# Patient Record
Sex: FEMALE | Race: WHITE | NOT HISPANIC OR LATINO | Employment: OTHER | URBAN - METROPOLITAN AREA
[De-identification: names, ages, dates, MRNs, and addresses within clinical notes are randomized per-mention and may not be internally consistent; named-entity substitution may affect disease eponyms.]

---

## 2017-03-28 ENCOUNTER — OFFICE VISIT (OUTPATIENT)
Dept: CARDIOLOGY | Facility: CLINIC | Age: 82
End: 2017-03-28
Payer: MEDICARE

## 2017-03-28 VITALS
HEART RATE: 64 BPM | DIASTOLIC BLOOD PRESSURE: 70 MMHG | SYSTOLIC BLOOD PRESSURE: 100 MMHG | BODY MASS INDEX: 23.43 KG/M2 | HEIGHT: 63 IN | WEIGHT: 132.25 LBS

## 2017-03-28 DIAGNOSIS — I10 ESSENTIAL HYPERTENSION: Primary | Chronic | ICD-10-CM

## 2017-03-28 DIAGNOSIS — I73.9 PAD (PERIPHERAL ARTERY DISEASE): ICD-10-CM

## 2017-03-28 DIAGNOSIS — I77.1 SUBCLAVIAN ARTERY STENOSIS, LEFT: ICD-10-CM

## 2017-03-28 DIAGNOSIS — I70.1 RIGHT RENAL ARTERY STENOSIS: ICD-10-CM

## 2017-03-28 DIAGNOSIS — E78.5 DYSLIPIDEMIA: Chronic | ICD-10-CM

## 2017-03-28 PROCEDURE — 99213 OFFICE O/P EST LOW 20 MIN: CPT | Mod: PBBFAC | Performed by: INTERNAL MEDICINE

## 2017-03-28 PROCEDURE — 99214 OFFICE O/P EST MOD 30 MIN: CPT | Mod: S$PBB,,, | Performed by: INTERNAL MEDICINE

## 2017-03-28 PROCEDURE — 99999 PR PBB SHADOW E&M-EST. PATIENT-LVL III: CPT | Mod: PBBFAC,,, | Performed by: INTERNAL MEDICINE

## 2017-03-28 RX ORDER — ROSUVASTATIN CALCIUM 5 MG/1
TABLET, COATED ORAL
Qty: 45 TABLET | Refills: 3 | Status: SHIPPED | OUTPATIENT
Start: 2017-03-28 | End: 2018-01-02 | Stop reason: SDUPTHER

## 2017-03-28 RX ORDER — LOSARTAN POTASSIUM 100 MG/1
100 TABLET ORAL DAILY
Qty: 90 TABLET | Refills: 3 | Status: SHIPPED | OUTPATIENT
Start: 2017-03-28 | End: 2018-01-02 | Stop reason: SDUPTHER

## 2017-03-28 NOTE — MR AVS SNAPSHOT
[facility]  [address]  [facility] [address]  Phone: [phone]                  Naheed Cottrell   3/28/2017 1:30 PM   Office Visit    Description:  Female : 1933   Provider:  Ronald Braswell MD   Department:  [facility]           Reason for Visit     Renovascular hypertension           Diagnoses this Visit        Comments    Subclavian artery stenosis, left    -  Primary     Essential hypertension         Right renal artery stenosis         Dyslipidemia                To Do List           Goals (5 Years of Data)     None      Follow-Up and Disposition     Return in about 3 months (around 2017).       These Medications        Disp Refills Start End    losartan (COZAAR) 100 MG tablet 90 tablet 3 3/28/2017     Take 1 tablet (100 mg total) by mouth once daily. - Oral    Pharmacy: [address] Ph #: [phone]       rosuvastatin (CRESTOR) 5 MG tablet 45 tablet 3 3/28/2017     Take one tablet every other day along with CoQ 10    Pharmacy: [facility] [address] [place] [address] Ph #: [phone]         [facility] On Call     [facility] On Call Nurse Care Line -  Assistance  Registered nurses in the [facility] On Call Center provide clinical advisement, health education, appointment booking, and other advisory services.  Call for this free service at [phone].             Medications           Message regarding Medications     Verify the changes and/or additions to your medication regime listed below are the same as discussed with your clinician today.  If any of these changes or additions are incorrect, please notify your healthcare provider.        START taking these NEW medications        Refills    rosuvastatin (CRESTOR) 5 MG tablet 3    Sig: Take one tablet every other day along with CoQ 10    Class: Normal      CHANGE how you are taking these medications     Start Taking  "Instead of    losartan (COZAAR) 100 MG tablet losartan (COZAAR) 50 MG tablet    Dosage:  Take 1 tablet (100 mg total) by mouth once daily. Dosage:  Take 1 tablet (50 mg total) by mouth once daily.    Reason for Change:  Reorder            Verify that the below list of medications is an accurate representation of the medications you are currently taking.  If none reported, the list may be blank. If incorrect, please contact your healthcare provider. Carry this list with you in case of emergency.           Current Medications     carvedilol (COREG) 25 MG tablet Take 1 tablet (25 mg total) by mouth 2 (two) times daily with meals.    chlorthalidone (HYGROTEN) 25 MG Tab Take 1 tablet (25 mg total) by mouth once daily.    cilostazol (PLETAL) 50 MG Tab Take 1 tablet (50 mg total) by mouth 2 (two) times daily.    citalopram (CELEXA) 20 MG tablet Take 1 tablet (20 mg total) by mouth once daily.    losartan (COZAAR) 100 MG tablet Take 1 tablet (100 mg total) by mouth once daily.    rosuvastatin (CRESTOR) 5 MG tablet Take one tablet every other day along with CoQ 10           Clinical Reference Information           Your Vitals Were     BP Pulse Height Weight Last Period BMI    100/70 (BP Location: Left arm, Patient Position: Sitting, BP Method: Manual) 64 5' 3" (1.6 m) 60 kg (132 lb 4.4 oz) 10/01/1980 (Approximate) 23.43 kg/m2      Blood Pressure          Most Recent Value    Right Arm BP - Sitting  168/70    Left Arm BP - Sitting  100/70    BP  100/70      Allergies as of 3/28/2017     No Known Allergies      Immunizations Administered on Date of Encounter - 3/28/2017     None      Orders Placed During Today's Visit     Future Labs/Procedures Expected by Expires    Comprehensive metabolic panel  5/27/2017 5/27/2018    Lipid panel  5/27/2017 (Approximate) 3/28/2018      [facility] Sign-Up     Activating your [facility] account is as easy as 1-2-3!     1) Visit [email or web address], select Sign Up Now, enter this activation code and " your date of birth, then select Next.  [email or web address]  Expires: 5/12/2017  1:09 PM      2) Create a username and password to use when you visit [facility] in the future and select a security question in case you lose your password and select Next.    3) Enter your e-mail address and click Sign Up!    Additional Information  If you have questions, please e-mail [email or web address] or call [phone] to talk to our [facility] staff. Remember, [email or web address] is NOT to be used for urgent needs. For medical emergencies, dial 911.         Instructions    Increase Losartan to 100 mg daily ( 2 of your current tablets until gone then the new prescription )  Add Crestor ( rosuvastatin ) 5 mg every other day along with CoQ 10  200 mg         Language Assistance Services     ATTENTION: Language assistance services are available, free of charge. Please call [phone].      ATENCIÓN: Si habla onofre, tiene a verma disposición servicios gratuitos de asistencia lingüística. Llame al [phone].     PETRA Ý: N?u b?n nói Ti?ng Vi?t, có các d?ch v? h? tr? ngôn ng? mi?n phí dành cho b?n. G?i s? [phone].         [facility] complies with applicable Federal civil rights laws and does not discriminate on the basis of race, color, national origin, age, disability, or sex.

## 2017-03-28 NOTE — PROGRESS NOTES
Subjective:   Patient ID:  Naheed Cottrell is a 84 y.o. female who presents for follow-up of Renovascular hypertension      HPI:   Naheed Cottrell presents for follow up of hypertension with a possible renovascular component. Elevated in the Clinic today ( right arm ). Naheed Cottrell has peripheral vascular disease with asymptomatic left subclavian stenosis. 2-3 block claudication , moderate LICA stenosis, and the mentioned KEVIN. Remains very active. Naheed Cottrell denies chest pain, shortness of breath, palpitations, presyncope , or syncope. Naheed Cottrell has dyslipidemia and has been intolerant of atorvastatin and simvastatin due to myalgias.. .  Review of Systems   Constitution: Negative for weakness, malaise/fatigue, weight gain and weight loss.   HENT: Negative for headaches.    Eyes: Negative for blurred vision.   Cardiovascular: Positive for claudication. Negative for chest pain, cyanosis, dyspnea on exertion, irregular heartbeat, leg swelling, near-syncope, orthopnea, palpitations, paroxysmal nocturnal dyspnea and syncope.   Respiratory: Negative for cough, shortness of breath and wheezing.    Musculoskeletal: Positive for joint pain. Negative for falls and myalgias.   Gastrointestinal: Negative for abdominal pain, heartburn, nausea and vomiting.   Genitourinary: Negative for nocturia.   Neurological: Negative for brief paralysis, dizziness, focal weakness, numbness and paresthesias.   Psychiatric/Behavioral: Negative for altered mental status.       Current Outpatient Prescriptions   Medication Sig    carvedilol (COREG) 25 MG tablet Take 1 tablet (25 mg total) by mouth 2 (two) times daily with meals.    chlorthalidone (HYGROTEN) 25 MG Tab Take 1 tablet (25 mg total) by mouth once daily.    cilostazol (PLETAL) 50 MG Tab Take 1 tablet (50 mg total) by mouth 2 (two) times daily.    citalopram (CELEXA) 20 MG tablet Take 1 tablet (20 mg total) by mouth once daily.    losartan (COZAAR) 100 MG tablet Take  "1 tablet (100 mg total) by mouth once daily.    rosuvastatin (CRESTOR) 5 MG tablet Take one tablet every other day along with CoQ 10     No current facility-administered medications for this visit.      Objective:   Physical Exam   Constitutional: She is oriented to person, place, and time. She appears well-developed. No distress.   /70 (BP Location: Left arm, Patient Position: Sitting, BP Method: Manual)  Pulse 64  Ht 5' 3" (1.6 m)  Wt 60 kg (132 lb 4.4 oz)  LMP 10/01/1980 (Approximate)  BMI 23.43 kg/m2   HENT:   Head: Normocephalic.   Eyes: Conjunctivae are normal. Pupils are equal, round, and reactive to light. No scleral icterus.   Neck: Neck supple. No JVD present. No thyromegaly present.   Cardiovascular: Normal rate, regular rhythm and intact distal pulses.  PMI is not displaced.  Exam reveals no gallop and no friction rub.    Murmur heard.   Medium-pitched midsystolic murmur is present with a grade of 1/6  at the upper left sternal border  Pulses:       Carotid pulses are on the right side with bruit       Radial pulses are 2+ on the right side, and 1+ on the left side.        Dorsalis pedis pulses are 0 on the right side, and 0 on the left side.        Posterior tibial pulses are 0 on the right side, and 0 on the left side.   Pulmonary/Chest: Effort normal and breath sounds normal. No respiratory distress. She has no wheezes. She has no rales.   Abdominal: Soft. She exhibits no distension. There is no splenomegaly or hepatomegaly. There is no tenderness.   Musculoskeletal: She exhibits no edema or tenderness.   Gait normal   Neurological: She is alert and oriented to person, place, and time.   Skin: Skin is warm and dry. She is not diaphoretic.   Psychiatric: She has a normal mood and affect. Her behavior is normal.       Lab Results   Component Value Date     (L) 10/16/2016    K 3.6 10/16/2016     10/16/2016    CO2 23 10/16/2016    BUN 24 (H) 10/16/2016    CREATININE 0.9 10/16/2016 "     (H) 10/16/2016    HGBA1C 5.5 10/01/2015    MG 2.1 10/16/2016    AST 17 10/16/2016    ALT 10 10/16/2016    ALBUMIN 3.5 10/16/2016    PROT 6.8 10/16/2016    BILITOT 0.3 10/16/2016    WBC 10.00 10/16/2016    HGB 12.5 10/16/2016    HCT 38.1 10/16/2016    MCV 93 10/16/2016     10/16/2016    TSH 1.743 10/16/2016    CHOL 144 08/04/2016    HDL 55 08/04/2016    LDLCALC 78.0 08/04/2016    TRIG 55 08/04/2016       Assessment:     1. Essential hypertension : Moderate elevation   2. Right renal artery stenosis : Possible contribution to # 1   3. Subclavian artery stenosis, left : Asymptomatic   4. Dyslipidemia : Untreated   5       Peripheral arterial Disease: 2-3 block claudication - stable    Plan:     Naheed was seen today for renovascular hypertension.    Diagnoses and all orders for this visit:    Essential hypertension  -     Comprehensive metabolic panel; Future; Expected date: 5/27/17  -     losartan (COZAAR) 100 MG tablet; Take 1 tablet (100 mg total) by mouth once daily ( an increase ).    Right renal artery stenosis    Subclavian artery stenosis, left    Dyslipidemia  -     Lipid panel; Future; Expected date: 5/27/17  -     rosuvastatin (CRESTOR) 5 MG tablet; Take one tablet every other day along with CoQ 10  Peripheral Arterial Disease

## 2017-05-29 ENCOUNTER — LAB VISIT (OUTPATIENT)
Dept: LAB | Facility: HOSPITAL | Age: 82
End: 2017-05-29
Attending: INTERNAL MEDICINE
Payer: MEDICARE

## 2017-05-29 ENCOUNTER — TELEPHONE (OUTPATIENT)
Dept: CARDIOLOGY | Facility: CLINIC | Age: 82
End: 2017-05-29

## 2017-05-29 DIAGNOSIS — I10 ESSENTIAL HYPERTENSION: Chronic | ICD-10-CM

## 2017-05-29 DIAGNOSIS — E78.5 DYSLIPIDEMIA: Chronic | ICD-10-CM

## 2017-05-29 LAB
ALBUMIN SERPL BCP-MCNC: 3.4 G/DL
ALP SERPL-CCNC: 64 U/L
ALT SERPL W/O P-5'-P-CCNC: 7 U/L
ANION GAP SERPL CALC-SCNC: 10 MMOL/L
AST SERPL-CCNC: 15 U/L
BILIRUB SERPL-MCNC: 0.4 MG/DL
BUN SERPL-MCNC: 15 MG/DL
CALCIUM SERPL-MCNC: 9.1 MG/DL
CHLORIDE SERPL-SCNC: 103 MMOL/L
CHOLEST/HDLC SERPL: 3.6 {RATIO}
CO2 SERPL-SCNC: 27 MMOL/L
CREAT SERPL-MCNC: 0.8 MG/DL
EST. GFR  (AFRICAN AMERICAN): >60 ML/MIN/1.73 M^2
EST. GFR  (NON AFRICAN AMERICAN): >60 ML/MIN/1.73 M^2
GLUCOSE SERPL-MCNC: 93 MG/DL
HDL/CHOLESTEROL RATIO: 28.1 %
HDLC SERPL-MCNC: 167 MG/DL
HDLC SERPL-MCNC: 47 MG/DL
LDLC SERPL CALC-MCNC: 106.2 MG/DL
NONHDLC SERPL-MCNC: 120 MG/DL
POTASSIUM SERPL-SCNC: 3.8 MMOL/L
PROT SERPL-MCNC: 6.7 G/DL
SODIUM SERPL-SCNC: 140 MMOL/L
TRIGL SERPL-MCNC: 69 MG/DL

## 2017-05-29 PROCEDURE — 36415 COLL VENOUS BLD VENIPUNCTURE: CPT | Mod: PO

## 2017-05-29 PROCEDURE — 80061 LIPID PANEL: CPT

## 2017-05-29 PROCEDURE — 80053 COMPREHEN METABOLIC PANEL: CPT

## 2017-05-30 NOTE — TELEPHONE ENCOUNTER
Results given to patient and she verbalized understanding.    Notes Recorded by Ronald Braswell MD on 5/29/2017 at 5:52 PM CDT  Please inform the patient that in addition to the comments on her cholesterol,  the other lab OK.  ------    Notes Recorded by Ronald Braswell MD on 5/29/2017 at 12:45 PM CDT  Please inform the patient that, although her cholesterol is not perfect, it is much better than the untreated values. Continue every other day rosuvastatin if tolerated

## 2017-06-04 ENCOUNTER — HOSPITAL ENCOUNTER (INPATIENT)
Facility: HOSPITAL | Age: 82
LOS: 3 days | Discharge: HOME HEALTH | DRG: 683 | End: 2017-06-07
Attending: EMERGENCY MEDICINE | Admitting: HOSPITALIST
Payer: MEDICARE

## 2017-06-04 DIAGNOSIS — S42.202A CLOSED FRACTURE OF PROXIMAL END OF LEFT HUMERUS, UNSPECIFIED FRACTURE MORPHOLOGY, INITIAL ENCOUNTER: ICD-10-CM
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
## 2017-06-04 DIAGNOSIS — N17.9 AKI (ACUTE KIDNEY INJURY): ICD-10-CM

## 2017-06-04 DIAGNOSIS — S42.302A CLOSED LEFT HUMERAL FRACTURE: ICD-10-CM

## 2017-06-04 DIAGNOSIS — R79.89 ELEVATED TROPONIN: Primary | ICD-10-CM

## 2017-06-04 DIAGNOSIS — E87.6 HYPOKALEMIA: ICD-10-CM

## 2017-06-04 DIAGNOSIS — I95.9 HYPOTENSION: ICD-10-CM

## 2017-06-04 DIAGNOSIS — I73.9 PAD (PERIPHERAL ARTERY DISEASE): ICD-10-CM

## 2017-06-04 DIAGNOSIS — E78.5 DYSLIPIDEMIA: Chronic | ICD-10-CM

## 2017-06-04 DIAGNOSIS — I10 ESSENTIAL HYPERTENSION: Chronic | ICD-10-CM

## 2017-06-04 DIAGNOSIS — I21.4 NSTEMI (NON-ST ELEVATED MYOCARDIAL INFARCTION): ICD-10-CM

## 2017-06-04 DIAGNOSIS — R11.10 NON-INTRACTABLE VOMITING, PRESENCE OF NAUSEA NOT SPECIFIED, UNSPECIFIED VOMITING TYPE: ICD-10-CM

## 2017-06-04 DIAGNOSIS — T14.90XA TRAUMA: ICD-10-CM

## 2017-06-04 DIAGNOSIS — S42.202A CLOSED FRACTURE OF LEFT PROXIMAL HUMERUS: ICD-10-CM

## 2017-06-04 DIAGNOSIS — N30.00 ACUTE CYSTITIS WITHOUT HEMATURIA: ICD-10-CM
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
## 2017-06-04 DIAGNOSIS — I67.841 REVERSIBLE CEREBROVASCULAR VASOCONSTRICTION SYNDROME: ICD-10-CM

## 2017-06-04 PROBLEM — S42.209A CLOSED FRACTURE OF PROXIMAL END OF HUMERUS: Status: ACTIVE | Noted: 2017-06-04

## 2017-06-04 LAB
ABO + RH BLD: NORMAL
ALBUMIN SERPL BCP-MCNC: 3.1 G/DL
ALP SERPL-CCNC: 67 U/L
ALT SERPL W/O P-5'-P-CCNC: 16 U/L
ANION GAP SERPL CALC-SCNC: 12 MMOL/L
ANION GAP SERPL CALC-SCNC: 15 MMOL/L
APTT BLDCRRT: 21.7 SEC
AST SERPL-CCNC: 29 U/L
BASOPHILS # BLD AUTO: 0.01 K/UL
BASOPHILS NFR BLD: 0.1 %
BILIRUB SERPL-MCNC: 0.6 MG/DL
BLD GP AB SCN CELLS X3 SERPL QL: NORMAL
BNP SERPL-MCNC: 478 PG/ML
BUN SERPL-MCNC: 40 MG/DL
BUN SERPL-MCNC: 43 MG/DL
CALCIUM SERPL-MCNC: 8.3 MG/DL
CALCIUM SERPL-MCNC: 8.7 MG/DL
CHLORIDE SERPL-SCNC: 101 MMOL/L
CHLORIDE SERPL-SCNC: 108 MMOL/L
CK MB SERPL-MCNC: 6.1 NG/ML
CK MB SERPL-RTO: 1.5 %
CK SERPL-CCNC: 418 U/L
CO2 SERPL-SCNC: 18 MMOL/L
CO2 SERPL-SCNC: 21 MMOL/L
CREAT SERPL-MCNC: 2 MG/DL
CREAT SERPL-MCNC: 2.2 MG/DL
DIFFERENTIAL METHOD: ABNORMAL
EOSINOPHIL # BLD AUTO: 0 K/UL
EOSINOPHIL NFR BLD: 0.1 %
ERYTHROCYTE [DISTWIDTH] IN BLOOD BY AUTOMATED COUNT: 14.2 %
EST. GFR  (AFRICAN AMERICAN): 23 ML/MIN/1.73 M^2
EST. GFR  (AFRICAN AMERICAN): 25.9 ML/MIN/1.73 M^2
EST. GFR  (NON AFRICAN AMERICAN): 20 ML/MIN/1.73 M^2
EST. GFR  (NON AFRICAN AMERICAN): 22.4 ML/MIN/1.73 M^2
GLUCOSE SERPL-MCNC: 125 MG/DL
GLUCOSE SERPL-MCNC: 131 MG/DL
HCT VFR BLD AUTO: 31 %
HGB BLD-MCNC: 10.4 G/DL
INR PPP: 1.2
LIPASE SERPL-CCNC: 6 U/L
LYMPHOCYTES # BLD AUTO: 0.5 K/UL
LYMPHOCYTES NFR BLD: 4.6 %
MAGNESIUM SERPL-MCNC: 1.9 MG/DL
MAGNESIUM SERPL-MCNC: 2 MG/DL
MCH RBC QN AUTO: 30.7 PG
MCHC RBC AUTO-ENTMCNC: 33.5 %
MCV RBC AUTO: 91 FL
MONOCYTES # BLD AUTO: 0.8 K/UL
MONOCYTES NFR BLD: 6.5 %
NEUTROPHILS # BLD AUTO: 10.3 K/UL
NEUTROPHILS NFR BLD: 88.7 %
PLATELET # BLD AUTO: 112 K/UL
PMV BLD AUTO: 9.9 FL
POTASSIUM SERPL-SCNC: 2.8 MMOL/L
POTASSIUM SERPL-SCNC: 4 MMOL/L
PROT SERPL-MCNC: 6.3 G/DL
PROTHROMBIN TIME: 12.8 SEC
RBC # BLD AUTO: 3.39 M/UL
SODIUM SERPL-SCNC: 137 MMOL/L
SODIUM SERPL-SCNC: 138 MMOL/L
TROPONIN I SERPL DL<=0.01 NG/ML-MCNC: 2.61 NG/ML
WBC # BLD AUTO: 11.6 K/UL

## 2017-06-04 PROCEDURE — 25000003 PHARM REV CODE 250: Performed by: EMERGENCY MEDICINE

## 2017-06-04 PROCEDURE — 80053 COMPREHEN METABOLIC PANEL: CPT

## 2017-06-04 PROCEDURE — 83880 ASSAY OF NATRIURETIC PEPTIDE: CPT

## 2017-06-04 PROCEDURE — 85025 COMPLETE CBC W/AUTO DIFF WBC: CPT

## 2017-06-04 PROCEDURE — 25000003 PHARM REV CODE 250: Performed by: HOSPITALIST

## 2017-06-04 PROCEDURE — 83690 ASSAY OF LIPASE: CPT

## 2017-06-04 PROCEDURE — 96361 HYDRATE IV INFUSION ADD-ON: CPT

## 2017-06-04 PROCEDURE — 63600175 PHARM REV CODE 636 W HCPCS: Performed by: HOSPITALIST

## 2017-06-04 PROCEDURE — 80048 BASIC METABOLIC PNL TOTAL CA: CPT

## 2017-06-04 PROCEDURE — 87040 BLOOD CULTURE FOR BACTERIA: CPT

## 2017-06-04 PROCEDURE — 96366 THER/PROPH/DIAG IV INF ADDON: CPT

## 2017-06-04 PROCEDURE — 86900 BLOOD TYPING SEROLOGIC ABO: CPT

## 2017-06-04 PROCEDURE — 96365 THER/PROPH/DIAG IV INF INIT: CPT

## 2017-06-04 PROCEDURE — 63600175 PHARM REV CODE 636 W HCPCS: Performed by: EMERGENCY MEDICINE

## 2017-06-04 PROCEDURE — 99283 EMERGENCY DEPT VISIT LOW MDM: CPT | Mod: ,,, | Performed by: INTERNAL MEDICINE

## 2017-06-04 PROCEDURE — 25000003 PHARM REV CODE 250: Performed by: NURSE PRACTITIONER

## 2017-06-04 PROCEDURE — 85730 THROMBOPLASTIN TIME PARTIAL: CPT

## 2017-06-04 PROCEDURE — 99285 EMERGENCY DEPT VISIT HI MDM: CPT | Mod: GC,,, | Performed by: EMERGENCY MEDICINE

## 2017-06-04 PROCEDURE — 93010 ELECTROCARDIOGRAM REPORT: CPT | Mod: ,,, | Performed by: INTERNAL MEDICINE

## 2017-06-04 PROCEDURE — 99285 EMERGENCY DEPT VISIT HI MDM: CPT | Mod: 25

## 2017-06-04 PROCEDURE — 84484 ASSAY OF TROPONIN QUANT: CPT

## 2017-06-04 PROCEDURE — 87186 SC STD MICRODIL/AGAR DIL: CPT

## 2017-06-04 PROCEDURE — 96375 TX/PRO/DX INJ NEW DRUG ADDON: CPT

## 2017-06-04 PROCEDURE — 86901 BLOOD TYPING SEROLOGIC RH(D): CPT

## 2017-06-04 PROCEDURE — 83735 ASSAY OF MAGNESIUM: CPT

## 2017-06-04 PROCEDURE — 85610 PROTHROMBIN TIME: CPT

## 2017-06-04 PROCEDURE — 83735 ASSAY OF MAGNESIUM: CPT | Mod: 91

## 2017-06-04 PROCEDURE — 20600001 HC STEP DOWN PRIVATE ROOM

## 2017-06-04 PROCEDURE — 82553 CREATINE MB FRACTION: CPT

## 2017-06-04 RX ORDER — CARVEDILOL 25 MG/1
25 TABLET ORAL 2 TIMES DAILY WITH MEALS
Status: DISCONTINUED | OUTPATIENT
Start: 2017-06-04 | End: 2017-06-07 | Stop reason: HOSPADM

## 2017-06-04 RX ORDER — ONDANSETRON 2 MG/ML
4 INJECTION INTRAMUSCULAR; INTRAVENOUS EVERY 6 HOURS PRN
Status: DISCONTINUED | OUTPATIENT
Start: 2017-06-04 | End: 2017-06-07 | Stop reason: HOSPADM

## 2017-06-04 RX ORDER — POTASSIUM CHLORIDE 20 MEQ/15ML
40 SOLUTION ORAL
Status: COMPLETED | OUTPATIENT
Start: 2017-06-04 | End: 2017-06-04

## 2017-06-04 RX ORDER — ACETAMINOPHEN 325 MG/1
650 TABLET ORAL EVERY 6 HOURS PRN
Status: DISCONTINUED | OUTPATIENT
Start: 2017-06-04 | End: 2017-06-07 | Stop reason: HOSPADM

## 2017-06-04 RX ORDER — ONDANSETRON 2 MG/ML
4 INJECTION INTRAMUSCULAR; INTRAVENOUS
Status: DISCONTINUED | OUTPATIENT
Start: 2017-06-04 | End: 2017-06-04

## 2017-06-04 RX ORDER — CITALOPRAM 10 MG/1
20 TABLET ORAL DAILY
Status: DISCONTINUED | OUTPATIENT
Start: 2017-06-04 | End: 2017-06-07 | Stop reason: HOSPADM

## 2017-06-04 RX ORDER — POTASSIUM CHLORIDE 7.45 MG/ML
10 INJECTION INTRAVENOUS
Status: COMPLETED | OUTPATIENT
Start: 2017-06-04 | End: 2017-06-04

## 2017-06-04 RX ORDER — ASPIRIN 325 MG
325 TABLET ORAL
Status: DISCONTINUED | OUTPATIENT
Start: 2017-06-04 | End: 2017-06-04

## 2017-06-04 RX ORDER — HYDROCODONE BITARTRATE AND ACETAMINOPHEN 5; 325 MG/1; MG/1
1 TABLET ORAL EVERY 6 HOURS PRN
Status: DISCONTINUED | OUTPATIENT
Start: 2017-06-04 | End: 2017-06-07 | Stop reason: HOSPADM

## 2017-06-04 RX ORDER — SODIUM CHLORIDE 9 MG/ML
INJECTION, SOLUTION INTRAVENOUS CONTINUOUS
Status: DISCONTINUED | OUTPATIENT
Start: 2017-06-04 | End: 2017-06-06

## 2017-06-04 RX ORDER — ROSUVASTATIN CALCIUM 5 MG/1
5 TABLET, COATED ORAL NIGHTLY
Status: DISCONTINUED | OUTPATIENT
Start: 2017-06-04 | End: 2017-06-07 | Stop reason: HOSPADM

## 2017-06-04 RX ADMIN — ROSUVASTATIN CALCIUM 5 MG: 5 TABLET, FILM COATED ORAL at 09:06

## 2017-06-04 RX ADMIN — SODIUM CHLORIDE 500 ML: 0.9 INJECTION, SOLUTION INTRAVENOUS at 08:06

## 2017-06-04 RX ADMIN — POTASSIUM CHLORIDE 10 MEQ: 10 INJECTION, SOLUTION INTRAVENOUS at 08:06

## 2017-06-04 RX ADMIN — ONDANSETRON 4 MG: 2 INJECTION INTRAMUSCULAR; INTRAVENOUS at 11:06

## 2017-06-04 RX ADMIN — ACETAMINOPHEN 650 MG: 325 TABLET ORAL at 09:06

## 2017-06-04 RX ADMIN — POTASSIUM CHLORIDE 40 MEQ: 20 SOLUTION ORAL at 08:06

## 2017-06-04 RX ADMIN — CITALOPRAM HYDROBROMIDE 20 MG: 10 TABLET ORAL at 02:06

## 2017-06-04 RX ADMIN — SODIUM CHLORIDE: 0.9 INJECTION, SOLUTION INTRAVENOUS at 12:06

## 2017-06-04 RX ADMIN — SODIUM CHLORIDE 1000 ML: 0.9 INJECTION, SOLUTION INTRAVENOUS at 06:06

## 2017-06-04 NOTE — HPI
Patient is a 84 y.o. female right hand dominant with PMH of HTN and HLD presents with left shoulder pain after a fall.  She fell and hit her shoulder earlier today. She noticed immediate pain and presented to [facility] [place] ED.  Denies numbness/paresthesias, head injury, or pain/trauma to other joints/extremities.  In ED she was found to have an elevated troponin and was admitted for observation.

## 2017-06-04 NOTE — ED NOTES
I acknowledge care of this pt. Pt AAO x 4 lying in stretcher with family at bedside. Left arm propped up with blankets. Pt and family aware awaiting ortho to come and speak with pt. Pt denies pain at this time. Pt on continuous cardiac monitor and pulse ox with blood pressure cycling every thirty minutes. Respirations are unlabored and equal. Side rails up x 2 bed locked and in low position with call light in reach. VSS. NAD noted. Will continue to monitor

## 2017-06-04 NOTE — CONSULTS
[facility]  Cardiology Service ER Consult Note    Attending Physician: Scarlett Oliver MD  Reason for Consult: Elevated Troponin    HPI:     Naheed Cottrell is an 84 year old pleasant female patient who presents to the ED after a mechanical fall at home. Cardiology service is consulted for elevated troponin. The patient has a PMH of HTN, extensive vasculopathy (carotid artery stenosis, intermittent claudication, subclavian stenosis).    The patient has been experiencing nausea and non-bloody, non-bilious vomiting for the past 2 days. Her oral intake has been poor as well. Earlier this morning, she was not feeling well and wanted to take a tylenol. When she was walking from the kitchen, she tripped and fell down. She had called her son prior to that so her son came to her home and found her on the floor and confused. The patient remembers the whole event and she denies any chest pain, SOB, palpitations, loss of consciousness. She fell towards a wall and hit her left arm. No head trauma. She was not able to get up after the fall. EMS was called and when they arrived, her SBP was in the 80s (right arm BP usually runs around 160 as per her son, left arm BP is 60 mm Hg lower because of subclavian stenosis.). She was diaphoretic as well. After arrival to the ED, her XRay showed non-displaced fracture of her left humerus. Her mental status is now back to normal and she continued to deny any abdominal pain, chest pain, focal weakness, bleeding, headache, dysuria. She passes gas and had bowel movement this morning. She has left shoulder pain when she moves it.    The patient has no personal history of CAD / HF. She follows up with Dr. AMADEO Braswell from cardiology. Her Losartan dose was increased to 100 mg in March 2017.    ROS: Negative except mentioned in the HPI.      PMH:     Past Medical History:   Diagnosis Date    Arthritis     Cataract     Encounter for blood transfusion     Fall, accidental      Hypertension     Right renal artery stenosis 3/28/2017    Stenosis of left subclavian artery      Past Surgical History:   Procedure Laterality Date    EYE SURGERY      FRACTURE SURGERY          Medications:     No current facility-administered medications on file prior to encounter.      Current Outpatient Prescriptions on File Prior to Encounter   Medication Sig Dispense Refill    carvedilol (COREG) 25 MG tablet Take 1 tablet (25 mg total) by mouth 2 (two) times daily with meals. 180 tablet 11    chlorthalidone (HYGROTEN) 25 MG Tab Take 1 tablet (25 mg total) by mouth once daily. 90 tablet 11    cilostazol (PLETAL) 50 MG Tab Take 1 tablet (50 mg total) by mouth 2 (two) times daily. 180 tablet 3    citalopram (CELEXA) 20 MG tablet Take 1 tablet (20 mg total) by mouth once daily. 90 tablet 2    losartan (COZAAR) 100 MG tablet Take 1 tablet (100 mg total) by mouth once daily. 90 tablet 3    rosuvastatin (CRESTOR) 5 MG tablet Take one tablet every other day along with CoQ 10 45 tablet 3        Physical Exam:     Vitals:  Temp:  [97.7 °F (36.5 °C)-99.1 °F (37.3 °C)]   Pulse:  [73-86]   Resp:  [16-18]   BP: ()/(50-62)   SpO2:  [96 %-97 %]        Physical Exam   Constitutional: She is oriented to person, place, and time. No distress.   HENT:   Head: Normocephalic and atraumatic.   Dry mouth.   Eyes: No scleral icterus.   Neck: No JVD present.   Bilateral carotid bruit (R>L)   Cardiovascular:   Murmur heard.  2/6 TAMY right upper sternal border   Pulmonary/Chest: She has no wheezes. She has rales.   Right basilar rales   Abdominal: Soft. There is no tenderness. There is no rebound.   Bowel sounds present   Musculoskeletal: She exhibits no edema.   Neurological: She is alert and oriented to person, place, and time.   Skin: Skin is warm and dry. She is not diaphoretic.   Decreased turgor.         Labs:     Recent Results (from the past 336 hour(s))   CBC auto differential    Collection Time: 06/04/17  6:16 AM    Result Value Ref Range    WBC 11.60 3.90 - 12.70 K/uL    Hemoglobin 10.4 (L) 12.0 - 16.0 g/dL    Hematocrit 31.0 (L) 37.0 - 48.5 %    Platelets 112 (L) 150 - 350 K/uL       No results found for this or any previous visit (from the past 336 hour(s)).      Recent Labs  Lab 06/04/17  0616   TROPONINI 2.610*   CPKMB 6.1   *   MB 1.5       Imaging:  CXR: Chronic bibasilar opacities, no change compared to before    Echo: 2015   1 - Normal left ventricular systolic function (EF 65-70%).     2 - Normal right ventricular systolic function .     3 - Grade I left ventricular diastolic dysfunction.     4 - Low central venous pressure.     5 - Normal ascending aorta.     EKG:   Normal sinus rhythm, normal ecg    Assessment & Recommendations:     84 year old female patient with a PMH of HTN, extensive vasculopathy (carotid artery stenosis, intermittent claudication, subclavian stenosis). The patient was seen with the following diagnosis:    1) Dehydration  2) Hypotension  3) ANGELICA  4) Hypokalemia   - All of the above likely triggered by poor oral intake, gastroenteritis and contributed by diuretics and BP medications.  - Dry on exam. BP was significantly lower compared to her baseline.    5) Elevated Troponin  - Most likely due to hypotension and ANGELICA  - No chest pain or ischemic ECG changes.  - Not consistent with an ACS.     6) Humerus Fracture  - Non-displaced. Ortho has been consulted.    Recommendations:  - Should admit to medicine.  - IV hydration and replace electrolytes.  - Hold BP medications for now until her BP returns back to her baseline.  - Should repeat one more set of troponin to see the trend. Even if it goes up, this is unlikely to be due to an ACS.  - Continue with ASA and statin.  - Hold pletal until ANGELICA resolves.  - Should obtain 2D echo with CFD to evaluate heart function.    Cardiology service will follow the patient. I discussed with Dr. Sharma, ED team and Dr. Cottrell who is the son of the patient.  Dr. Cottrell and the patient are not interested in any extensive invasive work-up.    Thank you for this consult. Further recommendations are pending the attending addendum. Please do not hesitate to page with questions.     Signed:  Jose Carlos Daly MD  Cardiology Fellow, PGY-6  Pager: [phone]  6/4/2017 9:22 AM    Attending Addendum:

## 2017-06-04 NOTE — ASSESSMENT & PLAN NOTE
- will pursue non-operative treatment  - sling LUE  - NWB LUE  - will f/u in ortho clinic in 1-2 weeks, clinic will call to schedule

## 2017-06-04 NOTE — CONSULTS
[facility]  Orthopedics  Consult Note    Attg Note: Patient seen and examined.   I agree with the above assessment and plan.  Left proximal humerus fracture in good alignment.  NVI distally.  Sling not providing good support.  Will change from sling to shoulder immobilzer.  Will schedule clinic f/u.      Jose J Wilder MD      Patient Name: Naheed Cottrell  MRN: [id number]  Admission Date: 6/4/2017  Hospital Length of Stay: 0 days  Attending Provider: Scarlett Billings MD  Primary Care Provider: Mack Cottrell MD    Patient information was obtained from patient.     Inpatient consult to Orthopedic Surgery  Consult performed by: AIDEN BECKETT  Consult ordered by: SCARLETT BILLINGS        Subjective:     Principal Problem:ANGELICA (acute kidney injury)    Chief Complaint:   Chief Complaint   Patient presents with    Fall     trip and fall, family reports confusion. denies LOC. left shoulder pain.         HPI: Patient is a 84 y.o. female right hand dominant with PMH of HTN and HLD presents with left shoulder pain after a fall.  She fell and hit her shoulder earlier today. She noticed immediate pain and presented to [facility] ED.  Denies numbness/paresthesias, head injury, or pain/trauma to other joints/extremities.  In ED she was found to have an elevated troponin and was admitted for observation.    Past Medical History:   Diagnosis Date    Arthritis     Cataract     Encounter for blood transfusion     Fall, accidental     Hypertension     Right renal artery stenosis 3/28/2017    Stenosis of left subclavian artery        Past Surgical History:   Procedure Laterality Date    EYE SURGERY      FRACTURE SURGERY         Review of patient's allergies indicates:  No Known Allergies    Current Facility-Administered Medications   Medication    0.9%  NaCl infusion    carvedilol tablet 25 mg    citalopram tablet 20 mg    ondansetron injection 4 mg    rosuvastatin tablet 5 mg     Family History      "Problem Relation (Age of Onset)    Hyperlipidemia Mother    Hypertension Mother        Social History Main Topics    Smoking status: Never Smoker    Smokeless tobacco: Never Used    Alcohol use No    Drug use: No    Sexual activity: Not on file     ROS     Negative except as noted in HPI    Objective:     Vital Signs (Most Recent):  Temp: 98.5 °F (36.9 °C) (06/04/17 1241)  Pulse: 91 (06/04/17 1241)  Resp: 16 (06/04/17 1241)  BP: 136/60 (06/04/17 1241)  SpO2: (!) 93 % (06/04/17 1241) Vital Signs (24h Range):  Temp:  [97.7 °F (36.5 °C)-99.1 °F (37.3 °C)] 98.5 °F (36.9 °C)  Pulse:  [73-91] 91  Resp:  [16-18] 16  SpO2:  [93 %-97 %] 93 %  BP: ()/(50-62) 136/60     Weight: 59 kg (130 lb)  Height: 5' 3" (160 cm)  Body mass index is 23.03 kg/m².    No intake or output data in the 24 hours ending 06/04/17 1353    Ortho/SPM Exam    Gen:  No acute distress  CV:  Peripherally well-perfused.  Pulses 2+ bilaterally.  Lungs:  Normal respiratory effort.  Neuro:  CN intact without deficit, SILT throughout B/L Upper & Lower Extremities  Pelvis: No TTP, Stable to direct anterior pressure over ASIS.    MSK:  LUE:  - no gross deformity  - skin intact  - ttp to shoulder/proximal humerus  - no ttp to elbow or distally  - pain with ROM shoulder  - AIN/PIN/Radial/Median/Ulnar Nerves assessed in isolation without deficit  - SILT throughout  - Radial & Ulnar arteries palpated 2+  - Capillary Refill <3s         Significant Labs:   BMP:   Recent Labs  Lab 06/04/17  1109   *      K 4.0      CO2 18*   BUN 40*   CREATININE 2.0*   CALCIUM 8.3*   MG 2.0     CBC:   Recent Labs  Lab 06/04/17  0616   WBC 11.60   HGB 10.4*   HCT 31.0*   *     All pertinent labs within the past 24 hours have been reviewed.    Significant Imaging:   Comminuted left proximal humerus fracture    Assessment/Plan:     Closed fracture of left proximal humerus    - will pursue non-operative treatment  - carlos DE JESUS  - CASSIE DE JESUS  - will f/u in " ortho clinic in 1-2 weeks, clinic will call to schedule              Willy Longoria MD  Orthopedics  [facility]

## 2017-06-04 NOTE — ED PROVIDER NOTES
Encounter Date: 6/4/2017       History     Chief Complaint   Patient presents with    Fall     trip and fall, family reports confusion. denies LOC. left shoulder pain.      Review of patient's allergies indicates:  No Known Allergies  HPI   Presents after fall.  Patient complains of left shoulder pain.  Went to a play tonight, was walking to car and felt a little clammy.  Has been vomiting since Friday night.  She took her BP meds this evening as well.  This evening was walking to the bathroom, went to step over a mirror that she left on the ground and she tripped and hit her left shoulder on the edge of bathroom counter.  She didn't hit her head or have LOC.  She remembers the entire event.  Right now she complains of left shoulder pain.    Past Medical History:   Diagnosis Date    Arthritis     Cataract     Encounter for blood transfusion     Fall, accidental     Hypertension     Right renal artery stenosis 3/28/2017    Stenosis of left subclavian artery      Past Surgical History:   Procedure Laterality Date    EYE SURGERY      FRACTURE SURGERY       Family History   Problem Relation Age of Onset    Hyperlipidemia Mother     Hypertension Mother      Social History   Substance Use Topics    Smoking status: Never Smoker    Smokeless tobacco: Never Used    Alcohol use No     Review of Systems   Gastrointestinal: Positive for nausea and vomiting.   Musculoskeletal:        Left shoulder pain   All other systems reviewed and are negative.      Physical Exam     Initial Vitals [06/04/17 0406]   BP Pulse Resp Temp SpO2   109/62 86 18 99.1 °F (37.3 °C) 97 %     Physical Exam  Physical Exam  Gen/Constitutional: Interactive. No acute distress  Head: Normocephalic, Atraumatic  Neck: supple, no masses or LAD  Eyes: PERRLA, conjunctiva clear  Ears, Nose and Throat: No rhinorrhea or stridor.  Cardiac: Reg Rhythm, No murmur  Pulmonary: CTA Bilat, no wheezes, rhonchi, rales.  GI: Abdomen soft, non-tender,  non-distended; no rebound or guarding  : No CVA tenderness.  Musculoskeletal: Extremities warm, well perfused, no erythema, no edema, left radial pulse intact.  Left shoulder ttp overlying humeral head.  Skin: No rashes  Neuro: Alert and Oriented x 3; No focal motor or sensory deficits.    Psych: Normal affect    ED Course   Procedures  Labs Reviewed   CBC W/ AUTO DIFFERENTIAL - Abnormal; Notable for the following:        Result Value    RBC 3.39 (*)     Hemoglobin 10.4 (*)     Hematocrit 31.0 (*)     Platelets 112 (*)     Gran # 10.3 (*)     Lymph # 0.5 (*)     Gran% 88.7 (*)     Lymph% 4.6 (*)     All other components within normal limits   COMPREHENSIVE METABOLIC PANEL - Abnormal; Notable for the following:     Potassium 2.8 (*)     CO2 21 (*)     Glucose 131 (*)     BUN, Bld 43 (*)     Creatinine 2.2 (*)     Albumin 3.1 (*)     eGFR if  23.0 (*)     eGFR if non  20.0 (*)     All other components within normal limits   B-TYPE NATRIURETIC PEPTIDE - Abnormal; Notable for the following:      (*)     All other components within normal limits   TROPONIN I - Abnormal; Notable for the following:     Troponin I 2.610 (*)     All other components within normal limits   PROTIME-INR - Abnormal; Notable for the following:     Prothrombin Time 12.8 (*)     All other components within normal limits   CK-MB - Abnormal; Notable for the following:      (*)     All other components within normal limits    Narrative:     ADD ON PER DR LEVIN, [facility], ORDER #[id number]   08:20  06/04/2017    BASIC METABOLIC PANEL - Abnormal; Notable for the following:     CO2 18 (*)     Glucose 125 (*)     BUN, Bld 40 (*)     Creatinine 2.0 (*)     Calcium 8.3 (*)     eGFR if  25.9 (*)     eGFR if non  22.4 (*)     All other components within normal limits   CULTURE, URINE   LIPASE   MAGNESIUM   APTT   CK   CK-MB   MAGNESIUM   TYPE & SCREEN       CT Upper Extremity Wo  Contrast Left   Final Result         Comminuted fracture of the proximal left humerus with mild impaction and posterior angulation as discussed above.      No intra-articular fragments.      Consolidative process within the lingula demonstrate mild bronchiectasis suggesting chronic nontuberculous mycobacterial infection.   ______________________________________       Electronically signed by resident: ANNETTE GALLAGHER MD   Date:     06/04/17   Time:    12:03                  As the supervising and teaching physician, I personally reviewed the images and resident's interpretation and I agree with the findings.         ______________________________________       Electronically signed by resident: ANNETTE GALLAGHER MD   Date:     06/04/17   Time:    12:10                  As the supervising and teaching physician, I personally reviewed the images and resident's interpretation and I agree with the findings.                  Electronically signed by: ROWAN CERDA   Date:     06/04/17   Time:    16:16       X-Ray Humerus 2 View Left   Final Result      Redemonstration of comminuted, minimally impacted fracture of the left humeral head.         Electronically signed by: HARSHA GOLDBERG   Date:     06/04/17   Time:    06:36       X-Ray Chest 1 View   Final Result      As above.         Electronically signed by: HARSHA GOLDBERG   Date:     06/04/17   Time:    06:15       X-Ray Shoulder Trauma Left   Final Result      Comminuted, minimally impacted fracture left humeral head with involvement of the greater tuberosity.            Electronically signed by: HARSHA GOLDBERG   Date:     06/04/17   Time:    06:13       CT Head Without Contrast    (Results Pending)                   APC / Resident Notes:   HO3 MDM:  84F w/ n/v, malaise, and mech trip and fall. No cardiac history known. Exam - poor skin turgor, dry membranes, L shoulder ttp and decr ROM d/t pain but no deformity.    Wx - cardiac labs, EKG - NSR 75 rate, normal axes and intervals with no  acute ischemic changes appreciated.    Tx - fluids for suspected dehydration     Sunil Sullivan  [facility] EM PGY3  6/4/2017 5:27 AM    Update -     Labs reviewed - NSTEMI w/ trop 2.2, asa ordered. K repletion initiated. Dr De Oliveira updated pt and family on condition and diagnosis. Cardiology consulted, at bedside.     Ortho reviewed films - L humeral head fx, minimal displacement, no dislocation - nonoperative, sling for comfort and immobilization.     Sunil Sullivan  [facility] EM PGY3  6/4/2017 8:06 AM               Attending Attestation:   Physician Attestation Statement for Resident:  As the supervising MD   Physician Attestation Statement: I have personally seen and examined this patient.   I agree with the above history. -:   As the supervising MD I agree with the above PE.    As the supervising MD I agree with the above treatment, course, plan, and disposition.   -: Patient presents after a mechanical fall, ultimately diagnosed with left humeral head fracture.  Orthopedics consult it and recommended sling and likely nonoperative management of this.  The patient also had incidentally noted acute renal insufficiency on labs and I felt she was significantly dehydrated.  She also had a troponin that was elevated.  Her EKG was nonischemic appearing.  Cardiology was consulted and recommends admission to medicine for trending of troponin and IV fluid rehydration.  Cardiology did not feel her elevated troponin was due to MI/ACS or an STEMI.  They felt it was most likely due to AKA I, dehydration and hypotension.  The patient was given fluid bolus.  Medicine accepted the patient for admission  I have reviewed and agree with the residents interpretation of the following: lab data, x-rays and EKG.  I have reviewed the following: old records at this facility.                    ED Course     Clinical Impression:   The primary encounter diagnosis was Elevated troponin. Diagnoses of Trauma, Fall, Closed left humeral fracture,  Hypokalemia, ANGELICA (acute kidney injury), Non-intractable vomiting, presence of nausea not specified, unspecified vomiting type, Hypotension, NSTEMI (non-ST elevated myocardial infarction), ANGELICA (acute kidney injury), Acute cystitis without hematuria, Closed fracture of proximal end of left humerus, unspecified fracture morphology, initial encounter, Dyslipidemia, and PAD (peripheral artery disease) were also pertinent to this visit.          Sunil Sullivna MD  Resident  06/04/17 0811       Robin De Oliveira MD  06/05/17 8511

## 2017-06-04 NOTE — ED PROVIDER NOTES
Encounter Date: 6/4/2017    SCRIBE #1 NOTE: I, Sunil Mckay, am scribing for, and in the presence of, Dr. De Oliveira . Other sections scribed: X-rays.       History     Chief Complaint   Patient presents with    Fall     trip and fall, family reports confusion. denies LOC. left shoulder pain.      Review of patient's allergies indicates:  No Known Allergies  HPI   Presents after fall.  Patient complains of left shoulder pain.  Went to a [employer], was walking to car and felt a little clammy.  Has been vomiting since Friday night.  She took her BP meds this evening as well.  This evening was walking to the bathroom, went to step over a mirror that she left on the ground and she tripped and hit her left shoulder on the edge of bathroom counter.  She didn't hit her head or have LOC.  She remembers the entire event.  Right now she complains of left shoulder pain.    Past Medical History:   Diagnosis Date    Arthritis     Cataract     Encounter for blood transfusion     Hypertension     Right renal artery stenosis 3/28/2017     Past Surgical History:   Procedure Laterality Date    EYE SURGERY      FRACTURE SURGERY       Family History   Problem Relation Age of Onset    Hyperlipidemia Mother     Hypertension Mother      Social History   Substance Use Topics    Smoking status: Never Smoker    Smokeless tobacco: Never Used    Alcohol use No     Review of Systems   Gastrointestinal: Positive for nausea and vomiting.   Musculoskeletal:        Left shoulder pain.   All other systems reviewed and are negative.      Physical Exam     Initial Vitals [06/04/17 0406]   BP Pulse Resp Temp SpO2   109/62 86 18 99.1 °F (37.3 °C) 97 %     Physical Exam  Gen/Constitutional: Interactive. No acute distress  Head: Normocephalic, Atraumatic  Neck: supple, no masses or LAD  Eyes: PERRLA, conjunctiva clear  Ears, Nose and Throat: No rhinorrhea or stridor.  Cardiac: Reg Rhythm, No murmur  Pulmonary: CTA Bilat, no wheezes, rhonchi,  rales.  GI: Abdomen soft, non-tender, non-distended; no rebound or guarding  : No CVA tenderness.  Musculoskeletal: Extremities warm, well perfused, no erythema, no edema, left radial pulse intact.  Left shoulder ttp overlying humeral head.  Skin: No rashes  Neuro: Alert and Oriented x 3; No focal motor or sensory deficits.    Psych: Normal affect    ED Course   Procedures  Labs Reviewed - No data to display     X-Ray Shoulder Trauma Left    (Results Pending)   X-Ray Chest 1 View    (Results Pending)          Medical Decision Making:   History:   Old Medical Records: I decided to obtain old medical records.  Independently Interpreted Test(s):   I have ordered and independently interpreted X-rays - see prior notes.       APC / Resident Notes:   HO3 MDM:      Sunil Sullivan  [employer] EM PGY3  6/4/2017 5:27 AM             Scribe Attestation:   Scribe #1: I performed the above scribed service and the documentation accurately describes the services I performed. I attest to the accuracy of the note.    Attending Attestation:           Physician Attestation for Scribe:  Physician Attestation Statement for Scribe #1: I, Dr. De Oliveira , reviewed documentation, as scribed by Sunil Mckay in my presence, and it is both accurate and complete.                 ED Course     Clinical Impression:   The encounter diagnosis was Trauma.

## 2017-06-04 NOTE — ED NOTES
Pt sitting up in stretcher with family at bedside. Pt offers no complaints at this time. Will continue to monitor

## 2017-06-04 NOTE — SUBJECTIVE & OBJECTIVE
"Past Medical History:   Diagnosis Date    Arthritis     Cataract     Encounter for blood transfusion     Fall, accidental     Hypertension     Right renal artery stenosis 3/28/2017    Stenosis of left subclavian artery        Past Surgical History:   Procedure Laterality Date    EYE SURGERY      FRACTURE SURGERY         Review of patient's allergies indicates:  No Known Allergies    Current Facility-Administered Medications   Medication    0.9%  NaCl infusion    carvedilol tablet 25 mg    citalopram tablet 20 mg    ondansetron injection 4 mg    rosuvastatin tablet 5 mg     Family History     Problem Relation (Age of Onset)    Hyperlipidemia Mother    Hypertension Mother        Social History Main Topics    Smoking status: Never Smoker    Smokeless tobacco: Never Used    Alcohol use No    Drug use: No    Sexual activity: Not on file     ROS     Negative except as noted in HPI    Objective:     Vital Signs (Most Recent):  Temp: 98.5 °F (36.9 °C) (06/04/17 1241)  Pulse: 91 (06/04/17 1241)  Resp: 16 (06/04/17 1241)  BP: 136/60 (06/04/17 1241)  SpO2: (!) 93 % (06/04/17 1241) Vital Signs (24h Range):  Temp:  [97.7 °F (36.5 °C)-99.1 °F (37.3 °C)] 98.5 °F (36.9 °C)  Pulse:  [73-91] 91  Resp:  [16-18] 16  SpO2:  [93 %-97 %] 93 %  BP: ()/(50-62) 136/60     Weight: 59 kg (130 lb)  Height: 5' 3" (160 cm)  Body mass index is 23.03 kg/m².    No intake or output data in the 24 hours ending 06/04/17 1353    Ortho/SPM Exam    Gen:  No acute distress  CV:  Peripherally well-perfused.  Pulses 2+ bilaterally.  Lungs:  Normal respiratory effort.  Neuro:  CN intact without deficit, SILT throughout B/L Upper & Lower Extremities  Pelvis: No TTP, Stable to direct anterior pressure over ASIS.    MSK:  LUE:  - no gross deformity  - skin intact  - ttp to shoulder/proximal humerus  - no ttp to elbow or distally  - pain with ROM shoulder  - AIN/PIN/Radial/Median/Ulnar Nerves assessed in isolation without deficit  - SILT " throughout  - Radial & Ulnar arteries palpated 2+  - Capillary Refill <3s         Significant Labs:   BMP:   Recent Labs  Lab 06/04/17  1109   *      K 4.0      CO2 18*   BUN 40*   CREATININE 2.0*   CALCIUM 8.3*   MG 2.0     CBC:   Recent Labs  Lab 06/04/17  0616   WBC 11.60   HGB 10.4*   HCT 31.0*   *     All pertinent labs within the past 24 hours have been reviewed.    Significant Imaging:   Comminuted left proximal humerus fracture

## 2017-06-04 NOTE — ED TRIAGE NOTES
Patient reports to ED via EMS for c/c of fall. FMs at bedside reports that patient had been nauseous with emesis, had minimal to eat, and took her all of her BP medications prior to her fall.   Patient was found on the floor by family members.     Patient reports that she tripped and fell and hit her left shoulder. Denies LOC or further injury.

## 2017-06-05 PROBLEM — N30.00 ACUTE CYSTITIS WITHOUT HEMATURIA: Status: ACTIVE | Noted: 2017-06-05

## 2017-06-05 PROBLEM — D69.6 THROMBOCYTOPENIA, UNSPECIFIED: Status: ACTIVE | Noted: 2017-06-05

## 2017-06-05 PROBLEM — E55.9 VITAMIN D DEFICIENCY: Status: ACTIVE | Noted: 2017-06-05

## 2017-06-05 PROBLEM — E87.6 HYPOKALEMIA: Status: ACTIVE | Noted: 2017-06-05

## 2017-06-05 PROBLEM — R78.81 GRAM-NEGATIVE BACTEREMIA: Status: ACTIVE | Noted: 2017-06-05

## 2017-06-05 PROBLEM — D64.9 ANEMIA: Status: ACTIVE | Noted: 2017-06-05

## 2017-06-05 LAB
ANION GAP SERPL CALC-SCNC: 9 MMOL/L
AORTIC VALVE REGURGITATION: ABNORMAL
AORTIC VALVE STENOSIS: ABNORMAL
BACTERIA #/AREA URNS AUTO: ABNORMAL /HPF
BASOPHILS # BLD AUTO: 0 K/UL
BASOPHILS # BLD AUTO: 0 K/UL
BASOPHILS NFR BLD: 0 %
BASOPHILS NFR BLD: 0 %
BILIRUB UR QL STRIP: NEGATIVE
BUN SERPL-MCNC: 42 MG/DL
CALCIUM SERPL-MCNC: 7.3 MG/DL
CHLORIDE SERPL-SCNC: 107 MMOL/L
CLARITY UR REFRACT.AUTO: ABNORMAL
CO2 SERPL-SCNC: 18 MMOL/L
COLOR UR AUTO: YELLOW
CREAT SERPL-MCNC: 1.4 MG/DL
DIASTOLIC DYSFUNCTION: YES
DIFFERENTIAL METHOD: ABNORMAL
DIFFERENTIAL METHOD: ABNORMAL
EOSINOPHIL # BLD AUTO: 0 K/UL
EOSINOPHIL # BLD AUTO: 0 K/UL
EOSINOPHIL NFR BLD: 0 %
EOSINOPHIL NFR BLD: 0 %
ERYTHROCYTE [DISTWIDTH] IN BLOOD BY AUTOMATED COUNT: 14.7 %
ERYTHROCYTE [DISTWIDTH] IN BLOOD BY AUTOMATED COUNT: 15 %
EST. GFR  (AFRICAN AMERICAN): 39.8 ML/MIN/1.73 M^2
EST. GFR  (NON AFRICAN AMERICAN): 34.5 ML/MIN/1.73 M^2
ESTIMATED PA SYSTOLIC PRESSURE: 47.33
GLUCOSE SERPL-MCNC: 110 MG/DL
GLUCOSE UR QL STRIP: NEGATIVE
HCT VFR BLD AUTO: 26.5 %
HCT VFR BLD AUTO: 28.1 %
HGB BLD-MCNC: 8.5 G/DL
HGB BLD-MCNC: 9.2 G/DL
HGB UR QL STRIP: ABNORMAL
HYALINE CASTS UR QL AUTO: 0 /LPF
KETONES UR QL STRIP: NEGATIVE
LEUKOCYTE ESTERASE UR QL STRIP: ABNORMAL
LYMPHOCYTES # BLD AUTO: 0.4 K/UL
LYMPHOCYTES # BLD AUTO: 0.4 K/UL
LYMPHOCYTES NFR BLD: 4.8 %
LYMPHOCYTES NFR BLD: 5.5 %
MAGNESIUM SERPL-MCNC: 1.6 MG/DL
MCH RBC QN AUTO: 29.9 PG
MCH RBC QN AUTO: 30.1 PG
MCHC RBC AUTO-ENTMCNC: 32.1 %
MCHC RBC AUTO-ENTMCNC: 32.7 %
MCV RBC AUTO: 92 FL
MCV RBC AUTO: 93 FL
MICROSCOPIC COMMENT: ABNORMAL
MITRAL VALVE REGURGITATION: ABNORMAL
MONOCYTES # BLD AUTO: 0.1 K/UL
MONOCYTES # BLD AUTO: 0.4 K/UL
MONOCYTES NFR BLD: 1.8 %
MONOCYTES NFR BLD: 5.5 %
NEUTROPHILS # BLD AUTO: 6.6 K/UL
NEUTROPHILS # BLD AUTO: 6.8 K/UL
NEUTROPHILS NFR BLD: 89 %
NEUTROPHILS NFR BLD: 93.3 %
NITRITE UR QL STRIP: NEGATIVE
PH UR STRIP: 5 [PH] (ref 5–8)
PHOSPHATE SERPL-MCNC: 2.7 MG/DL
PLATELET # BLD AUTO: 91 K/UL
PLATELET # BLD AUTO: 95 K/UL
PMV BLD AUTO: 10.6 FL
PMV BLD AUTO: 10.7 FL
POTASSIUM SERPL-SCNC: 3.4 MMOL/L
PROT UR QL STRIP: ABNORMAL
RBC # BLD AUTO: 2.84 M/UL
RBC # BLD AUTO: 3.06 M/UL
RBC #/AREA URNS AUTO: 2 /HPF (ref 0–4)
RETIRED EF AND QEF - SEE NOTES: 65 (ref 55–65)
SODIUM SERPL-SCNC: 134 MMOL/L
SP GR UR STRIP: 1.01 (ref 1–1.03)
SQUAMOUS #/AREA URNS AUTO: 1 /HPF
TRICUSPID VALVE REGURGITATION: ABNORMAL
URN SPEC COLLECT METH UR: ABNORMAL
UROBILINOGEN UR STRIP-ACNC: NEGATIVE EU/DL
WBC # BLD AUTO: 7.28 K/UL
WBC # BLD AUTO: 7.43 K/UL
WBC #/AREA URNS AUTO: >100 /HPF (ref 0–5)
WBC CLUMPS UR QL AUTO: ABNORMAL

## 2017-06-05 PROCEDURE — 99232 SBSQ HOSP IP/OBS MODERATE 35: CPT | Mod: ,,, | Performed by: HOSPITALIST

## 2017-06-05 PROCEDURE — 25000003 PHARM REV CODE 250: Performed by: HOSPITALIST

## 2017-06-05 PROCEDURE — 82272 OCCULT BLD FECES 1-3 TESTS: CPT

## 2017-06-05 PROCEDURE — 93306 TTE W/DOPPLER COMPLETE: CPT | Mod: PBBFAC | Performed by: INTERNAL MEDICINE

## 2017-06-05 PROCEDURE — 93306 TTE W/DOPPLER COMPLETE: CPT

## 2017-06-05 PROCEDURE — 99222 1ST HOSP IP/OBS MODERATE 55: CPT | Mod: GC,,, | Performed by: INTERNAL MEDICINE

## 2017-06-05 PROCEDURE — 80048 BASIC METABOLIC PNL TOTAL CA: CPT

## 2017-06-05 PROCEDURE — 83735 ASSAY OF MAGNESIUM: CPT

## 2017-06-05 PROCEDURE — 20600001 HC STEP DOWN PRIVATE ROOM

## 2017-06-05 PROCEDURE — 84100 ASSAY OF PHOSPHORUS: CPT

## 2017-06-05 PROCEDURE — 63600175 PHARM REV CODE 636 W HCPCS: Performed by: NURSE PRACTITIONER

## 2017-06-05 PROCEDURE — 81001 URINALYSIS AUTO W/SCOPE: CPT

## 2017-06-05 PROCEDURE — 99222 1ST HOSP IP/OBS MODERATE 55: CPT | Mod: GC,,, | Performed by: ORTHOPAEDIC SURGERY

## 2017-06-05 PROCEDURE — 36415 COLL VENOUS BLD VENIPUNCTURE: CPT

## 2017-06-05 PROCEDURE — 85025 COMPLETE CBC W/AUTO DIFF WBC: CPT | Mod: 91

## 2017-06-05 PROCEDURE — 25000003 PHARM REV CODE 250: Performed by: NURSE PRACTITIONER

## 2017-06-05 RX ORDER — POTASSIUM CHLORIDE 20 MEQ/1
40 TABLET, EXTENDED RELEASE ORAL ONCE
Status: COMPLETED | OUTPATIENT
Start: 2017-06-05 | End: 2017-06-05

## 2017-06-05 RX ORDER — FERROUS SULFATE, DRIED 160(50) MG
1 TABLET, EXTENDED RELEASE ORAL 2 TIMES DAILY
Status: DISCONTINUED | OUTPATIENT
Start: 2017-06-06 | End: 2017-06-06

## 2017-06-05 RX ADMIN — SODIUM CHLORIDE: 0.9 INJECTION, SOLUTION INTRAVENOUS at 12:06

## 2017-06-05 RX ADMIN — POTASSIUM CHLORIDE 40 MEQ: 1500 TABLET, EXTENDED RELEASE ORAL at 11:06

## 2017-06-05 RX ADMIN — ACETAMINOPHEN 650 MG: 325 TABLET ORAL at 02:06

## 2017-06-05 RX ADMIN — CARVEDILOL 25 MG: 25 TABLET, FILM COATED ORAL at 08:06

## 2017-06-05 RX ADMIN — CITALOPRAM HYDROBROMIDE 20 MG: 10 TABLET ORAL at 08:06

## 2017-06-05 RX ADMIN — ACETAMINOPHEN 650 MG: 325 TABLET ORAL at 08:06

## 2017-06-05 RX ADMIN — ROSUVASTATIN CALCIUM 5 MG: 5 TABLET, FILM COATED ORAL at 08:06

## 2017-06-05 RX ADMIN — CARVEDILOL 25 MG: 25 TABLET, FILM COATED ORAL at 06:06

## 2017-06-05 RX ADMIN — CEFTRIAXONE 2 G: 2 INJECTION, SOLUTION INTRAVENOUS at 02:06

## 2017-06-05 NOTE — PROGRESS NOTES
[facility] Hospital Medicine  Progress Note    Patient Name: Naheed Cottrell  MRN: [id number]  Patient Class: IP- Inpatient   Admission Date: 6/4/2017  Length of Stay: 1 days  Attending Physician: Kenny Rashid MD  Primary Care Provider: Mack Cottrell MD    [facility] Medicine Team: [facility] Kenny Rashid MD    Subjective:     Principal Problem:Gram-negative bacteremia    HPI:  Presents after fall.  Patient complains of left shoulder pain.  Went to a [employer], was walking to car and felt a little clammy.  Has been vomiting since Friday night.  She took her BP meds this evening as well.  This evening was walking to the bathroom, went to step over a mirror that she left on the ground and she tripped and hit her left shoulder on the edge of bathroom counter.  She didn't hit her head or have LOC.  She remembers the entire event.  Right now she complains of left shoulder pain.    Hospital Course:  See below    Interval History:   Symptoms:  Improved.    Diet:  Adequate intake.    Activity level:  Impaired due to weakness.   Pain:  Chronic left knee pain and acute left arm fracture pain controlled, requiring pain medication.      Review of Systems   Constitutional: Negative for fever.   Respiratory: Negative for shortness of breath.    Cardiovascular: Negative for chest pain.   Gastrointestinal: Negative for abdominal pain and blood in stool.     Objective:     Vital Signs (Most Recent):  Temp: 98.5 °F (36.9 °C) (06/05/17 1500)  Pulse: 80 (06/05/17 1500)  Resp: 20 (06/05/17 1500)  BP: (!) 129/58 (06/05/17 1500)  SpO2: 96 % (06/05/17 1500) Vital Signs (24h Range):  Temp:  [97.5 °F (36.4 °C)-98.5 °F (36.9 °C)] 98.5 °F (36.9 °C)  Pulse:  [70-86] 80  Resp:  [18-20] 20  SpO2:  [94 %-96 %] 96 %  BP: (108-133)/(54-60) 129/58     Weight: 59 kg (130 lb)  Body mass index is 23.03 kg/m².    Intake/Output Summary (Last 24 hours) at 06/05/17 1011  Last data filed at 06/05/17 0600   Gross per 24  hour   Intake             1550 ml   Output                1 ml   Net             1549 ml      Physical Exam   Constitutional: No distress.   Neck: No JVD present.   Cardiovascular: Normal rate and regular rhythm.    Pulmonary/Chest: Effort normal and breath sounds normal.   Abdominal: Soft. Bowel sounds are normal.   Musculoskeletal: She exhibits no edema.   Neurological: She is alert.       Significant Labs: All pertinent labs within the past 24 hours have been reviewed.    Significant Imaging: I have reviewed and interpreted all pertinent imaging results/findings within the past 24 hours.    Assessment/Plan:      * Gram-negative bacteremia    Acute cystitis without hematuria  Urine dirty and blood culture have GNR.  - -- Better; Continue with antibiotics and follow up culture results           Essential hypertension    PAD (peripheral artery disease)   Elevated troponin   Appreciate cards consult.  Tn elevation from infection and hypotension.  TTE done and was unchanged.  -- baby aspirin to start for as long as there is no GI bleed or head bleed from fall; will stop plavix          ANGELICA (acute kidney injury)    From volume depletion from N/V  - improved with IVF          Anemia    Drop could be from IVF as no signs of bleeding  -- check FOBT          Thrombocytopenia, unspecified    Likely from infection  - monitor          Hypokalemia    Replace as needed          Closed fracture of left proximal humerus    Appreciate ortho consult  - c/w sling  -- pt/ot ordered            VTE Risk Mitigation         Ordered     Medium Risk of VTE  Once      06/04/17 0910          Kenny Rashid MD  Department of Hospital Medicine   [facility]

## 2017-06-05 NOTE — PLAN OF CARE
Problem: Patient Care Overview  Goal: Plan of Care Review  Outcome: Ongoing (interventions implemented as appropriate)  Patient AAOx4. VSS. Patient calm and cooperative. Echo done today. CT scheduled. Patient plan of care reviewed with patient and family at the bedside. Occult blood sample sent to lab. Family remains at the bedside. Patient left arm in sling. PT and OT ordered to help with care and placement. Family requested home bed for patient upon discharge. SW and MD aware of family request. Patient up with one assist to bathroom. NS infusing at 50 ml/hr. No complaints of pain at this time.

## 2017-06-05 NOTE — HPI
Presents after fall.  Patient complains of left shoulder pain.  Went to a [employer], was walking to car and felt a little clammy.  Has been vomiting since Friday night.  She took her BP meds this evening as well.  This evening was walking to the bathroom, went to step over a mirror that she left on the ground and she tripped and hit her left shoulder on the edge of bathroom counter.  She didn't hit her head or have LOC.  She remembers the entire event.  Right now she complains of left shoulder pain.

## 2017-06-05 NOTE — PLAN OF CARE
Problem: Patient Care Overview  Goal: Individualization & Mutuality  Outcome: Ongoing (interventions implemented as appropriate)  Pt remained

## 2017-06-05 NOTE — ASSESSMENT & PLAN NOTE
-recommend restarting aspirin 81 mg daily  -patient should ideally also be started on plavix given PAD  -recommend obtaining CT head w/o contrast to r/o bleed given transient episode of coordination difficulties though low suspicion for stroke

## 2017-06-05 NOTE — ASSESSMENT & PLAN NOTE
Acute cystitis without hematuria  Urine dirty and blood culture have GNR.  - -- Better; Continue with antibiotics and follow up culture results

## 2017-06-05 NOTE — PLAN OF CARE
Blue admit folder given  Spoke w pt and daughter Terrence  PT will be dc to daughter's house  Pt takes a baby asa  Pending therapy recs  Home w family vs hh  Daughter terrence will ask dr king to order semi electric bed.      DC PLan   SNF vs hh      06/05/17 1157   Discharge Assessment   Assessment Type Discharge Planning Assessment   Confirmed/corrected address and phone number on facesheet? Yes   Assessment information obtained from? Patient;Caregiver;Medical Record   Expected Length of Stay (days) 3   Communicated expected length of stay with patient/caregiver yes   Prior to hospitilization cognitive status: Alert/Oriented;No Deficits   Prior to hospitalization functional status: Independent   Current cognitive status: Alert/Oriented;No Deficits   Current Functional Status: Assistive Equipment;Needs Assistance   Arrived From home or self-care   Lives With alone   Able to Return to Prior Arrangements yes   Is patient able to care for self after discharge? No   Who are your caregiver(s) and their phone number(s)? going to stay by daughterTerrence's house    [phone]  son is md  [phone]   Patient's perception of discharge disposition home or selfcare   Readmission Within The Last 30 Days no previous admission in last 30 days   Patient currently being followed by outpatient case management? No   Patient currently receives home health services? No   Does the patient currently use HME? No   Patient currently receives private duty nursing? N/A   Patient currently receives any other outside agency services? No   Equipment Currently Used at Home none   Do you have any problems affording any of your prescribed medications? No   Is the patient taking medications as prescribed? yes   Do you have any financial concerns preventing you from receiving the healthcare you need? No   Does the patient have transportation to healthcare appointments? Yes   Transportation Available family or friend will provide   On Dialysis? No   Does the  patient receive services at the Coumadin Clinic? No   Are there any open cases? No   Discharge Plan A Home with family   Discharge Plan B Home with family;Home Health   Patient/Family In Agreement With Plan yes

## 2017-06-05 NOTE — PROGRESS NOTES
[facility]  Cardiology  Progress Note    Patient Name: Naheed Cottrell  MRN: [id number]  Admission Date: 6/4/2017  Hospital Length of Stay: 1 days  Code Status: Full Code   Attending Physician: Kenny Rashid MD   Primary Care Physician: Mack Cottrell MD  Expected Discharge Date: 6/6/2017  Principal Problem:ANGELICA (acute kidney injury)    Subjective:     Hospital Course:   No notes on file    Interval History: NAEON. Patient states she feels better than when she first came in. Denies any chest pain, SOB. Has been ambulating to bathroom. Having some chills and fatigue from lack of sleep. Family states patient had an episode last night where she would bring spoon to chin instead of mouth while she was eating soup last night. This has resolved this morning, no focal deficits.    Review of Systems   Constitution: Positive for chills. Negative for fever.   HENT: Negative.    Eyes: Negative.    Cardiovascular: Negative for chest pain, dyspnea on exertion and palpitations.   Respiratory: Negative for cough, shortness of breath and wheezing.    Musculoskeletal: Negative.    Gastrointestinal: Negative for abdominal pain, diarrhea and vomiting.   Genitourinary: Negative for dysuria and hematuria.   Neurological: Negative for focal weakness and light-headedness.   Psychiatric/Behavioral: Negative.      Objective:     Vital Signs (Most Recent):  Temp: 98.5 °F (36.9 °C) (06/05/17 0745)  Pulse: 73 (06/05/17 1100)  Resp: 20 (06/05/17 0745)  BP: (!) 122/57 (06/05/17 0745)  SpO2: 95 % (06/05/17 0745) Vital Signs (24h Range):  Temp:  [97.5 °F (36.4 °C)-98.5 °F (36.9 °C)] 98.5 °F (36.9 °C)  Pulse:  [70-91] 73  Resp:  [16-20] 20  SpO2:  [93 %-95 %] 95 %  BP: (104-136)/(49-60) 122/57     Weight: 59 kg (130 lb)  Body mass index is 23.03 kg/m².     SpO2: 95 %  O2 Device (Oxygen Therapy): nasal cannula      Intake/Output Summary (Last 24 hours) at 06/05/17 1202  Last data filed at 06/05/17 0600   Gross per 24 hour    Intake             2160 ml   Output                1 ml   Net             2159 ml       Lines/Drains/Airways     Peripheral Intravenous Line                 Peripheral IV - Single Lumen 06/04/17 0605 Right Forearm 1 day         Peripheral IV - Single Lumen 06/04/17 0639 Left Forearm 1 day                Physical Exam   Constitutional: She is oriented to person, place, and time. She appears well-developed and well-nourished. No distress.   HENT:   Head: Normocephalic.   Eyes: Conjunctivae are normal.   Cardiovascular: Normal rate, regular rhythm and normal heart sounds.  Exam reveals no friction rub.    No murmur heard.  Pulmonary/Chest: Effort normal. She has no wheezes. She has no rales.   Abdominal: She exhibits no distension. There is no tenderness.   Musculoskeletal: She exhibits no edema.   Neurological: She is alert and oriented to person, place, and time.   Skin: Skin is warm.   Psychiatric: She has a normal mood and affect.       Significant Labs:   CMP   Recent Labs  Lab 06/04/17  0616 06/04/17  1109 06/05/17  0603    138 134*   K 2.8* 4.0 3.4*    108 107   CO2 21* 18* 18*   * 125* 110   BUN 43* 40* 42*   CREATININE 2.2* 2.0* 1.4   CALCIUM 8.7 8.3* 7.3*   PROT 6.3  --   --    ALBUMIN 3.1*  --   --    BILITOT 0.6  --   --    ALKPHOS 67  --   --    AST 29  --   --    ALT 16  --   --    ANIONGAP 15 12 9   ESTGFRAFRICA 23.0* 25.9* 39.8*   EGFRNONAA 20.0* 22.4* 34.5*    and CBC   Recent Labs  Lab 06/04/17  0616 06/05/17  0603   WBC 11.60 7.43   HGB 10.4* 8.5*   HCT 31.0* 26.5*   * 91*       Significant Imaging:   Imaging Results          CT Upper Extremity Wo Contrast Left (Final result)  Result time 06/04/17 16:16:30    Final result by Nabeel Randall III, MD (06/04/17 16:16:30)                 Impression:        Comminuted fracture of the proximal left humerus with mild impaction and posterior angulation as discussed above.    No intra-articular fragments.    Consolidative process  within the lingula demonstrate mild bronchiectasis suggesting chronic nontuberculous mycobacterial infection.  ______________________________________     Electronically signed by resident: ANNETTE GALLAGHER MD  Date:     06/04/17  Time:    12:03            As the supervising and teaching physician, I personally reviewed the images and resident's interpretation and I agree with the findings.      ______________________________________     Electronically signed by resident: ANNETTE GALLAGHER MD  Date:     06/04/17  Time:    12:10            As the supervising and teaching physician, I personally reviewed the images and resident's interpretation and I agree with the findings.            Electronically signed by: ROWAN CERDA  Date:     06/04/17  Time:    16:16              Narrative:    Technique: Axial 2 mm noncontrast CT of the left upper extremity was obtained without contrast.  Coronal and sagittal reformatted images as well as 3-D reconstruction images are provided for review.    Comparison: Ready graft 6/4/2000 7925    Findings:    There is a comminuted fracture of the proximal left humerus centered at the level of the surgical neck with fracture lines involving the greater and lesser tuberosities.  No intra-articular fractures or fragments are identified.  There is proximal impaction of the distal fragment of less than 1 cm with mild posterior angulation of the proximal fragment.    The clavicle, acromion, and scapula are intact.  Visualized thoracic cage demonstrates no acute rib fractures.  No large effusions are evidence of radiopaque foreign body.    Visualized portions of the left chest demonstrate a chronic apical scarring and pleural thickening as well as consolidation of the lingula.  Calcified pleural thickening again suggest prior granulomatous disease.  No pneumothorax.                             X-Ray Humerus 2 View Left (Final result)  Result time 06/04/17 06:36:44    Final result by Bridget Bess MD  (06/04/17 06:36:44)                 Impression:      Redemonstration of comminuted, minimally impacted fracture of the left humeral head.      Electronically signed by: BRIDGET BESS  Date:     06/04/17  Time:    06:36              Narrative:    Comparison: Shoulder radiographs same date.    Technique: AP and lateral radiographs of the left humerus.    Findings: There is redemonstration of a comminuted, minimally impacted fracture of the left humeral head with involvement of the greater tuberosity.  There is no evidence of humeral head dislocation.  Remaining osseous structures are intact.                             X-Ray Chest 1 View (Final result)  Result time 06/04/17 06:15:54    Final result by Bridget Bess MD (06/04/17 06:15:54)                 Impression:      As above.      Electronically signed by: BRIDGET BESS  Date:     06/04/17  Time:    06:15              Narrative:    Comparison: Chest radiograph 10/16/2016    Technique: Single AP chest radiograph.    Findings: The cardiomediastinal silhouette is within normal limits.  There is atherosclerosis of the aorta.  Trachea is midline.  There is no definite evidence of pneumothorax, focal consolidation or significant pleural fluid.  There is a comminuted fracture of the left humeral head, demonstrated to better extent on prior shoulder radiograph of same date.  Remaining osseous structures are intact.                             X-Ray Shoulder Trauma Left (Final result)  Result time 06/04/17 06:13:29    Final result by Bridget Bess MD (06/04/17 06:13:29)                 Impression:      Comminuted, minimally impacted fracture left humeral head with involvement of the greater tuberosity.        Electronically signed by: BRIDGET BESS  Date:     06/04/17  Time:    06:13              Narrative:    Exam: [id number]  06/04/17  05:56:00 [id number] ([facility]) : XR SHOULDER TRAUMA 3 VIEW LEFT    Technique:    3 views of the left shoulder.    Comparison:     None     Findings:       There is a comminuted, minimally impacted fracture of the left humeral head with involvement of the greater tuberosity.  The humeral head articulates appropriately with the glenoid fossa.  The acromioclavicular interval is maintained.  Remaining osseous structures and visualized intrathoracic structures demonstrate no acute abnormality.                                Assessment and Plan:     Brief HPI: 83 yo F with hx of HTN and PAD, admitted for humeral fracture and ANGELICA. Cardiology consulted for elevated troponin.    PAD (peripheral artery disease)    -recommend restarting aspirin 81 mg daily  -patient should ideally also be started on plavix given PAD  -recommend obtaining CT head w/o contrast to r/o bleed given transient episode of coordination difficulties though low suspicion for stroke           Elevated troponin    -no repeat troponin available. Asymptomatic, unlikely to be ACS.  -2D echo ordered             VTE Risk Mitigation         Ordered     Medium Risk of VTE  Once      06/04/17 0910        Cardiology will sign off pending results of 2D echo.    Alberta Ritter MD  Cardiology  [facility]    I have personally taken the history and examined the patient and agree with the resident's note as stated above.  Need to CT head with spells yesterday where she was missing mouth with spoon and after fall, especially if we will add clopidogrel and ASA. This is not a plaque rupture MI as due to hypotension , UTI etc, although suspect CAD with so much PAD. I know family very well.

## 2017-06-05 NOTE — PLAN OF CARE
Problem: Patient Care Overview  Goal: Plan of Care Review  Outcome: Ongoing (interventions implemented as appropriate)  POC reviewed; pt verbalized understanding. Telemetry monitored. In and out cath attempted 3 x to obtain urine sample for UA and culture; unable to collect at this time. Bladder scan showed 450 ml urine in bladder. IVF infusing. Pt tolerating CL diet. Family at bedside. No falls or injury; pt requires assistance. VSS. [facility] pt.

## 2017-06-05 NOTE — SUBJECTIVE & OBJECTIVE
Interval History: NAEON. Patient states she feels better than when she first came in. Denies any chest pain, SOB. Has been ambulating to bathroom. Having some chills and fatigue from lack of sleep. Family states patient had an episode last night where she would bring spoon to chin instead of mouth while she was eating soup last night. This has resolved this morning, no focal deficits.    Review of Systems   Constitution: Positive for chills. Negative for fever.   HENT: Negative.    Eyes: Negative.    Cardiovascular: Negative for chest pain, dyspnea on exertion and palpitations.   Respiratory: Negative for cough, shortness of breath and wheezing.    Musculoskeletal: Negative.    Gastrointestinal: Negative for abdominal pain, diarrhea and vomiting.   Genitourinary: Negative for dysuria and hematuria.   Neurological: Negative for focal weakness and light-headedness.   Psychiatric/Behavioral: Negative.      Objective:     Vital Signs (Most Recent):  Temp: 98.5 °F (36.9 °C) (06/05/17 0745)  Pulse: 73 (06/05/17 1100)  Resp: 20 (06/05/17 0745)  BP: (!) 122/57 (06/05/17 0745)  SpO2: 95 % (06/05/17 0745) Vital Signs (24h Range):  Temp:  [97.5 °F (36.4 °C)-98.5 °F (36.9 °C)] 98.5 °F (36.9 °C)  Pulse:  [70-91] 73  Resp:  [16-20] 20  SpO2:  [93 %-95 %] 95 %  BP: (104-136)/(49-60) 122/57     Weight: 59 kg (130 lb)  Body mass index is 23.03 kg/m².     SpO2: 95 %  O2 Device (Oxygen Therapy): nasal cannula      Intake/Output Summary (Last 24 hours) at 06/05/17 1202  Last data filed at 06/05/17 0600   Gross per 24 hour   Intake             2160 ml   Output                1 ml   Net             2159 ml       Lines/Drains/Airways     Peripheral Intravenous Line                 Peripheral IV - Single Lumen 06/04/17 0605 Right Forearm 1 day         Peripheral IV - Single Lumen 06/04/17 0639 Left Forearm 1 day                Physical Exam   Constitutional: She is oriented to person, place, and time. She appears well-developed and  well-nourished. No distress.   HENT:   Head: Normocephalic.   Eyes: Conjunctivae are normal.   Cardiovascular: Normal rate, regular rhythm and normal heart sounds.  Exam reveals no friction rub.    No murmur heard.  Pulmonary/Chest: Effort normal. She has no wheezes. She has no rales.   Abdominal: She exhibits no distension. There is no tenderness.   Musculoskeletal: She exhibits no edema.   Neurological: She is alert and oriented to person, place, and time.   Skin: Skin is warm.   Psychiatric: She has a normal mood and affect.       Significant Labs:   CMP   Recent Labs  Lab 06/04/17  0616 06/04/17  1109 06/05/17  0603    138 134*   K 2.8* 4.0 3.4*    108 107   CO2 21* 18* 18*   * 125* 110   BUN 43* 40* 42*   CREATININE 2.2* 2.0* 1.4   CALCIUM 8.7 8.3* 7.3*   PROT 6.3  --   --    ALBUMIN 3.1*  --   --    BILITOT 0.6  --   --    ALKPHOS 67  --   --    AST 29  --   --    ALT 16  --   --    ANIONGAP 15 12 9   ESTGFRAFRICA 23.0* 25.9* 39.8*   EGFRNONAA 20.0* 22.4* 34.5*    and CBC   Recent Labs  Lab 06/04/17  0616 06/05/17  0603   WBC 11.60 7.43   HGB 10.4* 8.5*   HCT 31.0* 26.5*   * 91*       Significant Imaging:   Imaging Results          CT Upper Extremity Wo Contrast Left (Final result)  Result time 06/04/17 16:16:30    Final result by Nabeel Randall III, MD (06/04/17 16:16:30)                 Impression:        Comminuted fracture of the proximal left humerus with mild impaction and posterior angulation as discussed above.    No intra-articular fragments.    Consolidative process within the lingula demonstrate mild bronchiectasis suggesting chronic nontuberculous mycobacterial infection.  ______________________________________     Electronically signed by resident: ANNETTE GALLAGHER MD  Date:     06/04/17  Time:    12:03            As the supervising and teaching physician, I personally reviewed the images and resident's interpretation and I agree with the  findings.      ______________________________________     Electronically signed by resident: ANNETTE GALLAGHER MD  Date:     06/04/17  Time:    12:10            As the supervising and teaching physician, I personally reviewed the images and resident's interpretation and I agree with the findings.            Electronically signed by: ROWAN CERDA  Date:     06/04/17  Time:    16:16              Narrative:    Technique: Axial 2 mm noncontrast CT of the left upper extremity was obtained without contrast.  Coronal and sagittal reformatted images as well as 3-D reconstruction images are provided for review.    Comparison: Ready graft 6/4/2000 6760    Findings:    There is a comminuted fracture of the proximal left humerus centered at the level of the surgical neck with fracture lines involving the greater and lesser tuberosities.  No intra-articular fractures or fragments are identified.  There is proximal impaction of the distal fragment of less than 1 cm with mild posterior angulation of the proximal fragment.    The clavicle, acromion, and scapula are intact.  Visualized thoracic cage demonstrates no acute rib fractures.  No large effusions are evidence of radiopaque foreign body.    Visualized portions of the left chest demonstrate a chronic apical scarring and pleural thickening as well as consolidation of the lingula.  Calcified pleural thickening again suggest prior granulomatous disease.  No pneumothorax.                             X-Ray Humerus 2 View Left (Final result)  Result time 06/04/17 06:36:44    Final result by Bridget Bess MD (06/04/17 06:36:44)                 Impression:      Redemonstration of comminuted, minimally impacted fracture of the left humeral head.      Electronically signed by: BRIDGET BESS  Date:     06/04/17  Time:    06:36              Narrative:    Comparison: Shoulder radiographs same date.    Technique: AP and lateral radiographs of the left humerus.    Findings: There is  redemonstration of a comminuted, minimally impacted fracture of the left humeral head with involvement of the greater tuberosity.  There is no evidence of humeral head dislocation.  Remaining osseous structures are intact.                             X-Ray Chest 1 View (Final result)  Result time 06/04/17 06:15:54    Final result by Bridget Bess MD (06/04/17 06:15:54)                 Impression:      As above.      Electronically signed by: BRIDGET BESS  Date:     06/04/17  Time:    06:15              Narrative:    Comparison: Chest radiograph 10/16/2016    Technique: Single AP chest radiograph.    Findings: The cardiomediastinal silhouette is within normal limits.  There is atherosclerosis of the aorta.  Trachea is midline.  There is no definite evidence of pneumothorax, focal consolidation or significant pleural fluid.  There is a comminuted fracture of the left humeral head, demonstrated to better extent on prior shoulder radiograph of same date.  Remaining osseous structures are intact.                             X-Ray Shoulder Trauma Left (Final result)  Result time 06/04/17 06:13:29    Final result by Bridget Bess MD (06/04/17 06:13:29)                 Impression:      Comminuted, minimally impacted fracture left humeral head with involvement of the greater tuberosity.        Electronically signed by: BRIDGET BESS  Date:     06/04/17  Time:    06:13              Narrative:    Exam: [id number]  06/04/17  05:56:00 [id number] ([facility]) : XR SHOULDER TRAUMA 3 VIEW LEFT    Technique:    3 views of the left shoulder.    Comparison:     None     Findings:      There is a comminuted, minimally impacted fracture of the left humeral head with involvement of the greater tuberosity.  The humeral head articulates appropriately with the glenoid fossa.  The acromioclavicular interval is maintained.  Remaining osseous structures and visualized intrathoracic structures demonstrate no acute abnormality.

## 2017-06-05 NOTE — PROGRESS NOTES
2D echo reviewed and unchanged from previous. Dr. Cottrell (patient's son) would like patient to only be on aspirin, not plavix. Recommend restarting aspirin 81 mg once CT head results negative. Cardiology will sign off.     Alberta Ritter MD  PGY-1  I have personally taken the history and examined the patient and agree with the resident's note as stated above.  Spoke with Dr Cottrell who would rather mom not be on Clopidogrel especially since Troponin was in setting of hypotension, infection, low H/H etc and echo is normal-just continue her baby aspirin for as long as there is no GI bleed or head bleed from fall.Dr Cottrell also asked us to order PT etc , siince this has not been accomplished.

## 2017-06-05 NOTE — ASSESSMENT & PLAN NOTE
Appreciate ortho consult. They will pursue non-operative treatment  - sling LUE  - NWB LUE  - will f/u in ortho clinic in 1-2 weeks, clinic will call to schedule  -- pt/ot ordered

## 2017-06-05 NOTE — SUBJECTIVE & OBJECTIVE
Interval History:   Symptoms:  Improved.    Diet:  Adequate intake.    Activity level:  Impaired due to weakness.   Pain:  Chronic left knee pain and acute left arm fracture pain controlled, requiring pain medication.      Review of Systems   Constitutional: Negative for fever.   Respiratory: Negative for shortness of breath.    Cardiovascular: Negative for chest pain.   Gastrointestinal: Negative for abdominal pain and blood in stool.     Objective:     Vital Signs (Most Recent):  Temp: 98.5 °F (36.9 °C) (06/05/17 1500)  Pulse: 80 (06/05/17 1500)  Resp: 20 (06/05/17 1500)  BP: (!) 129/58 (06/05/17 1500)  SpO2: 96 % (06/05/17 1500) Vital Signs (24h Range):  Temp:  [97.5 °F (36.4 °C)-98.5 °F (36.9 °C)] 98.5 °F (36.9 °C)  Pulse:  [70-86] 80  Resp:  [18-20] 20  SpO2:  [94 %-96 %] 96 %  BP: (108-133)/(54-60) 129/58     Weight: 59 kg (130 lb)  Body mass index is 23.03 kg/m².    Intake/Output Summary (Last 24 hours) at 06/05/17 1701  Last data filed at 06/05/17 0600   Gross per 24 hour   Intake             1550 ml   Output                1 ml   Net             1549 ml      Physical Exam   Constitutional: No distress.   Neck: No JVD present.   Cardiovascular: Normal rate and regular rhythm.    Pulmonary/Chest: Effort normal and breath sounds normal.   Abdominal: Soft. Bowel sounds are normal.   Musculoskeletal: She exhibits no edema.   Neurological: She is alert.       Significant Labs: All pertinent labs within the past 24 hours have been reviewed.    Significant Imaging: I have reviewed and interpreted all pertinent imaging results/findings within the past 24 hours.

## 2017-06-05 NOTE — ASSESSMENT & PLAN NOTE
PAD (peripheral artery disease)   Elevated troponin   Appreciate cards consult.  Tn elevation from infection and hypotension.  TTE done and was unchanged.  -- resume asa;  will stop plavix  - hold cozaar and chlorthalidone in setting of ANGELICA

## 2017-06-05 NOTE — ASSESSMENT & PLAN NOTE
PAD (peripheral artery disease)   Elevated troponin   Appreciate cards consult.  Tn elevation from infection and hypotension.  TTE done and was unchanged.  -- baby aspirin to start for as long as there is no GI bleed or head bleed from fall; will stop plavix

## 2017-06-06 PROBLEM — I51.7 ENLARGED LA (LEFT ATRIUM): Status: ACTIVE | Noted: 2017-06-06

## 2017-06-06 PROBLEM — D50.9 IRON DEFICIENCY ANEMIA: Status: ACTIVE | Noted: 2017-06-05

## 2017-06-06 PROBLEM — I63.411 EMBOLIC STROKE INVOLVING RIGHT MIDDLE CEREBRAL ARTERY: Status: ACTIVE | Noted: 2017-06-06

## 2017-06-06 LAB
25(OH)D3+25(OH)D2 SERPL-MCNC: 16 NG/ML
ANION GAP SERPL CALC-SCNC: 9 MMOL/L
BASOPHILS # BLD AUTO: 0 K/UL
BASOPHILS NFR BLD: 0 %
BUN SERPL-MCNC: 45 MG/DL
CALCIUM SERPL-MCNC: 7.6 MG/DL
CHLORIDE SERPL-SCNC: 104 MMOL/L
CO2 SERPL-SCNC: 21 MMOL/L
CREAT SERPL-MCNC: 1.4 MG/DL
DIFFERENTIAL METHOD: ABNORMAL
EOSINOPHIL # BLD AUTO: 0 K/UL
EOSINOPHIL NFR BLD: 0 %
ERYTHROCYTE [DISTWIDTH] IN BLOOD BY AUTOMATED COUNT: 15.1 %
EST. GFR  (AFRICAN AMERICAN): 39.8 ML/MIN/1.73 M^2
EST. GFR  (NON AFRICAN AMERICAN): 34.5 ML/MIN/1.73 M^2
FOLATE SERPL-MCNC: 10.1 NG/ML
GLUCOSE SERPL-MCNC: 120 MG/DL
HCT VFR BLD AUTO: 27.8 %
HGB BLD-MCNC: 9 G/DL
IRON SERPL-MCNC: <10 UG/DL
LYMPHOCYTES # BLD AUTO: 0.5 K/UL
LYMPHOCYTES NFR BLD: 8.6 %
MCH RBC QN AUTO: 29.6 PG
MCHC RBC AUTO-ENTMCNC: 32.4 %
MCV RBC AUTO: 91 FL
MONOCYTES # BLD AUTO: 0.2 K/UL
MONOCYTES NFR BLD: 3.4 %
NEUTROPHILS # BLD AUTO: 4.9 K/UL
NEUTROPHILS NFR BLD: 88 %
OB PNL STL: NEGATIVE
PLATELET # BLD AUTO: 87 K/UL
PLATELET BLD QL SMEAR: ABNORMAL
PMV BLD AUTO: 10.6 FL
POTASSIUM SERPL-SCNC: 3.5 MMOL/L
RBC # BLD AUTO: 3.04 M/UL
RETICS/RBC NFR AUTO: 0.5 %
SATURATED IRON: ABNORMAL %
SODIUM SERPL-SCNC: 134 MMOL/L
TOTAL IRON BINDING CAPACITY: 186 UG/DL
TRANSFERRIN SERPL-MCNC: 126 MG/DL
VIT B12 SERPL-MCNC: 1012 PG/ML
WBC # BLD AUTO: 5.6 K/UL

## 2017-06-06 PROCEDURE — 97165 OT EVAL LOW COMPLEX 30 MIN: CPT

## 2017-06-06 PROCEDURE — 36415 COLL VENOUS BLD VENIPUNCTURE: CPT

## 2017-06-06 PROCEDURE — 25000003 PHARM REV CODE 250: Performed by: HOSPITALIST

## 2017-06-06 PROCEDURE — 97162 PT EVAL MOD COMPLEX 30 MIN: CPT

## 2017-06-06 PROCEDURE — 99223 1ST HOSP IP/OBS HIGH 75: CPT | Mod: GC,,, | Performed by: PSYCHIATRY & NEUROLOGY

## 2017-06-06 PROCEDURE — 97535 SELF CARE MNGMENT TRAINING: CPT

## 2017-06-06 PROCEDURE — 99232 SBSQ HOSP IP/OBS MODERATE 35: CPT | Mod: ,,, | Performed by: HOSPITALIST

## 2017-06-06 PROCEDURE — 97530 THERAPEUTIC ACTIVITIES: CPT

## 2017-06-06 PROCEDURE — 82746 ASSAY OF FOLIC ACID SERUM: CPT

## 2017-06-06 PROCEDURE — 20600001 HC STEP DOWN PRIVATE ROOM

## 2017-06-06 PROCEDURE — 83540 ASSAY OF IRON: CPT

## 2017-06-06 PROCEDURE — 80048 BASIC METABOLIC PNL TOTAL CA: CPT

## 2017-06-06 PROCEDURE — 63600175 PHARM REV CODE 636 W HCPCS: Performed by: NURSE PRACTITIONER

## 2017-06-06 PROCEDURE — 82306 VITAMIN D 25 HYDROXY: CPT

## 2017-06-06 PROCEDURE — 97116 GAIT TRAINING THERAPY: CPT

## 2017-06-06 PROCEDURE — 82607 VITAMIN B-12: CPT

## 2017-06-06 PROCEDURE — 85025 COMPLETE CBC W/AUTO DIFF WBC: CPT

## 2017-06-06 PROCEDURE — 25000003 PHARM REV CODE 250: Performed by: NURSE PRACTITIONER

## 2017-06-06 PROCEDURE — 85045 AUTOMATED RETICULOCYTE COUNT: CPT

## 2017-06-06 RX ORDER — FERROUS SULFATE, DRIED 160(50) MG
2 TABLET, EXTENDED RELEASE ORAL 2 TIMES DAILY
Status: DISCONTINUED | OUTPATIENT
Start: 2017-06-06 | End: 2017-06-07 | Stop reason: HOSPADM

## 2017-06-06 RX ORDER — ASPIRIN 81 MG/1
81 TABLET ORAL DAILY
Status: DISCONTINUED | OUTPATIENT
Start: 2017-06-06 | End: 2017-06-07 | Stop reason: HOSPADM

## 2017-06-06 RX ORDER — ASPIRIN 325 MG
325 TABLET, DELAYED RELEASE (ENTERIC COATED) ORAL DAILY
Status: DISCONTINUED | OUTPATIENT
Start: 2017-06-06 | End: 2017-06-06

## 2017-06-06 RX ADMIN — CEFTRIAXONE 2 G: 2 INJECTION, SOLUTION INTRAVENOUS at 01:06

## 2017-06-06 RX ADMIN — ACETAMINOPHEN 650 MG: 325 TABLET ORAL at 02:06

## 2017-06-06 RX ADMIN — CARVEDILOL 25 MG: 25 TABLET, FILM COATED ORAL at 06:06

## 2017-06-06 RX ADMIN — OYSTER SHELL CALCIUM WITH VITAMIN D 1 TABLET: 500; 200 TABLET, FILM COATED ORAL at 08:06

## 2017-06-06 RX ADMIN — ROSUVASTATIN CALCIUM 5 MG: 5 TABLET, FILM COATED ORAL at 09:06

## 2017-06-06 RX ADMIN — OYSTER SHELL CALCIUM WITH VITAMIN D 2 TABLET: 500; 200 TABLET, FILM COATED ORAL at 09:06

## 2017-06-06 RX ADMIN — CITALOPRAM HYDROBROMIDE 20 MG: 10 TABLET ORAL at 08:06

## 2017-06-06 RX ADMIN — CARVEDILOL 25 MG: 25 TABLET, FILM COATED ORAL at 08:06

## 2017-06-06 RX ADMIN — ASPIRIN 81 MG: 81 TABLET, COATED ORAL at 01:06

## 2017-06-06 NOTE — ASSESSMENT & PLAN NOTE
84 yr old female with PMHx of HTN, PAD who admitted post mechanical fall; found to have left humerus head fracture. 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  Recommend MRI brain WO contrast to assess age of infarct. Also recommend completing stroke workup with cerebro-vasculature imaging - MRA head/neck.     Antithrombotics for secondary stroke prevention: Antiplatelets:  Aspirin: 81 mg oral now and daily,   Statins for secondary stroke prevention and hyperlipidemia, if present: Atorvastatin- 40 mg oral daily, Aggressive risk factor modification: Hypertension and Carotid Artery disease, Rehab Efforts: Physical Therapy, Occupational Therapy and Speech and Language Pathology, Diagnostics: Ordered/Pending MRA head to assess vasculature, MRA neck/arch to assess vasculature, MRI head without contrast to assess brain parenchyma, VTE Prophylaxis: Heparin 5000 units SQ every 8 hours

## 2017-06-06 NOTE — ASSESSMENT & PLAN NOTE
Drop could be from IVF as no signs of bleeding, but admit was lower than normal.  MCV wnl but RDW elevated.  B12, folate wnl.  Retic low.  Iron <10.  FOBT negative.  -- will start iron po on discharge

## 2017-06-06 NOTE — SUBJECTIVE & OBJECTIVE
Interval History:   Symptoms:  Improved.    Diet:  Adequate intake.    Activity level:  Impaired due to weakness.   Pain:  Chronic left knee pain and acute left arm fracture pain controlled, requiring pain medication.      Review of Systems   Constitutional: Negative for fever.   Respiratory: Negative for shortness of breath.    Cardiovascular: Negative for chest pain.   Gastrointestinal: Negative for abdominal pain and blood in stool.     Objective:     Vital Signs (Most Recent):  Temp: 98.9 °F (37.2 °C) (06/06/17 1541)  Pulse: 77 (06/06/17 1541)  Resp: 18 (06/06/17 1541)  BP: 136/63 (06/06/17 1541)  SpO2: (!) 93 % (06/06/17 1541) Vital Signs (24h Range):  Temp:  [97.7 °F (36.5 °C)-99.6 °F (37.6 °C)] 98.9 °F (37.2 °C)  Pulse:  [65-80] 77  Resp:  [16-20] 18  SpO2:  [92 %-96 %] 93 %  BP: (134-168)/(63-70) 136/63     Weight: 59 kg (130 lb)  Body mass index is 23.03 kg/m².    Intake/Output Summary (Last 24 hours) at 06/06/17 1605  Last data filed at 06/06/17 0704   Gross per 24 hour   Intake          2008.34 ml   Output              150 ml   Net          1858.34 ml      Physical Exam   Constitutional: No distress.   Neck: No JVD present.   Cardiovascular: Normal rate and regular rhythm.    Pulmonary/Chest: Effort normal and breath sounds normal.   Abdominal: Soft. Bowel sounds are normal.   Musculoskeletal: She exhibits no edema.   Neurological: She is alert.       Significant Labs: All pertinent labs within the past 24 hours have been reviewed.    Significant Imaging: I have reviewed and interpreted all pertinent imaging results/findings within the past 24 hours.

## 2017-06-06 NOTE — PLAN OF CARE
Problem: Physical Therapy Goal  Goal: Physical Therapy Goal  Goals to be met by: 17     Patient will increase functional independence with mobility by performin. Supine to sit with Contact Guard Assistance  2. Sit to supine with Contact Guard Assistance  3. Sit to stand transfer with Stand-by Assistance  4. Bed to chair transfer with Stand-by Assistance using LRAD  5. Gait  x 200 feet with Stand-by Assistance using LRAD  6. Ascend/descend 1 step with no Handrails Contact Guard Assistance using SPC  Outcome: Ongoing (interventions implemented as appropriate)  Evaluation complete and goals established.    Dior Bajwa DPT, PT  2017

## 2017-06-06 NOTE — PLAN OF CARE
Problem: Patient Care Overview  Goal: Plan of Care Review  Outcome: Ongoing (interventions implemented as appropriate)  Patient AAOx4. VSS. Patient calm and cooperative. Up with one assist. New sling placed today to left shoulder. continuous fluids discontinued. Seen by PT today. US of carotid done today.

## 2017-06-06 NOTE — PT/OT/SLP EVAL
Occupational Therapy  Evaluation / Treatment    Naheed Cottrell   MRN: [id number]   Admitting Diagnosis: Embolic stroke involving right middle cerebral artery    OT Date of Treatment: 06/06/17   OT Start Time: 1312  OT Stop Time: 1401  OT Total Time (min): 49 min    Billable Minutes:  Evaluation 25  Self Care/Home Management 12  Therapeutic Activity 12    Diagnosis: Embolic stroke involving right middle cerebral artery       Past Medical History:   Diagnosis Date    Arthritis     Cataract     Embolic stroke involving right middle cerebral artery 6/6/2017    Encounter for blood transfusion     Fall, accidental     Hypertension     Right renal artery stenosis 3/28/2017    Stenosis of left subclavian artery       Past Surgical History:   Procedure Laterality Date    EYE SURGERY      FRACTURE SURGERY         Referring physician: Michell  Date referred to OT: 6/5/17    General Precautions: Standard, fall  Orthopedic Precautions: LUE weight bearing as tolerated  Braces:  ((L) UE Immobilizer)    Do you have any cultural, spiritual, Baptist conflicts, given your current situation?: None stated     Patient History:  Living Environment  Lives With: alone  Living Arrangements: house (2 story home with Elevator)  Home Accessibility: stairs to enter home  Home Layout: Able to live on 1st floor  Number of Stairs to Enter Home:  (Threshold to a landinf then another threshold.)  Stair Railings at Home: inside, present at both sides  Transportation Available: family or friend will provide  Living Environment Comment: Pt lives alone in 2 story home with elevator access to 2nd floor where Pt's bed and bathroom are. Pt plans to go to daughter's home post D/C. Daughter's home is single stroy with 1 MIKO and has walk in shower with std height toilet. Daughter will be able to assist Pt 24/7.  Pt was reportedly (I) in all aspects PTA and was very active in the community.  Equipment Currently Used at Home: none    Prior level of  function:   Bed Mobility/Transfers: independent  Grooming: independent  Bathing: independent  Upper Body Dressing: independent  Lower Body Dressing: independent  Toileting: independent  Home Management Skills: independent  Mode of Transportation: Car, Family     Dominant hand: right    Subjective:  Communicated with nurse prior to session.    Chief Complaint: Embolic stroke involving right middle cerebral artery  Patient/Family stated goals: Pt wants to return to PLOF.    Pain/Comfort  Pain Rating 1:  (Pt unable to rate pain but grimacing whenever (L) UE is even minimally moved)  Location - Side 1: Left  Location - Orientation 1: upper  Location 1: arm  Pain Addressed 1: Pre-medicate for activity, Reposition, Distraction  Pain Rating Post-Intervention 1:  (same)    Objective:  Patient found with: peripheral IV, oxygen    Cognitive Exam:  Oriented to: Person, Place, Time and Situation  Follows Commands/attention: Follows two-step commands  Communication: clear/fluent  Memory:  No Deficits noted  Safety awareness/insight to disability: intact  Coping skills/emotional control: Appropriate to situation    Visual/perceptual:  Intact    Physical Exam:  Postural examination/scapula alignment: No postural abnormalities identified  Skin integrity: Visible skin intact  Edema: None noted (B) UEs    Sensation:   Intact    Upper Extremity Range of Motion:  Right Upper Extremity: WFL  Left Upper Extremity: Not tested secondary to (R) UE immobilizer    Upper Extremity Strength:  Right Upper Extremity: WFL  Left Upper Extremity: Not tested secondary to (R) UE being immobilized   Strength: WFLS    Fine motor coordination:   Intact    Gross motor coordination: WFL    Functional Mobility:  Bed Mobility:  Rolling/Turning to Left: Moderate assistance  Sit to Supine: Moderate Assistance    Transfers:  Sit <> Stand Assistance: Contact Guard Assistance (from EOB and Min (A) from toilet.)  Sit <> Stand Assistive Device: Straight  Cane  Bed <> Chair Technique: Stand Pivot  Bed <> Chair Transfer Assistance: Contact Guard Assistance  Toilet Transfer Technique: Stand Pivot  Toilet Transfer Assistance: Minimum Assistance  Toilet Transfer Assistive Device: Straight Cane      Activities of Daily Living:  Feeding Level of Assistance: Set-up Assistance    UE Dressing Level of Assistance: Moderate assistance ([facility]. Total (A) required to don/ doff  UE immobilizer)    LE Dressing Level of Assistance: Maximum assistance (with (A) to don/doff socks and to thread feet into brief. Pt able to pull brief to thigh level and over (R) hip but (A) to pull up over (L) hip.)    Grooming Position: Standing at sink  Grooming Level of Assistance: Stand by assistance (with set up required for Pt to brush teeth, wash face and hands.)     Toileting Where Assessed: Toilet  Toileting Level of Assistance: Minimum assistance (with (A) to pull brief up on (L) side. No other assist required.)       Balance:   Static Sit: NORMAL: No deviations seen in posture held statically  Dynamic Sit: NORMAL: No deviations seen in posture held dynamically  Static Stand: FAIR+: Takes MINIMAL challenges from all directions S/C to steady when standing  Dynamic stand: FAIR: Needs CONTACT GUARD during gait. SC to ambulate    Therapeutic Activities :  OT evaluation performed.  White board updated.  Pt educated on role of OT, safety with functional mobility and ADLs    Pt and daughter educated on doning/doffing of UE immobilizer and safe performance of UBD in light of (L) UE fracture.      Functional Ambulation: Pt ambulated from BSC to restroom 10 ft with SC and CGA , stood to perform grooming and then ambulated back to Bedside chair 10 ft with CGA.      AM-PAC 6 CLICK ADL  How much help from another person does this patient currently need?  1 = Unable, Total/Dependent Assistance  2 = A lot, Maximum/Moderate Assistance  3 = A little, Minimum/Contact Guard/Supervision  4 = None,  "Modified [place]/Independent    Putting on and taking off regular lower body clothing? : 2  Bathing (including washing, rinsing, drying)?: 2  Toileting, which includes using toilet, bedpan, or urinal? : 3  Putting on and taking off regular upper body clothing?: 2  Taking care of personal grooming such as brushing teeth?: 3  Eating meals?: 3  Total Score: 15    AM-PAC Raw Score CMS "G-Code Modifier Level of Impairment Assistance   6 % Total / Unable   7 - 9 CM 80 - 100% Maximal Assist   10-14 CL 60 - 80% Moderate Assist   15 - 19 CK 40 - 60% Moderate Assist   20 - 22 CJ 20 - 40% Minimal Assist   23 CI 1-20% SBA / CGA   24 CH 0% Independent/ Mod I       Patient left up in chair with call button in reach, nurse notified and daughter present    Assessment:  Naheed Cottrell is a 84 y.o. female with a medical diagnosis of Embolic stroke involving right middle cerebral artery and presents with decreased functional mobility, functional endurance, transfers, functional standing balance and sefl care task performance. Pt presents with increased pain (L) UE and is anxious about her inability to   Independently perform functional activities.  Pt is motivated and would benefit from OT intervention to further her functional (I)ce.    Rehab identified problem list/impairments: Rehab identified problem list/impairments: weakness, impaired self care skills, impaired functional mobilty, gait instability, decreased upper extremity function, decreased ROM, pain, decreased safety awareness, impaired coordination, impaired cardiopulmonary response to activity, orthopedic precautions    Rehab potential is good.    Activity tolerance: Good    Discharge recommendations: Discharge Facility/Level Of Care Needs: home health OT     Barriers to discharge: Barriers to Discharge: None    Equipment recommendations: cane, straight     GOALS:    Occupational Therapy Goals        Problem: Occupational Therapy Goal    Goal Priority " Disciplines Outcome Interventions   Occupational Therapy Goal     OT, PT/OT Ongoing (interventions implemented as appropriate)    Description:  Goals to be met by: 6/10/17     Patient will increase functional independence with ADLs by performing:    Feeding with Modified [place].  UE Dressing with Minimal Assistance.  LE Dressing with Minimal Assistance.  Grooming while standing with Set-up Assistance.  Toileting from toilet with Stand-by Assistance for hygiene and clothing management.   Supine to sit with Stand-by Assistance.  Stand pivot transfers with Supervision with SC.                      PLAN:  Patient to be seen 4 x/week to address the above listed problems via self-care/home management, therapeutic activities, therapeutic exercises  Plan of Care expires: 07/06/17  Plan of Care reviewed with: patient, daughter (Simultaneous filing. User may not have seen previous data.)         ROSEANNE Ortega/BANDAR  06/06/2017

## 2017-06-06 NOTE — PROGRESS NOTES
[facility] Hospital Medicine  Progress Note    Patient Name: Naheed Cottrell  MRN: [id number]  Patient Class: IP- Inpatient   Admission Date: 6/4/2017  Length of Stay: 2 days  Attending Physician: Kenny Rashid MD  Primary Care Provider: Mack Cottrell MD    [facility] Medicine Team: [facility]OSP MED O Kenny Rashid MD    Subjective:     Principal Problem:Embolic stroke involving right middle cerebral artery    HPI:  Presents after fall.  Patient complains of left shoulder pain.  Went to a [employer], was walking to car and felt a little clammy.  Has been vomiting since Friday night.  She took her BP meds this evening as well.  This evening was walking to the bathroom, went to step over a mirror that she left on the ground and she tripped and hit her left shoulder on the edge of bathroom counter.  She didn't hit her head or have LOC.  She remembers the entire event.  Right now she complains of left shoulder pain.    Hospital Course:  See below    Interval History:   Symptoms:  Improved.    Diet:  Adequate intake.    Activity level:  Impaired due to weakness.   Pain:  Chronic left knee pain and acute left arm fracture pain controlled, requiring pain medication.      Review of Systems   Constitutional: Negative for fever.   Respiratory: Negative for shortness of breath.    Cardiovascular: Negative for chest pain.   Gastrointestinal: Negative for abdominal pain and blood in stool.     Objective:     Vital Signs (Most Recent):  Temp: 98.9 °F (37.2 °C) (06/06/17 1541)  Pulse: 77 (06/06/17 1541)  Resp: 18 (06/06/17 1541)  BP: 136/63 (06/06/17 1541)  SpO2: (!) 93 % (06/06/17 1541) Vital Signs (24h Range):  Temp:  [97.7 °F (36.5 °C)-99.6 °F (37.6 °C)] 98.9 °F (37.2 °C)  Pulse:  [65-80] 77  Resp:  [16-20] 18  SpO2:  [92 %-96 %] 93 %  BP: (134-168)/(63-70) 136/63     Weight: 59 kg (130 lb)  Body mass index is 23.03 kg/m².    Intake/Output Summary (Last 24 hours) at 06/06/17 1605  Last data filed at  06/06/17 0704   Gross per 24 hour   Intake          2008.34 ml   Output              150 ml   Net          1858.34 ml      Physical Exam   Constitutional: No distress.   Neck: No JVD present.   Cardiovascular: Normal rate and regular rhythm.    Pulmonary/Chest: Effort normal and breath sounds normal.   Abdominal: Soft. Bowel sounds are normal.   Musculoskeletal: She exhibits no edema.   Neurological: She is alert.       Significant Labs: All pertinent labs within the past 24 hours have been reviewed.    Significant Imaging: I have reviewed and interpreted all pertinent imaging results/findings within the past 24 hours.    Assessment/Plan:      Gram-negative bacteremia    Acute cystitis without hematuria  Urine dirty and blood culture have GNR.  - -- Better; Continue with antibiotics and follow up culture results           Essential hypertension    PAD (peripheral artery disease)   Elevated troponin   Appreciate cards consult.  Tn elevation from infection and hypotension.  TTE done and was unchanged.  -- resume asa;  will stop plavix  - hold cozaar and chlorthalidone in setting of ANGELICA          ANGELICA (acute kidney injury)    From volume depletion from N/V.  Baseline Cr 0.8 and at admission was 2.2.  - improved with IVF but not at baseline; stopped IVF as pt is rehydrated now and using oxygen          Vitamin D deficiency    VIt D is low at 16.  -  start PO vit D           Closed fracture of left proximal humerus    Appreciate ortho consult. They will pursue non-operative treatment  - carlos DE JESUS  - CASSIE LUCESAR  - will f/u in ortho clinic in 1-2 weeks, clinic will call to schedule  -- pt/ot ordered          Iron deficiency anemia    Drop could be from IVF as no signs of bleeding, but admit was lower than normal.  MCV wnl but RDW elevated.  B12, folate wnl.  Retic low.  Iron <10.  FOBT negative.  -- will start iron po on discharge        Thrombocytopenia, unspecified    Likely from infection  - monitor          Hypokalemia     Replace as needed          * Embolic stroke involving right middle cerebral artery    Found on CTH.  Appreciate neuro consult.  Felt to be old.  Carotid US has only some mild blockages.            VTE Risk Mitigation         Ordered     Medium Risk of VTE  Once      06/04/17 0910          Kenny Rashid MD  Department of Hospital Medicine   [facility]

## 2017-06-06 NOTE — HPI
Patient is an 84 yr old female with PMHx of HTN, PAD, HFpEF who presented to the ED after a mechanical fall. Patient reported not seeing where she was going and tripping on a mirror that she had left on the floor of her bedroom. Patient reported symptoms of nausea, vomiting since Friday night however was intermittently feeling well enough and had attended a play the evening of the fall. Patient reported left shoulder pain post the fall. Denies LOC associated with the fall and patient reports being able to recollect events during the fall. Patient brought in to the ED for workup of left shoulder pain which was remarkable for comminuted, minimally displaced fracture of the left humeral head. Orthopedics was consulted and the fracture is being managed conservatively. Hospital course remarkable for gram negative anny bacteremia with UTI as possible source and currently on abx. CT head was done to rule out any hemorrhage prior to resuming anti-platelets. Imaging was remarkable for incidental finding of age indeterminate right parietal lobe wedge shaped area of possible infarct for which vascular neurology was consulted. Patient denies any change in neurologic status in the recent past that could account for this lesion. Patient also denies any known history of stroke.

## 2017-06-06 NOTE — ASSESSMENT & PLAN NOTE
Orthopedics following  - conservative management - in shoulder immobilizer  - NWB on the left.   - outpatient follow up

## 2017-06-06 NOTE — PLAN OF CARE
Dr Rashid notified that daughter Erica wants semi electric bed ordered and prefers hh.  Left msg for MSW Ashly re above.

## 2017-06-06 NOTE — ASSESSMENT & PLAN NOTE
Found on CTH.  Appreciate neuro consult.  Felt to be old.  Carotid US has only some mild blockages.

## 2017-06-06 NOTE — PLAN OF CARE
Problem: Occupational Therapy Goal  Goal: Occupational Therapy Goal  Goals to be met by: 6/10/17     Patient will increase functional independence with ADLs by performing:    Feeding with Modified [place].  UE Dressing with Minimal Assistance.  LE Dressing with Minimal Assistance.  Grooming while standing with Set-up Assistance.  Toileting from toilet with Stand-by Assistance for hygiene and clothing management.   Supine to sit with Stand-by Assistance.  Stand pivot transfers with Supervision with SC.    Outcome: Ongoing (interventions implemented as appropriate)  ROSEANNE Ortega/BANDAR      6/6/2017

## 2017-06-06 NOTE — SUBJECTIVE & OBJECTIVE
Past Medical History:   Diagnosis Date    Arthritis     Cataract     Encounter for blood transfusion     Fall, accidental     Hypertension     Right renal artery stenosis 3/28/2017    Stenosis of left subclavian artery      Past Surgical History:   Procedure Laterality Date    EYE SURGERY      FRACTURE SURGERY       Family History   Problem Relation Age of Onset    Hyperlipidemia Mother     Hypertension Mother      Social History   Substance Use Topics    Smoking status: Never Smoker    Smokeless tobacco: Never Used    Alcohol use No     Review of patient's allergies indicates:  No Known Allergies  Medications: I have reviewed the current medication administration record.    Prescriptions Prior to Admission   Medication Sig Dispense Refill Last Dose    carvedilol (COREG) 25 MG tablet Take 1 tablet (25 mg total) by mouth 2 (two) times daily with meals. 180 tablet 11 6/3/2017    chlorthalidone (HYGROTEN) 25 MG Tab Take 1 tablet (25 mg total) by mouth once daily. 90 tablet 11 6/3/2017    cilostazol (PLETAL) 50 MG Tab Take 1 tablet (50 mg total) by mouth 2 (two) times daily. 180 tablet 3 6/3/2017    citalopram (CELEXA) 20 MG tablet Take 1 tablet (20 mg total) by mouth once daily. 90 tablet 2 6/3/2017    losartan (COZAAR) 100 MG tablet Take 1 tablet (100 mg total) by mouth once daily. 90 tablet 3 6/3/2017    rosuvastatin (CRESTOR) 5 MG tablet Take one tablet every other day along with CoQ 10 45 tablet 3 6/3/2017       Review of Systems   Constitutional: Positive for fatigue. Negative for chills.   HENT: Negative for ear discharge and trouble swallowing.    Eyes: Negative for visual disturbance.   Respiratory: Negative for shortness of breath.    Cardiovascular: Negative for chest pain.   Gastrointestinal: Negative for abdominal pain.   Genitourinary: Negative for hematuria.   Musculoskeletal: Negative for back pain.   Neurological: Negative for dizziness, facial asymmetry, light-headedness and  headaches.   Psychiatric/Behavioral: Negative for agitation.     Objective:     Vital Signs (Most Recent):  Temp: 98.3 °F (36.8 °C) (17)  Pulse: 75 (17)  Resp: 20 (17)  BP: 134/63 (17)  SpO2: (!) 92 % (17)    Vital Signs Range (Last 24H):  Temp:  [97.5 °F (36.4 °C)-99.6 °F (37.6 °C)]   Pulse:  [69-86]   Resp:  [18-20]   BP: (108-149)/(54-64)   SpO2:  [92 %-96 %]     Physical Exam   Constitutional: She appears well-developed and well-nourished.   HENT:   Head: Normocephalic and atraumatic.   Eyes: Pupils are equal, round, and reactive to light.   Musculoskeletal:        Left shoulder: She exhibits decreased range of motion.       Neurological Exam:   LOC: alert and follows requests  Language: No aphasia  Speech: No dysarthria  Orientation: Person, Place, Time  Visual Fields (CN II): Full  EOM (CN III, IV, VI): Full/intact  Pupils (CN III, IV, VI): PERRL  Facial Sensation (CN V): Symmetric  Facial Movement (CN VII): symmetric facial expression  Shoulder/Neck (CN XI): SCM-Left: Normal ; SCM-Right: Normal ; Shoulder Shrug: Normal/Symetric  Tongue (CN XII): to midline  Motor*: Arm Left:  Limited ROM at the shoulder 2/2 humerus fracture however  strength 4+/5, Leg Left:   Normal (5/5), Arm Right:   Normal (5/5), Leg Right:   Normal (5/5)  Cerebellar*: Normal limb  Sensation: intact to light touch, temperature and vibration  Tone: Arm-Left: normal; Leg-Left: normal; Arm-Right: normal; Leg-Right: normal    NIH Stroke Scale:  Interval: baseline (upon arrival/admit)  Level of Consciousness: 0 - alert  LOC Questions: 0 - answers both correctly  LOC Commands: 0 - performs both correctly  Best Gaze: 0 - normal  Visual: 0 - no visual loss  Facial Palsy: 0 - normal  Motor Left Arm: UN - amputation, joint fusion (limited left shoulder ROM post fracture. )  Motor Right Arm: 0 - no drift  Motor Left Le - no drift  Motor Right Le - no drift  Limb Ataxia: 0 -  absent  Sensory: 0 - normal  Best Language: 0 - no aphasia  Dysarthria: 0 - normal articulation  Extinction and Inattention: 0 - no neglect  NIH Stroke Scale Total: 0  Modified [place] Scale:   Timeline: Prior to symptoms onset  Modified Maral Score: 0 - no symptoms        Laboratory:  CMP:   Recent Labs  Lab 06/05/17  0603   CALCIUM 7.3*   *   K 3.4*   CO2 18*      BUN 42*   CREATININE 1.4     BMP:   Recent Labs  Lab 06/05/17  0603   *   K 3.4*      CO2 18*   BUN 42*   CREATININE 1.4   CALCIUM 7.3*     CBC:   Recent Labs  Lab 06/05/17  1738   WBC 7.28   RBC 3.06*   HGB 9.2*   HCT 28.1*   PLT 95*   MCV 92   MCH 30.1   MCHC 32.7     Lipid Panel: No results for input(s): CHOL, LDLCALC, HDL, TRIG in the last 168 hours.  Coagulation:   Recent Labs  Lab 06/04/17  0616   INR 1.2   APTT 21.7     Platelet Aggregation Study: No results for input(s): PLTAGG, PLTAGINTERP, PLTAGREGLACO, ADPPLTAGGREG in the last 168 hours.  Hgb A1C: No results for input(s): HGBA1C in the last 168 hours.  TSH: No results for input(s): TSH in the last 168 hours.    Diagnostic Results:  Brain Imaging:     CT head without contrast (6/6/17)  Right parietal lobe area of hypodensity; possible age indeterminate infarct.

## 2017-06-06 NOTE — CONSULTS
[facility]  Vascular Neurology  Comprehensive Stroke Center  Consult Note      Inpatient consult to Vascular (Stroke) Neurology  Consult performed by: ERASMO SALMERON  Consult ordered by: COREY BELCHER        Subjective:     History of Present Illness:  Patient is an 84 yr old female with PMHx of HTN, PAD, HFpEF who presented to the ED after a mechanical fall. Patient reported not seeing where she was going and tripping on a mirror that she had left on the floor of her bedroom. Patient reported symptoms of nausea, vomiting since Friday night however was intermittently feeling well enough and had attended a play the evening of the fall. Patient reported left shoulder pain post the fall. Denies LOC associated with the fall and patient reports being able to recollect events during the fall. Patient brought in to the ED for workup of left shoulder pain which was remarkable for comminuted, minimally displaced fracture of the left humeral head. Orthopedics was consulted and the fracture is being managed conservatively. Hospital course remarkable for gram negative anny bacteremia with UTI as possible source and currently on abx. CT head was done to rule out any hemorrhage prior to resuming anti-platelets. Imaging was remarkable for incidental finding of age indeterminate right parietal lobe wedge shaped area of possible infarct for which vascular neurology was consulted. Patient denies any change in neurologic status in the recent past that could account for this lesion. Patient also denies any known history of stroke.          Past Medical History:   Diagnosis Date    Arthritis     Cataract     Encounter for blood transfusion     Fall, accidental     Hypertension     Right renal artery stenosis 3/28/2017    Stenosis of left subclavian artery      Past Surgical History:   Procedure Laterality Date    EYE SURGERY      FRACTURE SURGERY       Family History   Problem Relation Age of Onset     Hyperlipidemia Mother     Hypertension Mother      Social History   Substance Use Topics    Smoking status: Never Smoker    Smokeless tobacco: Never Used    Alcohol use No     Review of patient's allergies indicates:  No Known Allergies  Medications: I have reviewed the current medication administration record.    Prescriptions Prior to Admission   Medication Sig Dispense Refill Last Dose    carvedilol (COREG) 25 MG tablet Take 1 tablet (25 mg total) by mouth 2 (two) times daily with meals. 180 tablet 11 6/3/2017    chlorthalidone (HYGROTEN) 25 MG Tab Take 1 tablet (25 mg total) by mouth once daily. 90 tablet 11 6/3/2017    cilostazol (PLETAL) 50 MG Tab Take 1 tablet (50 mg total) by mouth 2 (two) times daily. 180 tablet 3 6/3/2017    citalopram (CELEXA) 20 MG tablet Take 1 tablet (20 mg total) by mouth once daily. 90 tablet 2 6/3/2017    losartan (COZAAR) 100 MG tablet Take 1 tablet (100 mg total) by mouth once daily. 90 tablet 3 6/3/2017    rosuvastatin (CRESTOR) 5 MG tablet Take one tablet every other day along with CoQ 10 45 tablet 3 6/3/2017       Review of Systems   Constitutional: Positive for fatigue. Negative for chills.   HENT: Negative for ear discharge and trouble swallowing.    Eyes: Negative for visual disturbance.   Respiratory: Negative for shortness of breath.    Cardiovascular: Negative for chest pain.   Gastrointestinal: Negative for abdominal pain.   Genitourinary: Negative for hematuria.   Musculoskeletal: Negative for back pain.   Neurological: Negative for dizziness, facial asymmetry, light-headedness and headaches.   Psychiatric/Behavioral: Negative for agitation.     Objective:     Vital Signs (Most Recent):  Temp: 98.3 °F (36.8 °C) (06/05/17 2333)  Pulse: 75 (06/05/17 2333)  Resp: 20 (06/05/17 2333)  BP: 134/63 (06/05/17 2333)  SpO2: (!) 92 % (06/05/17 2333)    Vital Signs Range (Last 24H):  Temp:  [97.5 °F (36.4 °C)-99.6 °F (37.6 °C)]   Pulse:  [69-86]   Resp:  [18-20]   BP:  (108-149)/(54-64)   SpO2:  [92 %-96 %]     Physical Exam   Constitutional: She appears well-developed and well-nourished.   HENT:   Head: Normocephalic and atraumatic.   Eyes: Pupils are equal, round, and reactive to light.   Musculoskeletal:        Left shoulder: She exhibits decreased range of motion.       Neurological Exam:   LOC: alert and follows requests  Language: No aphasia  Speech: No dysarthria  Orientation: Person, Place, Time  Visual Fields (CN II): Full  EOM (CN III, IV, VI): Full/intact  Pupils (CN III, IV, VI): PERRL  Facial Sensation (CN V): Symmetric  Facial Movement (CN VII): symmetric facial expression  Shoulder/Neck (CN XI): SCM-Left: Normal ; SCM-Right: Normal ; Shoulder Shrug: Normal/Symetric  Tongue (CN XII): to midline  Motor*: Arm Left:  Limited ROM at the shoulder 2/2 humerus fracture however  strength 4+/5, Leg Left:   Normal (5/5), Arm Right:   Normal (5/5), Leg Right:   Normal (5/5)  Cerebellar*: Normal limb  Sensation: intact to light touch, temperature and vibration  Tone: Arm-Left: normal; Leg-Left: normal; Arm-Right: normal; Leg-Right: normal    NIH Stroke Scale:  Interval: baseline (upon arrival/admit)  Level of Consciousness: 0 - alert  LOC Questions: 0 - answers both correctly  LOC Commands: 0 - performs both correctly  Best Gaze: 0 - normal  Visual: 0 - no visual loss  Facial Palsy: 0 - normal  Motor Left Arm: UN - amputation, joint fusion (limited left shoulder ROM post fracture. )  Motor Right Arm: 0 - no drift  Motor Left Le - no drift  Motor Right Le - no drift  Limb Ataxia: 0 - absent  Sensory: 0 - normal  Best Language: 0 - no aphasia  Dysarthria: 0 - normal articulation  Extinction and Inattention: 0 - no neglect  NIH Stroke Scale Total: 0  Modified [place] Scale:   Timeline: Prior to symptoms onset  Modified [place] Score: 0 - no symptoms        Laboratory:  CMP:   Recent Labs  Lab 17  0603   CALCIUM 7.3*   *   K 3.4*   CO2 18*      BUN 42*    CREATININE 1.4     BMP:   Recent Labs  Lab 06/05/17  0603   *   K 3.4*      CO2 18*   BUN 42*   CREATININE 1.4   CALCIUM 7.3*     CBC:   Recent Labs  Lab 06/05/17  1738   WBC 7.28   RBC 3.06*   HGB 9.2*   HCT 28.1*   PLT 95*   MCV 92   MCH 30.1   MCHC 32.7     Lipid Panel: No results for input(s): CHOL, LDLCALC, HDL, TRIG in the last 168 hours.  Coagulation:   Recent Labs  Lab 06/04/17  0616   INR 1.2   APTT 21.7     Platelet Aggregation Study: No results for input(s): PLTAGG, PLTAGINTERP, PLTAGREGLACO, ADPPLTAGGREG in the last 168 hours.  Hgb A1C: No results for input(s): HGBA1C in the last 168 hours.  TSH: No results for input(s): TSH in the last 168 hours.    Diagnostic Results:  Brain Imaging:     CT head without contrast (6/6/17)  Right parietal lobe area of hypodensity; possible age indeterminate infarct.        Assessment/Plan:     Patient is a 84 y.o. year old female with:    * Embolic stroke involving right middle cerebral artery    84 yr old female with PMHx of HTN, PAD who admitted post mechanical fall; found to have left humerus head fracture. 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  Recommend MRI brain WO contrast to assess age of infarct. Also recommend completing stroke workup with cerebro-vasculature imaging - MRA head/neck.     Antithrombotics for secondary stroke prevention: Antiplatelets:  Aspirin: 81 mg oral now and daily,   Statins for secondary stroke prevention and hyperlipidemia, if present: Atorvastatin- 40 mg oral daily, Aggressive risk factor modification: Hypertension and Carotid Artery disease, Rehab Efforts: Physical Therapy, Occupational Therapy and Speech and Language Pathology, Diagnostics: Ordered/Pending MRA head to assess vasculature, MRA neck/arch to assess vasculature, MRI head without contrast to assess brain parenchyma, VTE Prophylaxis: Heparin 5000 units SQ every 8 hours        Enlarged LA (left atrium)    Noted on imaging - moderate        Acute  cystitis without hematuria    - urine culture unavailable  - UA however - remarkable for >100WBC concerning for UTI  - rocephin day 2        Gram-negative bacteremia    On rocephin - Day 2  - will need repeat blood cultures        Closed fracture of left proximal humerus    Orthopedics following  - conservative management - in shoulder immobilizer  - NWB on the left.   - outpatient follow up        Essential hypertension    Stroke risk factor            If MRI positive for acute stroke, will admit to vascular neurology service.     Larry Felipe MD  Comprehensive Stroke Center  Department of Vascular Neurology   [facility]

## 2017-06-06 NOTE — PT/OT/SLP EVAL
Physical Therapy  Evaluation    Naheed Cottrell   MRN: [id number]   Admitting Diagnosis: Embolic stroke involving right middle cerebral artery    PT Received On: 06/06/17  PT Start Time: 1312     PT Stop Time: 1400    PT Total Time (min): 48 min       Billable Minutes:  Evaluation 25 minutes, Gait Training 10 minutes and Therapeutic Activity 13 minutes    Diagnosis: Embolic stroke involving right middle cerebral artery  S/p fall-- L proximal humerus fracture     Past Medical History:   Diagnosis Date    Arthritis     Cataract     Embolic stroke involving right middle cerebral artery 6/6/2017    Encounter for blood transfusion     Fall, accidental     Hypertension     Right renal artery stenosis 3/28/2017    Stenosis of left subclavian artery       Past Surgical History:   Procedure Laterality Date    EYE SURGERY      FRACTURE SURGERY         Referring physician: CYNDEE Rashid   Date referred to PT: 6/5/17    General Precautions: Standard, fall  Orthopedic Precautions: LUE non weight bearing   Braces:  (L arm sling )       Do you have any cultural, spiritual, Orthodox conflicts, given your current situation?: Per chart review- [facility]     Patient History:  Lives With: alone  Living Arrangements: house  Home Accessibility: stairs to enter home (elevator access)  Number of Stairs to Enter Home:  (threshold + landing + threshold )  Stair Railings at Home: outside, present at both sides  Transportation Available: family or friend will provide  Living Environment Comment: Pt reports living alone in 2 story house with elevator access to bedroom and bathroom. Upon discharge patient will live with daughter in [facility] with 1 MIKO and no handrail; bathroom has walk-in shower with standard toilet. Pt will have 24/7 assistance upon DC. PTA, pt was (I) with functional mobility, ADLs, and driving. Pt reports 1 recent fall which occured L proximal humerus fracture.   Equipment Currently Used at Home: none    Previous Level of  "Function:  Ambulation Skills: independent  Transfer Skills: independent  ADL Skills: independent  Work/Leisure Activity: independent    Subjective:  Communicated with RN prior to session.  Pt agreeable to PT/OT session. Pt states "I'm just so stiff and tired from being on that stretcher for 2 hours."  Chief Complaint: LUE pain   Patient goals: decrease pain and return home     Pain/Comfort  Pain Rating 1:  (did not provide numerical value )  Location - Side 1: Left  Location - Orientation 1: upper  Location 1: arm  Pain Addressed 1: Reposition, Distraction  Pain Rating Post-Intervention 1:  (See comment above)      Objective:   Patient found with: peripheral IV, oxygen     Cognitive Exam:  Oriented to: Person, Place, Time and Situation    Follows Commands/attention: Follows one-step commands and Follows two-step commands  Communication: clear/fluent  Safety awareness/insight to disability: impaired    Physical Exam:  Postural examination/scapula alignment: No postural abnormalities identified    Skin integrity: Visible skin intact  Edema: None noted in BLE    Sensation:   Intact    Lower Extremity Range of Motion:  Right Lower Extremity: WFL  Left Lower Extremity: WFL    Lower Extremity Strength:  Right Lower Extremity: WFL  Left Lower Extremity: WFL      Functional Mobility:  Bed Mobility:  Sit to Supine: Minimum Assistance (guiding at trunk and min A at BLE)    Transfers:  Sit <> Stand Assistance: Minimum Assistance (x 1 trial from BS chair; x 2 trials from EOB)  Sit <> Stand Assistive Device: No Assistive Device  Bed <> Chair Technique: Stand Pivot  Bed <> Chair Assistance: Minimum Assistance  Bed <> Chair Assistive Device: No Assistive Device  Toilet Transfer Technique: Stand Pivot  Toilet Transfer Assistance: Contact Guard Assistance  Toilet Transfer Assistive Device: No Assistive Device (grab bars)    Gait:   Gait Distance: 80' with CGA using no AD. Pt ambulated additional 30' with SPC and CGA. Verbal cues for " gait pattern while using AD, maintaining eyes open, and upright posture. Pt ambulated additional 10' with SPC and close SBA.   Assistance 1: Contact Guard Assistance, Stand by Assistance  Gait Assistive Device: No device, Single point cane  Gait Pattern: 3-point gait  Gait Deviation(s): decreased mikaela, increased time in double stance, decreased velocity of limb motion, decreased step length, decreased stride length    Stairs:  Pt ascended/descend 1 stair(s) with No Assistive Device with no and handrails with Moderate Assistance.     Balance:   Static Sit: GOOD: Takes MODERATE challenges from all directions  Dynamic Sit: GOOD-: Maintains balance through MODERATE excursions of active trunk movement,     Static Stand: FAIR+: Takes MINIMAL challenges from all directions  Dynamic stand: FAIR: Needs CONTACT GUARD during gait    Therapeutic Activities and Exercises:  Therapeutic activities aimed to increase pt's independence, safety, and efficiency with bed mobility and functional transfers. See above for assistance levels.   · Pt educated on role of PT and POC/goals for therapy as well as safety with mobility. Pt verbalized understanding. Pt expressed no further concerns/questions.   · Stand balance in bathroom to perform self care skills x 5 minutes with CGA/SBA   · Toilet transfer: CGA and use of grab bar  · 2 trials of sit to stand from EOB with min A and no AD  · White board updated: pt safe to ambulate to restroom with staff   · EOB sit balance with CGA to SBA while completing static and dynamic balance activities     Gait training performed to increase pt's endurance, gait stability, safety, and independence. Pt provided with verbal cueing to improve AD management and postural awareness.   · Oxygen stats decreased to 83% and  post gait training. Education on deep breathing to maximize lung capacity. Completed on 2L of oxygen.     AM-PAC 6 CLICK MOBILITY  How much help from another person does this patient  currently need?   1 = Unable, Total/Dependent Assistance  2 = A lot, Maximum/Moderate Assistance  3 = A little, Minimum/Contact Guard/Supervision  4 = None, Modified [place]/Independent    Turning over in bed (including adjusting bedclothes, sheets and blankets)?: 3  Sitting down on and standing up from a chair with arms (e.g., wheelchair, bedside commode, etc.): 3  Moving from lying on back to sitting on the side of the bed?: 3  Moving to and from a bed to a chair (including a wheelchair)?: 3  Need to walk in hospital room?: 3  Climbing 3-5 steps with a railing?: 2  Total Score: 17     AM-PAC Raw Score CMS G-Code Modifier Level of Impairment Assistance   6 % Total / Unable   7 - 9 CM 80 - 100% Maximal Assist   10 - 14 CL 60 - 80% Moderate Assist   15 - 19 CK 40 - 60% Moderate Assist   20 - 22 CJ 20 - 40% Minimal Assist   23 CI 1-20% SBA / CGA   24 CH 0% Independent/ Mod I     Patient left supine with all lines intact, call button in reach and RN present.    Assessment:   Naheed Cottrell is a 84 y.o. female with a medical diagnosis of Embolic stroke involving right middle cerebral artery. Upon initial PT evaluation, pt presents with weakness, impaired endurance, impaired self care skills, impaired functional mobilty, gait instability, impaired balance, decreased coordination, decreased upper extremity function, decreased safety awareness, pain, decreased ROM, impaired fine motor, and impaired cardiopulmonary response to activity. Pt completed bed mobility with min A, transfers with min A, and ambulated  80' with CGA using no AD + 40' with SPC and CGA/SBA. Education on PT role and POC as well as AD management. Pt would benefit from skilled PT services to address these deficits and improve return to PLOF. Anticipate d/c to home. DME    Rehab identified problem list/impairments: Rehab identified problem list/impairments: weakness, impaired endurance, impaired self care skills, impaired functional mobilty,  gait instability, impaired balance, decreased coordination, decreased upper extremity function, decreased safety awareness, pain, decreased ROM, impaired fine motor, impaired cardiopulmonary response to activity    Rehab potential is good.    Activity tolerance: Good    Discharge recommendations: Discharge Facility/Level Of Care Needs: home health PT     Barriers to discharge: Barriers to Discharge: None (when patient discharges to daughter's house)    Equipment recommendations: Equipment Needed After Discharge: cane, straight     GOALS:    Physical Therapy Goals        Problem: Physical Therapy Goal    Goal Priority Disciplines Outcome Goal Variances Interventions   Physical Therapy Goal     PT/OT, PT Ongoing (interventions implemented as appropriate)     Description:  Goals to be met by: 17     Patient will increase functional independence with mobility by performin. Supine to sit with Contact Guard Assistance  2. Sit to supine with Contact Guard Assistance  3. Sit to stand transfer with Stand-by Assistance  4. Bed to chair transfer with Stand-by Assistance using LRAD  5. Gait  x 200 feet with Stand-by Assistance using LRAD  6. Ascend/descend 1 step with no Handrails Contact Guard Assistance using SPC                    PLAN:    Patient to be seen 3 x/week to address the above listed problems via gait training, therapeutic activities, therapeutic exercises, neuromuscular re-education  Plan of Care expires: 17  Plan of Care reviewed with: patient, daughter      Dior Faustreggie, PT  2017

## 2017-06-06 NOTE — PLAN OF CARE
· SPOKE W DAUGHTER [employer] WHO WANTS NURSES' REGISTRY IF THEY EMPLOY  THERAPIST NAMED ELSIE LEWIS TO ORDER HOSPITAL BED    2ND HH CHOICE IS PULSE

## 2017-06-06 NOTE — ASSESSMENT & PLAN NOTE
From volume depletion from N/V.  Baseline Cr 0.8 and at admission was 2.2.  - improved with IVF but not at baseline; stopped IVF as pt is rehydrated now and using oxygen

## 2017-06-06 NOTE — ASSESSMENT & PLAN NOTE
- urine culture unavailable  - UA however - remarkable for >100WBC concerning for UTI  - rocephin day 2

## 2017-06-07 VITALS
WEIGHT: 130 LBS | OXYGEN SATURATION: 97 % | DIASTOLIC BLOOD PRESSURE: 60 MMHG | SYSTOLIC BLOOD PRESSURE: 158 MMHG | HEIGHT: 63 IN | HEART RATE: 75 BPM | BODY MASS INDEX: 23.04 KG/M2 | TEMPERATURE: 98 F | RESPIRATION RATE: 20 BRPM

## 2017-06-07 LAB
ANION GAP SERPL CALC-SCNC: 11 MMOL/L
BACTERIA BLD CULT: NORMAL
BASOPHILS # BLD AUTO: 0 K/UL
BASOPHILS NFR BLD: 0 %
BUN SERPL-MCNC: 34 MG/DL
CALCIUM SERPL-MCNC: 8.3 MG/DL
CHLORIDE SERPL-SCNC: 107 MMOL/L
CO2 SERPL-SCNC: 21 MMOL/L
CREAT SERPL-MCNC: 0.9 MG/DL
DIFFERENTIAL METHOD: ABNORMAL
EOSINOPHIL # BLD AUTO: 0 K/UL
EOSINOPHIL NFR BLD: 0.3 %
ERYTHROCYTE [DISTWIDTH] IN BLOOD BY AUTOMATED COUNT: 15.3 %
EST. GFR  (AFRICAN AMERICAN): >60 ML/MIN/1.73 M^2
EST. GFR  (NON AFRICAN AMERICAN): 58.9 ML/MIN/1.73 M^2
GLUCOSE SERPL-MCNC: 109 MG/DL
HCT VFR BLD AUTO: 27.2 %
HGB BLD-MCNC: 9.1 G/DL
LYMPHOCYTES # BLD AUTO: 0.7 K/UL
LYMPHOCYTES NFR BLD: 10.9 %
MCH RBC QN AUTO: 30.1 PG
MCHC RBC AUTO-ENTMCNC: 33.5 %
MCV RBC AUTO: 90 FL
MONOCYTES # BLD AUTO: 0.5 K/UL
MONOCYTES NFR BLD: 8 %
NEUTROPHILS # BLD AUTO: 4.9 K/UL
NEUTROPHILS NFR BLD: 80.5 %
PLATELET # BLD AUTO: 90 K/UL
PMV BLD AUTO: 10.3 FL
POTASSIUM SERPL-SCNC: 3.3 MMOL/L
RBC # BLD AUTO: 3.02 M/UL
SODIUM SERPL-SCNC: 139 MMOL/L
WBC # BLD AUTO: 6.13 K/UL

## 2017-06-07 PROCEDURE — 87040 BLOOD CULTURE FOR BACTERIA: CPT

## 2017-06-07 PROCEDURE — 25000003 PHARM REV CODE 250: Performed by: HOSPITALIST

## 2017-06-07 PROCEDURE — 99239 HOSP IP/OBS DSCHRG MGMT >30: CPT | Mod: ,,, | Performed by: HOSPITALIST

## 2017-06-07 PROCEDURE — 80048 BASIC METABOLIC PNL TOTAL CA: CPT

## 2017-06-07 PROCEDURE — 25000003 PHARM REV CODE 250: Performed by: NURSE PRACTITIONER

## 2017-06-07 PROCEDURE — 99233 SBSQ HOSP IP/OBS HIGH 50: CPT | Mod: GC,,, | Performed by: PSYCHIATRY & NEUROLOGY

## 2017-06-07 PROCEDURE — 36415 COLL VENOUS BLD VENIPUNCTURE: CPT

## 2017-06-07 PROCEDURE — 85025 COMPLETE CBC W/AUTO DIFF WBC: CPT

## 2017-06-07 PROCEDURE — 63600175 PHARM REV CODE 636 W HCPCS: Performed by: NURSE PRACTITIONER

## 2017-06-07 RX ORDER — FERROUS SULFATE, DRIED 160(50) MG
2 TABLET, EXTENDED RELEASE ORAL 2 TIMES DAILY
COMMUNITY
Start: 2017-06-07 | End: 2022-12-12

## 2017-06-07 RX ORDER — FERROUS SULFATE 325(65) MG
325 TABLET, DELAYED RELEASE (ENTERIC COATED) ORAL 2 TIMES DAILY
Refills: 0 | COMMUNITY
Start: 2017-06-07 | End: 2018-11-12

## 2017-06-07 RX ORDER — AMOXICILLIN 250 MG
1 CAPSULE ORAL DAILY
COMMUNITY
Start: 2017-06-07 | End: 2018-11-12

## 2017-06-07 RX ORDER — POTASSIUM CHLORIDE 20 MEQ/1
40 TABLET, EXTENDED RELEASE ORAL ONCE
Status: COMPLETED | OUTPATIENT
Start: 2017-06-07 | End: 2017-06-07

## 2017-06-07 RX ORDER — ASPIRIN 81 MG/1
81 TABLET ORAL DAILY
Refills: 0 | Status: ON HOLD | COMMUNITY
Start: 2017-06-07 | End: 2020-06-26 | Stop reason: HOSPADM

## 2017-06-07 RX ORDER — CIPROFLOXACIN 500 MG/1
500 TABLET ORAL 2 TIMES DAILY
Qty: 20 TABLET | Refills: 0 | Status: SHIPPED | OUTPATIENT
Start: 2017-06-07 | End: 2017-10-09

## 2017-06-07 RX ORDER — CLOPIDOGREL BISULFATE 75 MG/1
75 TABLET ORAL DAILY
Qty: 30 TABLET | Refills: 11
Start: 2017-06-07 | End: 2018-01-02 | Stop reason: SDUPTHER

## 2017-06-07 RX ADMIN — OYSTER SHELL CALCIUM WITH VITAMIN D 2 TABLET: 500; 200 TABLET, FILM COATED ORAL at 09:06

## 2017-06-07 RX ADMIN — CEFTRIAXONE 2 G: 2 INJECTION, SOLUTION INTRAVENOUS at 02:06

## 2017-06-07 RX ADMIN — CITALOPRAM HYDROBROMIDE 20 MG: 10 TABLET ORAL at 09:06

## 2017-06-07 RX ADMIN — POTASSIUM CHLORIDE 40 MEQ: 1500 TABLET, EXTENDED RELEASE ORAL at 09:06

## 2017-06-07 RX ADMIN — ASPIRIN 81 MG: 81 TABLET, COATED ORAL at 09:06

## 2017-06-07 RX ADMIN — CARVEDILOL 25 MG: 25 TABLET, FILM COATED ORAL at 09:06

## 2017-06-07 NOTE — PLAN OF CARE
Problem: Patient Care Overview  Goal: Plan of Care Review  Outcome: Ongoing (interventions implemented as appropriate)  No acute events overnight. VSS on room air now. No complaints of pain.  S/p fall at home resulting in fractured left shoulder placed in sling by ortho no surgical intervention at this time.  AAOx4 ambulating with 1 person assist.  Fall precautions maintained. No neurological deficits it is believed that stroke found on ct is old.  Per patient and family her mental status since of well being has improved and she is back to baseline.  Receiving iv antibiotics for treatment of uti.  Supportive family at bedside at all times.

## 2017-06-07 NOTE — SUBJECTIVE & OBJECTIVE
Interval History:   Symptoms:  Improved.    Diet:  Adequate intake.    Activity level:  Impaired due to weakness.   Pain:  Chronic left knee pain and acute left arm fracture pain controlled, requiring pain medication.      Review of Systems   Constitutional: Negative for fever.   Respiratory: Negative for shortness of breath.    Cardiovascular: Negative for chest pain.   Gastrointestinal: Negative for abdominal pain and blood in stool.     Objective:     Vital Signs (Most Recent):  Temp: 98.2 °F (36.8 °C) (06/07/17 1114)  Pulse: 75 (06/07/17 1120)  Resp: 20 (06/07/17 1114)  BP: (!) 158/60 (06/07/17 1129)  SpO2: 97 % (06/07/17 1114) Vital Signs (24h Range):  Temp:  [97.8 °F (36.6 °C)-98.9 °F (37.2 °C)] 98.2 °F (36.8 °C)  Pulse:  [71-77] 75  Resp:  [18-20] 20  SpO2:  [93 %-97 %] 97 %  BP: (124-182)/(60-80) 158/60     Weight: 59 kg (130 lb)  Body mass index is 23.03 kg/m².    Intake/Output Summary (Last 24 hours) at 06/07/17 1158  Last data filed at 06/07/17 0800   Gross per 24 hour   Intake              550 ml   Output                0 ml   Net              550 ml      Physical Exam   Constitutional: No distress.   Neck: No JVD present.   Cardiovascular: Normal rate and regular rhythm.    Pulmonary/Chest: Effort normal and breath sounds normal.   Abdominal: Soft. Bowel sounds are normal.   Musculoskeletal: She exhibits no edema.   Neurological: She is alert.       Significant Labs: All pertinent labs within the past 24 hours have been reviewed.    Significant Imaging: I have reviewed and interpreted all pertinent imaging results/findings within the past 24 hours.

## 2017-06-07 NOTE — PLAN OF CARE
Left msg for Angela at [facility] regarding hospital bed delivery today to daughter's house.  Cm provided name/number of daughter (Erica) to coordinate this delivery early this morning.  Cm also spoke with daughter Erica late yesterday evening regarding hh preference. I informed her that we only make one referral to a home health agency and they want Nurse[facility] RE[facility] ONLY if they can have a therapist Soco Ortega assigned to her.  CM confirmed with Sherrie at  that they do employ this person and pt wants this therapist assigned to her.  Per r care: confirmation rec'd that pt accepted and spoke with Sherrie who rec'd orders    Dc plan : hospital bed, home health- NURSE's Registry  With THerapist Soco Ortega.     06/07/17 0719   Right Care Assessment   Can the patient answer the patient profile reliably? Yes, cognitively intact   How often would a person be available to care for the patient? Often   Describe the patient's ability to walk at the present time. Walks with the help of equipment   How does the patient rate their overall health at the present time? Good   Number of comorbid conditions (as recorded on the chart) Five or more   During the past month, has the patient often been bothered by feeling down, depressed or hopeless? No   During the past month, has the patient often been bothered by little interest or pleasure in doing things? No     Future Appointments  Date Time Provider Department Center   6/19/2017 9:00 AM Vidya Lynch PA-C [facility] ORTHO Giovanni Powers     Schedule an appointment as soon as possible for a visit with Mack Cottrell MD.    Specialty:  Cardiology  Contact information:  [address] [place] [address]  [phone]  VALENTINA called DR Cottrell's nurse regarding need for this appt.  Nurse Nova will schedule once she speaks with Dr. Cottrell.

## 2017-06-07 NOTE — ASSESSMENT & PLAN NOTE
PAD (peripheral artery disease)   Elevated troponin   Appreciate cards consult.  Tn elevation from infection and hypotension.  TTE done and was unchanged.  Held cozaar and chlorthalidone in setting of ANGELICA, but may restart at DC.

## 2017-06-07 NOTE — ASSESSMENT & PLAN NOTE
Noted on imaging - moderate  Consider long term cardiac monitoring to look for PAF that could have been etiology for stroke

## 2017-06-07 NOTE — PROGRESS NOTES
[facility] Hospital Medicine  Progress Note    Patient Name: Naheed Cottrell  MRN: [id number]  Patient Class: IP- Inpatient   Admission Date: 6/4/2017  Length of Stay: 3 days  Attending Physician: Kenny Rashid MD  Primary Care Provider: Mack Cottrell MD    [facility] Medicine Team: [facility] MED O Kenny Rashid MD    Subjective:     Principal Problem:Embolic stroke involving right middle cerebral artery    HPI:  Presents after fall.  Patient complains of left shoulder pain.  Went to a [employer], was walking to car and felt a little clammy.  Has been vomiting since Friday night.  She took her BP meds this evening as well.  This evening was walking to the bathroom, went to step over a mirror that she left on the ground and she tripped and hit her left shoulder on the edge of bathroom counter.  She didn't hit her head or have LOC.  She remembers the entire event.  Right now she complains of left shoulder pain.    Hospital Course:  See below    Interval History:   Symptoms:  Improved.    Diet:  Adequate intake.    Activity level:  Impaired due to weakness.   Pain:  Chronic left knee pain and acute left arm fracture pain controlled, requiring pain medication.      Review of Systems   Constitutional: Negative for fever.   Respiratory: Negative for shortness of breath.    Cardiovascular: Negative for chest pain.   Gastrointestinal: Negative for abdominal pain and blood in stool.     Objective:     Vital Signs (Most Recent):  Temp: 98.2 °F (36.8 °C) (06/07/17 1114)  Pulse: 75 (06/07/17 1120)  Resp: 20 (06/07/17 1114)  BP: (!) 158/60 (06/07/17 1129)  SpO2: 97 % (06/07/17 1114) Vital Signs (24h Range):  Temp:  [97.8 °F (36.6 °C)-98.9 °F (37.2 °C)] 98.2 °F (36.8 °C)  Pulse:  [71-77] 75  Resp:  [18-20] 20  SpO2:  [93 %-97 %] 97 %  BP: (124-182)/(60-80) 158/60     Weight: 59 kg (130 lb)  Body mass index is 23.03 kg/m².    Intake/Output Summary (Last 24 hours) at 06/07/17 1158  Last data filed at  06/07/17 0800   Gross per 24 hour   Intake              550 ml   Output                0 ml   Net              550 ml      Physical Exam   Constitutional: No distress.   Neck: No JVD present.   Cardiovascular: Normal rate and regular rhythm.    Pulmonary/Chest: Effort normal and breath sounds normal.   Abdominal: Soft. Bowel sounds are normal.   Musculoskeletal: She exhibits no edema.   Neurological: She is alert.       Significant Labs: All pertinent labs within the past 24 hours have been reviewed.    Significant Imaging: I have reviewed and interpreted all pertinent imaging results/findings within the past 24 hours.    Assessment/Plan:      Gram-negative bacteremia    Acute cystitis without hematuria  Urine dirty and blood culture have e. Coli pan_S..  - -- Better; Continue with antibiotics         Essential hypertension    PAD (peripheral artery disease)   Elevated troponin   Appreciate cards consult.  Tn elevation from infection and hypotension.  TTE done and was unchanged.  Held cozaar and chlorthalidone in setting of ANGELICA, but may restart at DC.          ANGELICA (acute kidney injury)    From volume depletion from N/V.  Baseline Cr 0.8 and at admission was 2.2. Improved with IVF back to  Baseline.          Vitamin D deficiency    VIt D is low at 16.  -  start PO vit D           Closed fracture of left proximal humerus    Appreciate ortho consult. They will pursue non-operative treatment  - sling LUE  - NWOLIVER LUE  - will f/u in ortho clinic in 1-2 weeks, clinic will call to schedule  -- pt/ot ordered          Iron deficiency anemia    Drop could be from IVF as no signs of bleeding, but admit was lower than normal.  MCV wnl but RDW elevated.  B12, folate wnl.  Retic low.  Iron <10.  FOBT negative.  -- will start iron po on discharge        Thrombocytopenia, unspecified    Likely from severe infection; will improve with time.          Hypokalemia    Replace as needed          * Embolic stroke involving right middle  cerebral artery    Found on CTH.  Appreciate neuro consult.  Felt to be old.  Carotid US has only some mild blockages.            VTE Risk Mitigation         Ordered     Medium Risk of VTE  Once      06/04/17 0909          Kenny Rashid MD  Department of Hospital Medicine   [facility]

## 2017-06-07 NOTE — ASSESSMENT & PLAN NOTE
From volume depletion from N/V.  Baseline Cr 0.8 and at admission was 2.2. Improved with IVF back to  Baseline.

## 2017-06-07 NOTE — ASSESSMENT & PLAN NOTE
Found on CTH when evaluating AMS.  Appreciate neuro consult.  Felt to be old.  Carotid US has only some mild blockages.

## 2017-06-07 NOTE — ASSESSMENT & PLAN NOTE
Acute cystitis without hematuria  Urine dirty and blood culture have e. Coli pan_S..  - -- Better; Continue with antibiotics

## 2017-06-07 NOTE — PLAN OF CARE
ROQUE send HH orders to [facility] via [facility].    Ashly Arellano LMSW  [id number]

## 2017-06-07 NOTE — NURSING
Pt discharged to home in care of family. IV and telemetry removed. All orders reviewed. Belongings with pt. Pt left by wheel chair.

## 2017-06-07 NOTE — DISCHARGE SUMMARY
[facility] Hospital Medicine  Discharge Summary      Patient Name: Naheed Cottrell  MRN: [id number]  Admission Date: 6/4/2017  Hospital Length of Stay: 3 days  Discharge Date and Time:  06/07/2017 12:00 PM  Attending Physician: Kenny Rashid MD   Discharging Provider: Kenny Rashid MD  Primary Care Provider: Mack Cottrell MD  [facility]: [facility] MED O Kenny Rashid MD    HPI:   Presents after fall.  Patient complains of left shoulder pain.  Went to a [email or web address], was walking to car and felt a little clammy.  Has been vomiting since Friday night.  She took her BP meds this evening as well.  This evening was walking to the bathroom, went to step over a mirror that she left on the ground and she tripped and hit her left shoulder on the edge of bathroom counter.  She didn't hit her head or have LOC.  She remembers the entire event.  Right now she complains of left shoulder pain.    * No surgery found *      Indwelling Lines/Drains at time of discharge:   Lines/Drains/Airways          No matching active lines, drains, or airways        Hospital Course:   See below     Consults:   Consults         Status Ordering Provider     Inpatient consult to Cardiology  Once     Provider:  (Not yet assigned)    Completed GIOVANY GAMINO     Inpatient consult to Orthopedic Surgery  Once     Provider:  (Not yet assigned)    Completed RASHAAD JENKINS     Inpatient consult to Social Work  Once     Provider:  (Not yet assigned)    ANGELITA Araujo     Inpatient consult to Vascular (Stroke) Neurology  Once     Provider:  (Not yet assigned)    Completed COREY BELCHER              Pending Diagnostic Studies:     None        Final Active Diagnoses:    Diagnosis Date Noted POA    PRINCIPAL PROBLEM:  Embolic stroke involving right middle cerebral artery [I63.411] 06/06/2017 Yes    Gram-negative bacteremia [R78.81] 06/05/2017 Yes    Acute cystitis without hematuria [N30.00]  06/05/2017 Yes    Elevated troponin [R74.8] 06/04/2017 Yes    Essential hypertension [I10] 03/28/2017 Yes     Chronic    PAD (peripheral artery disease) [I73.9] 06/23/2016 Yes    ANGELICA (acute kidney injury) [N17.9] 06/04/2017 Yes    Vitamin D deficiency [E55.9] 06/05/2017 Yes    Closed fracture of left proximal humerus [S42.202A] 06/04/2017 Yes    Enlarged LA (left atrium) [I51.7] 06/06/2017 Yes    Hypokalemia [E87.6] 06/05/2017 Yes    Thrombocytopenia, unspecified [D69.6] 06/05/2017 Yes    Iron deficiency anemia [D50.9] 06/05/2017 Yes      Problems Resolved During this Admission:    Diagnosis Date Noted Date Resolved POA      Gram-negative bacteremia    Acute cystitis without hematuria  Urine dirty and blood culture have e. Coli pan_S..  - -- Better; Continue with antibiotics         Essential hypertension    PAD (peripheral artery disease)   Elevated troponin   Appreciate cards consult.  Tn elevation from infection and hypotension.  TTE done and was unchanged.  Held cozaar and chlorthalidone in setting of ANGELICA, but may restart at DC.          ANGELICA (acute kidney injury)    From volume depletion from N/V.  Baseline Cr 0.8 and at admission was 2.2. Improved with IVF back to  Baseline.          Vitamin D deficiency    VIt D is low at 16.  -  start PO vit D           Closed fracture of left proximal humerus    Appreciate ortho consult. They will pursue non-operative treatment  - sling LUE  - NWOLIVER LUE  - will f/u in ortho clinic in 1-2 weeks, clinic will call to schedule  -- pt/ot ordered          Iron deficiency anemia    Drop could be from IVF as no signs of bleeding, but admit was lower than normal.  MCV wnl but RDW elevated.  B12, folate wnl.  Retic low.  Iron <10.  FOBT negative.  -- will start iron po on discharge        Thrombocytopenia, unspecified    Pedro about 90.  Likely from severe infection; will improve with time.          Hypokalemia    Replace as needed          * Embolic stroke involving right  "middle cerebral artery    Found on CTH when evaluating AMS.  Appreciate neuro consult.  Felt to be old.  Carotid US has only some mild blockages.              Discharged Condition: good    Disposition:     Follow Up:  Follow-up Information     Vidya Lynch PA-C In 1 week.    Specialty:  Orthopedic Surgery  Why:  Orthopedic clinic will call with appointment time and date  Contact information:  [address]  [facility] [address]  [phone]             Schedule an appointment as soon as possible for a visit with Mack Cottrell MD.    Specialty:  Cardiology  Contact information:  [address]  [facility] [address]  [phone]                 Patient Instructions:     HOSPITAL BED FOR HOME USE   Order Specific Question Answer Comments   Type: Semi-electric    Length of need (1-99 months): 99    Does patient have medical equipment at home? none    Height: 5' 3" (1.6 m)    Weight: 59 kg (130 lb)    Please check all that apply: Patient requires positioning of the body in ways not feasible in an ordinary bed due to a medical condition which is expected to last at least one month.      CANE FOR HOME USE   Order Specific Question Answer Comments   Type of Cane: Straight    Height: 5' 3" (1.6 m)    Weight: 59 kg (130 lb)    Does patient have medical equipment at home? none    Length of need (1-99 months): 99    Please check all that apply: Patient's condition impairs ambulation.      Cardiac event monitor   Standing Status: Future  Standing Exp. Date: 06/07/18   Order Specific Question Answer Comments   Cardiac Event Monitor Looping Recorder        Medications:  Reconciled Home Medications:   Current Discharge Medication List      START taking these medications    Details   aspirin (ECOTRIN) 81 MG EC tablet Take 1 tablet (81 mg total) by mouth once daily.  Refills: 0      calcium-vitamin D3 500 mg(1,250mg) -200 unit per tablet Take 2 tablets by mouth 2 (two) times daily.      ciprofloxacin HCl (CIPRO) 500 " MG tablet Take 1 tablet (500 mg total) by mouth 2 (two) times daily.  Qty: 20 tablet, Refills: 0      clopidogrel (PLAVIX) 75 mg tablet Take 1 tablet (75 mg total) by mouth once daily.  Qty: 30 tablet, Refills: 11      ferrous sulfate 325 (65 FE) MG EC tablet Take 1 tablet (325 mg total) by mouth 2 (two) times daily. For iron  Refills: 0      senna-docusate 8.6-50 mg (TIMOTHY-COLACE) 8.6-50 mg per tablet Take 1 tablet by mouth once daily. For consitpation         CONTINUE these medications which have NOT CHANGED    Details   carvedilol (COREG) 25 MG tablet Take 1 tablet (25 mg total) by mouth 2 (two) times daily with meals.  Qty: 180 tablet, Refills: 11      chlorthalidone (HYGROTEN) 25 MG Tab Take 1 tablet (25 mg total) by mouth once daily.  Qty: 90 tablet, Refills: 11      cilostazol (PLETAL) 50 MG Tab Take 1 tablet (50 mg total) by mouth 2 (two) times daily.  Qty: 180 tablet, Refills: 3      citalopram (CELEXA) 20 MG tablet Take 1 tablet (20 mg total) by mouth once daily.  Qty: 90 tablet, Refills: 2      losartan (COZAAR) 100 MG tablet Take 1 tablet (100 mg total) by mouth once daily.  Qty: 90 tablet, Refills: 3    Associated Diagnoses: Essential hypertension      rosuvastatin (CRESTOR) 5 MG tablet Take one tablet every other day along with CoQ 10  Qty: 45 tablet, Refills: 3    Associated Diagnoses: Dyslipidemia           Time spent on the discharge of patient: 33 minutes    Kenny Rashid MD  Department of Hospital Medicine  [facility]

## 2017-06-07 NOTE — ASSESSMENT & PLAN NOTE
- urine culture unavailable  - UA however - remarkable for >100WBC concerning for UTI  - rocephin day 4

## 2017-06-07 NOTE — ASSESSMENT & PLAN NOTE
84 yr old female with PMHx of HTN, PAD who admitted post mechanical fall; found to have left humerus head fracture. CT head remarkable for right parietal lobe infarct - incidental finding and age indeterminate; likely subacute infarct.   Carotid U/S unremarkable for critical area of stenosis that could have contributed to symptoms. ECHO showing moderately enlarged LA. No PAF on tele here. Etiology likely cardio-embolic. Recommend long term cardiac monitoring on discharge.     Antithrombotics for secondary stroke prevention: Antiplatelets:  Aspirin: 81 mg oral now and daily,   Statins for secondary stroke prevention and hyperlipidemia, if present: Atorvastatin- 40 mg oral daily, Aggressive risk factor modification: Hypertension and Carotid Artery disease, Rehab Efforts: Physical Therapy, Occupational Therapy and Speech and Language Pathology, Diagnostics: Ordered/Pending none.   VTE Prophylaxis: Heparin 5000 units SQ every 8 hours

## 2017-06-07 NOTE — SUBJECTIVE & OBJECTIVE
Neurologic Chief Complaint: incidental right parietal lobe infarct    Subjective:     Interval History: Patient is seen for follow-up neurological assessment and treatment recommendations:   No acute events overnight. Patient doing well.     HPI, Past Medical, Family, and Social History remains the same as documented in the initial encounter.     Review of Systems   Constitutional: Negative for fatigue and fever.   HENT: Negative for ear discharge.    Eyes: Negative for photophobia.   Respiratory: Negative for shortness of breath.    Cardiovascular: Negative for chest pain.     Scheduled Meds:   aspirin  81 mg Oral Daily    calcium-vitamin D3  2 tablet Oral BID    carvedilol  25 mg Oral BID WM    cefTRIAXone (ROCEPHIN) IVPB  2 g Intravenous Q24H    citalopram  20 mg Oral Daily    rosuvastatin  5 mg Oral QHS     Continuous Infusions:   PRN Meds:acetaminophen, hydrocodone-acetaminophen 5-325mg, ondansetron    Objective:     Vital Signs (Most Recent):  Temp: 98.3 °F (36.8 °C) (06/07/17 0750)  Pulse: 75 (06/07/17 0750)  Resp: 20 (06/07/17 0750)  BP: (!) 168/80 (06/07/17 0750)  SpO2: 97 % (06/07/17 0750)  BP Location: Right arm    Vital Signs Range (Last 24H):  Temp:  [97.8 °F (36.6 °C)-98.9 °F (37.2 °C)]   Pulse:  [71-77]   Resp:  [18-20]   BP: (124-182)/(62-80)   SpO2:  [93 %-97 %]   BP Location: Right arm    Physical Exam   Constitutional: She appears well-developed and well-nourished.   HENT:   Head: Normocephalic and atraumatic.   Eyes: Pupils are equal, round, and reactive to light.       Neurological Exam:   LOC: alert and follows requests  Language: No aphasia  Speech: No dysarthria  Orientation: Person, Place, Time  Memory: Recent memory intact, Remote memory intact, Age correct, Month correct  EOM (CN III, IV, VI): Full/intact  Pupils (CN III, IV, VI): PERRL  Facial Sensation (CN V): Symmetric  Facial Movement (CN VII): symmetric facial expression  Tongue (CN XII): to midline  Motor*: Arm Left:  Normal  (5/5), Leg Left:   Normal (5/5), Arm Right:   Normal (5/5), Leg Right:   Normal (5/5)  Cerebellar*: Normal limb  Sensation: intact to light touch, temperature and vibration  Tone: Arm-Left: normal; Leg-Left: normal; Arm-Right: normal; Leg-Right: normal    NIH Stroke Scale:    Level of Consciousness: 0 - alert  LOC Questions: 0 - answers both correctly  LOC Commands: 0 - performs both correctly  Best Gaze: 0 - normal  Visual: 0 - no visual loss  Facial Palsy: 0 - normal  Motor Left Arm: 0 - no drift  Motor Right Arm: 0 - no drift  Motor Left Le - no drift  Motor Right Le - no drift  Limb Ataxia: 0 - absent  Sensory: 0 - normal  Best Language: 0 - no aphasia  Dysarthria: 0 - normal articulation  Extinction and Inattention: 0 - no neglect  NIH Stroke Scale Total: 0      Laboratory:  CMP:   Recent Labs  Lab 17  0431   CALCIUM 8.3*      K 3.3*   CO2 21*      BUN 34*   CREATININE 0.9     BMP:   Recent Labs  Lab 17  0431      K 3.3*      CO2 21*   BUN 34*   CREATININE 0.9   CALCIUM 8.3*     CBC:   Recent Labs  Lab 17  0431   WBC 6.13   RBC 3.02*   HGB 9.1*   HCT 27.2*   PLT 90*   MCV 90   MCH 30.1   MCHC 33.5     Lipid Panel: No results for input(s): CHOL, LDLCALC, HDL, TRIG in the last 168 hours.  Coagulation:   Recent Labs  Lab 17  0616   INR 1.2   APTT 21.7     Platelet Aggregation Study: No results for input(s): PLTAGG, PLTAGINTERP, PLTAGREGLACO, ADPPLTAGGREG in the last 168 hours.  Hgb A1C: No results for input(s): HGBA1C in the last 168 hours.  TSH: No results for input(s): TSH in the last 168 hours.    Diagnostic Results:  I have personally reviewed:   Findings:     CT head without contrast (17)  Right parietal lobe infarct; age indeterminate.

## 2017-06-07 NOTE — PROGRESS NOTES
[facility]  Vascular Neurology  Comprehensive Stroke Center  Progress Note    Neurologic Chief Complaint: incidental right parietal lobe infarct    Subjective:     Interval History: Patient is seen for follow-up neurological assessment and treatment recommendations:   No acute events overnight. Patient doing well.     HPI, Past Medical, Family, and Social History remains the same as documented in the initial encounter.     Review of Systems   Constitutional: Negative for fatigue and fever.   HENT: Negative for ear discharge.    Eyes: Negative for photophobia.   Respiratory: Negative for shortness of breath.    Cardiovascular: Negative for chest pain.     Scheduled Meds:   aspirin  81 mg Oral Daily    calcium-vitamin D3  2 tablet Oral BID    carvedilol  25 mg Oral BID WM    cefTRIAXone (ROCEPHIN) IVPB  2 g Intravenous Q24H    citalopram  20 mg Oral Daily    rosuvastatin  5 mg Oral QHS     Continuous Infusions:   PRN Meds:acetaminophen, hydrocodone-acetaminophen 5-325mg, ondansetron    Objective:     Vital Signs (Most Recent):  Temp: 98.3 °F (36.8 °C) (06/07/17 0750)  Pulse: 75 (06/07/17 0750)  Resp: 20 (06/07/17 0750)  BP: (!) 168/80 (06/07/17 0750)  SpO2: 97 % (06/07/17 0750)  BP Location: Right arm    Vital Signs Range (Last 24H):  Temp:  [97.8 °F (36.6 °C)-98.9 °F (37.2 °C)]   Pulse:  [71-77]   Resp:  [18-20]   BP: (124-182)/(62-80)   SpO2:  [93 %-97 %]   BP Location: Right arm    Physical Exam   Constitutional: She appears well-developed and well-nourished.   HENT:   Head: Normocephalic and atraumatic.   Eyes: Pupils are equal, round, and reactive to light.       Neurological Exam:   LOC: alert and follows requests  Language: No aphasia  Speech: No dysarthria  Orientation: Person, Place, Time  Memory: Recent memory intact, Remote memory intact, Age correct, Month correct  EOM (CN III, IV, VI): Full/intact  Pupils (CN III, IV, VI): PERRL  Facial Sensation (CN V): Symmetric  Facial  Movement (CN VII): symmetric facial expression  Tongue (CN XII): to midline  Motor*: Arm Left:  Normal (5/5), Leg Left:   Normal (5/5), Arm Right:   Normal (5/5), Leg Right:   Normal (5/5)  Cerebellar*: Normal limb  Sensation: intact to light touch, temperature and vibration  Tone: Arm-Left: normal; Leg-Left: normal; Arm-Right: normal; Leg-Right: normal    NIH Stroke Scale:    Level of Consciousness: 0 - alert  LOC Questions: 0 - answers both correctly  LOC Commands: 0 - performs both correctly  Best Gaze: 0 - normal  Visual: 0 - no visual loss  Facial Palsy: 0 - normal  Motor Left Arm: 0 - no drift  Motor Right Arm: 0 - no drift  Motor Left Le - no drift  Motor Right Le - no drift  Limb Ataxia: 0 - absent  Sensory: 0 - normal  Best Language: 0 - no aphasia  Dysarthria: 0 - normal articulation  Extinction and Inattention: 0 - no neglect  NIH Stroke Scale Total: 0      Laboratory:  CMP:   Recent Labs  Lab 17  0431   CALCIUM 8.3*      K 3.3*   CO2 21*      BUN 34*   CREATININE 0.9     BMP:   Recent Labs  Lab 17  0431      K 3.3*      CO2 21*   BUN 34*   CREATININE 0.9   CALCIUM 8.3*     CBC:   Recent Labs  Lab 17  0431   WBC 6.13   RBC 3.02*   HGB 9.1*   HCT 27.2*   PLT 90*   MCV 90   MCH 30.1   MCHC 33.5     Lipid Panel: No results for input(s): CHOL, LDLCALC, HDL, TRIG in the last 168 hours.  Coagulation:   Recent Labs  Lab 17  0616   INR 1.2   APTT 21.7     Platelet Aggregation Study: No results for input(s): PLTAGG, PLTAGINTERP, PLTAGREGLACO, ADPPLTAGGREG in the last 168 hours.  Hgb A1C: No results for input(s): HGBA1C in the last 168 hours.  TSH: No results for input(s): TSH in the last 168 hours.    Diagnostic Results:  I have personally reviewed:   Findings:     CT head without contrast (17)  Right parietal lobe infarct; age indeterminate.          Assessment/Plan:      - no acute events overnight. Moderately enlarged LA on ECHO    * Embolic stroke  involving right middle cerebral artery    84 yr old female with PMHx of HTN, PAD who admitted post mechanical fall; found to have left humerus head fracture. CT head remarkable for right parietal lobe infarct - incidental finding and age indeterminate; likely subacute infarct.   Carotid U/S unremarkable for critical area of stenosis that could have contributed to symptoms. ECHO showing moderately enlarged LA. No PAF on tele here. Etiology likely cardio-embolic. Recommend long term cardiac monitoring on discharge.     Antithrombotics for secondary stroke prevention: Antiplatelets:  Aspirin: 81 mg oral now and daily,   Statins for secondary stroke prevention and hyperlipidemia, if present: Atorvastatin- 40 mg oral daily, Aggressive risk factor modification: Hypertension and Carotid Artery disease, Rehab Efforts: Physical Therapy, Occupational Therapy and Speech and Language Pathology, Diagnostics: Ordered/Pending none.   VTE Prophylaxis: Heparin 5000 units SQ every 8 hours        Enlarged LA (left atrium)    Noted on imaging - moderate  Consider long term cardiac monitoring to look for PAF that could have been etiology for stroke        Acute cystitis without hematuria    - urine culture unavailable  - UA however - remarkable for >100WBC concerning for UTI  - rocephin day 4        Gram-negative bacteremia    On rocephin - Day 4  - management per primary team        Closed fracture of left proximal humerus    Orthopedics following  - conservative management - in shoulder immobilizer  - NWB on the left.   - outpatient follow up        Essential hypertension    Stroke risk factor            Larry Felipe MD  Comprehensive Stroke Center  Department of Vascular Neurology   [facility]

## 2017-06-07 NOTE — PLAN OF CARE
[facility]    HOME HEALTH ORDERS  FACE TO FACE ENCOUNTER    Patient Name: Naheed Cottrell  YOB: 1933    PCP: Mack Cottrell MD   [facility] [place] [address]  PCP Phone Number: [phone]  PCP Fax: [phone]    Encounter Date: 06/07/2017    Admit to Home Health    Diagnoses:  Active Hospital Problems    Diagnosis  POA    *Embolic stroke involving right middle cerebral artery [I63.411]  Yes    Gram-negative bacteremia [R78.81]  Yes     Priority: 1 - High    Acute cystitis without hematuria [N30.00]  Yes     Priority: 1 - High    Elevated troponin [R74.8]  Yes     Priority: 2     Essential hypertension [I10]  Yes     Priority: 2      Chronic    PAD (peripheral artery disease) [I73.9]  Yes     Priority: 2     ANGELICA (acute kidney injury) [N17.9]  Yes     Priority: 3     Vitamin D deficiency [E55.9]  Yes     Priority: 4     Closed fracture of left proximal humerus [S42.202A]  Yes     Priority: 4     Enlarged LA (left atrium) [I51.7]  Yes    Hypokalemia [E87.6]  Yes    Thrombocytopenia, unspecified [D69.6]  Yes    Iron deficiency anemia [D50.9]  Yes      Resolved Hospital Problems    Diagnosis Date Resolved POA   No resolved problems to display.       Future Appointments  Date Time Provider Department Center   6/19/2017 9:00 AM Viyda Lynch PA-C [facility]     Follow-up Information     Vidya Lynch PA-C In 1 week.    Specialty:  Orthopedic Surgery  Why:  Orthopedic clinic will call with appointment time and date  Contact information:  [address]  [facility] [address]  [phone]                     I have seen and examined this patient face to face today. My clinical findings that support the need for the home health skilled services and home bound status are the following:  Weakness/numbness causing balance and gait disturbance due to Fracture and Infection making it taxing to leave home.    Allergies:Review of  patient's allergies indicates:  No Known Allergies    Diet: regular diet    Activities: activity as tolerated    Nursing:   SN to complete comprehensive assessment including routine vital signs. Instruct on disease process and s/s of complications to report to MD. Review/verify medication list sent home with the patient at time of discharge  and instruct patient/caregiver as needed. Frequency may be adjusted depending on start of care date.    Notify MD if SBP > 160 or < 90; DBP > 90 or < 50; HR > 120 or < 50; Temp > 101;      CONSULTS:    Physical Therapy to evaluate and treat. Evaluate for home safety and equipment needs; Establish/upgrade home exercise program. Perform / instruct on therapeutic exercises, gait training, transfer training, and Range of Motion.  Occupational Therapy to evaluate and treat. Evaluate home environment for safety and equipment needs. Perform/Instruct on transfers, ADL training, ROM, and therapeutic exercises.     Non-weight bearing LUE; must wear sling on LUE    MISCELLANEOUS CARE:  N/A    WOUND CARE ORDERS  n/a      Medications: Review discharge medications with patient and family and provide education.       Medication List      START taking these medications    aspirin 81 MG EC tablet  Commonly known as:  ECOTRIN  Take 1 tablet (81 mg total) by mouth once daily.     calcium-vitamin D3 500 mg(1,250mg) -200 unit per tablet  Take 2 tablets by mouth 2 (two) times daily.     ciprofloxacin HCl 500 MG tablet  Commonly known as:  CIPRO  Take 1 tablet (500 mg total) by mouth 2 (two) times daily.     clopidogrel 75 mg tablet  Commonly known as:  PLAVIX  Take 1 tablet (75 mg total) by mouth once daily.     ferrous sulfate 325 (65 FE) MG EC tablet  Take 1 tablet (325 mg total) by mouth 2 (two) times daily. For iron     senna-docusate 8.6-50 mg 8.6-50 mg per tablet  Commonly known as:  TIMOTHY-COLACE  Take 1 tablet by mouth once daily. For consitpation        CONTINUE taking these medications     carvedilol 25 MG tablet  Commonly known as:  COREG  Take 1 tablet (25 mg total) by mouth 2 (two) times daily with meals.     chlorthalidone 25 MG Tab  Commonly known as:  HYGROTEN  Take 1 tablet (25 mg total) by mouth once daily.     cilostazol 50 MG Tab  Commonly known as:  PLETAL  Take 1 tablet (50 mg total) by mouth 2 (two) times daily.     citalopram 20 MG tablet  Commonly known as:  CELEXA  Take 1 tablet (20 mg total) by mouth once daily.     losartan 100 MG tablet  Commonly known as:  COZAAR  Take 1 tablet (100 mg total) by mouth once daily.     rosuvastatin 5 MG tablet  Commonly known as:  CRESTOR  Take one tablet every other day along with CoQ 10           Where to Get Your Medications      These medications were sent to [facility]  [address] [place] [address]  [address] [facility] [address]    Phone:  [phone]    ciprofloxacin HCl 500 MG tablet     You can get these medications from any pharmacy    You don't need a prescription for these medications   aspirin 81 MG EC tablet   calcium-vitamin D3 500 mg(1,250mg) -200 unit per tablet   ferrous sulfate 325 (65 FE) MG EC tablet   senna-docusate 8.6-50 mg 8.6-50 mg per tablet     Information about where to get these medications is not yet available    Ask your nurse or doctor about these medications   clopidogrel 75 mg tablet           I certify that this patient is confined to her home and needs intermittent skilled nursing care, physical therapy and occupational therapy.

## 2017-06-08 NOTE — PT/OT/SLP DISCHARGE
Physical Therapy Discharge Summary    Naheed Cottrell  MRN: [id number]   Embolic stroke involving right middle cerebral artery   Patient Discharged from acute Physical Therapy on 17.  Please refer to prior PT noted date on 17 for functional status.     Assessment:   Patient appropriate for care in another setting.  GOALS:    Physical Therapy Goals     Not on file          Multidisciplinary Problems (Resolved)        Problem: Physical Therapy Goal    Goal Priority Disciplines Outcome Goal Variances Interventions   Physical Therapy Goal   (Resolved)     PT/OT, PT Outcome(s) achieved     Description:  Goals to be met by: 17     Patient will increase functional independence with mobility by performin. Supine to sit with Contact Guard Assistance  2. Sit to supine with Contact Guard Assistance  3. Sit to stand transfer with Stand-by Assistance  4. Bed to chair transfer with Stand-by Assistance using LRAD  5. Gait  x 200 feet with Stand-by Assistance using LRAD  6. Ascend/descend 1 step with no Handrails Contact Guard Assistance using SPC                  Reasons for Discontinuation of Therapy Services  Transfer to alternate level of care.      Plan:  Patient Discharged to: Home with Home Health Service.    Dior Bajwa DPT, PT  2017

## 2017-06-09 ENCOUNTER — TELEPHONE (OUTPATIENT)
Dept: ORTHOPEDICS | Facility: CLINIC | Age: 82
End: 2017-06-09

## 2017-06-09 ENCOUNTER — PATIENT OUTREACH (OUTPATIENT)
Dept: ADMINISTRATIVE | Facility: CLINIC | Age: 82
End: 2017-06-09
Payer: MEDICARE

## 2017-06-09 RX ORDER — ACETAMINOPHEN 500 MG
1000 TABLET ORAL EVERY 6 HOURS PRN
Status: ON HOLD | COMMUNITY
End: 2020-06-26 | Stop reason: HOSPADM

## 2017-06-09 NOTE — TELEPHONE ENCOUNTER
Spoke with Soco.  She is asking for ROM instructions for pt's humerus fx.  Pending response from Dr Wilder.  Will contact Soco with instructions when received

## 2017-06-09 NOTE — PATIENT INSTRUCTIONS
Discharge Instructions for Stroke  You have been diagnosed with a stroke, or with a TIA (transient ischemic attack). Or you have been identified as having a high risk for stroke. During a stroke, blood stops flowing to part of your brain. This can damage areas in the brain that control other parts of the body. Symptoms after a stroke depend on which part of the brain has been affected.  Stroke risk factors  Once youve had a stroke, youre at greater risk for another one. Listed below are some other factors that can increase your risk for a stroke:  · High blood pressure  · High cholesterol  · Cigarette or cigar smoking  · Diabetes  · Carotid or other artery disease  · Atrial fibrillation, atrial flutter, or other heart disease  · Not being physically active  · Obesity  · Certain blood disorders (such as sickle cell anemia)  · Excessive alcohol use  · Abuse of street drugs  · Race  · Gender  · Family history of stroke  · Diet high in salty, fried, or greasy foods  Changes in daily living  Doing your regular tasks may be difficult after youve had a stroke, but you can learn new ways to manage your daily activities. In fact, doing daily activities may help you to regain muscle strength and bring back function to affected limbs. Be patient, give yourself time to adjust, and appreciate the progress you make.  Daily activities  You may be at risk of falling. Make changes to your home to help you walk more easily. A therapist will decide if you need an assistive device to walk safely.  You may need to see an occupational therapist or physical therapist to learn new ways of doing things. For example, you may need to make adjustments when bathing or dressing:  Tips for showering or bathing  · Test the water temperature with a hand or foot that was not affected by the stroke.  · Use grab bars, a shower seat, a hand-held showerhead, and a long-handled brush.  Tips for getting dressed  · Dress while sitting, starting with  the affected side or limb.  · Wear shirts that pull easily over your head. Wear pants or skirts with elastic waistbands.  · Use zippers with loops attached to the pull tabs.  Lifestyle changes  · Take your medicines exactly as directed. Dont skip doses.  · Begin an exercise program. Ask your provider how to get started. Also ask how much activity you should try to get on a daily or weekly basis. You can benefit from simple activities such as walking or gardening.  · Limit alcohol intake. Men should have no more than 2 alcoholic drinks a day. Women should limit themselves to 1 alcoholic drink per day.  · Know your cholesterol level. Follow your providers recommendations about how to keep cholesterol under control.  · If you are a smoker, quit now. Join a stop-smoking program to improve your chances of success. Ask your provider about medicines or other methods to help you quit.  · Learn stress management techniques to help you deal with stress in your home and work life.  Diet  Your healthcare provider will give you information on dietary changes that you may need to make, based on your situation. Your provider may recommend that you see a registered dietitian for help with diet changes. Changes may include:  · Reducing fat and cholesterol intake  · Reducing salt (sodium) intake, especially if you have high blood pressure  · Eating more fresh vegetables and fruits  · Eating more lean proteins, such as fish, poultry, and beans and peas (legumes)  · Eating less red meat and processed meats  · Using low-fat dairy products  · Limiting vegetable oils and nut oils  · Limiting sweets and processed foods such as chips, cookies, and baked goods  Follow-up care  · Keep your medical appointments. Close follow-up is important to stroke rehabilitation and recovery.  · Some medicines require blood tests to check for progress or problems. Keep follow-up appointments for any blood tests ordered by your providers.  When to call  911  Call 911 right away if you have any of the following symptoms of stroke:  · Weakness, tingling, or loss of feeling on one side of your face or body  · Sudden double vision or trouble seeing in one or both eyes  · Sudden trouble talking or slurred speech  · Trouble understanding others  · Sudden, severe headache  · Dizziness, loss of balance, or a sense of falling  · Blackouts or seizures      F.A.S.T. is an easy way to remember the signs of stroke. When you see these signs, you know that you need to call 911 fast.  F.A.S.T. stands for:  · F is for face drooping. One side of the face is drooping or numb. When the person smiles, the smile is uneven.  · A is for arm weakness. One arm is weak or numb. When the person lifts both arms at the same time, one arm may drift downward.  · S is for speech difficulty. You may notice slurred speech or trouble speaking. The person can't repeat a simple sentence correctly when asked.  · T is for time to call 911. If someone shows any of these symptoms, even if they go away, call 911 immediately. Make note of the time the symptoms first appeared.  Date Last Reviewed: 8/26/2015 © 2000-2016 [employer]. [address] [place] [address], [place] [address]. All rights reserved. This information is not intended as a substitute for professional medical care. Always follow your healthcare professional's instructions.

## 2017-06-09 NOTE — TELEPHONE ENCOUNTER
----- Message from Andrea Gunderson sent at 6/9/2017  9:28 AM CDT -----  Contact: [email or web address], [phone]  Soco called in asking for someone regarding Pt's shoulder. Pt fractured her shoulder and she is needing to know if Pt is to remain completely immobile, or should they do pendulum exercises and elbow fectiion extension. Please call and advise as soon as possible.    Thank you

## 2017-06-09 NOTE — PROGRESS NOTES
Please forward this important TCC information to your provider in order to maximize the post discharge care delivery of this patient.    C3 nurse spoke with Naheed Cottrell  for a TCC post hospital discharge follow up call. The patient does not have a scheduled HOSFU appointment with Mack Cottrell MD who is cardiologist and her son within 7-14 days post hospital discharge date 6\7\17. C3 nurse was unable to schedule HOSFU appointment in Epic. Her daughter states she will see him for follow up.      Respectfully,  Ashly Kim RN  [facility]    [email or web address]       Please do not reply to this message, as this inbox is not routinely monitored.

## 2017-06-10 NOTE — TREATMENT PLAN
Occupational Therapy Discharge Summary    Naheed Cottrell  MRN: [id number]   Embolic stroke involving right middle cerebral artery   Patient Discharged from acute Occupational Therapy on 6/7/17.  Please refer to prior OT note dated on 6/6/17 for functional status.     Assessment:   Patient was discharge unexpectedly.  Information required to complete and accurate discharge summary is unknown.  Refer to therapy initial evaluation and last progress note for initial and most recent functional status and goal achievement.  Recommendations made may be found in medical record.  GOALS:    Occupational Therapy Goals     Not on file          Multidisciplinary Problems (Resolved)        Problem: Occupational Therapy Goal    Goal Priority Disciplines Outcome Interventions   Occupational Therapy Goal   (Resolved)     OT, PT/OT Outcome(s) achieved    Description:  Goals to be met by: 6/10/17     Patient will increase functional independence with ADLs by performing:    Feeding with Modified [place].  UE Dressing with Minimal Assistance.  LE Dressing with Minimal Assistance.  Grooming while standing with Set-up Assistance.  Toileting from toilet with Stand-by Assistance for hygiene and clothing management.   Supine to sit with Stand-by Assistance.  Stand pivot transfers with Supervision with SC.                    Reasons for Discontinuation of Therapy Services  Transfer to alternate level of care.      Plan:  Patient Discharged to: Home with Home Health Service.

## 2017-06-12 ENCOUNTER — TELEPHONE (OUTPATIENT)
Dept: CARDIOLOGY | Facility: CLINIC | Age: 82
End: 2017-06-12

## 2017-06-12 LAB — BACTERIA BLD CULT: NORMAL

## 2017-06-12 NOTE — TELEPHONE ENCOUNTER
Dr Braswell wanted her to come in for a f/u appoinment, but Ms Cottrell said at this time she cannot come because she is not up to it with her broken arm.

## 2017-06-13 ENCOUNTER — TELEPHONE (OUTPATIENT)
Dept: ORTHOPEDICS | Facility: CLINIC | Age: 82
End: 2017-06-13

## 2017-06-13 NOTE — TELEPHONE ENCOUNTER
----- Message from Swetha Alba sent at 6/13/2017  1:28 PM CDT -----  Contact: [email or web address]  Soco Feliz states she spoke with you last week and hasnt heard from you please contact as soon as possible    Contact number [phone]   Thanks

## 2017-06-13 NOTE — TELEPHONE ENCOUNTER
Spoke with pt.   Scheduled for tomorrow at 1115 am with MILANA Monzon    Will call Dr Wilder once pt is roomed.  He will try to come up and see pt.

## 2017-06-14 ENCOUNTER — OFFICE VISIT (OUTPATIENT)
Dept: ORTHOPEDICS | Facility: CLINIC | Age: 82
End: 2017-06-14
Payer: MEDICARE

## 2017-06-14 ENCOUNTER — HOSPITAL ENCOUNTER (OUTPATIENT)
Dept: RADIOLOGY | Facility: HOSPITAL | Age: 82
Discharge: HOME | End: 2017-06-14
Attending: ORTHOPAEDIC SURGERY
Payer: MEDICARE

## 2017-06-14 DIAGNOSIS — M25.562 LEFT KNEE PAIN, UNSPECIFIED CHRONICITY: ICD-10-CM

## 2017-06-14 DIAGNOSIS — M17.12 OSTEOARTHRITIS OF LEFT KNEE, UNSPECIFIED OSTEOARTHRITIS TYPE: ICD-10-CM

## 2017-06-14 DIAGNOSIS — T14.8XXA FRACTURE: ICD-10-CM

## 2017-06-14 DIAGNOSIS — S42.295A OTHER CLOSED NONDISPLACED FRACTURE OF PROXIMAL END OF LEFT HUMERUS, INITIAL ENCOUNTER: Primary | ICD-10-CM

## 2017-06-14 DIAGNOSIS — S42.202D CLOSED FRACTURE OF PROXIMAL END OF LEFT HUMERUS WITH ROUTINE HEALING, UNSPECIFIED FRACTURE MORPHOLOGY, SUBSEQUENT ENCOUNTER: ICD-10-CM

## 2017-06-14 DIAGNOSIS — S42.202D CLOSED FRACTURE OF PROXIMAL END OF LEFT HUMERUS WITH ROUTINE HEALING, UNSPECIFIED FRACTURE MORPHOLOGY, SUBSEQUENT ENCOUNTER: Primary | ICD-10-CM

## 2017-06-14 PROCEDURE — 73564 X-RAY EXAM KNEE 4 OR MORE: CPT | Mod: 26,LT,, | Performed by: RADIOLOGY

## 2017-06-14 PROCEDURE — 1159F MED LIST DOCD IN RCRD: CPT | Mod: ,,, | Performed by: PHYSICIAN ASSISTANT

## 2017-06-14 PROCEDURE — 73562 X-RAY EXAM OF KNEE 3: CPT | Mod: 26,59,RT, | Performed by: RADIOLOGY

## 2017-06-14 PROCEDURE — 99999 PR PBB SHADOW E&M-EST. PATIENT-LVL I: CPT | Mod: PBBFAC,,, | Performed by: PHYSICIAN ASSISTANT

## 2017-06-14 PROCEDURE — 99204 OFFICE O/P NEW MOD 45 MIN: CPT | Mod: S$PBB,,, | Performed by: PHYSICIAN ASSISTANT

## 2017-06-14 PROCEDURE — 73030 X-RAY EXAM OF SHOULDER: CPT | Mod: 26,LT,, | Performed by: RADIOLOGY

## 2017-06-14 PROCEDURE — 73564 X-RAY EXAM KNEE 4 OR MORE: CPT | Mod: TC,LT

## 2017-06-14 PROCEDURE — 73030 X-RAY EXAM OF SHOULDER: CPT | Mod: TC,LT

## 2017-06-15 NOTE — PROGRESS NOTES
Subjective:      Patient ID: Naheed Cottrell is a 84 y.o. female.    Chief Complaint: No chief complaint on file.    HPI    Patient is a 84 year old female who presents to clinic for follow up from orthopedics hospital consult for left proximal humerus fracture that occurred on 06/04/2017 secondary to tripping and hit her left shoulder on the edge of bathroom counter.  Patient has been treated nonoperatively in a shoulder immobilizer. She reports that she is doing well. Pain is controlled. She denied numbness or tingling.   She does report left lateral knee pain. Pain is increased with weight bearing and range of motion. Patient has taken OTC NSAID which is helping some. Patient has history of previous surgery in the knee. Denied fever chills.     Review of Systems   Constitution: Negative for chills and fever.   Cardiovascular: Negative for chest pain.   Respiratory: Negative for cough and shortness of breath.    Skin: Negative for color change, dry skin, itching, nail changes, poor wound healing and rash.   Musculoskeletal: Positive for arthritis, falls and joint pain.        Left shoulder fracture, left knee pain   Neurological: Negative for dizziness.   Psychiatric/Behavioral: Negative for altered mental status. The patient is not nervous/anxious.    All other systems reviewed and are negative.        Objective:      General    Constitutional: She is oriented to person, place, and time. She appears well-developed and well-nourished. No distress.   HENT:   Head: Atraumatic.   Eyes: Conjunctivae are normal.   Cardiovascular: Normal rate.    Pulmonary/Chest: Effort normal.   Neurological: She is alert and oriented to person, place, and time.   Psychiatric: She has a normal mood and affect. Her behavior is normal.             Left Knee Exam     Inspection   Scars: present  Swelling: absent  Bruising: absent    Tenderness   The patient tender to palpation of the lateral joint line.    Left Shoulder Exam      Inspection/Observation   Swelling: present  Bruising: present    Comments:  Arrived to clinic in sling  Diffuse bruising to upper arm.   full range of motion of elbow and wrist.       RADS: taken in clinic: reviewed by myself and .   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  No fracture dislocation bone destruction or OCD seen.  SHOULDER: comminuted fracture of the surgical neck of the humerus, in similar position to previous exams    Assessment:       Encounter Diagnoses   Name Primary?    Other closed nondisplaced fracture of proximal end of left humerus, initial encounter Yes    Left knee pain, unspecified chronicity     Osteoarthritis of left knee, unspecified osteoarthritis type           Plan:        discussed treatment plan with patient and family. At this time we well continue non-operative treatment.   - continue sling,   - may do pendulums of shoulder, range of motion as tolerated of elbow wrist and fingers.   - NWB  - pain medication: no refill needed at this time. She may continue OTC antiinflammatory of knee pain, though patient informed of risk of antiinflammatories and delayed healing at fracture.   - return to clinic in 2 weeks with  at time 2 view shoulder AP scap Y is needed.

## 2017-06-21 ENCOUNTER — TELEPHONE (OUTPATIENT)
Dept: ORTHOPEDICS | Facility: CLINIC | Age: 82
End: 2017-06-21

## 2017-06-21 DIAGNOSIS — S42.202D CLOSED FRACTURE OF PROXIMAL END OF LEFT HUMERUS WITH ROUTINE HEALING, UNSPECIFIED FRACTURE MORPHOLOGY, SUBSEQUENT ENCOUNTER: Primary | ICD-10-CM

## 2017-06-21 NOTE — TELEPHONE ENCOUNTER
----- Message from Vidya Lynch PA-C sent at 6/21/2017  2:12 PM CDT -----  Contact: Soco/Home Health Nurse      ----- Message -----  From: Trini Angela MA  Sent: 6/21/2017   9:38 AM  To: JER Flores  I need written orders for home health exercises with pt's shoulder.  Fax to [facility]    Fax # [phone]  Thanks    ----- Message -----  From: Leonor Patel  Sent: 6/21/2017   9:09 AM  To: Me[facility]IT[facility] Staff    Soco would like to speak with you regarding putting the pt therapy on hold until she has received further orders for the pts shoulder. Soco can be reached at [phone].

## 2017-06-22 ENCOUNTER — TELEPHONE (OUTPATIENT)
Dept: ORTHOPEDICS | Facility: CLINIC | Age: 82
End: 2017-06-22

## 2017-06-22 NOTE — TELEPHONE ENCOUNTER
----- Message from Leonor Patel sent at 6/22/2017  8:54 AM CDT -----  Contact: Soco Wan called in stating that she needs the pts PT orders to be sent to [facility]. The phone number to [facility] is [phone]. Soco can be reached at [phone].

## 2017-06-26 RX ORDER — CITALOPRAM 20 MG/1
20 TABLET, FILM COATED ORAL DAILY
Qty: 90 TABLET | Refills: 2 | Status: SHIPPED | OUTPATIENT
Start: 2017-06-26 | End: 2018-01-02 | Stop reason: SDUPTHER

## 2017-06-27 ENCOUNTER — HOSPITAL ENCOUNTER (OUTPATIENT)
Dept: RADIOLOGY | Facility: HOSPITAL | Age: 82
Discharge: HOME | End: 2017-06-27
Attending: ORTHOPAEDIC SURGERY
Payer: MEDICARE

## 2017-06-27 ENCOUNTER — OFFICE VISIT (OUTPATIENT)
Dept: ORTHOPEDICS | Facility: CLINIC | Age: 82
End: 2017-06-27
Payer: MEDICARE

## 2017-06-27 VITALS — WEIGHT: 133.06 LBS | BODY MASS INDEX: 23.57 KG/M2 | HEIGHT: 63 IN

## 2017-06-27 DIAGNOSIS — S42.202D CLOSED FRACTURE OF PROXIMAL END OF LEFT HUMERUS WITH ROUTINE HEALING, UNSPECIFIED FRACTURE MORPHOLOGY, SUBSEQUENT ENCOUNTER: ICD-10-CM

## 2017-06-27 DIAGNOSIS — S42.202D CLOSED FRACTURE OF PROXIMAL END OF LEFT HUMERUS WITH ROUTINE HEALING, UNSPECIFIED FRACTURE MORPHOLOGY, SUBSEQUENT ENCOUNTER: Primary | ICD-10-CM

## 2017-06-27 PROCEDURE — 99213 OFFICE O/P EST LOW 20 MIN: CPT | Mod: PBBFAC,25 | Performed by: ORTHOPAEDIC SURGERY

## 2017-06-27 PROCEDURE — 99499 UNLISTED E&M SERVICE: CPT | Mod: S$PBB,,, | Performed by: ORTHOPAEDIC SURGERY

## 2017-06-27 PROCEDURE — 73030 X-RAY EXAM OF SHOULDER: CPT | Mod: 26,LT,, | Performed by: RADIOLOGY

## 2017-06-27 PROCEDURE — 99999 PR PBB SHADOW E&M-EST. PATIENT-LVL III: CPT | Mod: PBBFAC,,, | Performed by: ORTHOPAEDIC SURGERY

## 2017-06-28 ENCOUNTER — TELEPHONE (OUTPATIENT)
Dept: ORTHOPEDICS | Facility: CLINIC | Age: 82
End: 2017-06-28

## 2017-06-28 NOTE — TELEPHONE ENCOUNTER
----- Message from Andrea Gunderson sent at 6/28/2017 11:43 AM CDT -----  Contact: Soco@ nurse with [facility], [phone]  Soco called in asking to please fax updated PT orders to [facility]    Thank you

## 2017-06-29 NOTE — PROGRESS NOTES
HPI: 83 yo female status post trip and fall sustaining left proximal humerus fracture on 6/4/17.  She's been working on pendulum exercises with OT, and has essentially no pain.      ROS:  Patient denies constitutional symptoms, cardiac symptoms, respiratory symptoms, GI symptoms.  The remainder of the musculoskeletal ROS is included in the HPI.    PE:    AA&O x 4.  NAD  HEENT:  NCAT, sclera nonicteric  Lungs:  Respirations are equal and unlabored.  CV:  2+ bilateral upper and lower extremity pulses.  Skin:  Intact throughout.    MS:  Resolving ecchymosis.  Minimal TTP.  Able to move passively with no pain.  NVI distal.      Rads:  Maintaining good position.      A/P:  3+ weeks status post left proximal humerus fracture in good alignment.  I updated her HH PT order to begin PROM, advance through AAROM over the next few weeks and begin AROM at the 6 weeks dax.  She will RTC in 4 weeks with x-rays true AP and scapular Y left shoulder.  Sooner if she has any problems.

## 2017-06-30 ENCOUNTER — TELEPHONE (OUTPATIENT)
Dept: ORTHOPEDICS | Facility: CLINIC | Age: 82
End: 2017-06-30

## 2017-06-30 NOTE — TELEPHONE ENCOUNTER
----- Message from David Yip sent at 6/30/2017  8:08 AM CDT -----  Contact: Soco-[facility]   Soco  received physical therapy orders,have some questions

## 2017-07-17 ENCOUNTER — TELEPHONE (OUTPATIENT)
Dept: ORTHOPEDICS | Facility: CLINIC | Age: 82
End: 2017-07-17

## 2017-07-17 NOTE — TELEPHONE ENCOUNTER
----- Message from David Yip sent at 7/17/2017  9:26 AM CDT -----  Contact: Soco-[facility]   Soco ask if can continue home health PT twice a week for two weeks.

## 2017-07-17 NOTE — TELEPHONE ENCOUNTER
Spoke with Soco.  States pt was out of town on vacation last week and had no therapy visits.   Soco will fax orders for continued PT to my attention for Dr Wilder to sign.

## 2017-07-25 ENCOUNTER — OFFICE VISIT (OUTPATIENT)
Dept: ORTHOPEDICS | Facility: CLINIC | Age: 82
End: 2017-07-25
Payer: MEDICARE

## 2017-07-25 ENCOUNTER — HOSPITAL ENCOUNTER (OUTPATIENT)
Dept: RADIOLOGY | Facility: HOSPITAL | Age: 82
Discharge: HOME | End: 2017-07-25
Attending: ORTHOPAEDIC SURGERY
Payer: MEDICARE

## 2017-07-25 DIAGNOSIS — M25.512 ACUTE PAIN OF LEFT SHOULDER: Primary | ICD-10-CM

## 2017-07-25 DIAGNOSIS — S42.202D CLOSED FRACTURE OF PROXIMAL END OF LEFT HUMERUS WITH ROUTINE HEALING, UNSPECIFIED FRACTURE MORPHOLOGY, SUBSEQUENT ENCOUNTER: ICD-10-CM

## 2017-07-25 PROCEDURE — 99213 OFFICE O/P EST LOW 20 MIN: CPT | Mod: S$PBB,,, | Performed by: PHYSICIAN ASSISTANT

## 2017-07-25 PROCEDURE — 73030 X-RAY EXAM OF SHOULDER: CPT | Mod: 26,LT,, | Performed by: RADIOLOGY

## 2017-07-25 PROCEDURE — 99999 PR PBB SHADOW E&M-EST. PATIENT-LVL I: CPT | Mod: PBBFAC,,, | Performed by: PHYSICIAN ASSISTANT

## 2017-07-25 PROCEDURE — 1159F MED LIST DOCD IN RCRD: CPT | Mod: ,,, | Performed by: PHYSICIAN ASSISTANT

## 2017-07-25 PROCEDURE — 99211 OFF/OP EST MAY X REQ PHY/QHP: CPT | Mod: PBBFAC,25 | Performed by: PHYSICIAN ASSISTANT

## 2017-07-25 NOTE — PROGRESS NOTES
HPI: 83 yo female status post trip and fall sustaining left proximal humerus fracture on 6/4/17.  She's been working on exercises with OT, and has no pain.      ROS:  Patient denies constitutional symptoms, cardiac symptoms, respiratory symptoms, GI symptoms.  The remainder of the musculoskeletal ROS is included in the HPI.    PE:    AA&O x 4.  NAD  HEENT:  NCAT, sclera nonicteric  Lungs:  Respirations are equal and unlabored.  CV:  2+ bilateral upper and lower extremity pulses.  Skin:  Intact throughout.    MS:  Resolving ecchymosis.  no TTP.  Able to move passively with no pain. FROm of wrist and elbow. active flexion is 90  NVI distal.      Rads:  Maintaining good position.      A/P:  9+ weeks status post left proximal humerus fracture in good alignment.   Order placed for out patient PT, [facility], Patient is to make appointment herself.    She will RTC in 6 weeks with x-rays true AP and scapular Y left shoulder.  Sooner if she has any problems.

## 2017-08-01 ENCOUNTER — CLINICAL SUPPORT (OUTPATIENT)
Dept: REHABILITATION | Facility: HOSPITAL | Age: 82
End: 2017-08-01
Attending: ORTHOPAEDIC SURGERY
Payer: MEDICARE

## 2017-08-01 DIAGNOSIS — S42.355A CLOSED NONDISPLACED COMMINUTED FRACTURE OF SHAFT OF LEFT HUMERUS, INITIAL ENCOUNTER: Primary | ICD-10-CM

## 2017-08-01 PROCEDURE — 97162 PT EVAL MOD COMPLEX 30 MIN: CPT | Mod: PO

## 2017-08-01 PROCEDURE — G8979 MOBILITY GOAL STATUS: HCPCS | Mod: CJ,PO

## 2017-08-01 PROCEDURE — G8978 MOBILITY CURRENT STATUS: HCPCS | Mod: CK,PO

## 2017-08-01 PROCEDURE — 97110 THERAPEUTIC EXERCISES: CPT | Mod: PO

## 2017-08-01 NOTE — PROGRESS NOTES
[id number]  Date of evaluation: 08/01/2017     Referring Physician: Jose J Wilder MD  riority Start Date Expiration Date Referral Entered By   Routine 08/01/2017 12/31/2017 Vidya Lynch PA-C   Visits Requested Visits Authorized Visits Completed Visits Scheduled   1 20 1 0   Procedure Information     Diagnosis   M25.512 (ICD-10-CM) - Acute pain of left shoulder   S42.202D (ICD-10-CM) - Closed fracture of proximal end of left humerus with routine healing, unspecified fracture morphology, subsequent encounter     Age: 84 y.o.  Sex: female          Hand dominance: Right    Time In: 1002  Time Out: 1100  58 minute sone to one with therapist    Date of onset: 6/4/17  Involved area  proximal fracture of left humerus  Mechanism of Injury: pt passed out due to UTI and fell fracturing her left shoulder  She also had a second fall when she tripped over an object    Past Medical History:   Diagnosis Date    Arthritis     Cataract     Embolic stroke involving right middle cerebral artery 6/6/2017    Encounter for blood transfusion     Fall, accidental     Hypertension     Right renal artery stenosis 3/28/2017    Stenosis of left subclavian artery    anxiety    Precautions:   Current Outpatient Prescriptions   Medication Sig    acetaminophen (TYLENOL) 500 MG tablet Take 1,000 mg by mouth every 6 (six) hours as needed for Pain.    aspirin (ECOTRIN) 81 MG EC tablet Take 1 tablet (81 mg total) by mouth once daily.    calcium-vitamin D3 500 mg(1,250mg) -200 unit per tablet Take 2 tablets by mouth 2 (two) times daily.    carvedilol (COREG) 25 MG tablet Take 1 tablet (25 mg total) by mouth 2 (two) times daily with meals.    chlorthalidone (HYGROTEN) 25 MG Tab Take 1 tablet (25 mg total) by mouth once daily.    cilostazol (PLETAL) 50 MG Tab Take 1 tablet (50 mg total) by mouth 2 (two) times daily.    ciprofloxacin HCl (CIPRO) 500 MG tablet Take 1 tablet (500 mg total) by  "mouth 2 (two) times daily.    citalopram (CELEXA) 20 MG tablet Take 1 tablet (20 mg total) by mouth once daily.    clopidogrel (PLAVIX) 75 mg tablet Take 1 tablet (75 mg total) by mouth once daily.    ferrous sulfate 325 (65 FE) MG EC tablet Take 1 tablet (325 mg total) by mouth 2 (two) times daily. For iron    losartan (COZAAR) 100 MG tablet Take 1 tablet (100 mg total) by mouth once daily.    rosuvastatin (CRESTOR) 5 MG tablet Take one tablet every other day along with CoQ 10    senna-docusate 8.6-50 mg (TIMOTHY-COLACE) 8.6-50 mg per tablet Take 1 tablet by mouth once daily. For consitpation     No current facility-administered medications for this visit.      Review of patient's allergies indicates:  No Known Allergies  X-Rays/Tests:   There is a comminuted fracture of the proximal left humerus centered at the level of the surgical neck with fracture lines involving the greater and lesser tuberosities.  No intra-articular fractures or fragments are identified.  There is proximal impaction of the distal fragment of less than 1 cm with mild posterior angulation of the proximal fragment.     The clavicle, acromion, and scapula are intact.  Visualized thoracic cage demonstrates no acute rib fractures.  No large effusions are evidence of radiopaque foreign body.     PRECAUTIONS : standard, FALL RISK    Subjective:    Pain:Painful left shoulder with movement.  She was allowed to discontinue sling and swathe last week  During no work: 1/10  While workin/10  Sleeping: 3/10  Location of pain: generalized entire left shoulder    Naheed's goals for therapy are: 'get back to normal UE usage"      Objective: Petite 85 yo WF in no apparent distress  Sensation Test: Patient denies any numbness/tingling  Observation/Inspection:  scapular elevation with attempted shoulder ROM, rounded shoulders  Ecchymotic biceps and triceps regions on left    Range of Motion:   Right: WFL   (L) UE    supine AAROM Norm     0-180   Shoulder " Flexion 105 180   Shoulder Abduction 65 0-180   Shoulder Extension 35 0-50   Shoulder Internal Rotation 30  Can reach LS  0-90   Shoulder External Rotation 5 0-90   Horz Shoulder Adduction 25 0-40     ROM Comments:   Firm end feel with pain at end ranges    All major mm groups of Left shoulder functioning but not formally tested due to limitations in motion  Self Care: requires increase time to don clothes     TU seconds  30 second chair test:  8     CMS Impairment/Limitation/Restriction for FOTO Upper Arm Survey  Status Limitation G-Code CMS Severity Modifier  Intake 50% 50% Current Status CK - At least 40 percent but less than 60 percent  Predicted 67% 33% Goal Status+ CJ - At least 20 percent but less than 40 percent  D/C Status CK **only report if this is a one time visit  +Based on FOTO predicted change score  [facility]  INTAKE FUNCTIONAL STATUS SUMMARY (8/3/2017)      Treatment included: PT evaluation,  !5 MINUTES OF tHEREX OF the following exercises (HEP) were instructed and Naheed was able to demonstrate them prior to the end of the session. HEP are as follows:   Pendulum exercises  Shoulder rolls  Shoulder shrugs  Wand for fflex  Wand assisted abduction   Wand IR  sidelying ER  Pulley fflex I scaption   Romelia abduction    EDUCATION:  A written handout with exercises above was provided to patient and she was able to demonstrate prior to end of session she is to perform exercises twice daily.  She expresses understanding and agreement.  Discussed role of PTA  And PT, and goals pt expresses agreement    Assessment    83 yo  Referred to PT following a comminuted fracture of the left humerus.  She is currently living with her daughter who is assisting with ADLS.    Pt tolerated today's treatment without any adverse effects.   Following session patient reported  No change in symptoms.    Pt presents with  the following impairments and problems:   pain   Weakness,  decreased ROM of  Left UE ,  and impaired muscle length, decreased or painful participation in normal ADLS and usual exercise program.    Co-morbidities and personal factors which may influence therapy include the following:  Embolic stroke, arthritis recent 2 falls, lives alone usually6  Activity Limitations /Participation restrictions: decreased activity tolerance due to immobilization of the left shoulder  Clinical presentation: an evolving clinical presentation with changing clinical characteristics consistent with recent humeral fracture as well as recent falls  Complexity: l medium,   Medical necessity is demonstrated by the above impairments and problems.  Problem List:   Decreased function of Left UE, Decreased ROM, Increased pain, Decreased strength, Hypomobility, Muscular atrophy, Inability to perform work/tasks, Difficulty sleeping, Inability to perform leisure activiites and Inability to perform self care tasks  Goals to be met in 4 weeks:  1) Pt will be independent and report performing HEP to maximize (R) shoulder functional capacity.  2) Pt will increase shoulder PROM in all measured planes of motion by 10 degrees to A with daily task.  3) Pt will report use of home modalities for pain management.  4) Pt will report one degree less of pain with nonuse and with use.  5) Pt will report interrupted sleep no more than twice a night.    Plan  Naheed to be treated by Physical Therapy 1-2 times per week for 8weeks to achieve the established goals.   Treatment to include Ultrasoud @ 3mHz, AAROM Mobilization of GH joint, PROM Home program, Ice, Moist heat, Strengthening Theraband Ex and UBE  as well as any other treatments deemed necessary based on the patient's needs or progress.   Will also work on balance activity since pt is a high fall risk  Additional balance testing will take place next session.          I certify the need for these services furnished under this plan of treatment and while  under my care    ____________________________________  Physician/Referring Practitioner    _______________  Date of Signature

## 2017-08-03 ENCOUNTER — CLINICAL SUPPORT (OUTPATIENT)
Dept: REHABILITATION | Facility: HOSPITAL | Age: 82
End: 2017-08-03
Attending: ORTHOPAEDIC SURGERY
Payer: MEDICARE

## 2017-08-03 DIAGNOSIS — S42.355A CLOSED NONDISPLACED COMMINUTED FRACTURE OF SHAFT OF LEFT HUMERUS, INITIAL ENCOUNTER: Primary | ICD-10-CM

## 2017-08-03 PROCEDURE — 97110 THERAPEUTIC EXERCISES: CPT | Mod: PO

## 2017-08-03 NOTE — PROGRESS NOTES
"      Patient: Naheed Cottrell   [facility] #: [id number]  Date of evaluation: 2017     Referring Physician: Jose J Wilder MD  [place] Start Date Expiration Date Referral Entered By   Routine 2017 Vidya Lynch PA-C   Visits Requested Visits Authorized Visits Completed Visits Scheduled    2 0   Procedure Information     Diagnosis   M25.512 (ICD-10-CM) - Acute pain of left shoulder   S42.202D (ICD-10-CM) - Closed fracture of proximal end of left humerus with routine healing, unspecified fracture morphology, subsequent encounter     Age: 84 y.o.  Sex: female          Hand dominance: Right    Time In: 805  Time Out: 910  60 minute sone to one with therapist    Date of onset: 17  Involved area  proximal fracture of left humerus  Mechanism of Injury: pt passed out due to UTI and fell fracturing her left shoulder  She also had a second fall when she tripped over an object    anxiety  X-Rays/Tests:   There is a comminuted fracture of the proximal left humerus centered at the level of the surgical neck with fracture lines involving the greater and lesser tuberosities.  No intra-articular fractures or fragments are identified.  There is proximal impaction of the distal fragment of less than 1 cm with mild posterior angulation of the proximal fragment.   PRECAUTIONS : standard, FALL RISK    Subjective:  "I have been doing my exercises at home a lot and I think I am doing better."  Pain:Painful left shoulder with movement.   During no work: 1/10  While workin/10  Sleeping: 3/10  Location of pain: generalized entire left shoulder  Objective: Petite 83 yo WF in no apparent distress    scapular elevation with attempted shoulder ROM,    poor scapula rhythm.    Pt holds her shoulders elevated and rigidly  Ecchymotic biceps and triceps regions on left    Range of Motion:   Right: WFL   (L) UE    supine AAROM Norm     0-180   Shoulder Flexion 105 180   Shoulder Abduction 65 0-180   Shoulder Extension " 35 0-50   Shoulder Internal Rotation 30  Can reach LS  0-90   Shoulder External Rotation 5 0-90   Horz Shoulder Adduction 25 0-40     ROM Comments:   Firm end feel with pain at end ranges  Pt resists all motion but will somewhat relax with cues    All major mm groups of Left shoulder functioning but not formally tested due to limitations in motion      Treatment included:   55 MINUTES OF tHEREX OF the following exercises (HEP) were instructed and Naheed was able to demonstrate them prior to the end of the session. HEP are as follows:   All exercises 8-10 repetitions to tolerance  Pendulum exercises  NP  Shoulder rolls  Shoulder shrugs  Wand for fflex  Wand assisted abduction   Wand IR  sidelying ER  Pulley fflex I scaption   Pulley abduction  Wall slides  Wall pushes  Manual glenohumeral distraction provided throughout much of AAROM and manual stroking to assist with cues for relaxation    EDUCATION:  HEP reviewec. She expresses understanding and agreement.      Assessment    85 yo  Referred to PT following a comminuted fracture of the left humerus.   Pt tolerated today's treatment without any adverse effects. She is very tense and guards all movement of the LUE.  This limits her shoulder motion and requires extended time for all exercises due to need for manual cues to relax mm guarding    Goals to be met in 4 weeks:  1) Pt will be independent and report performing HEP to maximize (R) shoulder functional capacity.  2) Pt will increase shoulder PROM in all measured planes of motion by 10 degrees to A with daily task.  3) Pt will report use of home modalities for pain management.  4) Pt will report one degree less of pain with nonuse and with use.  5) Pt will report interrupted sleep no more than twice a night.    Plan  Naheed to be treated by Physical Therapy 1-2 times per week for 8weeks to achieve the established goals.   Treatment to include Ultrasoud @ 3mHz, AAROM Mobilization of GH joint, PROM Home program, Ice,  Moist heat, Strengthening Theraband Ex and UBE  as well as any other treatments deemed necessary based on the patient's needs or progress.   Will also work on balance activity since pt is a high fall risk  Additional balance testing will take place next session.

## 2017-08-08 ENCOUNTER — CLINICAL SUPPORT (OUTPATIENT)
Dept: REHABILITATION | Facility: HOSPITAL | Age: 82
End: 2017-08-08
Attending: ORTHOPAEDIC SURGERY
Payer: MEDICARE

## 2017-08-08 DIAGNOSIS — S42.355A CLOSED NONDISPLACED COMMINUTED FRACTURE OF SHAFT OF LEFT HUMERUS, INITIAL ENCOUNTER: Primary | ICD-10-CM

## 2017-08-08 PROCEDURE — 97110 THERAPEUTIC EXERCISES: CPT | Mod: PO

## 2017-08-08 NOTE — PROGRESS NOTES
"      Patient: Naheed Colinaney   [facility] #: [id number]  Date of evaluation: 2017     Referring Physician: Jose J Wilder MD  [place] Start Date Expiration Date Referral Entered By   Routine 2017 Vidya Lynch PA-C   Visits Requested Visits Authorized Visits Completed Visits Scheduled    3 0   Procedure Information     Diagnosis   M25.512 (ICD-10-CM) - Acute pain of left shoulder   S42.202D (ICD-10-CM) - Closed fracture of proximal end of left humerus with routine healing, unspecified fracture morphology, subsequent encounter     Age: 84 y.o.  Sex: female          Hand dominance: Right    Time In: 300  Time Out: 82726 minute sone to one with therapist    Date of onset: 17  Involved area  proximal fracture of left humerus  Mechanism of Injury: pt passed out due to UTI and fell fracturing her left shoulder  She also had a second fall when she tripped over an object      X-Rays/Tests:   There is a comminuted fracture of the proximal left humerus centered at the level of the surgical neck with fracture lines involving the greater and lesser tuberosities.  No intra-articular fractures or fragments are identified.  There is proximal impaction of the distal fragment of less than 1 cm with mild posterior angulation of the proximal fragment.   PRECAUTIONS : standard, FALL RISK    Subjective:  "I have been doing my exercises at home."."  Pain:Painful left shoulder with movement.   During no work: 1/10  While workin/10  Sleeping: 3/10  Location of pain: generalized entire left shoulder  Objective: Petite 83 yo WF in no apparent distress    scapular elevation with attempted shoulder ROM,    poor scapula rhythm.    Pt holds her shoulders elevated and rigidly  Ecchymotic biceps and triceps regions on left    Range of Motion:   Right: WFL   (L) UE    supine AAROM Norm     0-180   Shoulder Flexion 120 180   Shoulder Abduction 65 0-180   Shoulder Extension 35 0-50   Shoulder Internal Rotation " 30  Can reach LS  0-90   Shoulder External Rotation 5 0-90   Horz Shoulder Adduction 25 0-40         Treatment included:   55 MINUTES OF tHEREX OF the following exercises (HEP) were instructed and Naheed was able to demonstrate them prior to the end of the session. HEP are as follows:   All exercises x10 repetitions     Shoulder rolls  Shoulder shrugs  Wand for fflex  Wand assisted abduction   Wand IR  sidelying ER  Pulley fflex I scaption   Pulley abduction  Wall slides  Wall pushes  Wall shoulder rotation ( closed chained Kiblers)  scap retraction with green theraband  scap retraction with elbows straight/shoulder extension   Manual glenohumeral distraction provided throughout much of AAROM and manual stroking to assist with cues for relaxation    Moist heat at end of session for pain relief  EDUCATION:  HEP updated  Written handout was provided and  Reviewed--pt was able to return demonstration. She expresses understanding and agreement.      Assessment    83 yo  Referred to PT following a comminuted fracture of the left humerus.   Pt tolerated today's treatment without any adverse effects. She is very tense and guards all movement of the LUE when thrapist assisted AAROM attempted but she does quite well with self modulated AAROM.  This limits her shoulder motion and requires extended time for all exercises due to need for manual cues to relax mm guarding    Goals to be met in 4 weeks:  1) Pt will be independent and report performing HEP to maximize (R) shoulder functional capacity.  2) Pt will increase shoulder PROM in all measured planes of motion by 10 degrees to A with daily task.  3) Pt will report use of home modalities for pain management.  4) Pt will report one degree less of pain with nonuse and with use.  5) Pt will report interrupted sleep no more than twice a night.    Plan  Will also work on balance activity since pt is a high fall risk

## 2017-08-10 ENCOUNTER — CLINICAL SUPPORT (OUTPATIENT)
Dept: REHABILITATION | Facility: HOSPITAL | Age: 82
End: 2017-08-10
Attending: ORTHOPAEDIC SURGERY
Payer: MEDICARE

## 2017-08-10 DIAGNOSIS — S42.355A CLOSED NONDISPLACED COMMINUTED FRACTURE OF SHAFT OF LEFT HUMERUS, INITIAL ENCOUNTER: Primary | ICD-10-CM

## 2017-08-10 PROCEDURE — 97110 THERAPEUTIC EXERCISES: CPT | Mod: PO

## 2017-08-10 NOTE — PROGRESS NOTES
Patient: Naheed Colinaney   [facility] #: [id number]  Date of evaluation: 08/10/2017     Referring Physician: Jose J Wilder MD  riority Start Date Expiration Date Referral Entered By   Routine 08/01/2017 12/31/2017 Vidya Lynch PA-C   Visits Requested Visits Authorized Visits Completed Visits Scheduled   1 20 4 0   Procedure Information     Diagnosis   M25.512 (ICD-10-CM) - Acute pain of left shoulder   S42.202D (ICD-10-CM) - Closed fracture of proximal end of left humerus with routine healing, unspecified fracture morphology, subsequent encounter     Age: 84 y.o.  Sex: female          Hand dominance: Right    Time In: 900  Time Out: 100  30 minute sone to one with therapist    Date of onset: 6/4/17  Involved area  proximal fracture of left humerus  Mechanism of Injury: pt passed out due to UTI and fell fracturing her left shoulder  She also had a second fall when she tripped over an object      X-Rays/Tests:   There is a comminuted fracture of the proximal left humerus centered at the level of the surgical neck with fracture lines involving the greater and lesser tuberosities.  No intra-articular fractures or fragments are identified.  There is proximal impaction of the distal fragment of less than 1 cm with mild posterior angulation of the proximal fragment.   PRECAUTIONS : standard, FALL RISK    Subjective:    Pain:Painful left shoulder with movement.   At rest: 1/10  With end range motion: 5/10    Location of pain: generalized entire left shoulder    Objective: Petite 85 yo WF in no apparent distress    scapular elevation with attempted shoulder ROM,    poor scapula rhythm.    Pt holds her shoulders elevated and rigidly  Has difficulty with relaxing biceps during motion.    Range of Motion:   Right: WFL   (L) UE    supine AAROM Norm     0-180   Shoulder Flexion 120 180   Shoulder Abduction 65 0-180   Shoulder Extension 35 0-50   Shoulder Internal Rotation 30  Can reach LS  0-90   Shoulder External Rotation  5 0-90   Horz Shoulder Adduction 25 0-40         Treatment included:   55 MINUTES OF tHEREX OF the following exercises (HEP) were instructed and Naheed was able to demonstrate them prior to the end of the session. HEP are as follows:   All exercises x10 repetitions . 30 minutes billable time  Scifit x 8 min for UE motion  Shoulder rolls  Shoulder shrugs  Wand for fflex  Wand assisted abduction   Wand IR  sidelying ER  Pulley fflex I scaption   Pulley abduction  Wall slides  Wall pushes  Wall shoulder rotation ( closed chained Kiblers)  scap retraction with green theraband  scap retraction with elbows straight/shoulder extension   Manual glenohumeral distraction provided throughout much of AAROM and manual stroking to assist with cues for relaxation    Moist heat at end of session for pain relief  EDUCATION:  HEP updated  Written handout was provided and  Reviewed--pt was able to return demonstration. She expresses understanding and agreement.      Assessment    85 yo  With a comminuted fracture of the left humerus.   Pt tolerated today's treatment without any adverse effects. She is demonstrating progress with AAROM and is improving in strength.  She reports no longer having difficulty with ADLs  Goals to be met in 4 weeks:  1) Pt will be independent and report performing HEP to maximize (R) shoulder functional capacity.  2) Pt will increase shoulder PROM in all measured planes of motion by 10 degrees to A with daily task.  3) Pt will report use of home modalities for pain management.  4) Pt will report one degree less of pain with nonuse and with use.  5) Pt will report interrupted sleep no more than twice a night.    Plan  Will also work on balance activity since pt is a high fall risk

## 2017-08-15 ENCOUNTER — CLINICAL SUPPORT (OUTPATIENT)
Dept: REHABILITATION | Facility: HOSPITAL | Age: 82
End: 2017-08-15
Attending: ORTHOPAEDIC SURGERY
Payer: MEDICARE

## 2017-08-15 DIAGNOSIS — S42.355A CLOSED NONDISPLACED COMMINUTED FRACTURE OF SHAFT OF LEFT HUMERUS, INITIAL ENCOUNTER: Primary | ICD-10-CM

## 2017-08-15 PROCEDURE — 97110 THERAPEUTIC EXERCISES: CPT | Mod: PO

## 2017-08-15 NOTE — PROGRESS NOTES
Patient: Naheed Cottrell   [facility] #: [id number]  Date of evaluation: 08/15/2017     Referring Physician: Jose J Wilder MD  [place] Start Date Expiration Date Referral Entered By   Routine 08/01/2017 12/31/2017 Vidya Lynch PA-C   Visits Requested Visits Authorized Visits Completed Visits Scheduled   1 20 4 0   Procedure Information     Diagnosis   M25.512 (ICD-10-CM) - Acute pain of left shoulder   S42.202D (ICD-10-CM) - Closed fracture of proximal end of left humerus with routine healing, unspecified fracture morphology, subsequent encounter     Age: 84 y.o.  Sex: female          Hand dominance: Right    Time In: 900  Time Out: 1000  30 minutes one to one with therapist    Date of onset: 6/4/17  Involved area  proximal fracture of left humerus  Mechanism of Injury: pt passed out due to UTI and fell fracturing her left shoulder  She also had a second fall when she tripped over an object      X-Rays/Tests:   There is a comminuted fracture of the proximal left humerus centered at the level of the surgical neck with fracture lines involving the greater and lesser tuberosities.  No intra-articular fractures or fragments are identified.  There is proximal impaction of the distal fragment of less than 1 cm with mild posterior angulation of the proximal fragment.   PRECAUTIONS : standard, FALL RISK    Subjective:  I do my exercises everyday twice a day sometimes.  I am now able to use my left arm for everything  Pain:Painful left shoulder with movement.   At rest: 1/10  With end range motion: 5/10    Location of pain: generalized entire left shoulder    Objective: Petite 83 yo WF in no apparent distress    scapular elevation with attempted shoulder ROM,    poor scapula rhythm but is showing slight improvement    Pt holds her shoulders elevated and rigidly  Has difficulty with relaxing her left upper extremity and shoulder girdle during therapy.  .    Range of Motion:   Right: WFL   (L) UE    supine AAROM  8/15  Norm     0-180   Shoulder Flexion 125 180   Shoulder Abduction 68 0-180   Shoulder Extension 35 0-50   Shoulder Internal Rotation 30  Can reach LS  0-90   Shoulder External Rotation 5 0-90   Horz Shoulder Adduction 25 0-40         Treatment included:   55 MINUTES OF tHEREX OF the following exercises (HEP) were instructed and Naheed was able to demonstrate them prior to the end of the session. HEP are as follows:   All exercises x10 repetitions . 30 minutes billable time  Scifit x 8 min for UE motion  Shoulder rolls  Shoulder shrugs  Wand for fflex  Wand assisted abduction   Wand IR  sidelying ER  Pulley fflex I scaption   Pulley abduction  Wall slides  Wall pushes  Wall shoulder rotation ( closed chained Kiblers)  scap retraction with green theraband  scap retraction with elbows straight/shoulder extension   Manual glenohumeral distraction provided throughout much of AAROM and manual stroking to assist with cues for relaxation    icet at end of session for pain relief  EDUCATION:  HEP reviewed  Pt performing without problem or question  Assessment    85 yo  With a comminuted fracture of the left humerus.   Pt tolerated today's treatment without any adverse effects. She is demonstrating progress with AAROM and is improving in strength.  She reports no longer having difficulty with ADLs and is very pleased with her progress.  Mrs. Cottrell has great difficulty relaxing her LUE with therex and does much better with self AAROM    Goals to be met in 4 weeks:  1) Pt will be independent and report performing HEP to maximize (R) shoulder functional capacity.  MET  2) Pt will increase shoulder PROM in all measured planes of motion by 10 degrees to A with daily task.  3) Pt will report use of home modalities for pain management.  4) Pt will report one degree less of pain with nonuse and with use.  5) Pt will report interrupted sleep no more than twice a night.    Plan WE DID NOT HAVE TIME TODAY TO ADDRESS BALANCE ISSUES.

## 2017-08-22 ENCOUNTER — CLINICAL SUPPORT (OUTPATIENT)
Dept: REHABILITATION | Facility: HOSPITAL | Age: 82
End: 2017-08-22
Attending: ORTHOPAEDIC SURGERY
Payer: MEDICARE

## 2017-08-22 DIAGNOSIS — S42.355A CLOSED NONDISPLACED COMMINUTED FRACTURE OF SHAFT OF LEFT HUMERUS, INITIAL ENCOUNTER: Primary | ICD-10-CM
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
## 2017-08-22 PROCEDURE — 97110 THERAPEUTIC EXERCISES: CPT | Mod: PO

## 2017-08-22 PROCEDURE — 97112 NEUROMUSCULAR REEDUCATION: CPT | Mod: PO

## 2017-08-22 NOTE — PROGRESS NOTES
Patient: Naheed Cottrell   [facility] #: [id number]  Date of evaluation: 08/22/2017     Referring Physician: Jose J Wilder MD  riority Start Date Expiration Date Referral Entered By   Routine 08/01/2017 12/31/2017 Vidya Lynch PA-C   Visits Requested Visits Authorized Visits Completed Visits Scheduled   1 20 6 0   Procedure Information     Diagnosis   M25.512 (ICD-10-CM) - Acute pain of left shoulder   S42.202D (ICD-10-CM) - Closed fracture of proximal end of left humerus with routine healing, unspecified fracture morphology, subsequent encounter     Age: 84 y.o.  Sex: female          Hand dominance: Right    Time In: 903  Time Out: 1000  57 minutes one to one with therapist    Date of onset: 6/4/17  proximal fracture of left humerus  Mechanism of Injury: pt passed out due to UTI and fell fracturing her left shoulder  She also had a second fall when she tripped over an object      X-Rays/Tests:   There is a comminuted fracture of the proximal left humerus centered at the level of the surgical neck with fracture lines involving the greater and lesser tuberosities.  No intra-articular fractures or fragments are identified.  There is proximal impaction of the distal fragment of less than 1 cm with mild posterior angulation of the proximal fragment.   PRECAUTIONS : standard, FALL RISK    Subjective:  I dont have any painin the left shoulder unless I over stretch it.  Pain currently  At rest 0/10  With motion 3/10  Location of pain: generalized entire left shoulder    Objective:     scapular elevation with attempted shoulder ROM,    poor scapula rhythm but is showing slight improvement    Pt holds her shoulders elevated and rigidly  Has difficulty with relaxing her left upper extremity and shoulder girdle during therapy.  .    Range of Motion:   Right: WFL   (L) UE    supine AAROM  8/15 Norm     0-180   Shoulder Flexion 129 180   Shoulder Abduction 90 0-180   Shoulder Extension 35 0-50   Shoulder Internal Rotation  30  Can reach LS  0-90   Shoulder External Rotation 5 0-90   Horz Shoulder Adduction 25 0-40     BALANCE ASSESSMENT  30 sec chair rise 13 repetitions  TUG  11 seconds  SLS 4 sec RLE, 6 sec LLE  Cannot step into tandem without UE support  Tandem walk accomplished with min UE support  Standing Eyes closed x 60 seconds without LOB  Heel tap requires UE light support    Ambulation:  Doesn't use AD.. Ambulates with normal mikaela, stride and sequence.  No LOB or wavering from path noted on level surfaces     Treatment included:   35 MINUTES OF tHEREX OF the following exercises  All exercises2 x10 repetitions .   Shoulder rolls  Shoulder shrugs  Wand for fflex with 2# wand  Wand assisted abduction  With 2# wand  Towel IR  sidelying ER with 1#  Pulley fflex I scaption   Pulley abduction  Wall slides  Wall pushes  Wall shoulder rotation ( closed chained Kiblers)  scap retraction with green theraband  scap retraction with elbows straight/shoulder extension   Manual glenohumeral distraction provided throughout much of AAROM and manual stroking to assist with cues for relaxation  NP    Neuromm re-ed x 30 min  SLS  Toe raises in standing  Heel raises in standing  SLS with UE support  Tandem stand with UE support  Tandem walk with UE support  Heel taps on 2nd step  EO/ EC standing balance  Ambulation with head turns    Ice at end of session for pain relief to shoulder    EDUCATION:  HEP reviewed  Pt performing without problem or question/  Am updated HEP was provided to include balance activities.  Pt expressed understanding of new exercises    85 yo  With a comminuted fracture of the left humerus.   Pt tolerated today's treatment without any adverse effects. She is demonstrating progress with AAROM and is improving in strength.  She reports no longer having difficulty with ADLs and is very pleased with her progress.  Mrs. Cottrell has great difficulty relaxing her LUE with therex and does much better with self AAROM.  The cause  of the fracture of her shoulder was due to a fall.  Therefore her safety and balance is of a concern.  Her balance is actually good for her age.  She will benefit from a few sessions to address balance to avoid future potential for falls    Goals to be met in 4 weeks:  1) Pt will be independent and report performing HEP to maximize (R) shoulder functional capacity.  MET  2) Pt will increase shoulder PROM in all measured planes of motion by 10 degrees to A with daily task.  3) Pt will report use of home modalities for pain management.  4) Pt will report one degree less of pain with nonuse and with use.  5) Pt will report interrupted sleep no more than twice a night.      NEW GOALS  6 WEEKS  ( AS OF 8/22/17)  Pt to be able to ambulate on compliant and noncompliant surfaces without LOB and good safety awareness  Pt to be able to report no difficulty with balance at home.  Pt to be able to navigate, unlevel surfaces such as gravel, grass, broken concrete without LOB  Plan continue to address UE strength and ROM concerns.  Devote some of treatment time to balance activities to include neuromm re-education, therex, gait training

## 2017-08-24 ENCOUNTER — CLINICAL SUPPORT (OUTPATIENT)
Dept: REHABILITATION | Facility: HOSPITAL | Age: 82
End: 2017-08-24
Attending: ORTHOPAEDIC SURGERY
Payer: MEDICARE
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
## 2017-08-24 DIAGNOSIS — S42.355A CLOSED NONDISPLACED COMMINUTED FRACTURE OF SHAFT OF LEFT HUMERUS, INITIAL ENCOUNTER: Primary | ICD-10-CM

## 2017-08-24 PROCEDURE — 97110 THERAPEUTIC EXERCISES: CPT | Mod: PO

## 2017-08-24 NOTE — PROGRESS NOTES
[id number]  Date of evaluation: 08/24/2017     Referring Physician: Jose J Wilder MD  [place] Start Date Expiration Date Referral Entered By   Routine 08/01/2017 12/31/2017 Vidya Lynch PA-C   Visits Requested Visits Authorized Visits Completed Visits Scheduled   1 20 7 0   Procedure Information     Diagnosis   M25.512 (ICD-10-CM) - Acute pain of left shoulder   S42.202D (ICD-10-CM) - Closed fracture of proximal end of left humerus with routine healing, unspecified fracture morphology, subsequent encounter     Age: 84 y.o.  Sex: female          Hand dominance: Right    Time In: 900  Time Out: 1000  60 minutes one to one with therapist    Date of onset: 6/4/17  proximal fracture of left humerus  Mechanism of Injury: pt passed out due to UTI and fell fracturing her left shoulder  She also had a second fall when she tripped over an object      X-Rays/Tests:   There is a comminuted fracture of the proximal left humerus centered at the level of the surgical neck with fracture lines involving the greater and lesser tuberosities.  No intra-articular fractures or fragments are identified.  There is proximal impaction of the distal fragment of less than 1 cm with mild posterior angulation of the proximal fragment.   PRECAUTIONS : standard, FALL RISK    Subjective:  I dont have any pain in the left shoulder unless I reach too far.  Pain currently  At rest 0/10  With motion 3/10  Location of pain: generalized entire left shoulder    Objective:   scapular elevation with attempted shoulder ROM,    poor scapula rhythm but is showing slight improvement    Pt holds her shoulders elevated and rigidly  Has difficulty with relaxing her left upper extremity and shoulder girdle during therapy.  .    Range of Motion:   Right: WFL   (L) UE       supine AAROM  8/15 AA 8/24   Norm        0-180   Shoulder Flexion 129 152   180   Shoulder Abduction 90 105   0-180   Shoulder Extension 35 35   0-50    Shoulder Internal Rotation 30  Can reach LS  @90'  40   0-90   Shoulder External Rotation 5 @90'  30   0-90   Horz Shoulder Adduction 25    0-40       Treatment included:   55 MINUTES OF tHEREX OF the following exercises  All exercises2 x10 repetitions .   Shoulder rolls  Shoulder shrugs  Wand for fflex with 2# wand  Wand assisted abduction  With 2# wand  Towel IR  sidelying ER with 1#  Pulley fflex I scaption   Pulley abduction  Wall slides  Wall pushes  NP  Wall shoulder rotation ( closed chained Kiblers)  NP  scap retraction with green theraband  scap retraction with elbows straight/shoulder extension   Manual glenohumeral distraction provided throughout much of AAROM and manual stroking to assist with cues for relaxation    BOSU static stand , AP, Lat with SBA    Neuromm re-ed x 30 min  Not performed  SLS  Toe raises in standing  Heel raises in standing  SLS with UE support  Tandem stand with UE support  Tandem walk with UE support  Heel taps on 2nd step  EO/ EC standing balance  Ambulation with head turns    Ice at end of session for pain relief to shoulder    EDUCATION:  HEP reviewed  Pt performing without problem or question/  .  Pt expressed understanding of new exercises    85 yo  With a comminuted fracture of the left humerus.   Pt tolerated today's treatment without any adverse effects. She is demonstrating progress with AAROM and is improving in strength.  She reports no longer having difficulty with ADLs and is very pleased with her progress.  Relaxing during exercise to achieve full ROM remains problematic as pt is anxious and guards motion strongly.      Goals to be met in 4 weeks:  1) Pt will be independent and report performing HEP to maximize (R) shoulder functional capacity.  MET  2) Pt will increase shoulder PROM in all measured planes of motion by 10 degrees to A with daily task.  MET  3) Pt will report use of home modalities for pain management.  MET  4) Pt will report one degree less of pain  with nonuse and with use.  MET  5) Pt will report interrupted sleep no more than twice a night.  MET      NEW GOALS  6 WEEKS  ( AS OF 8/22/17)  Pt to be able to ambulate on compliant and noncompliant surfaces without LOB and good safety awareness  Pt to be able to report no difficulty with balance at home.  Pt to be able to navigate, unlevel surfaces such as gravel, grass, broken concrete without LOB  Plan continue to address UE strength and ROM concerns.  Devote some of treatment time to balance activities to include neuromm re-education, therex, gait training  ADDITIONAL GOALS AS OF 8/24/17  Pt will be independent and report performing HEP to maximize (R) shoulder functional capacity.   2) Pt will increase shoulder PROM in all measured planes of motion by 5degrees to A with daily task.   3) Pt will report use of home modalities for pain management.    4) Pt will report one degree less of pain with nonuse and with use.    PLAN  Continue with current plan of care

## 2017-08-31 ENCOUNTER — CLINICAL SUPPORT (OUTPATIENT)
Dept: REHABILITATION | Facility: HOSPITAL | Age: 82
End: 2017-08-31
Attending: ORTHOPAEDIC SURGERY
Payer: MEDICARE

## 2017-08-31 DIAGNOSIS — R29.898 LEFT ARM WEAKNESS: ICD-10-CM
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
## 2017-08-31 DIAGNOSIS — S42.355A CLOSED NONDISPLACED COMMINUTED FRACTURE OF SHAFT OF LEFT HUMERUS, INITIAL ENCOUNTER: Primary | ICD-10-CM

## 2017-08-31 PROCEDURE — 97110 THERAPEUTIC EXERCISES: CPT | Mod: PO

## 2017-08-31 NOTE — PROGRESS NOTES
Patient: Naheed Cottrell   [facility] #: [id number]  Date of evaluation: 08/31/2017     Referring Physician: Jose J Wilder MD  [place] Start Date Expiration Date Referral Entered By   Routine 08/01/2017 12/31/2017 Vidya Lynch PA-C   Visits Requested Visits Authorized Visits Completed Visits Scheduled   1 20 7 0   Procedure Information     Diagnosis   M25.512 (ICD-10-CM) - Acute pain of left shoulder   S42.202D (ICD-10-CM) - Closed fracture of proximal end of left humerus with routine healing, unspecified fracture morphology, subsequent encounter     Age: 84 y.o.  Sex: female          Hand dominance: Right    Time In: 900  Time Out: 1000  60 minutes one to one with therapist    Date of onset: 6/4/17  proximal fracture of left humerus  Mechanism of Injury: pt passed out due to UTI and fell fracturing her left shoulder  She also had a second fall when she tripped over an object    X-Rays/Tests:   There is a comminuted fracture of the proximal left humerus centered at the level of the surgical neck with fracture lines involving the greater and lesser tuberosities.  No intra-articular fractures or fragments are identified.  There is proximal impaction of the distal fragment of less than 1 cm with mild posterior angulation of the proximal fragment.   PRECAUTIONS : standard, FALL RISK    Subjective:  I can do everything without problem at home now. Still some problem with seatbelt  I exercise 2x a day on most days  Sometimes 3x  Pain currently  At rest 0/10  With motion 3/10  Location of pain: generalized entire left shoulder    Objective:   scapular elevation with attempted shoulder ROM,    poor scapula rhythm but is showing slight improvement    Pt holds her shoulders elevated and rigidly  Has difficulty with relaxing her left upper extremity and shoulder girdle during therapy.  .  However when moving UE actively she does have functional AROM of the left shoulder but with poor scap/humeral rhythm  Range of Motion:    Right: WFL   (L) UE       supine AAROM  8/15 AA 8/24 AROM 8/31  Norm        0-180   Shoulder Flexion 129 152 152  180   Shoulder Abduction 90 105 100  0-180   Shoulder Extension 35 35 35  0-50   Shoulder Internal Rotation 30  Can reach LS  @90'  40 @90'  40     0-90   Shoulder External Rotation 5 @90'  30 @90'  30     0-90   Horz Shoulder Adduction 25    0-40       Treatment included:   55 MINUTES OFTHEREX OF the following exercises  All exercises2 x10 repetitions .   Shoulder rolls  Shoulder shrugs  Wand for fflex with 2# wand  Wand assisted abduction  With 2# wand  Towel IR  sidelying ER with 1#  Pulley fflex I scaption   Pulley abduction  Wall slides  Wall pushes  NP  Wall shoulder rotation ( closed chained Kiblers)  NP  scap retraction with green theraband  scap retraction with elbows straight/shoulder extension   Manual glenohumeral distraction provided throughout much of AAROM and manual stroking to assist with cues for relaxation    BOSU static stand , AP, Lat with SBA    Neuromm re-ed x 30 min  Not performed  SLS  Toe raises in standing  Heel raises in standing  SLS with UE support  Tandem stand with UE support  Tandem walk with UE support  Heel taps on 2nd step  EO/ EC standing balance  Ambulation with head turns    Ice at end of session for pain relief to shoulder    EDUCATION:  HEP reviewed  Pt performing without problem or question/  ..      ASSESSMENT  83 yo  With a comminuted fracture of the left humerus.   Pt tolerated today's treatment without any adverse effects. She is demonstrating progress with AAROM and is improving in strength.  She reports no longer having difficulty with ADLs and is very pleased with her progress.  Relaxing during exercise to achieve full ROM remains problematic as pt is anxious and guards motion strongly.  She may have reached max potential but we will continue to see for a 2-4 more treatments to see if we can achieve some more IR and ER reaching behind her and putting on  seatbelt and to focus more on balance activity    Goals to be met in 4 weeks:  1) Pt will be independent and report performing HEP to maximize (R) shoulder functional capacity.  MET  2) Pt will increase shoulder PROM in all measured planes of motion by 10 degrees to A with daily task.  MET  3) Pt will report use of home modalities for pain management.  MET  4) Pt will report one degree less of pain with nonuse and with use.  MET  5) Pt will report interrupted sleep no more than twice a night.  MET      NEW GOALS  6 WEEKS  ( AS OF 8/22/17)  Pt to be able to ambulate on compliant and noncompliant surfaces without LOB and good safety awareness  Pt to be able to report no difficulty with balance at home.  Pt to be able to navigate, unlevel surfaces such as gravel, grass, broken concrete without LOB  Plan continue to address UE strength and ROM concerns.  Devote some of treatment time to balance activities to include neuromm re-education, therex, gait training  ADDITIONAL GOALS AS OF 8/24/17  Pt will be independent and report performing HEP to maximize (R) shoulder functional capacity.   2) Pt will increase shoulder PROM in all measured planes of motion by 5degrees to A with daily task.   3) Pt will report use of home modalities for pain management.    4) Pt will report one degree less of pain with nonuse and with use.    PLAN FOCUS ON BALANCE ACTIVITY

## 2017-09-05 ENCOUNTER — HOSPITAL ENCOUNTER (OUTPATIENT)
Dept: RADIOLOGY | Facility: HOSPITAL | Age: 82
Discharge: HOME | End: 2017-09-05
Attending: PHYSICIAN ASSISTANT
Payer: MEDICARE

## 2017-09-05 ENCOUNTER — OFFICE VISIT (OUTPATIENT)
Dept: ORTHOPEDICS | Facility: CLINIC | Age: 82
End: 2017-09-05
Payer: MEDICARE

## 2017-09-05 DIAGNOSIS — M25.519 SHOULDER PAIN, UNSPECIFIED CHRONICITY, UNSPECIFIED LATERALITY: Primary | ICD-10-CM

## 2017-09-05 DIAGNOSIS — S42.202D CLOSED FRACTURE OF PROXIMAL END OF LEFT HUMERUS WITH ROUTINE HEALING, UNSPECIFIED FRACTURE MORPHOLOGY, SUBSEQUENT ENCOUNTER: Primary | ICD-10-CM

## 2017-09-05 DIAGNOSIS — M25.519 SHOULDER PAIN, UNSPECIFIED CHRONICITY, UNSPECIFIED LATERALITY: ICD-10-CM

## 2017-09-05 PROCEDURE — 73030 X-RAY EXAM OF SHOULDER: CPT | Mod: 26,LT,, | Performed by: RADIOLOGY

## 2017-09-05 PROCEDURE — 99213 OFFICE O/P EST LOW 20 MIN: CPT | Mod: PBBFAC,25 | Performed by: PHYSICIAN ASSISTANT

## 2017-09-05 PROCEDURE — 99999 PR PBB SHADOW E&M-EST. PATIENT-LVL III: CPT | Mod: PBBFAC,,, | Performed by: PHYSICIAN ASSISTANT

## 2017-09-05 PROCEDURE — 99213 OFFICE O/P EST LOW 20 MIN: CPT | Mod: S$PBB,,, | Performed by: PHYSICIAN ASSISTANT

## 2017-09-05 PROCEDURE — 73030 X-RAY EXAM OF SHOULDER: CPT | Mod: TC,LT

## 2017-09-05 PROCEDURE — 1159F MED LIST DOCD IN RCRD: CPT | Mod: ,,, | Performed by: PHYSICIAN ASSISTANT

## 2017-09-06 ENCOUNTER — CLINICAL SUPPORT (OUTPATIENT)
Dept: REHABILITATION | Facility: HOSPITAL | Age: 82
End: 2017-09-06
Attending: ORTHOPAEDIC SURGERY
Payer: MEDICARE

## 2017-09-06 DIAGNOSIS — R29.898 LEFT ARM WEAKNESS: Primary | Chronic | ICD-10-CM
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
## 2017-09-06 PROCEDURE — 97110 THERAPEUTIC EXERCISES: CPT | Mod: PO

## 2017-09-06 PROCEDURE — 97112 NEUROMUSCULAR REEDUCATION: CPT | Mod: PO

## 2017-09-06 NOTE — PROGRESS NOTES
Patient: Naheed Cottrell   [facility] #: [id number]  Date of evaluation: 09/06/2017     Referring Physician: Jose J Wilder MD  [place] Start Date Expiration Date Referral Entered By   Routine 08/01/2017 12/31/2017 Vidya Lynch PA-C   Visits Requested Visits Authorized Visits Completed Visits Scheduled   1 20 9 0   Procedure Information     Diagnosis   M25.512 (ICD-10-CM) - Acute pain of left shoulder   S42.202D (ICD-10-CM) - Closed fracture of proximal end of left humerus with routine healing, unspecified fracture morphology, subsequent encounter     Age: 84 y.o.  Sex: female          Hand dominance: Right    Time In: 1000  Time Out: 1100  30minutes one to one with therapist    Date of onset: 6/4/17  proximal fracture of left humerus  Mechanism of Injury: pt passed out due to UTI and fell fracturing her left shoulder  She also had a second fall when she tripped over an object    X-Rays/Tests:   There is a comminuted fracture of the proximal left humerus centered at the level of the surgical neck with fracture lines involving the greater and lesser tuberosities.  No intra-articular fractures or fragments are identified.  There is proximal impaction of the distal fragment of less than 1 cm with mild posterior angulation of the proximal fragment.   PRECAUTIONS : standard, FALL RISK    Subjective:  I can do everything without problem at home now. Still some problem with seatbelt  I exercise 2x a day on most days  Sometimes 3x  Pain currently  At rest 0/10  With motion 3/10  Location of pain: generalized entire left shoulder    Objective:   scapular elevation with attempted shoulder ROM,    poor scapula rhythm but is showing slight improvement    Pt holds her shoulders elevated and rigidly  Has difficulty with relaxing her left upper extremity and shoulder girdle during therapy.  .  However when moving UE actively she does have functional AROM of the left shoulder but with poor scap/humeral rhythm  Range of Motion:    Right: WFL   (L) UE       supine AAROM  8/15 AA 8/24 AROM 8/31  Norm        0-180   Shoulder Flexion 129 152 152  180   Shoulder Abduction 90 105 100  0-180   Shoulder Extension 35 35 35  0-50   Shoulder Internal Rotation 30  Can reach LS  @90'  40 @90'  40     0-90   Shoulder External Rotation 5 @90'  30 @90'  30     0-90   Horz Shoulder Adduction 25    0-40       Treatment included:   55 MINUTES OFTHEREX OF the following exercises  All exercises2 x10 repetitions .   Shoulder rolls  Shoulder shrugs  Wand for fflex with 2# wand  Wand assisted abduction  With 2# wand  Towel IR  sidelying ER with 1#  Pulley fflex I scaption   Pulley abduction  Wall slides  Wall pushes  NP  Wall shoulder rotation ( closed chained Kiblers)  NP  scap retraction with green theraband  scap retraction with elbows straight/shoulder extension   Manual glenohumeral distraction provided throughout much of AAROM and manual stroking to assist with cues for relaxation    BOSU static stand , AP, Lat with SBA    Neuromm re-ed x 30 min  Not performed  SLS  Toe raises in standing  Heel raises in standing  SLS with UE support  Tandem stand with UE support  Tandem walk with UE support  Heel taps on 2nd step  EO/ EC standing balance  Ambulation with head turns    Ice at end of session for pain relief to shoulder    EDUCATION:  HEP reviewed  Pt performing without problem or question/  ..      ASSESSMENT  83 yo  With a comminuted fracture of the left humerus.   Pt tolerated today's treatment without any adverse effects. She is demonstrating progress with AAROM and is improving in strength.  She reports no longer having difficulty with ADLs and is very pleased with her progress.  Relaxing during exercise to achieve full ROM remains problematic as pt is anxious and guards motion strongly.  She may have reached max potential but we will continue to see for a 2-4 more treatments to see if we can achieve some more IR and ER reaching behind her and putting on  seatbelt and to focus more on balance activity    Goals to be met in 4 weeks:  1) Pt will be independent and report performing HEP to maximize (R) shoulder functional capacity.  MET  2) Pt will increase shoulder PROM in all measured planes of motion by 10 degrees to A with daily task.  MET  3) Pt will report use of home modalities for pain management.  MET  4) Pt will report one degree less of pain with nonuse and with use.  MET  5) Pt will report interrupted sleep no more than twice a night.  MET      NEW GOALS  6 WEEKS  ( AS OF 8/22/17)  Pt to be able to ambulate on compliant and noncompliant surfaces without LOB and good safety awareness  Pt to be able to report no difficulty with balance at home.  Pt to be able to navigate, unlevel surfaces such as gravel, grass, broken concrete without LOB  Plan continue to address UE strength and ROM concerns.  Devote some of treatment time to balance activities to include neuromm re-education, therex, gait training  ADDITIONAL GOALS AS OF 8/24/17  Pt will be independent and report performing HEP to maximize (R) shoulder functional capacity.   2) Pt will increase shoulder PROM in all measured planes of motion by 5degrees to A with daily task.   3) Pt will report use of home modalities for pain management.    4) Pt will report one degree less of pain with nonuse and with use.    PLAN FOCUS ON BALANCE ACTIVITY

## 2017-09-06 NOTE — PROGRESS NOTES
Patient: Naheed Cottrell   [facility] #: [id number]  Date of evaluation: 09/06/2017     Referring Physician: Jose J Wilder MD  [place] Start Date Expiration Date Referral Entered By   Routine 08/01/2017 12/31/2017 Vidya Lynch PA-C   Visits Requested Visits Authorized Visits Completed Visits Scheduled   1 20 7 0   Procedure Information     Diagnosis   M25.512 (ICD-10-CM) - Acute pain of left shoulder   S42.202D (ICD-10-CM) - Closed fracture of proximal end of left humerus with routine healing, unspecified fracture morphology, subsequent encounter     Age: 84 y.o.  Sex: female          Hand dominance: Right    Time In: 1000  Time Out: 1100  30 minutes one to one with therapist    Date of onset: 6/4/17  proximal fracture of left humerus  Mechanism of Injury: pt passed out due to UTI and fell fracturing her left shoulder  She also had a second fall when she tripped over an object    X-Rays/Tests:   There is a comminuted fracture of the proximal left humerus centered at the level of the surgical neck with fracture lines involving the greater and lesser tuberosities.  No intra-articular fractures or fragments are identified.  There is proximal impaction of the distal fragment of less than 1 cm with mild posterior angulation of the proximal fragment.   PRECAUTIONS : standard, FALL RISK    Subjective:  I can do everything without problem at home now. Pain currently  At rest 0/10  With motion 3/10  Location of pain: generalized entire left shoulder    Objective:   scapular elevation with attempted shoulder ROM,    poor scapula rhythm but is showing improvement    Pt holds her shoulders elevated and rigidly  Has difficulty with relaxing her left upper extremity and shoulder girdle during therapy.  .  However when moving UE actively she does have functional AROM of the left shoulder but with poor scap/humeral rhythm  Range of Motion:   Right: WFL   (L) UE       supine AAROM  8/15 AA 8/24 AROM 8/31  Norm        0-180  "  Shoulder Flexion 129 152 152  180   Shoulder Abduction 90 105 100  0-180   Shoulder Extension 35 35 35  0-50   Shoulder Internal Rotation 30  Can reach LS  @90'  40 @90'  40     0-90   Shoulder External Rotation 5 @90'  30 @90'  30     0-90   Horz Shoulder Adduction 25    0-40       Treatment included:   15MINUTES OF one to one THEREX OF the following exercises  All exercises2 x10 repetitions .   Shoulder rolls  Shoulder shrugs  Wand for fflex with 2#  abduction  With 2# wand  Towel IR  sidelying ER with 1#  Pulley fflex I scaption   Pulley abduction  Wall slides  Wall pushes  NP  Wall shoulder rotation ( closed chained Kiblers)  NP  scap retraction with green theraband  scap retraction with elbows straight/shoulder extension   Manual glenohumeral distraction provided throughout much of AAROM and manual stroking to assist with cues for relaxation    BOSU static stand , AP, Lat with SBA    Neuromm re-ed x  min  Not performed  SLS  Toe raises in standing  Heel raises in standing  SLS with UE support  Tandem stand with UE support  Tandem walk with UE support  Heel taps on 2nd step            NP  EO/ EC standing balance   NP  Ambulation with head turns  NP  Ambulation x 8 trials across compliant surface "feels like thick grass" with SBA/CGA  only and no LOB  She exhibited fair safety awareness I  ce at end of session for pain relief to shoulder    EDUCATION:  HEP reviewed  Pt performing without problem or question/  ..      ASSESSMENT  85 yo  With a comminuted fracture of the left humerus.   Pt tolerated today's treatment without any adverse effects. She is demonstrating progress with AAROM and is improving in strength.  She reports no longer having difficulty with ADLs and is very pleased with her progress.   She is doing very well and now we are concentrating on balance activity to prevent future falls    Goals to be met in 4 weeks:  1) Pt will be independent and report performing HEP to maximize (R) shoulder " functional capacity.  MET  2) Pt will increase shoulder PROM in all measured planes of motion by 10 degrees to A with daily task.  MET  3) Pt will report use of home modalities for pain management.  MET  4) Pt will report one degree less of pain with nonuse and with use.  MET  5) Pt will report interrupted sleep no more than twice a night.  MET      NEW GOALS  6 WEEKS  ( AS OF 8/22/17)  Pt to be able to ambulate on compliant and noncompliant surfaces without LOB and good safety awareness  Pt to be able to report no difficulty with balance at home.  Pt to be able to navigate, unlevel surfaces such as gravel, grass, broken concrete without LOB  Plan continue to address UE strength and ROM concerns.  Devote some of treatment time to balance activities to include neuromm re-education, therex, gait training  ADDITIONAL GOALS AS OF 8/24/17  Pt will be independent and report performing HEP to maximize (R) shoulder functional capacity.   2) Pt will increase shoulder PROM in all measured planes of motion by 5degrees to A with daily task.   3) Pt will report use of home modalities for pain management.    4) Pt will report one degree less of pain with nonuse and with use.    PLAN FOCUS ON BALANCE ACTIVITY assess TUG and 30 sec chair rise next session

## 2017-09-06 NOTE — PROGRESS NOTES
HPI: 83 yo female status post trip and fall sustaining left proximal humerus fracture on 6/4/17.  She's been working on exercises with OT, and has no pain.  Reports that she is doing very well and is happy with the outcome.     ROS:  Patient denies constitutional symptoms, cardiac symptoms, respiratory symptoms, GI symptoms.  The remainder of the musculoskeletal ROS is included in the HPI.    PE:    AA&O x 4.  NAD  HEENT:  NCAT, sclera nonicteric  Lungs:  Respirations are equal and unlabored.  CV:  2+ bilateral upper and lower extremity pulses.  Skin:  Intact throughout.    MS:  Resolving ecchymosis.  no TTP.   FROm of wrist and elbow. active flexion is 90 abd 85  NVI distal.      Rads:  Maintaining good position.      A/P: status post left proximal humerus fracture in good alignment.   PT, [facility], Patient is to make appointment herself.    She will RTC in 6 weeks if any increase I painwith x-rays true AP and scapular Y left shoulder.  Sooner if she has any problems.

## 2017-09-12 ENCOUNTER — CLINICAL SUPPORT (OUTPATIENT)
Dept: REHABILITATION | Facility: HOSPITAL | Age: 82
End: 2017-09-12
Attending: ORTHOPAEDIC SURGERY
Payer: MEDICARE

## 2017-09-12 ENCOUNTER — DOCUMENTATION ONLY (OUTPATIENT)
Dept: REHABILITATION | Facility: HOSPITAL | Age: 82
End: 2017-09-12

## 2017-09-12 DIAGNOSIS — R29.898 LEFT ARM WEAKNESS: Primary | ICD-10-CM
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
## 2017-09-12 DIAGNOSIS — S42.355A CLOSED NONDISPLACED COMMINUTED FRACTURE OF SHAFT OF LEFT HUMERUS, INITIAL ENCOUNTER: ICD-10-CM

## 2017-09-12 PROCEDURE — G8979 MOBILITY GOAL STATUS: HCPCS | Mod: CJ,PO

## 2017-09-12 PROCEDURE — 97110 THERAPEUTIC EXERCISES: CPT | Mod: PO

## 2017-09-12 PROCEDURE — G8978 MOBILITY CURRENT STATUS: HCPCS | Mod: CK,PO

## 2017-09-12 NOTE — PROGRESS NOTES
.          [id number]  Date of evaluation: 09/12/2017     Referring Physician: Jose J Wilder MD  [place] Start Date Expiration Date Referral Entered By   Routine 08/01/2017 12/31/2017 Vidya Lynch PA-C   Visits Requested Visits Authorized Visits Completed Visits Scheduled   1 20 10 0   Procedure Information     Diagnosis   M25.512 (ICD-10-CM) - Acute pain of left shoulder   S42.202D (ICD-10-CM) - Closed fracture of proximal end of left humerus with routine healing, unspecified fracture morphology, subsequent encounter     Age: 84 y.o.  Sex: female          Hand dominance: Right    Time In: 1000  Time Out: 1100  30 minutes one to one with therapist    Date of onset: 6/4/17  proximal fracture of left humerus  Mechanism of Injury: pt passed out due to UTI and fell fracturing her left shoulder  She also had a second fall when she tripped over an object    X-Rays/Tests:   There is a comminuted fracture of the proximal left humerus centered at the level of the surgical neck with fracture lines involving the greater and lesser tuberosities.  No intra-articular fractures or fragments are identified.  There is proximal impaction of the distal fragment of less than 1 cm with mild posterior angulation of the proximal fragment.   PRECAUTIONS : standard, FALL RISK    Subjective:  I can do everything without problem at home now. Pain currently  At rest 0/10  With motion 3/10  Location of pain: generalized entire left shoulder.  Most pain at end ranges    Objective:   scapular elevation with  shoulder ROM,    poor scapula rhythm but is showing improvement   Pt resists all AAROM.  However when moving UE actively she does have functional AROM of the left shoulder but with poor scap/humeral rhythm  Range of Motion:   Right: WFL   (L) UE       supine AAROM  8/15 AA 8/24 AROM 8/31  Norm        0-180   Shoulder Flexion 129 152 152  180   Shoulder Abduction 90 105 100  0-180   Shoulder Extension  "35 35 35  0-50   Shoulder Internal Rotation 30  Can reach LS  @90'  40 @90'  40     0-90   Shoulder External Rotation 5 @90'  30 @90'  30     0-90   Horz Shoulder Adduction 25    0-40       Treatment included:   30MINUTES OF one to one THEREX OF the following exercises  All exercises2 x10 repetitions .   Shoulder rolls  Shoulder shrugs  Wand for fflex with 3#  abduction  With 3# wand  Towel IR  sidelying ER with 2#  Pulley fflex I scaption   (independent)  Pulley abduction    (independent)  Wall slides  Wall pushes  NP  Wall shoulder rotation ( closed chained Kiblers)  NP  scap retraction with GTB  scap retraction with elbows straight/shoulder extension GTB  Manual glenohumeral distraction provided throughout much of AAROM and manual stroking to assist with cues for relaxation    BOSU static stand , AP, Lat with SBA  NP    Neuromm re-ed x  min  Not performed  SLS  Toe raises in standing  Heel raises in standing  SLS with UE support  Tandem stand with UE support  Tandem walk with UE support  Heel taps on 2nd step            NP  EO/ EC standing balance   NP  Ambulation with head turns  NP  Ambulation x 8 trials across compliant surface "feels like thick grass" with SBA/CGA  only and no LOB  She exhibited fair safety awareness I  ce at end of session for pain relief to shoulder    EDUCATION:  HEP reviewed  Pt performing without problem or question/  ..      ASSESSMENT  83 yo  With a comminuted fracture of the left humerus.   Pt tolerated today's treatment without any adverse effects. She is demonstrating progress with AAROM and is improving in strength.  She reports no longer having difficulty with ADLs and is very pleased with her progress.   She is doing very well and now we are concentrating on balance activity to prevent future falls    Impairment/Limitation/Restriction for FOTO Upper Arm Survey  Status Limitation G-Code CMS Severity Modifier  Intake 50% 50%  Predicted 67% 33% Goal Status+ CJ - At least 20 percent " but less than 40 percent  9/6/2017 56% 44% Current Status CK - At least 40 percent but less than 60 percent  D/C Status CK **only report if this is discharge survey    Goals to be met in 4 weeks:  1) Pt will be independent and report performing HEP to maximize (R) shoulder functional capacity.  MET  2) Pt will increase shoulder PROM in all measured planes of motion by 10 degrees to A with daily task.  MET  3) Pt will report use of home modalities for pain management.  MET  4) Pt will report one degree less of pain with nonuse and with use.  MET  5) Pt will report interrupted sleep no more than twice a night.  MET      NEW GOALS  6 WEEKS  ( AS OF 8/22/17)  Pt to be able to ambulate on compliant and noncompliant surfaces without LOB and good safety awareness  Pt to be able to report no difficulty with balance at home.  Pt to be able to navigate, unlevel surfaces such as gravel, grass, broken concrete without LOB  Plan continue to address UE strength and ROM concerns.  Devote some of treatment time to balance activities to include neuromm re-education, therex, gait training  ADDITIONAL GOALS AS OF 8/24/17  Pt will be independent and report performing HEP to maximize (R) shoulder functional capacity.   2) Pt will increase shoulder PROM in all measured planes of motion by 5degrees to A with daily task.   3) Pt will report use of home modalities for pain management.    4) Pt will report one degree less of pain with nonuse and with use.    PLAN FOCUS ON BALANCE ACTIVITY assess TUG and 30 sec chair rise next session

## 2017-09-14 ENCOUNTER — CLINICAL SUPPORT (OUTPATIENT)
Dept: REHABILITATION | Facility: HOSPITAL | Age: 82
End: 2017-09-14
Attending: ORTHOPAEDIC SURGERY
Payer: MEDICARE

## 2017-09-14 DIAGNOSIS — R29.898 LEFT ARM WEAKNESS: Primary | ICD-10-CM
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
## 2017-09-14 DIAGNOSIS — S42.355A CLOSED NONDISPLACED COMMINUTED FRACTURE OF SHAFT OF LEFT HUMERUS, INITIAL ENCOUNTER: ICD-10-CM

## 2017-09-14 PROCEDURE — G8979 MOBILITY GOAL STATUS: HCPCS | Mod: CJ,PO

## 2017-09-14 PROCEDURE — 97110 THERAPEUTIC EXERCISES: CPT | Mod: PO

## 2017-09-14 PROCEDURE — G8980 MOBILITY D/C STATUS: HCPCS | Mod: CK,PO

## 2017-09-14 NOTE — PROGRESS NOTES
.      PHYSICAL THERAPY TREATMENT AND DISCHARGE SUMMARY    Patient: Naheed Colinaney   [facility] #: [id number]  Date of evaluation: 09/14/2017     Referring Physician: Jose J Wilder MD  [facility] Start Date Expiration Date Referral Entered By   Routine 08/01/2017 12/31/2017 Vidya Lynch PA-C   Visits Requested Visits Authorized Visits Completed Visits Scheduled   1 20 11 0   Procedure Information     Diagnosis   M25.512 (ICD-10-CM) - Acute pain of left shoulder   S42.202D (ICD-10-CM) - Closed fracture of proximal end of left humerus with routine healing, unspecified fracture morphology, subsequent encounter     Age: 84 y.o.  Sex: female          Hand dominance: Right    Time In: 1000  Time Out: 1110  50 minutes one to one with therapist    Date of onset: 6/4/17  proximal fracture of left humerus  Mechanism of Injury: pt passed out due to UTI and fell fracturing her left shoulder  She also had a second fall when she tripped over an object    X-Rays/Tests:   There is a comminuted fracture of the proximal left humerus centered at the level of the surgical neck with fracture lines involving the greater and lesser tuberosities.  No intra-articular fractures or fragments are identified.  There is proximal impaction of the distal fragment of less than 1 cm with mild posterior angulation of the proximal fragment.   PRECAUTIONS : standard, FALL RISK    Subjective:  I can do everything without problem at home and I have no pain as long as I don't move my arm tot he extreme like in therapy.  . Pain currently  At rest 0/10  With motion during ADL  0/10  At end range  6/10  Location of pain: generalized entire left shoulder.  Most pain at end ranges    Objective:   scapular elevation with  shoulder ROM significantly improved with good rhythm until last few degrees of motion     Range of Motion:   Right: WFL   (L) UE       supine AAROM  8/15 AA 8/24 AROM 8/31 AROM 9/14 Norm        0-180   Shoulder Flexion 129 152 152 160 180    Shoulder Abduction 90 105 100 180 0-180   Shoulder Extension 35 35 35 35 0-50   Shoulder Internal Rotation 30  Can reach LS  @90'  40 @90'  40    @90  80 0-90   Shoulder External Rotation 5 @90'  30 @90'  30    @90   30 0-90   Horz Shoulder Adduction 25    0-40   All ROM within a functional range   UE strength overall at ;eaast 4 to 4+/5 all major mm groups    Treatment included:   50 MINUTES OF one to one THEREX OF the following exercises  All exercises2 x10 repetitions .   Wand for fflex with 3#  abduction  With 3# wand  Towel IR  sidelying ER with 2#  Pulley fflex I scaption   (independent)  Pulley abduction    (independent)  Wall slides  Wall shoulder rotation   scap retraction with GTB  scap retraction with elbows straight/shoulder extension GTB  AAROM of shoulder  Ambulation on level surfaces with minor perturbations, on inclines, stepping over parveen g curbs in series, walking on gravel and on unlevel surface, walking on cracked unlevel concrete all performed with good safety awareness, and no loss of balance.    EDUCATION:  HEP reviewed   And need to continue with current exercises at least 3 x week to maintain gains and possibly increase shoulder motion discussed with pt and she agrees and expresses understanding    ASSESSMENT  85 yo  With a comminuted fracture of the left humerus following a fall and then a subsequent fall has done exceptionally well with rehab.   She now has a functional strength and ROM for the left shoulder.  She is able to perform all normal daily activities independently and without pain.  Her balance and gait has also demonstrated improvement with heightened safety awareness and ability to ambulate safely and independent surfaces that previously presented challenges to her balance.  She has met goals below and is ready for discharge.      NEW GOALS  6 WEEKS  ( AS OF 8/22/17)  Pt to be able to ambulate on compliant and noncompliant surfaces without LOB and good safety awareness   MET  Pt to be able to report no difficulty with balance at home. MET  Pt to be able to navigate, unlevel surfaces such as gravel, grass, broken concrete without LOB  MET    ADDITIONAL GOALS AS OF 8/24/17  Pt will be independent and report performing HEP to maximize (R) shoulder functional capacity.      2) Pt will increase shoulder PROM in all measured planes of motion by 5degrees to A with daily task.   MET  3) Pt will report use of home modalities for pain management.   MET  4) Pt will report one degree less of pain with nonuse and with use. MET    CMS Impairment/Limitation/Restriction for FOTO Upper Arm Survey  Status Limitation G-Code CMS Severity Modifier  Intake 50% 50%  Predicted 67% 33% Goal Status+ CJ - At least 20 percent but less than 40 percent  9/6/2017 56% 44%  9/14/2017 59% 41% Current Status CK - At least 40 percent but less than 60 percent  D/C Status CK **only report if this is discharge survey      PLAN dISCHARGED FROM PT WITH GOALS MET

## 2017-10-09 ENCOUNTER — OFFICE VISIT (OUTPATIENT)
Dept: CARDIOLOGY | Facility: CLINIC | Age: 82
End: 2017-10-09
Payer: MEDICARE

## 2017-10-09 VITALS
SYSTOLIC BLOOD PRESSURE: 135 MMHG | BODY MASS INDEX: 23.91 KG/M2 | HEART RATE: 61 BPM | HEIGHT: 63 IN | WEIGHT: 134.94 LBS | DIASTOLIC BLOOD PRESSURE: 62 MMHG

## 2017-10-09 DIAGNOSIS — I73.9 PAD (PERIPHERAL ARTERY DISEASE): ICD-10-CM

## 2017-10-09 DIAGNOSIS — E55.9 VITAMIN D DEFICIENCY: ICD-10-CM

## 2017-10-09 DIAGNOSIS — I10 ESSENTIAL HYPERTENSION: Primary | Chronic | ICD-10-CM

## 2017-10-09 DIAGNOSIS — E78.5 DYSLIPIDEMIA: Chronic | ICD-10-CM

## 2017-10-09 DIAGNOSIS — E87.6 HYPOKALEMIA: ICD-10-CM

## 2017-10-09 DIAGNOSIS — I77.1 SUBCLAVIAN ARTERY STENOSIS, LEFT: ICD-10-CM

## 2017-10-09 DIAGNOSIS — I70.1 RIGHT RENAL ARTERY STENOSIS: ICD-10-CM

## 2017-10-09 PROCEDURE — 99214 OFFICE O/P EST MOD 30 MIN: CPT | Mod: S$PBB,,, | Performed by: INTERNAL MEDICINE

## 2017-10-09 PROCEDURE — 99999 PR PBB SHADOW E&M-EST. PATIENT-LVL III: CPT | Mod: PBBFAC,,, | Performed by: INTERNAL MEDICINE

## 2017-10-09 PROCEDURE — 99213 OFFICE O/P EST LOW 20 MIN: CPT | Mod: PBBFAC | Performed by: INTERNAL MEDICINE

## 2017-10-09 RX ORDER — CILOSTAZOL 50 MG/1
50 TABLET ORAL 2 TIMES DAILY
Qty: 180 TABLET | Refills: 3 | Status: SHIPPED | OUTPATIENT
Start: 2017-10-09 | End: 2018-01-02 | Stop reason: SDUPTHER

## 2017-10-09 RX ORDER — VIT C/E/ZN/COPPR/LUTEIN/ZEAXAN 250MG-90MG
1000 CAPSULE ORAL DAILY
Refills: 0 | COMMUNITY
Start: 2017-10-09 | End: 2022-12-12

## 2017-10-09 NOTE — PROGRESS NOTES
Subjective:   Patient ID:  Naheed Cottrell is a 84 y.o. female who presents for follow-up of Hypertension (6 month f/u )      HPI:   Naheed Cottrell presents for follow up of hypertension.Bp is better controlled with medication adjustment. Naheed Cottrell has peripheral vascular disease including right renal artery stenosis , moderate LICA stenosis, and asymptomatic left subclavian stenosis. States if she walks 3 blocks, her left knee and calf will ache. Naheed Cottrell denies chest pain, shortness of breath, palpitations, presyncope , or syncope. Naheed Cottrell has dyslipidemia tolerating the QOD Crestor with LDL a little over 100..  Review of Systems   Constitution: Negative for weakness, malaise/fatigue, weight gain and weight loss.   Eyes: Negative for blurred vision.   Cardiovascular: Positive for claudication. Negative for chest pain, cyanosis, dyspnea on exertion, irregular heartbeat, leg swelling, near-syncope, orthopnea, palpitations, paroxysmal nocturnal dyspnea and syncope.   Respiratory: Negative for cough, shortness of breath and wheezing.    Musculoskeletal: Positive for joint pain. Negative for falls and myalgias.        Recent fractured shoulder.   Gastrointestinal: Negative for abdominal pain, heartburn, nausea and vomiting.   Genitourinary: Negative for nocturia.   Neurological: Negative for brief paralysis, dizziness, focal weakness, headaches, numbness and paresthesias.   Psychiatric/Behavioral: Negative for altered mental status.       Current Outpatient Prescriptions   Medication Sig    acetaminophen (TYLENOL) 500 MG tablet Take 1,000 mg by mouth every 6 (six) hours as needed for Pain.    aspirin (ECOTRIN) 81 MG EC tablet Take 1 tablet (81 mg total) by mouth once daily.    calcium-vitamin D3 500 mg(1,250mg) -200 unit per tablet Take 2 tablets by mouth 2 (two) times daily.    carvedilol (COREG) 25 MG tablet Take 1 tablet (25 mg total) by mouth 2 (two) times daily with meals.     "chlorthalidone (HYGROTEN) 25 MG Tab Take 1 tablet (25 mg total) by mouth once daily.    cilostazol (PLETAL) 50 MG Tab Take 1 tablet (50 mg total) by mouth 2 (two) times daily.    citalopram (CELEXA) 20 MG tablet Take 1 tablet (20 mg total) by mouth once daily.    clopidogrel (PLAVIX) 75 mg tablet Take 1 tablet (75 mg total) by mouth once daily.    ferrous sulfate 325 (65 FE) MG EC tablet Take 1 tablet (325 mg total) by mouth 2 (two) times daily. For iron    losartan (COZAAR) 100 MG tablet Take 1 tablet (100 mg total) by mouth once daily.    rosuvastatin (CRESTOR) 5 MG tablet Take one tablet every other day along with CoQ 10    senna-docusate 8.6-50 mg (TIMOTHY-COLACE) 8.6-50 mg per tablet Take 1 tablet by mouth once daily. For consitpation    cholecalciferol, vitamin D3, 1,000 unit capsule Take 1 capsule (1,000 Units total) by mouth once daily.     No current facility-administered medications for this visit.      Objective:   Physical Exam   Constitutional: She is oriented to person, place, and time. She appears well-developed. No distress.   /62 (BP Location: Left arm, Patient Position: Sitting, BP Method: Medium (Automatic))   Pulse 61   Ht 5' 3" (1.6 m)   Wt 61.2 kg (134 lb 15.1 oz)   LMP 10/01/1980 (Approximate)   BMI 23.90 kg/m²    HENT:   Head: Normocephalic.   Eyes: Conjunctivae are normal. Pupils are equal, round, and reactive to light. No scleral icterus.   Neck: Neck supple. No JVD present. No thyromegaly present.   Cardiovascular: Normal rate, regular rhythm and intact distal pulses.   Occasional extrasystoles are present. PMI is not displaced.  Exam reveals no gallop and no friction rub.    Murmur heard.   Medium-pitched systolic murmur is present with a grade of 1/6  at the upper left sternal border  Pulses:       Radial pulses are 2+ on the right side, and 1+ on the left side.        Femoral pulses are 2+ on the right side, and 2+ on the left side.       Dorsalis pedis pulses are 0 on " the right side, and 0 on the left side.        Posterior tibial pulses are 0 on the right side, and 0 on the left side.   Pulmonary/Chest: Effort normal and breath sounds normal. No respiratory distress. She has no wheezes. She has no rales.   Abdominal: Soft. She exhibits no distension. There is no splenomegaly or hepatomegaly. There is no tenderness.   Musculoskeletal: She exhibits no edema or tenderness.   Gait normal   Neurological: She is alert and oriented to person, place, and time.   Skin: Skin is warm and dry. She is not diaphoretic.   Psychiatric: She has a normal mood and affect. Her behavior is normal.       Lab Results   Component Value Date     06/07/2017    K 3.3 (L) 06/07/2017     06/07/2017    CO2 21 (L) 06/07/2017    BUN 34 (H) 06/07/2017    CREATININE 0.9 06/07/2017     06/07/2017    HGBA1C 5.5 10/01/2015    MG 1.6 06/05/2017    AST 29 06/04/2017    ALT 16 06/04/2017    ALBUMIN 3.1 (L) 06/04/2017    PROT 6.3 06/04/2017    BILITOT 0.6 06/04/2017    WBC 6.13 06/07/2017    HGB 9.1 (L) 06/07/2017    HCT 27.2 (L) 06/07/2017    MCV 90 06/07/2017    PLT 90 (L) 06/07/2017    TSH 1.743 10/16/2016    CHOL 167 05/29/2017    HDL 47 05/29/2017    LDLCALC 106.2 05/29/2017    TRIG 69 05/29/2017       Assessment:     1. Essential hypertension: adequate control    2. Dyslipidemia ; Improved tolerating current regimen   3. PAD (peripheral artery disease) : Stable claudication   4. Hypokalemia : In the hospital with UTI   5. Vitamin D deficiency ; 16 last check   6. Right renal artery stenosis : Stable   7. Subclavian artery stenosis, left : Stable -asymptomatic       Plan:     Naheed was seen today for hypertension.    Diagnoses and all orders for this visit:    Essential hypertension  -     CBC auto differential; Future; Expected date: 10/09/2017 ( anemic in the hospital 0  Continue current regimen    Dyslipidemia  Continue current regimen    PAD (peripheral artery disease)  -     cilostazol  (PLETAL) 50 MG Tab; Take 1 tablet (50 mg total) by mouth 2 (two) times daily( refilled ).    Hypokalemia  -     Basic metabolic panel; Future; Expected date: 10/09/2017    Vitamin D deficiency  -     VITAMIN D; Future; Expected date: 11/09/2017  -     cholecalciferol, vitamin D3, 1,000 unit capsule; Take 1 capsule (1,000 Units total) by mouth once daily.    Right renal artery stenosis    Subclavian artery stenosis, left    Flue shot recommended but she states she doesn't take them and has never had the flue.

## 2017-10-10 ENCOUNTER — TELEPHONE (OUTPATIENT)
Dept: CARDIOLOGY | Facility: CLINIC | Age: 82
End: 2017-10-10

## 2017-10-10 NOTE — TELEPHONE ENCOUNTER
----- Message from Ronald Braswell MD sent at 10/9/2017  6:21 PM CDT -----  Please inform the patient that her Electrolytes have returned to normal and Vit D level still low therefore take the replacement as we discussed. Blood count has returned to normal

## 2017-11-28 RX ORDER — CHLORTHALIDONE 25 MG/1
TABLET ORAL
Qty: 90 TABLET | Refills: 11 | Status: SHIPPED | OUTPATIENT
Start: 2017-11-28 | End: 2018-01-02 | Stop reason: SDUPTHER

## 2018-01-02 DIAGNOSIS — E78.5 DYSLIPIDEMIA: Chronic | ICD-10-CM

## 2018-01-02 DIAGNOSIS — I73.9 PAD (PERIPHERAL ARTERY DISEASE): ICD-10-CM

## 2018-01-02 DIAGNOSIS — I10 ESSENTIAL HYPERTENSION: Chronic | ICD-10-CM

## 2018-01-02 RX ORDER — CITALOPRAM 20 MG/1
20 TABLET, FILM COATED ORAL DAILY
Qty: 90 TABLET | Refills: 2 | Status: SHIPPED | OUTPATIENT
Start: 2018-01-02 | End: 2018-11-21 | Stop reason: SDUPTHER

## 2018-01-02 RX ORDER — LOSARTAN POTASSIUM 100 MG/1
100 TABLET ORAL DAILY
Qty: 90 TABLET | Refills: 3 | Status: SHIPPED | OUTPATIENT
Start: 2018-01-02 | End: 2018-04-30 | Stop reason: SDUPTHER

## 2018-01-02 RX ORDER — CHLORTHALIDONE 25 MG/1
25 TABLET ORAL DAILY
Qty: 90 TABLET | Refills: 11 | Status: SHIPPED | OUTPATIENT
Start: 2018-01-02 | End: 2018-11-21 | Stop reason: SDUPTHER

## 2018-01-02 RX ORDER — CLOPIDOGREL BISULFATE 75 MG/1
75 TABLET ORAL DAILY
Qty: 90 TABLET | Refills: 3
Start: 2018-01-02 | End: 2018-05-07 | Stop reason: SDUPTHER

## 2018-01-02 RX ORDER — CARVEDILOL 25 MG/1
TABLET ORAL
Qty: 180 TABLET | Refills: 11 | Status: SHIPPED | OUTPATIENT
Start: 2018-01-02 | End: 2018-01-02 | Stop reason: SDUPTHER

## 2018-01-02 RX ORDER — CILOSTAZOL 50 MG/1
50 TABLET ORAL 2 TIMES DAILY
Qty: 180 TABLET | Refills: 3 | Status: SHIPPED | OUTPATIENT
Start: 2018-01-02 | End: 2018-10-31 | Stop reason: SDUPTHER

## 2018-01-02 RX ORDER — ROSUVASTATIN CALCIUM 5 MG/1
TABLET, COATED ORAL
Qty: 45 TABLET | Refills: 3 | Status: SHIPPED | OUTPATIENT
Start: 2018-01-02 | End: 2018-04-30 | Stop reason: SDUPTHER

## 2018-01-02 RX ORDER — CARVEDILOL 25 MG/1
25 TABLET ORAL 2 TIMES DAILY WITH MEALS
Qty: 180 TABLET | Refills: 11 | Status: SHIPPED | OUTPATIENT
Start: 2018-01-02 | End: 2018-11-21 | Stop reason: SDUPTHER

## 2018-04-30 ENCOUNTER — OFFICE VISIT (OUTPATIENT)
Dept: CARDIOLOGY | Facility: CLINIC | Age: 83
End: 2018-04-30
Payer: MEDICARE

## 2018-04-30 VITALS
OXYGEN SATURATION: 95 % | HEART RATE: 76 BPM | SYSTOLIC BLOOD PRESSURE: 110 MMHG | BODY MASS INDEX: 23.36 KG/M2 | DIASTOLIC BLOOD PRESSURE: 80 MMHG | WEIGHT: 131.81 LBS | HEIGHT: 63 IN

## 2018-04-30 DIAGNOSIS — I70.1 RIGHT RENAL ARTERY STENOSIS: ICD-10-CM

## 2018-04-30 DIAGNOSIS — I10 ESSENTIAL HYPERTENSION: Primary | Chronic | ICD-10-CM

## 2018-04-30 DIAGNOSIS — I73.9 PAD (PERIPHERAL ARTERY DISEASE): ICD-10-CM

## 2018-04-30 DIAGNOSIS — E78.5 DYSLIPIDEMIA: Chronic | ICD-10-CM

## 2018-04-30 DIAGNOSIS — I77.1 SUBCLAVIAN ARTERY STENOSIS, LEFT: ICD-10-CM

## 2018-04-30 PROCEDURE — 99213 OFFICE O/P EST LOW 20 MIN: CPT | Mod: S$GLB,,, | Performed by: INTERNAL MEDICINE

## 2018-04-30 PROCEDURE — 3074F SYST BP LT 130 MM HG: CPT | Mod: CPTII,S$GLB,, | Performed by: INTERNAL MEDICINE

## 2018-04-30 PROCEDURE — 3079F DIAST BP 80-89 MM HG: CPT | Mod: CPTII,S$GLB,, | Performed by: INTERNAL MEDICINE

## 2018-04-30 PROCEDURE — 99999 PR PBB SHADOW E&M-EST. PATIENT-LVL IV: CPT | Mod: PBBFAC,,, | Performed by: INTERNAL MEDICINE

## 2018-04-30 RX ORDER — LOSARTAN POTASSIUM 100 MG/1
100 TABLET ORAL DAILY
Qty: 90 TABLET | Refills: 3 | Status: SHIPPED | OUTPATIENT
Start: 2018-04-30 | End: 2018-11-12 | Stop reason: ALTCHOICE

## 2018-04-30 RX ORDER — ROSUVASTATIN CALCIUM 5 MG/1
TABLET, COATED ORAL
Qty: 45 TABLET | Refills: 3
Start: 2018-04-30 | End: 2019-06-04

## 2018-04-30 NOTE — PATIENT INSTRUCTIONS
It is recommended that  blood pressure be checked 3-4 times a week in your right arm and a log  maintained brought with you the next visit.  Take Rosuvastatin 3 times a week.

## 2018-04-30 NOTE — PROGRESS NOTES
"Subjective:   Patient ID:  Naheed Cottrell is a 85 y.o. female who presents for follow-up of Hypertension (6 month f/u )      HPI:   Naheed Cottrell presents for follow up of hypertension and PAD. She has left subclavian stenosis with BP in her right arm elevated but she has not taken her Losartan today ( ran out yesterday). Has claudication after 3 blocks but is limited more by knee pain. Has a moderate KEVIN. Naheed Cottrell denies chest pain, shortness of breath, palpitations, presyncope , or syncope. Naheed Cottrell has dyslipidemia but stopped crestor Q.O.D because of a " funny feeling " in her thighs..  Review of Systems   Constitution: Negative for weakness, malaise/fatigue, weight gain and weight loss.   Eyes: Negative for blurred vision.   Cardiovascular: Positive for claudication. Negative for chest pain, cyanosis, dyspnea on exertion, irregular heartbeat, leg swelling, near-syncope, orthopnea, palpitations, paroxysmal nocturnal dyspnea and syncope.   Respiratory: Negative for cough, shortness of breath and wheezing.    Musculoskeletal: Positive for joint pain. Negative for falls and myalgias.   Gastrointestinal: Negative for abdominal pain, heartburn, nausea and vomiting.   Genitourinary: Negative for nocturia.   Neurological: Negative for brief paralysis, dizziness, focal weakness, headaches, numbness and paresthesias.   Psychiatric/Behavioral: Negative for altered mental status.       Current Outpatient Prescriptions   Medication Sig    acetaminophen (TYLENOL) 500 MG tablet Take 1,000 mg by mouth every 6 (six) hours as needed for Pain.    aspirin (ECOTRIN) 81 MG EC tablet Take 1 tablet (81 mg total) by mouth once daily.    calcium-vitamin D3 500 mg(1,250mg) -200 unit per tablet Take 2 tablets by mouth 2 (two) times daily.    carvedilol (COREG) 25 MG tablet Take 1 tablet (25 mg total) by mouth 2 (two) times daily with meals.    chlorthalidone (HYGROTEN) 25 MG Tab Take 1 tablet (25 mg total) by mouth " "once daily.    chlorthalidone (HYGROTEN) 25 MG Tab Take 1 tablet (25 mg total) by mouth once daily.    cholecalciferol, vitamin D3, 1,000 unit capsule Take 1 capsule (1,000 Units total) by mouth once daily.    cilostazol (PLETAL) 50 MG Tab Take 1 tablet (50 mg total) by mouth 2 (two) times daily.    citalopram (CELEXA) 20 MG tablet Take 1 tablet (20 mg total) by mouth once daily.    clopidogrel (PLAVIX) 75 mg tablet Take 1 tablet (75 mg total) by mouth once daily.    ferrous sulfate 325 (65 FE) MG EC tablet Take 1 tablet (325 mg total) by mouth 2 (two) times daily. For iron    losartan (COZAAR) 100 MG tablet Take 1 tablet (100 mg total) by mouth once daily.    senna-docusate 8.6-50 mg (TIMOTHY-COLACE) 8.6-50 mg per tablet Take 1 tablet by mouth once daily. For consitpation    rosuvastatin (CRESTOR) 5 MG tablet Take one tablet three times a week  along with CoQ 10     No current facility-administered medications for this visit.      Objective:   Physical Exam   Constitutional: She is oriented to person, place, and time. She appears well-developed. No distress.   /80 (BP Location: Right arm, Patient Position: Sitting, BP Method: Medium (Automatic))   Pulse 76   Ht 5' 3" (1.6 m)   Wt 59.8 kg (131 lb 13.4 oz)   LMP 10/01/1980 (Approximate)   SpO2 95%   BMI 23.35 kg/m²    HENT:   Head: Normocephalic.   Eyes: Conjunctivae are normal. Pupils are equal, round, and reactive to light. No scleral icterus.   Neck: Neck supple. No JVD present. No thyromegaly present.   Cardiovascular: Normal rate, regular rhythm and intact distal pulses.  PMI is not displaced.  Exam reveals no gallop and no friction rub.    Murmur heard.   Midsystolic murmur is present with a grade of 1/6  at the upper left sternal border  Pulses:       Radial pulses are 2+ on the right side, and 1+ on the left side.        Dorsalis pedis pulses are 0 on the right side, and 0 on the left side.        Posterior tibial pulses are 0 on the right " side, and 0 on the left side.   Pulmonary/Chest: Effort normal and breath sounds normal. No respiratory distress. She has no wheezes. She has no rales.   Abdominal: Soft. She exhibits no distension. There is no splenomegaly or hepatomegaly. There is no tenderness.   Musculoskeletal: She exhibits no edema or tenderness.   Gait normal   Neurological: She is alert and oriented to person, place, and time.   Skin: Skin is warm and dry. She is not diaphoretic.   Psychiatric: She has a normal mood and affect. Her behavior is normal.       Lab Results   Component Value Date     10/09/2017    K 4.1 10/09/2017     10/09/2017    CO2 27 10/09/2017    BUN 23 10/09/2017    CREATININE 0.8 10/09/2017    GLU 91 10/09/2017    HGBA1C 5.5 10/01/2015    MG 1.6 06/05/2017    AST 29 06/04/2017    ALT 16 06/04/2017    ALBUMIN 3.1 (L) 06/04/2017    PROT 6.3 06/04/2017    BILITOT 0.6 06/04/2017    WBC 5.43 10/09/2017    HGB 13.0 10/09/2017    HCT 40.0 10/09/2017    MCV 92 10/09/2017     10/09/2017    TSH 1.743 10/16/2016    CHOL 167 05/29/2017    HDL 47 05/29/2017    LDLCALC 106.2 05/29/2017    TRIG 69 05/29/2017       Assessment:     1. Essential hypertension: Elevation but did not take all meds today and ate chinese for lunch    2. PAD (peripheral artery disease) : Stable   3. Subclavian artery stenosis, left : Asx   4. Dyslipidemia : Not controlled   5. Right renal artery stenosis        Plan:     Naheed was seen today for hypertension.    Diagnoses and all orders for this visit:    Essential hypertension  -     losartan (COZAAR) 100 MG tablet; Take 1 tablet (100 mg total) by mouth once daily ( refilled).  Home BP checks by family.  PAD (peripheral artery disease)    Subclavian artery stenosis, left    Dyslipidemia  -    Attempt  rosuvastatin (CRESTOR) 5 MG tablet; Take one tablet three times a week  along with CoQ 10    Right renal artery stenosis

## 2018-05-07 RX ORDER — CLOPIDOGREL BISULFATE 75 MG/1
75 TABLET ORAL DAILY
Qty: 90 TABLET | Refills: 3
Start: 2018-05-07 | End: 2018-05-16 | Stop reason: SDUPTHER

## 2018-05-16 RX ORDER — CLOPIDOGREL BISULFATE 75 MG/1
75 TABLET ORAL DAILY
Qty: 90 TABLET | Refills: 3 | Status: SHIPPED | OUTPATIENT
Start: 2018-05-16 | End: 2018-05-16 | Stop reason: SDUPTHER

## 2018-05-16 RX ORDER — CLOPIDOGREL BISULFATE 75 MG/1
75 TABLET ORAL DAILY
Qty: 90 TABLET | Refills: 3
Start: 2018-05-16 | End: 2018-05-16 | Stop reason: SDUPTHER

## 2018-05-16 RX ORDER — CLOPIDOGREL BISULFATE 75 MG/1
75 TABLET ORAL DAILY
Qty: 90 TABLET | Refills: 3 | Status: SHIPPED | OUTPATIENT
Start: 2018-05-16 | End: 2018-11-21 | Stop reason: SDUPTHER

## 2018-10-31 DIAGNOSIS — I73.9 PAD (PERIPHERAL ARTERY DISEASE): ICD-10-CM

## 2018-10-31 RX ORDER — CILOSTAZOL 50 MG/1
50 TABLET ORAL 2 TIMES DAILY
Qty: 180 TABLET | Refills: 3 | Status: SHIPPED | OUTPATIENT
Start: 2018-10-31 | End: 2018-11-21 | Stop reason: SDUPTHER

## 2018-11-07 DIAGNOSIS — E78.5 DYSLIPIDEMIA: ICD-10-CM

## 2018-11-07 DIAGNOSIS — I10 HYPERTENSION, UNSPECIFIED TYPE: Primary | ICD-10-CM

## 2018-11-07 DIAGNOSIS — E88.9 METABOLIC DISEASE: ICD-10-CM

## 2018-11-07 DIAGNOSIS — E56.9 VITAMIN DEFICIENCY: Primary | ICD-10-CM

## 2018-11-07 DIAGNOSIS — E78.5 DYSLIPIDEMIA: Primary | ICD-10-CM

## 2018-11-08 ENCOUNTER — LAB VISIT (OUTPATIENT)
Dept: LAB | Facility: HOSPITAL | Age: 83
End: 2018-11-08
Attending: INTERNAL MEDICINE
Payer: MEDICARE

## 2018-11-08 DIAGNOSIS — E78.5 DYSLIPIDEMIA: ICD-10-CM

## 2018-11-08 DIAGNOSIS — I10 HYPERTENSION, UNSPECIFIED TYPE: ICD-10-CM

## 2018-11-08 LAB
ALBUMIN SERPL BCP-MCNC: 3.6 G/DL
ALP SERPL-CCNC: 70 U/L
ALT SERPL W/O P-5'-P-CCNC: 9 U/L
ANION GAP SERPL CALC-SCNC: 9 MMOL/L
AST SERPL-CCNC: 15 U/L
BASOPHILS # BLD AUTO: 0.03 K/UL
BASOPHILS NFR BLD: 0.6 %
BILIRUB SERPL-MCNC: 0.4 MG/DL
BUN SERPL-MCNC: 25 MG/DL
CALCIUM SERPL-MCNC: 9.1 MG/DL
CHLORIDE SERPL-SCNC: 103 MMOL/L
CHOLEST SERPL-MCNC: 183 MG/DL
CHOLEST/HDLC SERPL: 3.8 {RATIO}
CO2 SERPL-SCNC: 28 MMOL/L
CREAT SERPL-MCNC: 0.9 MG/DL
DIFFERENTIAL METHOD: ABNORMAL
EOSINOPHIL # BLD AUTO: 0.1 K/UL
EOSINOPHIL NFR BLD: 1 %
ERYTHROCYTE [DISTWIDTH] IN BLOOD BY AUTOMATED COUNT: 13.9 %
EST. GFR  (AFRICAN AMERICAN): >60 ML/MIN/1.73 M^2
EST. GFR  (NON AFRICAN AMERICAN): 58.5 ML/MIN/1.73 M^2
GLUCOSE SERPL-MCNC: 94 MG/DL
HCT VFR BLD AUTO: 36.6 %
HDLC SERPL-MCNC: 48 MG/DL
HDLC SERPL: 26.2 %
HGB BLD-MCNC: 11.5 G/DL
IMM GRANULOCYTES # BLD AUTO: 0.01 K/UL
IMM GRANULOCYTES NFR BLD AUTO: 0.2 %
LDLC SERPL CALC-MCNC: 117 MG/DL
LYMPHOCYTES # BLD AUTO: 1.6 K/UL
LYMPHOCYTES NFR BLD: 31.3 %
MCH RBC QN AUTO: 29.9 PG
MCHC RBC AUTO-ENTMCNC: 31.4 G/DL
MCV RBC AUTO: 95 FL
MONOCYTES # BLD AUTO: 0.5 K/UL
MONOCYTES NFR BLD: 9 %
NEUTROPHILS # BLD AUTO: 3 K/UL
NEUTROPHILS NFR BLD: 57.9 %
NONHDLC SERPL-MCNC: 135 MG/DL
NRBC BLD-RTO: 0 /100 WBC
PLATELET # BLD AUTO: 173 K/UL
PMV BLD AUTO: 10.5 FL
POTASSIUM SERPL-SCNC: 4.1 MMOL/L
PROT SERPL-MCNC: 6.9 G/DL
RBC # BLD AUTO: 3.84 M/UL
SODIUM SERPL-SCNC: 140 MMOL/L
TRIGL SERPL-MCNC: 90 MG/DL
WBC # BLD AUTO: 5.24 K/UL

## 2018-11-08 PROCEDURE — 80053 COMPREHEN METABOLIC PANEL: CPT | Mod: HCWC

## 2018-11-08 PROCEDURE — 80061 LIPID PANEL: CPT | Mod: HCWC

## 2018-11-08 PROCEDURE — 85025 COMPLETE CBC W/AUTO DIFF WBC: CPT | Mod: HCWC

## 2018-11-08 PROCEDURE — 36415 COLL VENOUS BLD VENIPUNCTURE: CPT | Mod: HCWC,PO

## 2018-11-12 ENCOUNTER — OFFICE VISIT (OUTPATIENT)
Dept: CARDIOLOGY | Facility: CLINIC | Age: 83
End: 2018-11-12
Payer: MEDICARE

## 2018-11-12 ENCOUNTER — TELEPHONE (OUTPATIENT)
Dept: CARDIOLOGY | Facility: CLINIC | Age: 83
End: 2018-11-12

## 2018-11-12 VITALS
HEART RATE: 84 BPM | BODY MASS INDEX: 24.51 KG/M2 | HEIGHT: 62 IN | DIASTOLIC BLOOD PRESSURE: 56 MMHG | SYSTOLIC BLOOD PRESSURE: 117 MMHG | WEIGHT: 133.19 LBS

## 2018-11-12 DIAGNOSIS — D64.9 ANEMIA, UNSPECIFIED TYPE: ICD-10-CM

## 2018-11-12 DIAGNOSIS — I77.1 SUBCLAVIAN ARTERY STENOSIS, LEFT: ICD-10-CM

## 2018-11-12 DIAGNOSIS — I10 ESSENTIAL HYPERTENSION: Primary | Chronic | ICD-10-CM

## 2018-11-12 DIAGNOSIS — I73.9 PAD (PERIPHERAL ARTERY DISEASE): ICD-10-CM

## 2018-11-12 DIAGNOSIS — E78.5 DYSLIPIDEMIA: Chronic | ICD-10-CM

## 2018-11-12 PROCEDURE — 3078F DIAST BP <80 MM HG: CPT | Mod: CPTII,HCWC,S$GLB, | Performed by: INTERNAL MEDICINE

## 2018-11-12 PROCEDURE — 99214 OFFICE O/P EST MOD 30 MIN: CPT | Mod: HCWC,S$GLB,, | Performed by: INTERNAL MEDICINE

## 2018-11-12 PROCEDURE — 3074F SYST BP LT 130 MM HG: CPT | Mod: CPTII,HCWC,S$GLB, | Performed by: INTERNAL MEDICINE

## 2018-11-12 PROCEDURE — 99999 PR PBB SHADOW E&M-EST. PATIENT-LVL III: CPT | Mod: PBBFAC,HCWC,, | Performed by: INTERNAL MEDICINE

## 2018-11-12 RX ORDER — AA/PROT/LYSINE/METHIO/VIT C/B6 50-12.5 MG
10 TABLET ORAL DAILY
COMMUNITY
End: 2022-12-12

## 2018-11-12 RX ORDER — OLMESARTAN MEDOXOMIL 40 MG/1
40 TABLET ORAL DAILY
Qty: 90 TABLET | Refills: 3 | Status: SHIPPED | OUTPATIENT
Start: 2018-11-12 | End: 2019-02-14 | Stop reason: SDUPTHER

## 2018-11-12 NOTE — TELEPHONE ENCOUNTER
Ronald, I got the note on my mother. The BP is recorded in left arm. That is the side with subclavian stenosis.

## 2018-11-12 NOTE — PROGRESS NOTES
"Subjective:   Patient ID:  Naheed Cottrell is a 85 y.o. female who presents for follow-up of Essential hypertension (6 months fu)      HPI:   Naheed Cottrell presents for follow up of hypertension and PAD. Her BP is elevated in her right arm having left subclavian stenosis. Naheed Cottrell has peripheral vascular disease stating that she has leg pain after a " few blocks" with knee pain also limiting her. Naheed Cottrell denies chest pain, shortness of breath, palpitations, presyncope , or syncope. Naheed Cottrell has dyslipidemia only able to tolerate 3 time a week low dose Rosuvastatin. .  Review of Systems   Constitution: Negative for weakness, malaise/fatigue, weight gain and weight loss.   Eyes: Negative for blurred vision.   Cardiovascular: Positive for claudication. Negative for chest pain, cyanosis, dyspnea on exertion, irregular heartbeat, leg swelling, near-syncope, orthopnea, palpitations, paroxysmal nocturnal dyspnea and syncope.   Respiratory: Negative for cough, shortness of breath and wheezing.    Hematologic/Lymphatic:        Recent H&H decreased when compared to last.   Musculoskeletal: Positive for joint pain. Negative for falls and myalgias.   Gastrointestinal: Negative for abdominal pain, heartburn, nausea and vomiting.   Genitourinary: Negative for nocturia.   Neurological: Negative for brief paralysis, dizziness, focal weakness, headaches, numbness and paresthesias.   Psychiatric/Behavioral: Negative for altered mental status.       Current Outpatient Medications   Medication Sig    acetaminophen (TYLENOL) 500 MG tablet Take 1,000 mg by mouth every 6 (six) hours as needed for Pain.    aspirin (ECOTRIN) 81 MG EC tablet Take 1 tablet (81 mg total) by mouth once daily.    calcium-vitamin D3 500 mg(1,250mg) -200 unit per tablet Take 2 tablets by mouth 2 (two) times daily.    carvedilol (COREG) 25 MG tablet Take 1 tablet (25 mg total) by mouth 2 (two) times daily with meals.    chlorthalidone " "(HYGROTEN) 25 MG Tab Take 1 tablet (25 mg total) by mouth once daily.    cholecalciferol, vitamin D3, 1,000 unit capsule Take 1 capsule (1,000 Units total) by mouth once daily.    cilostazol (PLETAL) 50 MG Tab Take 1 tablet (50 mg total) by mouth 2 (two) times daily.    citalopram (CELEXA) 20 MG tablet Take 1 tablet (20 mg total) by mouth once daily.    clopidogrel (PLAVIX) 75 mg tablet Take 1 tablet (75 mg total) by mouth once daily.    coenzyme Q10 (CO Q-10) 10 mg capsule Take 10 mg by mouth once daily.    rosuvastatin (CRESTOR) 5 MG tablet Take one tablet three times a week  along with CoQ 10    olmesartan (BENICAR) 40 MG tablet Take 1 tablet (40 mg total) by mouth once daily.     No current facility-administered medications for this visit.      Objective:   Physical Exam   Constitutional: She is oriented to person, place, and time. She appears well-developed. No distress.   BP (!) 117/56 (BP Location: Left arm, Patient Position: Sitting, BP Method: Large (Automatic))   Pulse 84   Ht 5' 2" (1.575 m)   Wt 60.4 kg (133 lb 2.5 oz)   LMP 10/01/1980 (Approximate)   BMI 24.35 kg/m²    HENT:   Head: Normocephalic.   Eyes: Conjunctivae are normal. Pupils are equal, round, and reactive to light. No scleral icterus.   Neck: Neck supple. No JVD present. No thyromegaly present.   Cardiovascular: Normal rate, regular rhythm, normal heart sounds and intact distal pulses. PMI is not displaced. Exam reveals no gallop and no friction rub.   No murmur heard.  Pulses:       Radial pulses are 2+ on the right side, and 0 on the left side.        Femoral pulses are 2+ on the right side, and 2+ on the left side.       Dorsalis pedis pulses are 0 on the right side, and 0 on the left side.        Posterior tibial pulses are 0 on the right side, and 0 on the left side.   Pulmonary/Chest: Effort normal and breath sounds normal. No respiratory distress. She has no wheezes. She has no rales.   Abdominal: Soft. She exhibits no " distension. There is no splenomegaly or hepatomegaly. There is no tenderness.   Musculoskeletal: She exhibits no edema or tenderness.   Gait normal   Neurological: She is alert and oriented to person, place, and time.   Skin: Skin is warm and dry. She is not diaphoretic.   Psychiatric: She has a normal mood and affect. Her behavior is normal.       Lab Results   Component Value Date     11/08/2018    K 4.1 11/08/2018     11/08/2018    CO2 28 11/08/2018    BUN 25 (H) 11/08/2018    CREATININE 0.9 11/08/2018    GLU 94 11/08/2018    HGBA1C 5.5 10/01/2015    MG 1.6 06/05/2017    AST 15 11/08/2018    ALT 9 (L) 11/08/2018    ALBUMIN 3.6 11/08/2018    PROT 6.9 11/08/2018    BILITOT 0.4 11/08/2018    WBC 5.24 11/08/2018    HGB 11.5 (L) 11/08/2018    HCT 36.6 (L) 11/08/2018    MCV 95 11/08/2018     11/08/2018    TSH 1.743 10/16/2016    CHOL 183 11/08/2018    HDL 48 11/08/2018    LDLCALC 117.0 11/08/2018    TRIG 90 11/08/2018       Assessment:     1. Essential hypertension : Elevation with known right renal artery stenosis.   2. PAD (peripheral artery disease) : Modest limitation   3. Subclavian artery stenosis, left    4. Dyslipidemia : Not At LDL goal due to relative statin intolerance   5. Anemia, unspecified type : Mild       Plan:     Naheed was seen today for essential hypertension.    Diagnoses and all orders for this visit:    Essential hypertension  -     olmesartan (BENICAR) 40 MG tablet; Take 1 tablet (40 mg total) by mouth once daily in place of Losartan.    PAD (peripheral artery disease)    Subclavian artery stenosis, left    Dyslipidemia  Continue current regimen  Consider the addition of Zetia in the future will discuss with her son ( physician).  Anemia, unspecified type  -     POCT Occult Blood Stool    Other orders  -     coenzyme Q10 (CO Q-10) 10 mg capsule; Take 10 mg by mouth once daily.

## 2018-11-21 DIAGNOSIS — I73.9 PAD (PERIPHERAL ARTERY DISEASE): ICD-10-CM

## 2018-11-21 NOTE — TELEPHONE ENCOUNTER
----- Message from Jose Quiroz sent at 11/21/2018  3:07 PM CST -----  Contact: self   patient need a refill on all [facility] medications [facility], any questions please call back at [phone] (home) patient is waiting     [id number] - [place] - [address] AT [facility] [address] [facility]  [address]  [place]   [place] [address]  Phone: [phone] Fax: [phone]

## 2018-11-25 RX ORDER — CHLORTHALIDONE 25 MG/1
25 TABLET ORAL DAILY
Qty: 90 TABLET | Refills: 11 | Status: SHIPPED | OUTPATIENT
Start: 2018-11-25 | End: 2020-02-10

## 2018-11-25 RX ORDER — CILOSTAZOL 50 MG/1
50 TABLET ORAL 2 TIMES DAILY
Qty: 180 TABLET | Refills: 3 | Status: SHIPPED | OUTPATIENT
Start: 2018-11-25 | End: 2020-02-10

## 2018-11-25 RX ORDER — CARVEDILOL 25 MG/1
25 TABLET ORAL 2 TIMES DAILY WITH MEALS
Qty: 180 TABLET | Refills: 11 | Status: SHIPPED | OUTPATIENT
Start: 2018-11-25 | End: 2020-04-29

## 2018-11-25 RX ORDER — CITALOPRAM 20 MG/1
20 TABLET, FILM COATED ORAL DAILY
Qty: 90 TABLET | Refills: 2 | Status: SHIPPED | OUTPATIENT
Start: 2018-11-25 | End: 2020-01-06

## 2018-11-25 RX ORDER — CLOPIDOGREL BISULFATE 75 MG/1
75 TABLET ORAL DAILY
Qty: 90 TABLET | Refills: 3 | Status: SHIPPED | OUTPATIENT
Start: 2018-11-25 | End: 2019-06-05 | Stop reason: SDUPTHER

## 2019-01-28 ENCOUNTER — PATIENT OUTREACH (OUTPATIENT)
Dept: ADMINISTRATIVE | Facility: HOSPITAL | Age: 84
End: 2019-01-28

## 2019-02-14 DIAGNOSIS — I10 ESSENTIAL HYPERTENSION: Chronic | ICD-10-CM

## 2019-02-14 RX ORDER — OLMESARTAN MEDOXOMIL 40 MG/1
TABLET ORAL
Qty: 90 TABLET | Refills: 0 | Status: ON HOLD | OUTPATIENT
Start: 2019-02-14 | End: 2019-12-30 | Stop reason: HOSPADM

## 2019-05-16 DIAGNOSIS — E78.5 DYSLIPIDEMIA: Primary | ICD-10-CM

## 2019-05-30 ENCOUNTER — LAB VISIT (OUTPATIENT)
Dept: LAB | Facility: HOSPITAL | Age: 84
End: 2019-05-30
Attending: INTERNAL MEDICINE
Payer: MEDICARE

## 2019-05-30 DIAGNOSIS — E78.5 DYSLIPIDEMIA: ICD-10-CM

## 2019-05-30 LAB
ALBUMIN SERPL BCP-MCNC: 3.6 G/DL (ref 3.5–5.2)
ALP SERPL-CCNC: 65 U/L (ref 55–135)
ALT SERPL W/O P-5'-P-CCNC: 9 U/L (ref 10–44)
ANION GAP SERPL CALC-SCNC: 8 MMOL/L (ref 8–16)
AST SERPL-CCNC: 16 U/L (ref 10–40)
BASOPHILS # BLD AUTO: 0.03 K/UL (ref 0–0.2)
BASOPHILS NFR BLD: 0.7 % (ref 0–1.9)
BILIRUB SERPL-MCNC: 0.3 MG/DL (ref 0.1–1)
BUN SERPL-MCNC: 25 MG/DL (ref 8–23)
CALCIUM SERPL-MCNC: 9.6 MG/DL (ref 8.7–10.5)
CHLORIDE SERPL-SCNC: 103 MMOL/L (ref 95–110)
CHOLEST SERPL-MCNC: 262 MG/DL (ref 120–199)
CHOLEST/HDLC SERPL: 5.5 {RATIO} (ref 2–5)
CO2 SERPL-SCNC: 27 MMOL/L (ref 23–29)
CREAT SERPL-MCNC: 1 MG/DL (ref 0.5–1.4)
DIFFERENTIAL METHOD: ABNORMAL
EOSINOPHIL # BLD AUTO: 0.1 K/UL (ref 0–0.5)
EOSINOPHIL NFR BLD: 1.4 % (ref 0–8)
ERYTHROCYTE [DISTWIDTH] IN BLOOD BY AUTOMATED COUNT: 13.9 % (ref 11.5–14.5)
EST. GFR  (AFRICAN AMERICAN): 58.9 ML/MIN/1.73 M^2
EST. GFR  (NON AFRICAN AMERICAN): 51.1 ML/MIN/1.73 M^2
GLUCOSE SERPL-MCNC: 91 MG/DL (ref 70–110)
HCT VFR BLD AUTO: 39.2 % (ref 37–48.5)
HDLC SERPL-MCNC: 48 MG/DL (ref 40–75)
HDLC SERPL: 18.3 % (ref 20–50)
HGB BLD-MCNC: 12.3 G/DL (ref 12–16)
IMM GRANULOCYTES # BLD AUTO: 0.01 K/UL (ref 0–0.04)
IMM GRANULOCYTES NFR BLD AUTO: 0.2 % (ref 0–0.5)
LDLC SERPL CALC-MCNC: 186.2 MG/DL (ref 63–159)
LYMPHOCYTES # BLD AUTO: 1.3 K/UL (ref 1–4.8)
LYMPHOCYTES NFR BLD: 31 % (ref 18–48)
MCH RBC QN AUTO: 30 PG (ref 27–31)
MCHC RBC AUTO-ENTMCNC: 31.4 G/DL (ref 32–36)
MCV RBC AUTO: 96 FL (ref 82–98)
MONOCYTES # BLD AUTO: 0.4 K/UL (ref 0.3–1)
MONOCYTES NFR BLD: 10.3 % (ref 4–15)
NEUTROPHILS # BLD AUTO: 2.4 K/UL (ref 1.8–7.7)
NEUTROPHILS NFR BLD: 56.4 % (ref 38–73)
NONHDLC SERPL-MCNC: 214 MG/DL
NRBC BLD-RTO: 0 /100 WBC
PLATELET # BLD AUTO: 180 K/UL (ref 150–350)
PMV BLD AUTO: 10.5 FL (ref 9.2–12.9)
POTASSIUM SERPL-SCNC: 4.4 MMOL/L (ref 3.5–5.1)
PROT SERPL-MCNC: 6.9 G/DL (ref 6–8.4)
RBC # BLD AUTO: 4.1 M/UL (ref 4–5.4)
SODIUM SERPL-SCNC: 138 MMOL/L (ref 136–145)
TRIGL SERPL-MCNC: 139 MG/DL (ref 30–150)
WBC # BLD AUTO: 4.19 K/UL (ref 3.9–12.7)

## 2019-05-30 PROCEDURE — 80053 COMPREHEN METABOLIC PANEL: CPT | Mod: HCNC

## 2019-05-30 PROCEDURE — 36415 COLL VENOUS BLD VENIPUNCTURE: CPT | Mod: HCNC,PO

## 2019-05-30 PROCEDURE — 80061 LIPID PANEL: CPT | Mod: HCNC

## 2019-05-30 PROCEDURE — 85025 COMPLETE CBC W/AUTO DIFF WBC: CPT | Mod: HCNC

## 2019-06-04 ENCOUNTER — OFFICE VISIT (OUTPATIENT)
Dept: CARDIOLOGY | Facility: CLINIC | Age: 84
End: 2019-06-04
Payer: MEDICARE

## 2019-06-04 ENCOUNTER — TELEPHONE (OUTPATIENT)
Dept: CARDIOLOGY | Facility: CLINIC | Age: 84
End: 2019-06-04

## 2019-06-04 VITALS
WEIGHT: 133.63 LBS | SYSTOLIC BLOOD PRESSURE: 103 MMHG | HEIGHT: 63 IN | DIASTOLIC BLOOD PRESSURE: 54 MMHG | BODY MASS INDEX: 23.68 KG/M2 | HEART RATE: 68 BPM

## 2019-06-04 DIAGNOSIS — I10 ESSENTIAL HYPERTENSION: Primary | Chronic | ICD-10-CM

## 2019-06-04 DIAGNOSIS — R09.89 RIGHT CAROTID BRUIT: Primary | ICD-10-CM

## 2019-06-04 DIAGNOSIS — I70.1 RIGHT RENAL ARTERY STENOSIS: ICD-10-CM

## 2019-06-04 DIAGNOSIS — E78.5 DYSLIPIDEMIA: Chronic | ICD-10-CM

## 2019-06-04 DIAGNOSIS — R09.89 RIGHT CAROTID BRUIT: ICD-10-CM

## 2019-06-04 DIAGNOSIS — I63.411 EMBOLIC STROKE INVOLVING RIGHT MIDDLE CEREBRAL ARTERY: Primary | ICD-10-CM

## 2019-06-04 DIAGNOSIS — Z88.8 ALLERGY TO STATIN MEDICATION: ICD-10-CM

## 2019-06-04 DIAGNOSIS — I73.9 PAD (PERIPHERAL ARTERY DISEASE): ICD-10-CM

## 2019-06-04 DIAGNOSIS — R00.2 PALPITATIONS: Primary | ICD-10-CM

## 2019-06-04 DIAGNOSIS — R00.2 PALPITATIONS: ICD-10-CM

## 2019-06-04 DIAGNOSIS — I77.1 SUBCLAVIAN ARTERY STENOSIS, LEFT: ICD-10-CM

## 2019-06-04 PROCEDURE — 93005 EKG 12-LEAD: ICD-10-PCS | Mod: HCNC,S$GLB,, | Performed by: INTERNAL MEDICINE

## 2019-06-04 PROCEDURE — 99999 PR PBB SHADOW E&M-EST. PATIENT-LVL IV: CPT | Mod: PBBFAC,HCNC,, | Performed by: INTERNAL MEDICINE

## 2019-06-04 PROCEDURE — 99214 PR OFFICE/OUTPT VISIT, EST, LEVL IV, 30-39 MIN: ICD-10-PCS | Mod: HCNC,S$GLB,, | Performed by: INTERNAL MEDICINE

## 2019-06-04 PROCEDURE — 93005 ELECTROCARDIOGRAM TRACING: CPT | Mod: HCNC,S$GLB,, | Performed by: INTERNAL MEDICINE

## 2019-06-04 PROCEDURE — 93010 EKG 12-LEAD: ICD-10-PCS | Mod: HCNC,S$GLB,, | Performed by: INTERNAL MEDICINE

## 2019-06-04 PROCEDURE — 99999 PR PBB SHADOW E&M-EST. PATIENT-LVL IV: ICD-10-PCS | Mod: PBBFAC,HCNC,, | Performed by: INTERNAL MEDICINE

## 2019-06-04 PROCEDURE — 93010 ELECTROCARDIOGRAM REPORT: CPT | Mod: HCNC,S$GLB,, | Performed by: INTERNAL MEDICINE

## 2019-06-04 PROCEDURE — 99214 OFFICE O/P EST MOD 30 MIN: CPT | Mod: HCNC,S$GLB,, | Performed by: INTERNAL MEDICINE

## 2019-06-04 NOTE — PROGRESS NOTES
Subjective:   Patient ID:  Naheed Cottrell is a 86 y.o. female who presents for follow-up of Essential hypertension (6 months fu)      HPI:   Naheed Cottrell presents for follow up of hypertension. Her BP remains elevated. She is taking all medication. Naheed Cottrell has peripheral vascular disease both peripheral ( able to walk 2-3 blocks before calf discomfort) , right renal artery, and moderate carotid stenosis on last carotid doppler. Naheed Cottrell has dyslipidemia and stopped the 3 time a week low dose rosuvastatin due to leg pain.Naheed Cottrell denies chest pain, shortness of breath, palpitations, presyncope , or syncope. .. .  Review of Systems   Constitution: Negative for malaise/fatigue, weight gain and weight loss.   Eyes: Negative for blurred vision.   Cardiovascular: Positive for claudication. Negative for chest pain, cyanosis, dyspnea on exertion, irregular heartbeat, leg swelling, near-syncope, orthopnea, palpitations, paroxysmal nocturnal dyspnea and syncope.   Respiratory: Negative for cough, shortness of breath and wheezing.    Musculoskeletal: Positive for joint pain and muscle cramps. Negative for falls and myalgias.        Left knee pain   Gastrointestinal: Negative for abdominal pain, heartburn, nausea and vomiting.   Genitourinary: Negative for nocturia.   Neurological: Negative for brief paralysis, dizziness, focal weakness, headaches, numbness, paresthesias and weakness.   Psychiatric/Behavioral: Negative for altered mental status.       Current Outpatient Medications   Medication Sig    acetaminophen (TYLENOL) 500 MG tablet Take 1,000 mg by mouth every 6 (six) hours as needed for Pain.    aspirin (ECOTRIN) 81 MG EC tablet Take 1 tablet (81 mg total) by mouth once daily.    calcium-vitamin D3 500 mg(1,250mg) -200 unit per tablet Take 2 tablets by mouth 2 (two) times daily.    carvedilol (COREG) 25 MG tablet Take 1 tablet (25 mg total) by mouth 2 (two) times daily with meals.     "chlorthalidone (HYGROTEN) 25 MG Tab Take 1 tablet (25 mg total) by mouth once daily.    cholecalciferol, vitamin D3, 1,000 unit capsule Take 1 capsule (1,000 Units total) by mouth once daily.    cilostazol (PLETAL) 50 MG Tab Take 1 tablet (50 mg total) by mouth 2 (two) times daily.    citalopram (CELEXA) 20 MG tablet Take 1 tablet (20 mg total) by mouth once daily.    clopidogrel (PLAVIX) 75 mg tablet Take 1 tablet (75 mg total) by mouth once daily.    coenzyme Q10 (CO Q-10) 10 mg capsule Take 10 mg by mouth once daily.    olmesartan (BENICAR) 40 MG tablet TAKE 1 TABLET(40 MG) BY MOUTH EVERY DAY    alirocumab (PRALUENT PEN) 75 mg/mL PnIj Inject 1 mL (75 mg total) into the skin every 14 (fourteen) days.     No current facility-administered medications for this visit.      Objective:   Physical Exam   Constitutional: She is oriented to person, place, and time. She appears well-developed. No distress.   BP (!) 103/54 (BP Location: Left arm, Patient Position: Sitting, BP Method: Large (Automatic))   Pulse 68   Ht 5' 2.5" (1.588 m)   Wt 60.6 kg (133 lb 9.6 oz)   LMP 10/01/1980 (Approximate)   BMI 24.05 kg/m²    HENT:   Head: Normocephalic.   Eyes: Pupils are equal, round, and reactive to light. Conjunctivae are normal. No scleral icterus.   Neck: Neck supple. No JVD present. No thyromegaly present.   Cardiovascular: Normal rate, regular rhythm and intact distal pulses. PMI is not displaced. Exam reveals no gallop and no friction rub.   Murmur heard.   Medium-pitched midsystolic murmur is present with a grade of 1/6 at the upper left sternal border and lower left sternal border.  Pulses:       Carotid pulses are on the right side with bruit.       Radial pulses are 2+ on the right side, and 0 on the left side.        Dorsalis pedis pulses are 0 on the right side, and 0 on the left side.        Posterior tibial pulses are 0 on the right side, and 0 on the left side.   Pulmonary/Chest: Effort normal and breath " "sounds normal. No respiratory distress. She has no wheezes. She has no rales.   Abdominal: Soft. She exhibits no distension. There is no splenomegaly or hepatomegaly. There is no tenderness.   Musculoskeletal: She exhibits no edema or tenderness.   Gait normal   Neurological: She is alert and oriented to person, place, and time.   Skin: Skin is warm and dry. She is not diaphoretic.   Psychiatric: She has a normal mood and affect. Her behavior is normal.       Lab Results   Component Value Date     05/30/2019    K 4.4 05/30/2019     05/30/2019    CO2 27 05/30/2019    BUN 25 (H) 05/30/2019    CREATININE 1.0 05/30/2019    GLU 91 05/30/2019    HGBA1C 5.5 10/01/2015    MG 1.6 06/05/2017    AST 16 05/30/2019    ALT 9 (L) 05/30/2019    ALBUMIN 3.6 05/30/2019    PROT 6.9 05/30/2019    BILITOT 0.3 05/30/2019    WBC 4.19 05/30/2019    HGB 12.3 05/30/2019    HCT 39.2 05/30/2019    MCV 96 05/30/2019     05/30/2019    TSH 1.743 10/16/2016    CHOL 262 (H) 05/30/2019    HDL 48 05/30/2019    LDLCALC 186.2 (H) 05/30/2019    TRIG 139 05/30/2019       Assessment:     1. Essential hypertension : Right arm elevation . No evidence for end organ involvement and may represent " pseudohypertension " due to arterial calcinosis.   2. PAD (peripheral artery disease) : 2-3 block claudication per the patient   3. Subclavian artery stenosis, left : Asx   4. Dyslipidemia ; Untreated due to inability to take a statin   5. Right renal artery stenosis: possibly contributing to #1    6. Right carotid bruit : High pitched   7. Allergy to statin medication        Plan:     Naheed was seen today for essential hypertension.    Diagnoses and all orders for this visit:    Essential hypertension  -     EKG 12-lead; ( NSSTWC)  Continue current regimen for the time being ( tried Norvasc in the past and stopped due to edema)    PAD (peripheral artery disease)  Stable  Subclavian artery stenosis, left    Dyslipidemia  -     alirocumab (PRALUENT " PEN) 75 mg/mL PnIj; Inject 1 mL (75 mg total) into the skin every 14 (fourteen) days.    Right renal artery stenosis      Right carotid bruit  -     US Carotid Bilateral; Future; Expected date: 06/04/2019    Allergy to statin medication  -     alirocumab (PRALUENT PEN) 75 mg/mL PnIj; Inject 1 mL (75 mg total) into the skin every 14 (fourteen) days.

## 2019-06-05 ENCOUNTER — TELEPHONE (OUTPATIENT)
Dept: PHARMACY | Facility: CLINIC | Age: 84
End: 2019-06-05

## 2019-06-05 RX ORDER — CLOPIDOGREL BISULFATE 75 MG/1
75 TABLET ORAL DAILY
Qty: 90 TABLET | Refills: 3 | Status: SHIPPED | OUTPATIENT
Start: 2019-06-05 | End: 2019-12-19

## 2019-06-05 NOTE — TELEPHONE ENCOUNTER
LVM for callback to inform patient that [facility] received prescription for Praluent and prior authorization is required.  [facility] will be back in touch once insurance determination is received.

## 2019-06-11 ENCOUNTER — CLINICAL SUPPORT (OUTPATIENT)
Dept: CARDIOLOGY | Facility: CLINIC | Age: 84
End: 2019-06-11
Attending: INTERNAL MEDICINE
Payer: MEDICARE

## 2019-06-11 DIAGNOSIS — R09.89 RIGHT CAROTID BRUIT: ICD-10-CM

## 2019-06-11 LAB
LEFT CBA DIAS: 14 CM/S
LEFT CBA SYS: 85 CM/S
LEFT CCA DIST DIAS: 16 CM/S
LEFT CCA DIST SYS: 94 CM/S
LEFT CCA MID DIAS: 22 CM/S
LEFT CCA MID SYS: 117 CM/S
LEFT CCA PROX DIAS: 16 CM/S
LEFT CCA PROX SYS: 89 CM/S
LEFT ECA DIAS: 0 CM/S
LEFT ECA SYS: 92 CM/S
LEFT ICA DIST DIAS: 27 CM/S
LEFT ICA DIST SYS: 101 CM/S
LEFT ICA MID DIAS: 21 CM/S
LEFT ICA MID SYS: 91 CM/S
LEFT ICA PROX DIAS: 11 CM/S
LEFT ICA PROX SYS: 67 CM/S
LEFT VERTEBRAL DIAS: 0 CM/S
LEFT VERTEBRAL SYS: 96 CM/S
OHS CV CAROTID RIGHT ICA EDV HIGHEST: 15
OHS CV CAROTID ULTRASOUND LEFT ICA/CCA RATIO: 0.86
OHS CV CAROTID ULTRASOUND RIGHT ICA/CCA RATIO: 0.94
OHS CV PV CAROTID LEFT HIGHEST CCA: 117
OHS CV PV CAROTID LEFT HIGHEST ICA: 101
OHS CV PV CAROTID RIGHT HIGHEST CCA: 63
OHS CV PV CAROTID RIGHT HIGHEST ICA: 59
OHS CV US CAROTID LEFT HIGHEST EDV: 27
RIGHT ARM SYSTOLIC BLOOD PRESSURE: 110 MMHG
RIGHT CBA DIAS: 12 CM/S
RIGHT CBA SYS: 78 CM/S
RIGHT CCA DIST DIAS: 12 CM/S
RIGHT CCA DIST SYS: 58 CM/S
RIGHT CCA MID DIAS: 11 CM/S
RIGHT CCA MID SYS: 57 CM/S
RIGHT CCA PROX DIAS: 10 CM/S
RIGHT CCA PROX SYS: 63 CM/S
RIGHT ECA DIAS: 0 CM/S
RIGHT ECA SYS: 144 CM/S
RIGHT ICA DIST DIAS: 15 CM/S
RIGHT ICA DIST SYS: 59 CM/S
RIGHT ICA MID DIAS: 14 CM/S
RIGHT ICA MID SYS: 54 CM/S
RIGHT ICA PROX DIAS: 13 CM/S
RIGHT ICA PROX SYS: 58 CM/S
RIGHT VERTEBRAL DIAS: 14 CM/S
RIGHT VERTEBRAL SYS: 93 CM/S

## 2019-06-11 PROCEDURE — 93880 CV US DOPPLER CAROTID (CUPID ONLY): ICD-10-PCS | Mod: HCNC,S$GLB,, | Performed by: INTERNAL MEDICINE

## 2019-06-11 PROCEDURE — 93880 EXTRACRANIAL BILAT STUDY: CPT | Mod: HCNC,S$GLB,, | Performed by: INTERNAL MEDICINE

## 2019-06-13 NOTE — TELEPHONE ENCOUNTER
Patient called to pharmacy.  She declined injection training as she states she has several physicians in the family who will be administering the injections for her.  Patient plans to start Praluent on TBD with physician family member.   Counseled patient on administration directions:  - Inject 1ml (75mg) into the skin every 14 days.   - Take out of the refrigerator 30-60 minutes prior to injection.  - Wash hands before and after injection.  - Monthly RX will come with gauze, bandaids, and alcohol swabs.  - Patient may inject in either the tops of the thighs, abdomen- but at least 2 inches away from her belly button, or the outer part of her upper arm. Patient was instructed to rotate injections sites.  - Patient is to wipe down the injection site with the alcohol pad, wait to dry. Gently squeeze the area of the cleaned skin and hold it firmly. Place the pen flat against the raised area of skin that is being squeezed, then push down on the button and release, in 10-15 seconds you will hear a click and the window will go from from clear to yellow, indicating injection is complete.  - Patient should rotate injection sites.   - Patient will use sharps container; once full, per [place] law, she/ he may lock the sharps container and place in her trash. She/ he can then contact the Pharmacy and we will replace the sharps at no additional charge.  Patient was counseled on possible side effects:  - Injection site reaction: redness, soreness, itching, bruising, which should resolve within 3-5 days.  - flu-like symptoms  Patient did not have any further questions. She was advised to keep a calendar to stay compliant. Patient will start Praluent on TBD. I will f/u with patient in a week and [facility] will f/u in 3 weeks for further refills.      Wilbert Crawford, NORBERT.Ph., [facility]  Clinical Pharmacist, HIV/HCV  [facility]  Phone: [phone]

## 2019-07-09 ENCOUNTER — TELEPHONE (OUTPATIENT)
Dept: PHARMACY | Facility: CLINIC | Age: 84
End: 2019-07-09

## 2019-08-14 ENCOUNTER — TELEPHONE (OUTPATIENT)
Dept: PHARMACY | Facility: CLINIC | Age: 84
End: 2019-08-14

## 2019-08-18 RX ORDER — DICLOFENAC SODIUM 10 MG/G
2 GEL TOPICAL 2 TIMES DAILY
Qty: 1 TUBE | Refills: 3 | Status: SHIPPED | OUTPATIENT
Start: 2019-08-18 | End: 2020-10-28 | Stop reason: SDUPTHER

## 2019-09-17 DIAGNOSIS — I10 ESSENTIAL HYPERTENSION: Chronic | ICD-10-CM

## 2019-09-17 RX ORDER — OLMESARTAN MEDOXOMIL 40 MG/1
TABLET ORAL
Qty: 90 TABLET | Refills: 3 | Status: SHIPPED | OUTPATIENT
Start: 2019-09-17 | End: 2020-08-17 | Stop reason: SDUPTHER

## 2019-09-18 ENCOUNTER — TELEPHONE (OUTPATIENT)
Dept: PHARMACY | Facility: CLINIC | Age: 84
End: 2019-09-18

## 2019-09-18 DIAGNOSIS — E78.5 DYSLIPIDEMIA: Primary | ICD-10-CM

## 2019-09-18 NOTE — TELEPHONE ENCOUNTER
Praluent PEN follow up and refill confirmed. We will ship Praluent refill on 19 via fedex to arrive on 19. $155.93 copay- [id number]. Confirmed 2 patient identifiers - name and .      Patient self injects Praluent every other  without any difficulties; son in law is a physician who showed her how to self inject.  She uses a calendar to adhere to administration days.  She has 0 doses on hand, next injection due 19.   Patient reports no side effects since beginning of therapy.  No missed doses, no new medications, no new allergies or health conditions reported at this time. Patient has f/u with cardio provider at end of the month.  All questions answered and addressed to patients satisfaction. Pt verbalized understanding.

## 2019-09-26 ENCOUNTER — TELEPHONE (OUTPATIENT)
Dept: CARDIOLOGY | Facility: CLINIC | Age: 84
End: 2019-09-26

## 2019-09-26 ENCOUNTER — LAB VISIT (OUTPATIENT)
Dept: LAB | Facility: HOSPITAL | Age: 84
End: 2019-09-26
Attending: INTERNAL MEDICINE
Payer: MEDICARE

## 2019-09-26 DIAGNOSIS — E78.5 DYSLIPIDEMIA: ICD-10-CM

## 2019-09-26 LAB
CHOLEST SERPL-MCNC: 146 MG/DL (ref 120–199)
CHOLEST/HDLC SERPL: 2.9 {RATIO} (ref 2–5)
HDLC SERPL-MCNC: 51 MG/DL (ref 40–75)
HDLC SERPL: 34.9 % (ref 20–50)
LDLC SERPL CALC-MCNC: 74.2 MG/DL (ref 63–159)
NONHDLC SERPL-MCNC: 95 MG/DL
TRIGL SERPL-MCNC: 104 MG/DL (ref 30–150)

## 2019-09-26 PROCEDURE — 80061 LIPID PANEL: CPT | Mod: HCNC

## 2019-09-26 PROCEDURE — 36415 COLL VENOUS BLD VENIPUNCTURE: CPT | Mod: HCNC,PO

## 2019-09-26 NOTE — TELEPHONE ENCOUNTER
Naheed Cottrell called and results of lab tests reviewed.Dramatic reduction in lipid levels.  . Continue current regimen

## 2019-09-30 ENCOUNTER — OFFICE VISIT (OUTPATIENT)
Dept: CARDIOLOGY | Facility: CLINIC | Age: 84
End: 2019-09-30
Payer: MEDICARE

## 2019-09-30 VITALS
HEIGHT: 62 IN | HEART RATE: 78 BPM | SYSTOLIC BLOOD PRESSURE: 87 MMHG | WEIGHT: 136.69 LBS | BODY MASS INDEX: 25.15 KG/M2 | DIASTOLIC BLOOD PRESSURE: 41 MMHG

## 2019-09-30 DIAGNOSIS — E78.5 DYSLIPIDEMIA: Chronic | ICD-10-CM

## 2019-09-30 DIAGNOSIS — I70.1 RIGHT RENAL ARTERY STENOSIS: ICD-10-CM

## 2019-09-30 DIAGNOSIS — I10 ESSENTIAL HYPERTENSION: Primary | Chronic | ICD-10-CM

## 2019-09-30 DIAGNOSIS — I77.1 SUBCLAVIAN ARTERY STENOSIS, LEFT: ICD-10-CM

## 2019-09-30 DIAGNOSIS — I73.9 PAD (PERIPHERAL ARTERY DISEASE): ICD-10-CM

## 2019-09-30 PROCEDURE — 99999 PR PBB SHADOW E&M-EST. PATIENT-LVL III: CPT | Mod: PBBFAC,HCNC,, | Performed by: INTERNAL MEDICINE

## 2019-09-30 PROCEDURE — 99499 RISK ADDL DX/OHS AUDIT: ICD-10-PCS | Mod: S$GLB,,, | Performed by: INTERNAL MEDICINE

## 2019-09-30 PROCEDURE — 99214 PR OFFICE/OUTPT VISIT, EST, LEVL IV, 30-39 MIN: ICD-10-PCS | Mod: HCNC,S$GLB,, | Performed by: INTERNAL MEDICINE

## 2019-09-30 PROCEDURE — 99214 OFFICE O/P EST MOD 30 MIN: CPT | Mod: HCNC,S$GLB,, | Performed by: INTERNAL MEDICINE

## 2019-09-30 PROCEDURE — 99999 PR PBB SHADOW E&M-EST. PATIENT-LVL III: ICD-10-PCS | Mod: PBBFAC,HCNC,, | Performed by: INTERNAL MEDICINE

## 2019-09-30 PROCEDURE — 99499 UNLISTED E&M SERVICE: CPT | Mod: S$GLB,,, | Performed by: INTERNAL MEDICINE

## 2019-09-30 NOTE — PROGRESS NOTES
Subjective:   Patient ID:  Naheed Cottrell is a 86 y.o. female who presents for follow-up of Essential hypertension (3 month f/u )      HPI:   moshe Cottrell presents for follow up of hypertension, PAD, and dyslipidemia. BP in her right arm is 140/ ( Left subclavian stenosis). Claudication sxs or stable ( 2-3 blocks ) but she is also limited by left knee pain. She has right KEVIN with carotid stenosis that was  only mild recent check. She has tolerated Praluent with significant lipid reduction. Naheed Cottrell denies chest pain, shortness of breath, palpitations, presyncope , or syncope.   Review of Systems   Constitution: Negative for malaise/fatigue, weight gain and weight loss.   Eyes: Negative for blurred vision.   Cardiovascular: Positive for claudication. Negative for chest pain, cyanosis, dyspnea on exertion, irregular heartbeat, leg swelling, near-syncope, orthopnea, palpitations, paroxysmal nocturnal dyspnea and syncope.   Respiratory: Negative for cough, shortness of breath and wheezing.    Skin:        Alopecia   Musculoskeletal: Positive for joint pain. Negative for falls and myalgias.   Gastrointestinal: Negative for abdominal pain, heartburn, nausea and vomiting.   Genitourinary: Negative for nocturia.   Neurological: Negative for brief paralysis, dizziness, focal weakness, headaches, numbness, paresthesias and weakness.   Psychiatric/Behavioral: Negative for altered mental status.       Current Outpatient Medications   Medication Sig    acetaminophen (TYLENOL) 500 MG tablet Take 1,000 mg by mouth every 6 (six) hours as needed for Pain.    alirocumab (PRALUENT PEN) 75 mg/mL PnIj Inject 1 mL (75 mg total) into the skin every 14 (fourteen) days.    carvedilol (COREG) 25 MG tablet Take 1 tablet (25 mg total) by mouth 2 (two) times daily with meals.    chlorthalidone (HYGROTEN) 25 MG Tab Take 1 tablet (25 mg total) by mouth once daily.    cholecalciferol, vitamin D3, 1,000 unit capsule Take 1 capsule  "(1,000 Units total) by mouth once daily.    cilostazol (PLETAL) 50 MG Tab Take 1 tablet (50 mg total) by mouth 2 (two) times daily.    citalopram (CELEXA) 20 MG tablet Take 1 tablet (20 mg total) by mouth once daily.    clopidogrel (PLAVIX) 75 mg tablet Take 1 tablet (75 mg total) by mouth once daily.    coenzyme Q10 (CO Q-10) 10 mg capsule Take 10 mg by mouth once daily.    diclofenac sodium (VOLTAREN) 1 % Gel Apply 2 g topically 2 (two) times daily.    olmesartan (BENICAR) 40 MG tablet TAKE 1 TABLET(40 MG) BY MOUTH EVERY DAY    olmesartan (BENICAR) 40 MG tablet TAKE 1 TABLET(40 MG) BY MOUTH EVERY DAY    aspirin (ECOTRIN) 81 MG EC tablet Take 1 tablet (81 mg total) by mouth once daily.    calcium-vitamin D3 500 mg(1,250mg) -200 unit per tablet Take 2 tablets by mouth 2 (two) times daily.     No current facility-administered medications for this visit.      Objective:   Physical Exam   Constitutional: She is oriented to person, place, and time. She appears well-developed. No distress.   BP (!) 87/41 (BP Location: Left arm, Patient Position: Sitting, BP Method: Medium (Automatic))   Pulse 78   Ht 5' 2" (1.575 m)   Wt 62 kg (136 lb 11 oz)   LMP 10/01/1980 (Approximate)   BMI 25.00 kg/m²    HENT:   Head: Normocephalic.   Eyes: Pupils are equal, round, and reactive to light. Conjunctivae are normal. No scleral icterus.   Neck: Neck supple. No JVD present. No thyromegaly present.   Cardiovascular: Normal rate, regular rhythm and intact distal pulses.  Occasional extrasystoles are present. PMI is not displaced. Exam reveals no gallop and no friction rub.   Murmur heard.   Medium-pitched midsystolic murmur is present with a grade of 1/6 at the upper left sternal border and lower left sternal border.  Pulses:       Radial pulses are 2+ on the right side, and 0 on the left side.        Dorsalis pedis pulses are 0 on the right side, and 0 on the left side.        Posterior tibial pulses are 0 on the right side, " and 0 on the left side.   Pulmonary/Chest: Effort normal and breath sounds normal. No respiratory distress. She has no wheezes. She has no rales.   Abdominal: Soft. She exhibits no distension. There is no splenomegaly or hepatomegaly. There is no tenderness.   Musculoskeletal: She exhibits no edema or tenderness.   Gait normal   Neurological: She is alert and oriented to person, place, and time.   Skin: Skin is warm and dry. She is not diaphoretic.   Psychiatric: She has a normal mood and affect. Her behavior is normal.       Lab Results   Component Value Date     05/30/2019    K 4.4 05/30/2019     05/30/2019    CO2 27 05/30/2019    BUN 25 (H) 05/30/2019    CREATININE 1.0 05/30/2019    GLU 91 05/30/2019    HGBA1C 5.5 10/01/2015    MG 1.6 06/05/2017    AST 16 05/30/2019    ALT 9 (L) 05/30/2019    ALBUMIN 3.6 05/30/2019    PROT 6.9 05/30/2019    BILITOT 0.3 05/30/2019    WBC 4.19 05/30/2019    HGB 12.3 05/30/2019    HCT 39.2 05/30/2019    MCV 96 05/30/2019     05/30/2019    TSH 1.743 10/16/2016    CHOL 146 09/26/2019    HDL 51 09/26/2019    LDLCALC 74.2 09/26/2019    TRIG 104 09/26/2019       Assessment:     1. Essential hypertension : Improved and near goal   2. Dyslipidemia: Marked reduction with PCSK9 inhibitor    3. PAD (peripheral artery disease) : Stable   4. Subclavian artery stenosis, left : Stable asx   5. Right renal artery stenosis : Stable       Plan:     Naheed was seen today for essential hypertension.    Diagnoses and all orders for this visit:    Essential hypertension  Continue current regimen    Dyslipidemia  Continue current regimen    PAD (peripheral artery disease)    Subclavian artery stenosis, left    Right renal artery stenosis

## 2019-10-10 ENCOUNTER — TELEPHONE (OUTPATIENT)
Dept: PHARMACY | Facility: CLINIC | Age: 84
End: 2019-10-10

## 2019-11-06 ENCOUNTER — TELEPHONE (OUTPATIENT)
Dept: PHARMACY | Facility: CLINIC | Age: 84
End: 2019-11-06

## 2019-12-02 ENCOUNTER — TELEPHONE (OUTPATIENT)
Dept: CARDIOLOGY | Facility: CLINIC | Age: 84
End: 2019-12-02

## 2019-12-02 NOTE — TELEPHONE ENCOUNTER
----- Message from Shelby Mir RN sent at 12/2/2019 11:36 AM CST -----  Contact: pt [phone]  Please see message below, [facility] prefers Repatha but if would like can also send prescription to pharmacy for Praluent and will try to get authorized again.    Thank you    ----- Message -----  From: Nichole Kirk  Sent: 12/2/2019  11:29 AM CST  To: Shelby Mir RN    Repatha is the preferred drug for [employer]. However if you send in the prescription for Praluent we can try to get it authorized by [facility].  Thank you  ----- Message -----  From: Shelby Mir RN  Sent: 12/2/2019  11:10 AM CST  To: Nichole Blisslo,  Can you look into this for me? Do we need to send a new rx in?  Thank you    ----- Message -----  From: Laury Mortensen  Sent: 12/2/2019   9:23 AM CST  To: Shelby Mir RN    Pt received a letter from [facility] that they will not be covering her Praluent pen 75 mg/ml anymore. She would like Dr. Braswell to try and renew this Rx for January.   LOV 9/30/19 Dr. Braswell    Thanks

## 2019-12-04 DIAGNOSIS — Z88.8 ALLERGY TO STATIN MEDICATION: ICD-10-CM

## 2019-12-04 DIAGNOSIS — E78.5 DYSLIPIDEMIA: Primary | Chronic | ICD-10-CM

## 2019-12-04 DIAGNOSIS — E78.5 DYSLIPIDEMIA: Chronic | ICD-10-CM

## 2019-12-05 NOTE — TELEPHONE ENCOUNTER
----- Message from Mike Echevarria sent at 12/4/2019 12:53 PM CST -----  Regarding: Refill request - Praluent   Good afternoon Dr. Braswell,    Look like the prescription Praluent was discontinue with [facility] & sent over to [facility], if appropriate can you please send over a new script to [facility] for us to work on the prior authorization for 2020 & patient will be in need for refill after 12/6.    Thank you,  Mike  [phone]

## 2019-12-09 ENCOUNTER — TELEPHONE (OUTPATIENT)
Dept: PHARMACY | Facility: CLINIC | Age: 84
End: 2019-12-09

## 2019-12-19 RX ORDER — CLOPIDOGREL BISULFATE 75 MG/1
TABLET ORAL
Qty: 90 TABLET | Refills: 3 | Status: SHIPPED | OUTPATIENT
Start: 2019-12-19 | End: 2020-06-18 | Stop reason: SDUPTHER

## 2019-12-29 ENCOUNTER — HOSPITAL ENCOUNTER (OUTPATIENT)
Facility: HOSPITAL | Age: 84
Discharge: HOME | DRG: 312 | End: 2019-12-30
Attending: EMERGENCY MEDICINE | Admitting: HOSPITALIST
Payer: MEDICARE

## 2019-12-29 DIAGNOSIS — N28.9 ACUTE RENAL INSUFFICIENCY: ICD-10-CM

## 2019-12-29 DIAGNOSIS — I10 ESSENTIAL HYPERTENSION: Chronic | ICD-10-CM

## 2019-12-29 DIAGNOSIS — F41.9 ANXIETY: ICD-10-CM

## 2019-12-29 DIAGNOSIS — R55 SYNCOPE: Primary | ICD-10-CM

## 2019-12-29 DIAGNOSIS — I73.9 PAD (PERIPHERAL ARTERY DISEASE): ICD-10-CM

## 2019-12-29 DIAGNOSIS — I50.32 CHRONIC HEART FAILURE WITH PRESERVED EJECTION FRACTION: ICD-10-CM

## 2019-12-29 DIAGNOSIS — I95.1 ORTHOSTATIC HYPOTENSION: ICD-10-CM

## 2019-12-29 DIAGNOSIS — R55 SYNCOPE, UNSPECIFIED SYNCOPE TYPE: ICD-10-CM

## 2019-12-29 DIAGNOSIS — Z86.73 HISTORY OF CVA (CEREBROVASCULAR ACCIDENT): ICD-10-CM

## 2019-12-29 DIAGNOSIS — D50.9 IRON DEFICIENCY ANEMIA, UNSPECIFIED IRON DEFICIENCY ANEMIA TYPE: ICD-10-CM

## 2019-12-29 PROBLEM — N30.00 ACUTE CYSTITIS WITHOUT HEMATURIA: Status: RESOLVED | Noted: 2017-06-05 | Resolved: 2019-12-29

## 2019-12-29 PROBLEM — R79.89 ELEVATED TROPONIN: Status: RESOLVED | Noted: 2017-06-04 | Resolved: 2019-12-29

## 2019-12-29 PROBLEM — R78.81 GRAM-NEGATIVE BACTEREMIA: Status: RESOLVED | Noted: 2017-06-05 | Resolved: 2019-12-29

## 2019-12-29 PROBLEM — E87.6 HYPOKALEMIA: Status: RESOLVED | Noted: 2017-06-05 | Resolved: 2019-12-29

## 2019-12-29 PROBLEM — N17.9 AKI (ACUTE KIDNEY INJURY): Status: RESOLVED | Noted: 2017-06-04 | Resolved: 2019-12-29

## 2019-12-29 LAB
ALBUMIN SERPL BCP-MCNC: 3.4 G/DL (ref 3.5–5.2)
ALP SERPL-CCNC: 60 U/L (ref 55–135)
ALT SERPL W/O P-5'-P-CCNC: 9 U/L (ref 10–44)
ANION GAP SERPL CALC-SCNC: 12 MMOL/L (ref 8–16)
AST SERPL-CCNC: 21 U/L (ref 10–40)
BACTERIA #/AREA URNS AUTO: ABNORMAL /HPF
BASOPHILS # BLD AUTO: 0.03 K/UL (ref 0–0.2)
BASOPHILS NFR BLD: 0.7 % (ref 0–1.9)
BILIRUB SERPL-MCNC: 0.3 MG/DL (ref 0.1–1)
BILIRUB UR QL STRIP: NEGATIVE
BUN SERPL-MCNC: 25 MG/DL (ref 8–23)
CALCIUM SERPL-MCNC: 8.9 MG/DL (ref 8.7–10.5)
CHLORIDE SERPL-SCNC: 103 MMOL/L (ref 95–110)
CLARITY UR REFRACT.AUTO: ABNORMAL
CO2 SERPL-SCNC: 22 MMOL/L (ref 23–29)
COLOR UR AUTO: YELLOW
CREAT SERPL-MCNC: 1.3 MG/DL (ref 0.5–1.4)
DIFFERENTIAL METHOD: ABNORMAL
EOSINOPHIL # BLD AUTO: 0 K/UL (ref 0–0.5)
EOSINOPHIL NFR BLD: 0.7 % (ref 0–8)
ERYTHROCYTE [DISTWIDTH] IN BLOOD BY AUTOMATED COUNT: 14 % (ref 11.5–14.5)
EST. GFR  (AFRICAN AMERICAN): 42.9 ML/MIN/1.73 M^2
EST. GFR  (NON AFRICAN AMERICAN): 37.2 ML/MIN/1.73 M^2
GLUCOSE SERPL-MCNC: 118 MG/DL (ref 70–110)
GLUCOSE UR QL STRIP: NEGATIVE
HCT VFR BLD AUTO: 36.3 % (ref 37–48.5)
HGB BLD-MCNC: 11.1 G/DL (ref 12–16)
HGB UR QL STRIP: NEGATIVE
HYALINE CASTS UR QL AUTO: 6 /LPF
IMM GRANULOCYTES # BLD AUTO: 0.01 K/UL (ref 0–0.04)
IMM GRANULOCYTES NFR BLD AUTO: 0.2 % (ref 0–0.5)
KETONES UR QL STRIP: NEGATIVE
LACTATE SERPL-SCNC: 1.6 MMOL/L (ref 0.5–2.2)
LEUKOCYTE ESTERASE UR QL STRIP: ABNORMAL
LYMPHOCYTES # BLD AUTO: 0.9 K/UL (ref 1–4.8)
LYMPHOCYTES NFR BLD: 20.2 % (ref 18–48)
MCH RBC QN AUTO: 29.9 PG (ref 27–31)
MCHC RBC AUTO-ENTMCNC: 30.6 G/DL (ref 32–36)
MCV RBC AUTO: 98 FL (ref 82–98)
MICROSCOPIC COMMENT: ABNORMAL
MONOCYTES # BLD AUTO: 0.5 K/UL (ref 0.3–1)
MONOCYTES NFR BLD: 10.8 % (ref 4–15)
NEUTROPHILS # BLD AUTO: 3 K/UL (ref 1.8–7.7)
NEUTROPHILS NFR BLD: 67.4 % (ref 38–73)
NITRITE UR QL STRIP: NEGATIVE
NRBC BLD-RTO: 0 /100 WBC
PH UR STRIP: 5 [PH] (ref 5–8)
PLATELET # BLD AUTO: 148 K/UL (ref 150–350)
PMV BLD AUTO: 10.1 FL (ref 9.2–12.9)
POTASSIUM SERPL-SCNC: 3.9 MMOL/L (ref 3.5–5.1)
PROT SERPL-MCNC: 6.5 G/DL (ref 6–8.4)
PROT UR QL STRIP: NEGATIVE
RBC # BLD AUTO: 3.71 M/UL (ref 4–5.4)
RBC #/AREA URNS AUTO: 2 /HPF (ref 0–4)
SODIUM SERPL-SCNC: 137 MMOL/L (ref 136–145)
SP GR UR STRIP: 1.01 (ref 1–1.03)
SQUAMOUS #/AREA URNS AUTO: 9 /HPF
TROPONIN I SERPL DL<=0.01 NG/ML-MCNC: 0.01 NG/ML (ref 0–0.03)
TROPONIN I SERPL DL<=0.01 NG/ML-MCNC: <0.006 NG/ML (ref 0–0.03)
URN SPEC COLLECT METH UR: ABNORMAL
WBC # BLD AUTO: 4.46 K/UL (ref 3.9–12.7)
WBC #/AREA URNS AUTO: 51 /HPF (ref 0–5)

## 2019-12-29 PROCEDURE — G0378 HOSPITAL OBSERVATION PER HR: HCPCS | Mod: HCNC

## 2019-12-29 PROCEDURE — 82272 OCCULT BLD FECES 1-3 TESTS: CPT | Mod: HCNC

## 2019-12-29 PROCEDURE — 83605 ASSAY OF LACTIC ACID: CPT | Mod: HCNC

## 2019-12-29 PROCEDURE — 99285 PR EMERGENCY DEPT VISIT,LEVEL V: ICD-10-PCS | Mod: ,,, | Performed by: EMERGENCY MEDICINE

## 2019-12-29 PROCEDURE — 85025 COMPLETE CBC W/AUTO DIFF WBC: CPT | Mod: HCNC

## 2019-12-29 PROCEDURE — 63600175 PHARM REV CODE 636 W HCPCS: Mod: HCNC | Performed by: PHYSICIAN ASSISTANT

## 2019-12-29 PROCEDURE — 93010 ELECTROCARDIOGRAM REPORT: CPT | Mod: HCNC,,, | Performed by: INTERNAL MEDICINE

## 2019-12-29 PROCEDURE — 81001 URINALYSIS AUTO W/SCOPE: CPT | Mod: HCNC

## 2019-12-29 PROCEDURE — 87086 URINE CULTURE/COLONY COUNT: CPT | Mod: HCNC

## 2019-12-29 PROCEDURE — 84484 ASSAY OF TROPONIN QUANT: CPT | Mod: 91,HCNC

## 2019-12-29 PROCEDURE — 25000003 PHARM REV CODE 250: Mod: HCNC | Performed by: PHYSICIAN ASSISTANT

## 2019-12-29 PROCEDURE — 80053 COMPREHEN METABOLIC PANEL: CPT | Mod: HCNC

## 2019-12-29 PROCEDURE — 84484 ASSAY OF TROPONIN QUANT: CPT | Mod: HCNC

## 2019-12-29 PROCEDURE — 11000001 HC ACUTE MED/SURG PRIVATE ROOM: Mod: HCNC

## 2019-12-29 PROCEDURE — 99285 EMERGENCY DEPT VISIT HI MDM: CPT | Mod: 25,HCNC

## 2019-12-29 PROCEDURE — 96360 HYDRATION IV INFUSION INIT: CPT | Mod: HCNC

## 2019-12-29 PROCEDURE — 93010 EKG 12-LEAD: ICD-10-PCS | Mod: HCNC,,, | Performed by: INTERNAL MEDICINE

## 2019-12-29 PROCEDURE — 99223 PR INITIAL HOSPITAL CARE,LEVL III: ICD-10-PCS | Mod: HCNC,AI,, | Performed by: PHYSICIAN ASSISTANT

## 2019-12-29 PROCEDURE — 93005 ELECTROCARDIOGRAM TRACING: CPT

## 2019-12-29 PROCEDURE — 36415 COLL VENOUS BLD VENIPUNCTURE: CPT | Mod: HCNC

## 2019-12-29 PROCEDURE — 99223 1ST HOSP IP/OBS HIGH 75: CPT | Mod: HCNC,AI,, | Performed by: PHYSICIAN ASSISTANT

## 2019-12-29 PROCEDURE — 99285 EMERGENCY DEPT VISIT HI MDM: CPT | Mod: ,,, | Performed by: EMERGENCY MEDICINE

## 2019-12-29 RX ORDER — IPRATROPIUM BROMIDE AND ALBUTEROL SULFATE 2.5; .5 MG/3ML; MG/3ML
3 SOLUTION RESPIRATORY (INHALATION) EVERY 4 HOURS PRN
Status: DISCONTINUED | OUTPATIENT
Start: 2019-12-29 | End: 2019-12-30 | Stop reason: HOSPADM

## 2019-12-29 RX ORDER — ACETAMINOPHEN 325 MG/1
650 TABLET ORAL EVERY 4 HOURS PRN
Status: DISCONTINUED | OUTPATIENT
Start: 2019-12-29 | End: 2019-12-30 | Stop reason: HOSPADM

## 2019-12-29 RX ORDER — IBUPROFEN 200 MG
24 TABLET ORAL
Status: DISCONTINUED | OUTPATIENT
Start: 2019-12-29 | End: 2019-12-30 | Stop reason: HOSPADM

## 2019-12-29 RX ORDER — CILOSTAZOL 50 MG/1
50 TABLET ORAL 2 TIMES DAILY
Status: DISCONTINUED | OUTPATIENT
Start: 2019-12-29 | End: 2019-12-30 | Stop reason: HOSPADM

## 2019-12-29 RX ORDER — CHLORTHALIDONE 25 MG/1
25 TABLET ORAL DAILY
Status: DISCONTINUED | OUTPATIENT
Start: 2019-12-30 | End: 2019-12-29

## 2019-12-29 RX ORDER — ONDANSETRON 8 MG/1
8 TABLET, ORALLY DISINTEGRATING ORAL EVERY 8 HOURS PRN
Status: DISCONTINUED | OUTPATIENT
Start: 2019-12-29 | End: 2019-12-30 | Stop reason: HOSPADM

## 2019-12-29 RX ORDER — CITALOPRAM 20 MG/1
20 TABLET, FILM COATED ORAL DAILY
Status: DISCONTINUED | OUTPATIENT
Start: 2019-12-30 | End: 2019-12-30 | Stop reason: HOSPADM

## 2019-12-29 RX ORDER — OLMESARTAN MEDOXOMIL 40 MG/1
40 TABLET ORAL DAILY
Status: DISCONTINUED | OUTPATIENT
Start: 2019-12-30 | End: 2019-12-29

## 2019-12-29 RX ORDER — TALC
6 POWDER (GRAM) TOPICAL NIGHTLY PRN
Status: DISCONTINUED | OUTPATIENT
Start: 2019-12-29 | End: 2019-12-30 | Stop reason: HOSPADM

## 2019-12-29 RX ORDER — SODIUM CHLORIDE 0.9 % (FLUSH) 0.9 %
5 SYRINGE (ML) INJECTION
Status: DISCONTINUED | OUTPATIENT
Start: 2019-12-29 | End: 2019-12-30 | Stop reason: HOSPADM

## 2019-12-29 RX ORDER — GLUCAGON 1 MG
1 KIT INJECTION
Status: DISCONTINUED | OUTPATIENT
Start: 2019-12-29 | End: 2019-12-30 | Stop reason: HOSPADM

## 2019-12-29 RX ORDER — HYDRALAZINE HYDROCHLORIDE 25 MG/1
25 TABLET, FILM COATED ORAL EVERY 8 HOURS PRN
Status: DISCONTINUED | OUTPATIENT
Start: 2019-12-29 | End: 2019-12-30 | Stop reason: HOSPADM

## 2019-12-29 RX ORDER — CARVEDILOL 25 MG/1
25 TABLET ORAL 2 TIMES DAILY WITH MEALS
Status: DISCONTINUED | OUTPATIENT
Start: 2019-12-29 | End: 2019-12-30 | Stop reason: HOSPADM

## 2019-12-29 RX ORDER — ASPIRIN 81 MG/1
81 TABLET ORAL DAILY
Status: DISCONTINUED | OUTPATIENT
Start: 2019-12-30 | End: 2019-12-30 | Stop reason: HOSPADM

## 2019-12-29 RX ORDER — IBUPROFEN 200 MG
16 TABLET ORAL
Status: DISCONTINUED | OUTPATIENT
Start: 2019-12-29 | End: 2019-12-30 | Stop reason: HOSPADM

## 2019-12-29 RX ORDER — POLYETHYLENE GLYCOL 3350 17 G/17G
17 POWDER, FOR SOLUTION ORAL DAILY
Status: DISCONTINUED | OUTPATIENT
Start: 2019-12-30 | End: 2019-12-30 | Stop reason: HOSPADM

## 2019-12-29 RX ORDER — ACETAMINOPHEN 500 MG
1000 TABLET ORAL EVERY 8 HOURS PRN
Status: DISCONTINUED | OUTPATIENT
Start: 2019-12-29 | End: 2019-12-30 | Stop reason: HOSPADM

## 2019-12-29 RX ORDER — BISACODYL 10 MG
10 SUPPOSITORY, RECTAL RECTAL DAILY PRN
Status: DISCONTINUED | OUTPATIENT
Start: 2019-12-29 | End: 2019-12-30 | Stop reason: HOSPADM

## 2019-12-29 RX ORDER — CLOPIDOGREL BISULFATE 75 MG/1
75 TABLET ORAL ONCE
Status: COMPLETED | OUTPATIENT
Start: 2019-12-29 | End: 2019-12-29

## 2019-12-29 RX ADMIN — SODIUM CHLORIDE 1000 ML: 0.9 INJECTION, SOLUTION INTRAVENOUS at 04:12

## 2019-12-29 RX ADMIN — CILOSTAZOL 50 MG: 50 TABLET ORAL at 09:12

## 2019-12-29 RX ADMIN — CARVEDILOL 25 MG: 25 TABLET, FILM COATED ORAL at 09:12

## 2019-12-29 RX ADMIN — SODIUM CHLORIDE 1000 ML: 0.9 INJECTION, SOLUTION INTRAVENOUS at 09:12

## 2019-12-29 RX ADMIN — CLOPIDOGREL BISULFATE 75 MG: 75 TABLET ORAL at 09:12

## 2019-12-29 RX ADMIN — HYDRALAZINE HYDROCHLORIDE 25 MG: 25 TABLET, FILM COATED ORAL at 11:12

## 2019-12-29 NOTE — EKG INTERPRETATIONS - EMERGENCY DEPT.
Independently interpreted by MD:  Rate 72, NSR, no stemi, PVC x 2, no hypertrophy, nonspecific ST changes

## 2019-12-29 NOTE — ED PROVIDER NOTES
Encounter Date: 12/29/2019       History     Chief Complaint   Patient presents with    Loss of Consciousness     arrived to ED via EMS with c/o syncope x2, each lasting approx 10 seconds each, witness states patient passed out when standing up, returned to baseline when she was lying down then passed out again when she tried to get up, denies injury     87 yo F with medical comorbidities significant for HTN, stroke, NSTEMI in 2017, renal artery stenosis, dyslipidemia, PAD presents to the ED for evaluation of syncope.  Patient's daughter witnessed the episode.  Patient denies any prodromal symptoms.  Patient's daughter witnessed the episode.  Daughter states that the patient reached for her and appeared like she was going to faint.  Pt's daughter caught her and guided her to the ground. She was unconscious for about 10 sec.  Daughter and other family members moved patient to a chair. She states that patient passed out again when they sat her up.  Pt denies any chest pain, SOB, palpitations, n/v, headache, dark or bloody stool, dysuria or other urinary symptoms, fever.          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   HENT: Negative for sore throat.    Respiratory: Negative for  shortness of breath.    Cardiovascular: Negative for chest pain and palpitations.   Gastrointestinal: Negative for abdominal pain, blood in stool, diarrhea, nausea and vomiting.   Genitourinary: Negative for dysuria, frequency and urgency.   Musculoskeletal: Negative for back pain.   Skin: Negative for rash.   Neurological: Positive for syncope. Negative for weakness.   Hematological: Does not bruise/bleed easily.       Physical Exam     Initial Vitals [12/29/19 1546]   BP Pulse Resp Temp SpO2   (!) 90/40 70 16 97.4 °F (36.3 °C) 95 %      MAP       --         Physical Exam    Nursing note and vitals reviewed.  Constitutional: She appears well-developed and well-nourished. She is not diaphoretic.  Non-toxic appearance. She does not appear ill. No distress.   HENT:   Head: Normocephalic and atraumatic.   Eyes: Conjunctivae and EOM are normal. Pupils are equal, round, and reactive to light.   Neck: Neck supple.   Cardiovascular: Normal rate and regular rhythm. Exam reveals no gallop and no friction rub.    No murmur heard.  Pulmonary/Chest: Effort normal. No accessory muscle usage. No tachypnea. No respiratory distress. She has no decreased breath sounds. She has no wheezes. She has no rhonchi. She has no rales.   Abdominal: Soft. She exhibits no distension and no mass. There is no tenderness. There is no guarding.   Genitourinary:   Genitourinary Comments: Med brown stool on GAIL, guaiac negative    Neurological: She is alert.   Skin: No rash noted.   Psychiatric: She has a normal mood and affect. Her behavior is normal.         ED Course   Procedures  Labs Reviewed   CBC W/ AUTO DIFFERENTIAL - Abnormal; Notable for the following components:       Result Value    RBC 3.71 (*)     Hemoglobin 11.1 (*)     Hematocrit 36.3 (*)     Mean Corpuscular Hemoglobin Conc 30.6 (*)     Platelets 148 (*)     Lymph # 0.9 (*)     All other components within normal limits   COMPREHENSIVE METABOLIC PANEL - Abnormal; Notable for the  following components:    CO2 22 (*)     Glucose 118 (*)     BUN, Bld 25 (*)     Albumin 3.4 (*)     ALT 9 (*)     eGFR if  42.9 (*)     eGFR if non  37.2 (*)     All other components within normal limits   LACTIC ACID, PLASMA   TROPONIN I   URINALYSIS, REFLEX TO URINE CULTURE   TROPONIN I     EKG Readings: (Independently Interpreted)   Initial Reading: No STEMI. Rhythm: Normal Sinus Rhythm. Heart Rate: 72. Ectopy: PVCs.     ECG Results          EKG 12-lead (In process)  Result time 12/29/19 16:11:05    In process by Interface, Lab In [facility] (12/29/19 16:11:05)                 Narrative:    Test Reason : R55,    Vent. Rate : 072 BPM     Atrial Rate : 072 BPM     P-R Int : 180 ms          QRS Dur : 078 ms      QT Int : 384 ms       P-R-T Axes : 065 069 061 degrees     QTc Int : 420 ms    Sinus rhythm with occasional Premature ventricular complexes  Nonspecific ST abnormality  Abnormal ECG  When compared with ECG of 04-JUN-2019 15:22,  Premature ventricular complexes are now Present  Nonspecific T wave abnormality no longer evident in Inferior leads    Referred By: System System           Confirmed By:                             Imaging Results          X-Ray Chest PA And Lateral (Final result)  Result time 12/29/19 16:38:53    Final result by Kamryn Dumont MD (12/29/19 16:38:53)                 Impression:      No radiographic evidence for acute cardiopulmonary disease.      Electronically signed by: Kamryn Dumont MD  Date:    12/29/2019  Time:    16:38             Narrative:    EXAMINATION:  XR CHEST PA AND LATERAL    CLINICAL HISTORY:  Syncope and collapse    TECHNIQUE:  PA and lateral views of the chest were performed.    COMPARISON:  06/04/2017    FINDINGS:  The lungs are symmetrically inflated with no mass, nodule, pneumothorax, airspace consolidation or pleural effusion.    The cardiomediastinal silhouette is normal.    The osseous and soft tissue structures are stable  for this patient.                                 Medical Decision Making:   History:   Old Medical Records: I decided to obtain old medical records.  Differential Diagnosis:   My differential diagnosis includes but is not limited to:  Cardiac dysrhythmia, anemia, electrolyte abnormality, ACS   Independently Interpreted Test(s):   I have ordered and independently interpreted EKG Reading(s) - see prior notes  Clinical Tests:   Lab Tests: Ordered  Radiological Study: Ordered  Medical Tests: Ordered  Other:   I have discussed this case with another health care provider.       <> Summary of the Discussion:   Hospital Medicine       APC / Resident Notes:   86-year-old female presents to the ED with 2 syncopal episodes today, both witnessed  By family. Initial BP is 90/40.  Patient is well-appearing and in no acute distress.  Neuro intact.    EKG reveals PVCs without evidence of ischemia or other life-threatening arrhythmia.  Initial troponin is negative.  Chest x-ray is unremarkable.  Other blood work unremarkable.      Patient will be placed in observation for cardiac monitoring and further management.     I have reviewed the patient's records and discussed this case with my supervising physician.  Please be advised that this text was dictated with [employer] software and may contain dictation errors.                               Clinical Impression:       ICD-10-CM ICD-9-CM   1. Syncope R55 780.2         Disposition:   Disposition: Placed in Observation  Condition: Fair                       Kamryn Mares PA-C  12/29/19 [id number]

## 2019-12-29 NOTE — ED TRIAGE NOTES
Naheed Cottrell, a 86 y.o. female presents to the ED w/ complaint of multiple episodes of syncopy.  Pt states that she may have not been drinking enough water.  Pt denies CP, SOB, dysuria, constipation, fever or chills. Family member assisted pt to the ground and no trauma.  Pt denies being dizziness and palpitations during the episodes.        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    LOC: The patient is awake, alert and aware of environment with an appropriate affect, the patient is oriented x 3 and speaking appropriately.  APPEARANCE: Patient resting comfortably and in no acute distress, patient is clean and well groomed, patient's clothing is properly fastened.  SKIN: The skin is warm and dry, color consistent with ethnicity, patient has normal skin turgor and moist mucus membranes, skin intact, no breakdown or bruising noted.  MUSCULOSKELETAL: Patient moving all extremities spontaneously, no obvious swelling or deformities noted.  RESPIRATORY: Airway is open and patent, respirations are spontaneous, patient has a normal effort and rate, no accessory muscle use noted, bilateral breath sounds clear.  CARDIAC: Patient NS with PVCs, no periphreal edema noted, capillary refill < 3 seconds.  ABDOMEN: Soft and non tender  to palpation, no distention noted, normoactive bowel sounds present in all four quadrants.  NEUROLOGIC: PERRL, eyes open spontaneously, behavior appropriate to situation, follows commands.

## 2019-12-29 NOTE — HPI
Ms. Cottrell is a 85 yo F, Cardiology consulted for syncope x2. (Son: Sydney Cottrell, Primary cardiologist Dr. Braswell), PMhx of HTN, stroke, renal artery stenosis, L subclavian stenosis, dyslipidemia, PAD (on DAPT) who presented to the ED for evaluation of syncope. Patient seen with family at bedside. They state she was feeling well at baseline, no complaint, after supper while standing, suffered a witnessed LOC, daughter held her from falling and guided her to the ground. They assisted her to a chair. Patient +LoC 10 seconds, following this regained consciousness and was at baseline mentation. Around 5 minutes after, again LOC for 10 seconds, with baseline mentation. Patient denies any prodromal symptoms, she was feeling fine. No recent medication changes, took her anti-HTN as routinely, reports good PO intake. Only complaint was feeling tired during Xmas. Patient does have a history of syncope x2, once during urosepsis and other time when she was dehydrated in the summer. Patient denies any urinary or bowel incontinence, no history of seizures, denies  any chest pain, SOB, palpitations, n/v, headache, dark or bloody stool, dysuria or other urinary symptoms, fever. She feels at baseline health, able to complete all of her ADLs.     Upon EMS evaluation BP was noted to be very low, given 200cc NS bolus, [place] [facility] [place] was clinically orthostatic felt lightheaded with changes in position (BP 90/40). Upon my arrival she has completed 1L NS bolus, orthostatics were negative. EKG with NSR with PJC / PVCs, unchanged compared to prior, no acute ST changes. CXR unremarkable. CBC wnl, CMP wnl.

## 2019-12-29 NOTE — SUBJECTIVE & OBJECTIVE
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      Current Outpatient Medications on File Prior to Encounter   Medication Sig    acetaminophen (TYLENOL) 500 MG tablet Take 1,000 mg by mouth every 6 (six) hours as needed for Pain.    alirocumab (PRALUENT PEN) 75 mg/mL PnIj Inject 1 mL (75 mg total) into the skin every 14 (fourteen) days.    aspirin (ECOTRIN) 81 MG EC tablet Take 1 tablet (81 mg total) by mouth once daily.    calcium-vitamin D3 500 mg(1,250mg) -200 unit per tablet Take 2 tablets by mouth 2 (two) times daily.    carvedilol (COREG) 25 MG tablet Take 1 tablet (25 mg total) by mouth 2 (two) times daily with meals.    chlorthalidone (HYGROTEN) 25 MG Tab Take 1 tablet (25 mg total) by mouth once daily.    cholecalciferol, vitamin D3, 1,000 unit capsule Take 1 capsule (1,000 Units total) by mouth once daily.    cilostazol (PLETAL) 50 MG Tab Take 1 tablet (50 mg total) by mouth 2 (two) times daily.    citalopram (CELEXA) 20 MG tablet Take 1 tablet (20 mg total) by mouth once daily.    clopidogrel (PLAVIX) 75 mg tablet TAKE 1 TABLET(75 MG) BY MOUTH EVERY DAY    coenzyme Q10 (CO Q-10) 10 mg capsule Take 10 mg by mouth once daily.    diclofenac sodium (VOLTAREN) 1 % Gel Apply 2 g topically 2 (two) times daily.    olmesartan (BENICAR) 40 MG tablet TAKE 1 TABLET(40 MG) BY MOUTH EVERY DAY    olmesartan (BENICAR) 40 MG tablet TAKE 1 TABLET(40 MG) BY MOUTH EVERY DAY     Family History     Problem Relation (Age of Onset)     Hyperlipidemia Mother    Hypertension Mother        Tobacco Use    Smoking status: Never Smoker    Smokeless tobacco: Never Used   Substance and Sexual Activity    Alcohol use: No    Drug use: No    Sexual activity: Not on file     Review of Systems   Constitution: Negative for chills, decreased appetite, diaphoresis, fever, weight gain and weight loss.   Eyes: Negative for blurred vision.   Cardiovascular: Positive for syncope. Negative for chest pain, dyspnea on exertion, irregular heartbeat, leg swelling, near-syncope, orthopnea and palpitations.   Respiratory: Negative for cough, shortness of breath, snoring and wheezing.    Gastrointestinal: Negative for abdominal pain, nausea and vomiting.   Genitourinary: Negative for bladder incontinence and urgency.     Objective:     Vital Signs (Most Recent):  Temp: 97.4 °F (36.3 °C) (12/29/19 1546)  Pulse: 79 (12/29/19 1718)  Resp: (P) 20 (12/29/19 1619)  BP: (!) 169/75 (12/29/19 1718)  SpO2: 98 % (12/29/19 1718) Vital Signs (24h Range):  Temp:  [97.4 °F (36.3 °C)] 97.4 °F (36.3 °C)  Pulse:  [67-79] 79  Resp:  [16-22] (P) 20  SpO2:  [95 %-98 %] 98 %  BP: ()/(40-75) 169/75       Weight: 61.7 kg (136 lb)  Body mass index is 24.87 kg/m².    SpO2: 98 %  O2 Device (Oxygen Therapy): room air    Physical Exam   Constitutional: She is oriented to person, place, and time. She appears well-developed and well-nourished. No distress.   HENT:   Head: Normocephalic and atraumatic.   Eyes: Pupils are equal, round, and reactive to light. Conjunctivae are normal.   Neck: Normal range of motion. Neck supple. No JVD present. Carotid bruit is present. No thyromegaly present.   Cardiovascular: Normal rate, regular rhythm and intact distal pulses. Exam reveals no gallop and no friction rub.   Murmur heard.  Pulses:       Carotid pulses are 2+ on the right side, and 2+ on the left side.       Radial pulses are 2+ on the right side, and 2+ on the left side.   Pulmonary/Chest: Effort  normal and breath sounds normal. No respiratory distress. She has no wheezes.   Abdominal: Soft. Bowel sounds are normal. She exhibits no distension. There is no tenderness.   Musculoskeletal: Normal range of motion.   Neurological: She is alert and oriented to person, place, and time.   Skin: She is not diaphoretic.       Significant Labs:   CMP:   Recent Labs   Lab 12/29/19  1612      K 3.9      CO2 22*   *   BUN 25*   CREATININE 1.3   CALCIUM 8.9   PROT 6.5   ALBUMIN 3.4*   BILITOT 0.3   ALKPHOS 60   AST 21   ALT 9*   ANIONGAP 12   ESTGFRAFRICA 42.9*   EGFRNONAA 37.2*   , CBC:   Recent Labs   Lab 12/29/19  1612   WBC 4.46   HGB 11.1*   HCT 36.3*   *   , INR: No results for input(s): INR, PROTIME in the last 48 hours. and Lipid Panel No results for input(s): CHOL, HDL, LDLCALC, TRIG, CHOLHDL in the last 48 hours.    Significant Imaging: Ejection fraction: No results for input(s): EF in the last 168 hours.

## 2019-12-30 ENCOUNTER — CLINICAL SUPPORT (OUTPATIENT)
Dept: CARDIOLOGY | Facility: HOSPITAL | Age: 84
DRG: 312 | End: 2019-12-30
Attending: INTERNAL MEDICINE
Payer: MEDICARE

## 2019-12-30 VITALS
TEMPERATURE: 98 F | OXYGEN SATURATION: 96 % | HEART RATE: 73 BPM | DIASTOLIC BLOOD PRESSURE: 80 MMHG | WEIGHT: 145 LBS | RESPIRATION RATE: 17 BRPM | HEIGHT: 62 IN | BODY MASS INDEX: 26.68 KG/M2 | SYSTOLIC BLOOD PRESSURE: 193 MMHG

## 2019-12-30 DIAGNOSIS — R55 SYNCOPE: ICD-10-CM

## 2019-12-30 PROBLEM — I95.1 ORTHOSTATIC HYPOTENSION: Status: RESOLVED | Noted: 2019-12-29 | Resolved: 2019-12-30

## 2019-12-30 PROBLEM — R82.71 ASYMPTOMATIC BACTERIURIA: Status: ACTIVE | Noted: 2019-12-30

## 2019-12-30 LAB
ANION GAP SERPL CALC-SCNC: 9 MMOL/L (ref 8–16)
ASCENDING AORTA: 2.82 CM
AV INDEX (PROSTH): 0.71
AV MEAN GRADIENT: 6 MMHG
AV PEAK GRADIENT: 11 MMHG
AV VALVE AREA: 1.75 CM2
AV VELOCITY RATIO: 0.65
BACTERIA UR CULT: NORMAL
BACTERIA UR CULT: NORMAL
BASOPHILS # BLD AUTO: 0.02 K/UL (ref 0–0.2)
BASOPHILS NFR BLD: 0.5 % (ref 0–1.9)
BSA FOR ECHO PROCEDURE: 1.7 M2
BUN SERPL-MCNC: 22 MG/DL (ref 8–23)
CALCIUM SERPL-MCNC: 8.5 MG/DL (ref 8.7–10.5)
CHLORIDE SERPL-SCNC: 107 MMOL/L (ref 95–110)
CO2 SERPL-SCNC: 24 MMOL/L (ref 23–29)
CREAT SERPL-MCNC: 0.9 MG/DL (ref 0.5–1.4)
CV ECHO LV RWT: 0.45 CM
D DIMER PPP IA.FEU-MCNC: 0.96 MG/L FEU
DIFFERENTIAL METHOD: ABNORMAL
DOP CALC AO PEAK VEL: 1.68 M/S
DOP CALC AO VTI: 37.44 CM
DOP CALC LVOT AREA: 2.5 CM2
DOP CALC LVOT DIAMETER: 1.78 CM
DOP CALC LVOT PEAK VEL: 1.09 M/S
DOP CALC LVOT STROKE VOLUME: 65.69 CM3
DOP CALCLVOT PEAK VEL VTI: 26.41 CM
E WAVE DECELERATION TIME: 211.5 MSEC
E/A RATIO: 0.83
E/E' RATIO: 11.33 M/S
ECHO LV POSTERIOR WALL: 0.84 CM (ref 0.6–1.1)
EOSINOPHIL # BLD AUTO: 0 K/UL (ref 0–0.5)
EOSINOPHIL NFR BLD: 0.8 % (ref 0–8)
ERYTHROCYTE [DISTWIDTH] IN BLOOD BY AUTOMATED COUNT: 13.9 % (ref 11.5–14.5)
EST. GFR  (AFRICAN AMERICAN): >60 ML/MIN/1.73 M^2
EST. GFR  (NON AFRICAN AMERICAN): 58.1 ML/MIN/1.73 M^2
ESTIMATED AVG GLUCOSE: 108 MG/DL (ref 68–131)
FRACTIONAL SHORTENING: 37 % (ref 28–44)
GLUCOSE SERPL-MCNC: 92 MG/DL (ref 70–110)
HBA1C MFR BLD HPLC: 5.4 % (ref 4–5.6)
HCT VFR BLD AUTO: 35.9 % (ref 37–48.5)
HGB BLD-MCNC: 10.7 G/DL (ref 12–16)
IMM GRANULOCYTES # BLD AUTO: 0.01 K/UL (ref 0–0.04)
IMM GRANULOCYTES NFR BLD AUTO: 0.3 % (ref 0–0.5)
INTERVENTRICULAR SEPTUM: 0.87 CM (ref 0.6–1.1)
IVRT: 0.07 MSEC
LA MAJOR: 4.8 CM
LA MINOR: 4.9 CM
LA WIDTH: 3.79 CM
LEFT ATRIUM SIZE: 3.63 CM
LEFT ATRIUM VOLUME INDEX: 34 ML/M2
LEFT ATRIUM VOLUME: 56.71 CM3
LEFT INTERNAL DIMENSION IN SYSTOLE: 2.34 CM (ref 2.1–4)
LEFT VENTRICLE DIASTOLIC VOLUME INDEX: 35.28 ML/M2
LEFT VENTRICLE DIASTOLIC VOLUME: 58.82 ML
LEFT VENTRICLE MASS INDEX: 55 G/M2
LEFT VENTRICLE SYSTOLIC VOLUME INDEX: 11.4 ML/M2
LEFT VENTRICLE SYSTOLIC VOLUME: 18.96 ML
LEFT VENTRICULAR INTERNAL DIMENSION IN DIASTOLE: 3.72 CM (ref 3.5–6)
LEFT VENTRICULAR MASS: 90.98 G
LV LATERAL E/E' RATIO: 10.63 M/S
LV SEPTAL E/E' RATIO: 12.14 M/S
LYMPHOCYTES # BLD AUTO: 1 K/UL (ref 1–4.8)
LYMPHOCYTES NFR BLD: 25.8 % (ref 18–48)
MAGNESIUM SERPL-MCNC: 1.6 MG/DL (ref 1.6–2.6)
MCH RBC QN AUTO: 29.2 PG (ref 27–31)
MCHC RBC AUTO-ENTMCNC: 29.8 G/DL (ref 32–36)
MCV RBC AUTO: 98 FL (ref 82–98)
MONOCYTES # BLD AUTO: 0.5 K/UL (ref 0.3–1)
MONOCYTES NFR BLD: 11.6 % (ref 4–15)
MV PEAK A VEL: 1.02 M/S
MV PEAK E VEL: 0.85 M/S
NEUTROPHILS # BLD AUTO: 2.4 K/UL (ref 1.8–7.7)
NEUTROPHILS NFR BLD: 61 % (ref 38–73)
NRBC BLD-RTO: 0 /100 WBC
PHOSPHATE SERPL-MCNC: 2.8 MG/DL (ref 2.7–4.5)
PISA TR MAX VEL: 2.63 M/S
PLATELET # BLD AUTO: 137 K/UL (ref 150–350)
PMV BLD AUTO: 10 FL (ref 9.2–12.9)
POTASSIUM SERPL-SCNC: 3.6 MMOL/L (ref 3.5–5.1)
PULM VEIN S/D RATIO: 1.16
PV PEAK D VEL: 0.58 M/S
PV PEAK S VEL: 0.67 M/S
RA MAJOR: 4.02 CM
RA PRESSURE: 3 MMHG
RA WIDTH: 4.02 CM
RBC # BLD AUTO: 3.66 M/UL (ref 4–5.4)
RIGHT VENTRICULAR END-DIASTOLIC DIMENSION: 3.29 CM
RV TISSUE DOPPLER FREE WALL SYSTOLIC VELOCITY 1 (APICAL 4 CHAMBER VIEW): 17.78 CM/S
SINUS: 2.45 CM
SODIUM SERPL-SCNC: 140 MMOL/L (ref 136–145)
STJ: 2.86 CM
TDI LATERAL: 0.08 M/S
TDI SEPTAL: 0.07 M/S
TDI: 0.08 M/S
TR MAX PG: 28 MMHG
TRICUSPID ANNULAR PLANE SYSTOLIC EXCURSION: 2.25 CM
TV REST PULMONARY ARTERY PRESSURE: 31 MMHG
WBC # BLD AUTO: 3.88 K/UL (ref 3.9–12.7)

## 2019-12-30 PROCEDURE — 90471 IMMUNIZATION ADMIN: CPT | Mod: HCNC | Performed by: INTERNAL MEDICINE

## 2019-12-30 PROCEDURE — 25000003 PHARM REV CODE 250: Mod: HCNC | Performed by: PHYSICIAN ASSISTANT

## 2019-12-30 PROCEDURE — 99222 1ST HOSP IP/OBS MODERATE 55: CPT | Mod: HCNC,GC,, | Performed by: INTERNAL MEDICINE

## 2019-12-30 PROCEDURE — 25000003 PHARM REV CODE 250: Mod: HCNC

## 2019-12-30 PROCEDURE — 83036 HEMOGLOBIN GLYCOSYLATED A1C: CPT | Mod: HCNC

## 2019-12-30 PROCEDURE — 83735 ASSAY OF MAGNESIUM: CPT | Mod: HCNC

## 2019-12-30 PROCEDURE — G0378 HOSPITAL OBSERVATION PER HR: HCPCS | Mod: HCNC

## 2019-12-30 PROCEDURE — 85025 COMPLETE CBC W/AUTO DIFF WBC: CPT | Mod: HCNC

## 2019-12-30 PROCEDURE — G0008 ADMIN INFLUENZA VIRUS VAC: HCPCS | Mod: HCNC | Performed by: INTERNAL MEDICINE

## 2019-12-30 PROCEDURE — 63600175 PHARM REV CODE 636 W HCPCS: Mod: HCNC | Performed by: INTERNAL MEDICINE

## 2019-12-30 PROCEDURE — 90662 IIV NO PRSV INCREASED AG IM: CPT | Mod: HCNC | Performed by: INTERNAL MEDICINE

## 2019-12-30 PROCEDURE — 93271 ECG/MONITORING AND ANALYSIS: CPT | Mod: HCNC

## 2019-12-30 PROCEDURE — 99232 SBSQ HOSP IP/OBS MODERATE 35: CPT | Mod: HCNC,,,

## 2019-12-30 PROCEDURE — 99232 PR SUBSEQUENT HOSPITAL CARE,LEVL II: ICD-10-PCS | Mod: HCNC,,,

## 2019-12-30 PROCEDURE — 80048 BASIC METABOLIC PNL TOTAL CA: CPT | Mod: HCNC

## 2019-12-30 PROCEDURE — 85379 FIBRIN DEGRADATION QUANT: CPT | Mod: HCNC

## 2019-12-30 PROCEDURE — 36415 COLL VENOUS BLD VENIPUNCTURE: CPT | Mod: HCNC

## 2019-12-30 PROCEDURE — 11000001 HC ACUTE MED/SURG PRIVATE ROOM: Mod: HCNC

## 2019-12-30 PROCEDURE — 84100 ASSAY OF PHOSPHORUS: CPT | Mod: HCNC

## 2019-12-30 PROCEDURE — 99222 PR INITIAL HOSPITAL CARE,LEVL II: ICD-10-PCS | Mod: HCNC,GC,, | Performed by: INTERNAL MEDICINE

## 2019-12-30 RX ORDER — OLMESARTAN MEDOXOMIL 40 MG/1
40 TABLET ORAL DAILY
Status: DISCONTINUED | OUTPATIENT
Start: 2019-12-30 | End: 2019-12-30

## 2019-12-30 RX ORDER — OLMESARTAN MEDOXOMIL 40 MG/1
40 TABLET ORAL DAILY
Status: DISCONTINUED | OUTPATIENT
Start: 2019-12-30 | End: 2019-12-30 | Stop reason: HOSPADM

## 2019-12-30 RX ORDER — HYDRALAZINE HYDROCHLORIDE 25 MG/1
25 TABLET, FILM COATED ORAL ONCE
Status: COMPLETED | OUTPATIENT
Start: 2019-12-30 | End: 2019-12-30

## 2019-12-30 RX ORDER — CLOPIDOGREL BISULFATE 75 MG/1
75 TABLET ORAL DAILY
Status: DISCONTINUED | OUTPATIENT
Start: 2019-12-30 | End: 2019-12-30 | Stop reason: HOSPADM

## 2019-12-30 RX ADMIN — CILOSTAZOL 50 MG: 50 TABLET ORAL at 10:12

## 2019-12-30 RX ADMIN — CARVEDILOL 25 MG: 25 TABLET, FILM COATED ORAL at 07:12

## 2019-12-30 RX ADMIN — INFLUENZA A VIRUS A/MICHIGAN/45/2015 X-275 (H1N1) ANTIGEN (FORMALDEHYDE INACTIVATED), INFLUENZA A VIRUS A/SINGAPORE/INFIMH-16-0019/2016 IVR-186 (H3N2) ANTIGEN (FORMALDEHYDE INACTIVATED), AND INFLUENZA B VIRUS B/MARYLAND/15/2016 BX-69A (A B/COLORADO/6/2017-LIKE VIRUS) ANTIGEN (FORMALDEHYDE INACTIVATED) 0.5 ML: 60; 60; 60 INJECTION, SUSPENSION INTRAMUSCULAR at 05:12

## 2019-12-30 RX ADMIN — OLMESARTAN MEDOXOMIL 40 MG: 40 TABLET, FILM COATED ORAL at 05:12

## 2019-12-30 RX ADMIN — CITALOPRAM HYDROBROMIDE 20 MG: 20 TABLET ORAL at 10:12

## 2019-12-30 RX ADMIN — CARVEDILOL 25 MG: 25 TABLET, FILM COATED ORAL at 05:12

## 2019-12-30 RX ADMIN — ASPIRIN 81 MG: 81 TABLET, COATED ORAL at 10:12

## 2019-12-30 RX ADMIN — Medication 6 MG: at 01:12

## 2019-12-30 RX ADMIN — CLOPIDOGREL BISULFATE 75 MG: 75 TABLET ORAL at 05:12

## 2019-12-30 RX ADMIN — HYDRALAZINE HYDROCHLORIDE 25 MG: 25 TABLET, FILM COATED ORAL at 03:12

## 2019-12-30 NOTE — PLAN OF CARE
Pt medically ready for discharge pending 30 day event monitor, CM left a message with Armand at [phone]. CM awaiting return call.

## 2019-12-30 NOTE — ASSESSMENT & PLAN NOTE
- UA nitrite negative, 2+leuks, and 51 WBC, but contaminated with 9 squamE  - asymptomatic  - hold on ABX  - UA reflexed to culture, follow those results

## 2019-12-30 NOTE — SUBJECTIVE & OBJECTIVE
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      Current Outpatient Medications on File Prior to Encounter   Medication Sig    acetaminophen (TYLENOL) 500 MG tablet Take 1,000 mg by mouth every 6 (six) hours as needed for Pain.    alirocumab (PRALUENT PEN) 75 mg/mL PnIj Inject 1 mL (75 mg total) into the skin every 14 (fourteen) days.    aspirin (ECOTRIN) 81 MG EC tablet Take 1 tablet (81 mg total) by mouth once daily.    calcium-vitamin D3 500 mg(1,250mg) -200 unit per tablet Take 2 tablets by mouth 2 (two) times daily.    carvedilol (COREG) 25 MG tablet Take 1 tablet (25 mg total) by mouth 2 (two) times daily with meals.    chlorthalidone (HYGROTEN) 25 MG Tab Take 1 tablet (25 mg total) by mouth once daily.    cholecalciferol, vitamin D3, 1,000 unit capsule Take 1 capsule (1,000 Units total) by mouth once daily.    cilostazol (PLETAL) 50 MG Tab Take 1 tablet (50 mg total) by mouth 2 (two) times daily.    citalopram (CELEXA) 20 MG tablet Take 1 tablet (20 mg total) by mouth once daily.    clopidogrel (PLAVIX) 75 mg tablet TAKE 1 TABLET(75 MG) BY MOUTH EVERY DAY    coenzyme Q10 (CO Q-10) 10 mg capsule Take 10 mg by mouth once daily.    diclofenac sodium (VOLTAREN) 1 % Gel Apply 2 g topically 2 (two) times daily.    olmesartan (BENICAR) 40 MG tablet TAKE 1 TABLET(40 MG) BY MOUTH EVERY DAY    olmesartan (BENICAR) 40 MG tablet TAKE 1 TABLET(40 MG) BY MOUTH EVERY DAY     Family History     Problem Relation (Age of Onset)     Hyperlipidemia Mother    Hypertension Mother        Tobacco Use    Smoking status: Never Smoker    Smokeless tobacco: Never Used   Substance and Sexual Activity    Alcohol use: No    Drug use: No    Sexual activity: Not on file     Review of Systems   Constitutional: Negative for activity change, appetite change, chills, fatigue and fever.   HENT: Negative for congestion, postnasal drip and rhinorrhea.    Eyes: Negative for photophobia and visual disturbance.   Respiratory: Negative for cough, shortness of breath and wheezing.    Cardiovascular: Negative for chest pain, palpitations and leg swelling.   Gastrointestinal: Negative for abdominal distention, abdominal pain, blood in stool, diarrhea, nausea and vomiting.   Genitourinary: Negative for difficulty urinating and dysuria.   Musculoskeletal: Negative for arthralgias, back pain and gait problem.   Skin: Negative for rash and wound.   Allergic/Immunologic: Negative for immunocompromised state.   Neurological: Positive for syncope. Negative for seizures, facial asymmetry, weakness, light-headedness, numbness and headaches.   Psychiatric/Behavioral: Negative for agitation and confusion.     Objective:     Vital Signs (Most Recent):  Temp: 97.6 °F (36.4 °C) (12/29/19 2040)  Pulse: 74 (12/29/19 2040)  Resp: 20 (12/29/19 2040)  BP: (!) 184/77 (12/29/19 2040)  SpO2: 95 % (12/29/19 2040) Vital Signs (24h Range):  Temp:  [97.4 °F (36.3 °C)-97.6 °F (36.4 °C)] 97.6 °F (36.4 °C)  Pulse:  [67-79] 74  Resp:  [16-22] 20  SpO2:  [95 %-98 %] 95 %  BP: ()/(40-77) 184/77     Weight: 62.5 kg (137 lb 12.6 oz)  Body mass index is 25.2 kg/m².    Physical Exam   Constitutional: She is oriented to person, place, and time. She appears well-developed and well-nourished. No distress.   HENT:   Head: Normocephalic and atraumatic.   Eyes: Pupils are equal, round, and reactive to light. EOM are normal.   Neck: Normal range of motion. No JVD present.   Cardiovascular: Normal rate  and regular rhythm.   No murmur heard.  Pulmonary/Chest: Effort normal and breath sounds normal. No stridor. No respiratory distress. She has no wheezes.   Abdominal: Soft. Bowel sounds are normal. She exhibits no distension. There is no tenderness.   Musculoskeletal: Normal range of motion. She exhibits no edema.   Neurological: She is alert and oriented to person, place, and time. No cranial nerve deficit.   5/5 strength throughout  Speech clear  No facial droop  Limited RLE knee flexion 2/2 pain/knee replacement   Skin: Skin is warm and dry. She is not diaphoretic.   Psychiatric: She has a normal mood and affect. Her behavior is normal. Judgment and thought content normal.   Nursing note and vitals reviewed.        CRANIAL NERVES     CN III, IV, VI   Pupils are equal, round, and reactive to light.  Extraocular motions are normal.        Significant Labs:   Blood Culture: No results for input(s): LABBLOO in the last 48 hours.  BMP:   Recent Labs   Lab 12/29/19  1612   *      K 3.9      CO2 22*   BUN 25*   CREATININE 1.3   CALCIUM 8.9     CBC:   Recent Labs   Lab 12/29/19  1612   WBC 4.46   HGB 11.1*   HCT 36.3*   *     CMP:   Recent Labs   Lab 12/29/19  1612      K 3.9      CO2 22*   *   BUN 25*   CREATININE 1.3   CALCIUM 8.9   PROT 6.5   ALBUMIN 3.4*   BILITOT 0.3   ALKPHOS 60   AST 21   ALT 9*   ANIONGAP 12   EGFRNONAA 37.2*     Cardiac Markers: No results for input(s): CKMB, MYOGLOBIN, BNP, TROPISTAT in the last 48 hours.  Troponin:   Recent Labs   Lab 12/29/19  1615   TROPONINI 0.006     TSH: No results for input(s): TSH in the last 4320 hours.  Urine Culture: No results for input(s): LABURIN in the last 48 hours.  Urine Studies: No results for input(s): COLORU, APPEARANCEUA, PHUR, SPECGRAV, PROTEINUA, GLUCUA, KETONESU, BILIRUBINUA, OCCULTUA, NITRITE, UROBILINOGEN, LEUKOCYTESUR, RBCUA, WBCUA, BACTERIA, SQUAMEPITHEL, HYALINECASTS in the last 48 hours.    Invalid  input(s): MANDY    Significant Imaging: I have reviewed all pertinent imaging results/findings within the past 24 hours.

## 2019-12-30 NOTE — NURSING
Patient refusing to wear holter monitor.  Patient and son communicated with Dr Cottrell and the plan is for an alternate cardiac monitoring device to be place at her next appointment.

## 2019-12-30 NOTE — CONSULTS
[facility]  Cardiac Electrophysiology  Consult Note    Admission Date: 12/29/2019  Code Status: Prior   Attending Provider: Donnell Gillette MD  Consulting Provider: Aruna Woo MD  Principal Problem:<principal problem not specified>    Consults  Subjective:     Chief Complaint:       HPI:   Ms. Cottrell is a 85 yo F, Cardiology consulted for syncope x2. (Son: Sydney Cottrell, Primary cardiologist Dr. Braswell), PMhx of HTN, stroke, renal artery stenosis, L subclavian stenosis, dyslipidemia, PAD (on DAPT) who presented to the ED for evaluation of syncope. Patient seen with family at bedside. They state she was feeling well at baseline, no complaint, after supper while standing, suffered a witnessed LOC, daughter held her from falling and guided her to the ground. They assisted her to a chair. Patient +LoC 10 seconds, following this regained consciousness and was at baseline mentation. Around 5 minutes after, again LOC for 10 seconds, with baseline mentation. Patient denies any prodromal symptoms, she was feeling fine. No recent medication changes, took her anti-HTN as routinely, reports good PO intake. Only complaint was feeling tired during Xmas. Patient does have a history of syncope x2, once during urosepsis and other time when she was dehydrated in the summer. Patient denies any urinary or bowel incontinence, no history of seizures, denies  any chest pain, SOB, palpitations, n/v, headache, dark or bloody stool, dysuria or other urinary symptoms, fever. She feels at baseline health, able to complete all of her ADLs.     Upon EMS evaluation BP was noted to be very low, given 200cc NS bolus, in [facility] ER she was clinically orthostatic felt lightheaded with changes in position (BP 90/40). Upon my arrival she has completed 1L NS bolus, orthostatics were negative. EKG with NSR with PJC / PVCs, unchanged compared to prior, no acute ST changes. CXR unremarkable. CBC wnl, CMP wnl.       Past Medical History:    Diagnosis Date    Arthritis     Cataract     Embolic stroke involving right middle cerebral artery 6/6/2017    Encounter for blood transfusion     Fall, accidental     Hypertension     Right renal artery stenosis 3/28/2017    Stenosis of left subclavian artery        Past Surgical History:   Procedure Laterality Date    EYE SURGERY      FRACTURE SURGERY         Review of patient's allergies indicates:  No Known Allergies    No current facility-administered medications on file prior to encounter.      Current Outpatient Medications on File Prior to Encounter   Medication Sig    acetaminophen (TYLENOL) 500 MG tablet Take 1,000 mg by mouth every 6 (six) hours as needed for Pain.    alirocumab (PRALUENT PEN) 75 mg/mL PnIj Inject 1 mL (75 mg total) into the skin every 14 (fourteen) days.    aspirin (ECOTRIN) 81 MG EC tablet Take 1 tablet (81 mg total) by mouth once daily.    calcium-vitamin D3 500 mg(1,250mg) -200 unit per tablet Take 2 tablets by mouth 2 (two) times daily.    carvedilol (COREG) 25 MG tablet Take 1 tablet (25 mg total) by mouth 2 (two) times daily with meals.    chlorthalidone (HYGROTEN) 25 MG Tab Take 1 tablet (25 mg total) by mouth once daily.    cholecalciferol, vitamin D3, 1,000 unit capsule Take 1 capsule (1,000 Units total) by mouth once daily.    cilostazol (PLETAL) 50 MG Tab Take 1 tablet (50 mg total) by mouth 2 (two) times daily.    citalopram (CELEXA) 20 MG tablet Take 1 tablet (20 mg total) by mouth once daily.    clopidogrel (PLAVIX) 75 mg tablet TAKE 1 TABLET(75 MG) BY MOUTH EVERY DAY    coenzyme Q10 (CO Q-10) 10 mg capsule Take 10 mg by mouth once daily.    diclofenac sodium (VOLTAREN) 1 % Gel Apply 2 g topically 2 (two) times daily.    olmesartan (BENICAR) 40 MG tablet TAKE 1 TABLET(40 MG) BY MOUTH EVERY DAY    olmesartan (BENICAR) 40 MG tablet TAKE 1 TABLET(40 MG) BY MOUTH EVERY DAY     Family History     Problem Relation (Age of Onset)    Hyperlipidemia Mother     Hypertension Mother        Tobacco Use    Smoking status: Never Smoker    Smokeless tobacco: Never Used   Substance and Sexual Activity    Alcohol use: No    Drug use: No    Sexual activity: Not on file     Review of Systems   Constitution: Negative for chills, decreased appetite, diaphoresis, fever, weight gain and weight loss.   Eyes: Negative for blurred vision.   Cardiovascular: Positive for syncope. Negative for chest pain, dyspnea on exertion, irregular heartbeat, leg swelling, near-syncope, orthopnea and palpitations.   Respiratory: Negative for cough, shortness of breath, snoring and wheezing.    Gastrointestinal: Negative for abdominal pain, nausea and vomiting.   Genitourinary: Negative for bladder incontinence and urgency.     Objective:     Vital Signs (Most Recent):  Temp: 97.4 °F (36.3 °C) (12/29/19 1546)  Pulse: 79 (12/29/19 1718)  Resp: (P) 20 (12/29/19 1619)  BP: (!) 169/75 (12/29/19 1718)  SpO2: 98 % (12/29/19 1718) Vital Signs (24h Range):  Temp:  [97.4 °F (36.3 °C)] 97.4 °F (36.3 °C)  Pulse:  [67-79] 79  Resp:  [16-22] (P) 20  SpO2:  [95 %-98 %] 98 %  BP: ()/(40-75) 169/75       Weight: 61.7 kg (136 lb)  Body mass index is 24.87 kg/m².    SpO2: 98 %  O2 Device (Oxygen Therapy): room air    Physical Exam   Constitutional: She is oriented to person, place, and time. She appears well-developed and well-nourished. No distress.   HENT:   Head: Normocephalic and atraumatic.   Eyes: Pupils are equal, round, and reactive to light. Conjunctivae are normal.   Neck: Normal range of motion. Neck supple. No JVD present. Carotid bruit is present. No thyromegaly present.   Cardiovascular: Normal rate, regular rhythm and intact distal pulses. Exam reveals no gallop and no friction rub.   Murmur heard.  Pulses:       Carotid pulses are 2+ on the right side, and 2+ on the left side.       Radial pulses are 2+ on the right side, and 2+ on the left side.   Pulmonary/Chest: Effort normal and breath  sounds normal. No respiratory distress. She has no wheezes.   Abdominal: Soft. Bowel sounds are normal. She exhibits no distension. There is no tenderness.   Musculoskeletal: Normal range of motion.   Neurological: She is alert and oriented to person, place, and time.   Skin: She is not diaphoretic.       Significant Labs:   CMP:   Recent Labs   Lab 12/29/19  1612      K 3.9      CO2 22*   *   BUN 25*   CREATININE 1.3   CALCIUM 8.9   PROT 6.5   ALBUMIN 3.4*   BILITOT 0.3   ALKPHOS 60   AST 21   ALT 9*   ANIONGAP 12   ESTGFRAFRICA 42.9*   EGFRNONAA 37.2*   , CBC:   Recent Labs   Lab 12/29/19  1612   WBC 4.46   HGB 11.1*   HCT 36.3*   *   , INR: No results for input(s): INR, PROTIME in the last 48 hours. and Lipid Panel No results for input(s): CHOL, HDL, LDLCALC, TRIG, CHOLHDL in the last 48 hours.    Significant Imaging: Ejection fraction: No results for input(s): EF in the last 168 hours.              Assessment and Plan:     Syncope  This is a 86 yoF, patient of Dr. Braswell. Presenting with syncope x 2, his HPI is concerning for cardiogenic syncope with sudden LOC and regain of consciouness without any prodrome, ?SSS although telemetry / EKG thus far do not demonstrate any degree of AV block, no QRs / QTc prolongation, she does have frequent ectopy (PVC / PACs). Additionally, possible orthostatic in nature she is on 4 BP medications, hypotensive in ED 90/40 upon arrival. Doubt seizures, vaso vagal in nature as her HPI does not describe as such. Exam not suggestive of significant valvular diease ie AS.    - TTE 2017 mild AS, EF 60% with grade 1 DD     - Discussed with Dr. Braswell, Dr. Cottrell, recommend inpatient observation with telemetry   - If no further events, recommend event monitor on discharge  - Continue BP medications   - Closely monitor for further syncopal events  - Repeat TTE rule out Cardiac Mechanical   - Serial cardiac enzymes  - D-Dimer  - Do not see indication for CT head at  this time   - no Metabolic derangement                 Thank you for your consult. I will follow-up with patient. Please contact us if you have any additional questions.    Aruna Woo MD  Cardiac Electrophysiology  [facility]

## 2019-12-30 NOTE — ASSESSMENT & PLAN NOTE
- baseline SCr 0.8-1.0, on admit 1.3  - hold HCTZ and benicar for now pending repeat labs in AM  - IVFs overnight  - UA pending  - monitor I/Os, daily weights

## 2019-12-30 NOTE — ASSESSMENT & PLAN NOTE
This is a 86 yoF, patient of Dr. Braswell. Presenting with syncope x 2, his HPI is concerning for cardiogenic syncope with sudden LOC and regain of consciouness without any prodrome, ?SSS although telemetry / EKG thus far do not demonstrate any degree of AV block, no QRs / QTc prolongation, she does have frequent ectopy (PVC / PACs). Additionally, possible orthostatic in nature she is on 4 BP medications, hypotensive in ED 90/40 upon arrival. Doubt seizures, vaso vagal in nature as her HPI does not describe as such. Exam not suggestive of significant valvular diease ie AS.    - TTE 2017 mild AS, EF 60% with grade 1 DD     - Discussed with Dr. Braswell, Dr. Cottrell, recommend inpatient observation with telemetry   - If no further events, recommend event monitor on discharge  - Continue BP medications   - Closely monitor for further syncopal events  - Repeat TTE rule out Cardiac Mechanical   - Serial cardiac enzymes  - D-Dimer  - Do not see indication for CT head at this time   - no Metabolic derangement

## 2019-12-30 NOTE — ASSESSMENT & PLAN NOTE
Last US 06/2019:  There is 20-39% right Internal Carotid Stenosis.  There is 0-19% left Internal Carotid Stenosis.

## 2019-12-30 NOTE — PROGRESS NOTES
[facility]  Cardiology  Progress Note    Patient Name: Naheed Cottrell  MRN: [id number]  Admission Date: 12/29/2019  Hospital Length of Stay: 0 days  Code Status: Full Code   Attending Physician: Tip Ritter MD   Primary Care Physician: Mack Cottrell MD  Expected Discharge Date:   Principal Problem:Syncope    Subjective:     Interval History: Renal function improved while holding Benicar and chlorthalidone as well as giving hydration. Orthostatics this morning with /75 to 172/74 upon standing. Troponin negative. No TELE events.      Review of Systems   Constitution: Negative for chills and fever.   HENT: Negative for ear discharge.    Eyes: Negative for pain and visual disturbance.   Cardiovascular: Negative for chest pain, dyspnea on exertion, irregular heartbeat, leg swelling, orthopnea, palpitations, paroxysmal nocturnal dyspnea and syncope.   Respiratory: Negative for hemoptysis, shortness of breath and wheezing.    Skin: Negative for rash and suspicious lesions.   Musculoskeletal: Negative for joint pain and muscle weakness.   Gastrointestinal: Negative for abdominal pain, diarrhea, nausea and vomiting.   Genitourinary: Negative for dysuria and frequency.   Neurological: Negative for focal weakness, headaches and tremors.     Objective:     Vital Signs (Most Recent):  Temp: 98.4 °F (36.9 °C) (12/30/19 1129)  Pulse: 74 (12/30/19 1129)  Resp: 17 (12/30/19 1129)  BP: (!) 165/79 (12/30/19 1155)  SpO2: (!) 94 % (12/30/19 1129) Vital Signs (24h Range):  Temp:  [97.4 °F (36.3 °C)-98.7 °F (37.1 °C)] 98.4 °F (36.9 °C)  Pulse:  [67-82] 74  Resp:  [16-22] 17  SpO2:  [94 %-98 %] 94 %  BP: ()/(40-86) 165/79     Weight: 66 kg (145 lb 9.8 oz)  Body mass index is 26.63 kg/m².     SpO2: (!) 94 %  O2 Device (Oxygen Therapy): room air      Intake/Output Summary (Last 24 hours) at 12/30/2019 1237  Last data filed at 12/30/2019 0600  Gross per 24 hour   Intake 1620 ml   Output 1100 ml   Net  520 ml       Lines/Drains/Airways     Peripheral Intravenous Line                 Peripheral IV - Single Lumen 12/29/19 1547 18 G Right Forearm less than 1 day                Physical Exam   Constitutional: She is oriented to person, place, and time. She appears well-developed and well-nourished.   HENT:   Head: Normocephalic and atraumatic.   Eyes: EOM are normal.   Cardiovascular: Normal rate and regular rhythm. Exam reveals no gallop and no friction rub.   Murmur heard.   Systolic murmur is present with a grade of 1/6 at the upper right sternal border.  Pulmonary/Chest: Effort normal and breath sounds normal. No stridor. She has no wheezes. She has no rales.   Abdominal: Soft. Bowel sounds are normal. There is no rebound and no guarding.   Musculoskeletal: She exhibits no edema.   Neurological: She is alert and oriented to person, place, and time. No cranial nerve deficit.   Skin: Skin is warm and dry.   Psychiatric: She has a normal mood and affect. Her behavior is normal.       Significant Labs: All pertinent lab results from the last 24 hours have been reviewed.      Assessment and Plan:     * Syncope  - hypovolemia/diuretics on the ddx; will touch base with primary cardiologist(s) re: thiazide diuretic use  - arrhythmia also on the ddx: recommend a 30 day event monitor  - encouraged hydration (patient reports reduced PO intake)  - f/u TTE with CFD        VTE Risk Mitigation (From admission, onward)         Ordered     IP VTE HIGH RISK PATIENT  Once      12/29/19 1930     Place TRAY hose  Until discontinued      12/29/19 1930     Place sequential compression device  Until discontinued      12/29/19 1930                Danny Acosta MD  Cardiology  [facility]

## 2019-12-30 NOTE — ASSESSMENT & PLAN NOTE
Orthostatic Hypotension  Decreased PO intake  Acute Renal Insufficiency   - history c/w orthostatic hypotension and mild dehydration  - hold AM chlorthalidone and benicar for RAPHAEL, resume if repeat labs improved  - IVFs overnight, trend orthostatics  - maintain on tele  - echo in AM  - cardiology following, requesting holter at d/c

## 2019-12-30 NOTE — DISCHARGE SUMMARY
[facility] Hospital Medicine  Discharge Summary      Patient Name: Naheed Cottrell  MRN: [id number]  Admission Date: 12/29/2019  Hospital Length of Stay: 0 days  Discharge Date and Time:  12/30/2019 4:32 PM  Attending Physician: Tip Ritter MD   Discharging Provider: Tip Ritter MD  Primary Care Provider: Mack Cottrell MD  [facility] Medicine Team: [facility]OS[facility] MED  Tip Ritter MD    HPI:   Naheed Cottrell is a pleasant 86F with renvascular HTN, HFpEF (EF 60), renal artery stenosis, PAD, carotid stenosis, h/o CVA who presents for evaluation of syncope x 2. The patient states she didn't drink much today besides 2 cups of coffee and a lemonade with her AM BP medications. She has been told to drink more water repeatedly. Today after supper she was standing when she felt odd, grabbed onto her daughter, and was eased down onto a chair. She did not fall or hit her head. She was out for a few seconds, came to at baseline mentation. They tried to stand her up from the chair a few minutes later and she again had LOC and returned to baseline a few seconds later. She was never confused, no slurred speech, convulsions, unilateral weakness, facial droop, B/B incontinence. She had otherwise been in her usual state of health - no URI symptoms, dysuria, NVD, melena, BRBPR, CP, SOB, fever. She had an episode of syncope several years ago associated with dehydration in the summer and another time with sepsis. She ambulates independently. EMS was activated and upon their arrival BP was noted to be very low, given 200cc NS bolus,     ED: Intake BP 90/40, improved to 140s/60s with 1L IVFs, orthostatics +, afebrile, no leukocytosis, Hgb stable, slight increase in SCr to 1.3, Tn negative, LA 1.6, EKG with PVCs x 2, CXR negative.    * No surgery found *      Hospital Course:   Mrs. Cottrell was admitted to [facility] Medicine on 12/29 for evaluation of syncope.  She was given 1 L of NS in the ED with improvement in  BP.  Labwork in AM noted improvement improvement of renal function to baseline.  Orthostatic vitals performed in the AM reveal no further evidence of orthostasis.  She was able to ambulate without assistance per nursing.  2D echo performed later in the day without significant issue and normal CVP.  Discussed with cardiology, who recommend resuming home PO antihypertensive medications.   Contacted device clinic, who will plan for event monitor today.  She is discharged on 12/30/2019 in good condition.  She will need follow up with her PCP/Cardiologist.  She will resume all previous medications prior to admission.      Consults:     No new Assessment & Plan notes have been filed under this hospital service since the last note was generated.  Service: Hospital Medicine    Final Active Diagnoses:    Diagnosis Date Noted POA    PRINCIPAL PROBLEM:  Syncope [R55] 12/29/2019 Yes    Acute renal insufficiency [N28.9] 12/29/2019 Yes    Essential hypertension [I10] 03/28/2017 Yes     Chronic    PAD (peripheral artery disease) [I73.9] 06/23/2016 Yes    Iron deficiency anemia [D50.9] 06/05/2017 Yes    Anxiety [F41.9] 10/02/2015 Yes    Asymptomatic bacteriuria [R82.71] 12/30/2019 Yes    History of CVA (cerebrovascular accident) [Z86.73] 12/29/2019 Not Applicable    Chronic heart failure with preserved ejection fraction [I50.32] 12/29/2019 Yes    Right carotid bruit [R09.89] 06/04/2019 Yes    Thrombocytopenia, unspecified [D69.6] 06/05/2017 Yes    Renovascular hypertension [I15.0] 10/09/2015 Yes      Problems Resolved During this Admission:    Diagnosis Date Noted Date Resolved POA    Orthostatic hypotension [I95.1] 12/29/2019 12/30/2019 Yes       Discharged Condition: good    Disposition: Home or Self Care    Follow Up:    Patient Instructions:      Ambulatory Referral to Cardiology   Referral Priority: Routine Referral Type: Consultation   Referral Reason: Specialty Services Required   Requested Specialty: Cardiology    Number of Visits Requested: 1     Notify your health care provider if you experience any of the following:  persistent dizziness, light-headedness, or visual disturbances     Notify your health care provider if you experience any of the following:  increased confusion or weakness     Notify your health care provider if you experience any of the following:  severe persistent headache     Cardiac event monitor   Standing Status: Future Number of Occurrences: 1 Standing Exp. Date: 12/30/20     Order Specific Question Answer Comments   Cardiac Event Monitor Auto Trigger      Activity as tolerated       Significant Diagnostic Studies:     Recent Results (from the past 24 hour(s))   Urinalysis, Reflex to Urine Culture Urine, Clean Catch    Collection Time: 12/29/19  9:10 PM   Result Value Ref Range    Specimen UA Urine, Clean Catch     Color, UA Yellow Yellow, Straw, Nae    Appearance, UA Cloudy (A) Clear    pH, UA 5.0 5.0 - 8.0    Specific Gravity, UA 1.010 1.005 - 1.030    Protein, UA Negative Negative    Glucose, UA Negative Negative    Ketones, UA Negative Negative    Bilirubin (UA) Negative Negative    Occult Blood UA Negative Negative    Nitrite, UA Negative Negative    Leukocytes, UA 2+ (A) Negative   Urinalysis Microscopic    Collection Time: 12/29/19  9:10 PM   Result Value Ref Range    RBC, UA 2 0 - 4 /hpf    WBC, UA 51 (H) 0 - 5 /hpf    Bacteria Rare None-Occ /hpf    Squam Epithel, UA 9 /hpf    Hyaline Casts, UA 6 (A) 0-1/lpf /lpf    Microscopic Comment SEE COMMENT    Troponin I    Collection Time: 12/29/19  9:21 PM   Result Value Ref Range    Troponin I <0.006 0.000 - 0.026 ng/mL   Basic Metabolic Panel (BMP)    Collection Time: 12/30/19  3:16 AM   Result Value Ref Range    Sodium 140 136 - 145 mmol/L    Potassium 3.6 3.5 - 5.1 mmol/L    Chloride 107 95 - 110 mmol/L    CO2 24 23 - 29 mmol/L    Glucose 92 70 - 110 mg/dL    BUN, Bld 22 8 - 23 mg/dL    Creatinine 0.9 0.5 - 1.4 mg/dL    Calcium 8.5 (L) 8.7 -  10.5 mg/dL    Anion Gap 9 8 - 16 mmol/L    eGFR if African American >60.0 >60 mL/min/1.73 m^2    eGFR if non  58.1 (A) >60 mL/min/1.73 m^2   Magnesium    Collection Time: 12/30/19  3:16 AM   Result Value Ref Range    Magnesium 1.6 1.6 - 2.6 mg/dL   Phosphorus    Collection Time: 12/30/19  3:16 AM   Result Value Ref Range    Phosphorus 2.8 2.7 - 4.5 mg/dL   Hemoglobin A1c    Collection Time: 12/30/19  3:16 AM   Result Value Ref Range    Hemoglobin A1C 5.4 4.0 - 5.6 %    Estimated Avg Glucose 108 68 - 131 mg/dL   CBC with Automated Differential    Collection Time: 12/30/19  3:16 AM   Result Value Ref Range    WBC 3.88 (L) 3.90 - 12.70 K/uL    RBC 3.66 (L) 4.00 - 5.40 M/uL    Hemoglobin 10.7 (L) 12.0 - 16.0 g/dL    Hematocrit 35.9 (L) 37.0 - 48.5 %    Mean Corpuscular Volume 98 82 - 98 fL    Mean Corpuscular Hemoglobin 29.2 27.0 - 31.0 pg    Mean Corpuscular Hemoglobin Conc 29.8 (L) 32.0 - 36.0 g/dL    RDW 13.9 11.5 - 14.5 %    Platelets 137 (L) 150 - 350 K/uL    MPV 10.0 9.2 - 12.9 fL    Immature Granulocytes 0.3 0.0 - 0.5 %    Gran # (ANC) 2.4 1.8 - 7.7 K/uL    Immature Grans (Abs) 0.01 0.00 - 0.04 K/uL    Lymph # 1.0 1.0 - 4.8 K/uL    Mono # 0.5 0.3 - 1.0 K/uL    Eos # 0.0 0.0 - 0.5 K/uL    Baso # 0.02 0.00 - 0.20 K/uL    nRBC 0 0 /100 WBC    Gran% 61.0 38.0 - 73.0 %    Lymph% 25.8 18.0 - 48.0 %    Mono% 11.6 4.0 - 15.0 %    Eosinophil% 0.8 0.0 - 8.0 %    Basophil% 0.5 0.0 - 1.9 %    Differential Method Automated    D dimer, quantitative    Collection Time: 12/30/19 12:21 PM   Result Value Ref Range    D-Dimer 0.96 (H) <0.50 mg/L FEU   Echo Color Flow Doppler? Yes    Collection Time: 12/30/19  1:49 PM   Result Value Ref Range    Ascending aorta 2.82 cm    STJ 2.86 cm    AV mean gradient 6 mmHg    Ao peak brook 1.68 m/s    Ao VTI 37.44 cm    IVRT 0.07 msec    IVS 0.87 0.6 - 1.1 cm    LA size 3.63 cm    Left Atrium Major Axis 4.80 cm    Left Atrium Minor Axis 4.90 cm    LVIDD 3.72 3.5 - 6.0 cm    LVIDS  2.34 2.1 - 4.0 cm    LVOT diameter 1.78 cm    LVOT peak VTI 26.41 cm    PW 0.84 0.6 - 1.1 cm    MV Peak A Juvencio 1.02 m/s    E wave decelartion time 211.50 msec    MV Peak E Juvencio 0.85 m/s    PV Peak D Juvencio 0.58 m/s    PV Peak S Juvencio 0.67 m/s    RA Major Axis 4.02 cm    RA Width 4.02 cm    RVDD 3.29 cm    Sinus 2.45 cm    TAPSE 2.25 cm    TR Max Juvencio 2.63 m/s    TDI LATERAL 0.08 m/s    TDI SEPTAL 0.07 m/s    LA WIDTH 3.79 cm    LV Diastolic Volume 58.82 mL    LV Systolic Volume 18.96 mL    RV S' 17.78 cm/s    LVOT peak juvencio 1.09 m/s    LV LATERAL E/E' RATIO 10.63 m/s    LV SEPTAL E/E' RATIO 12.14 m/s    FS 37 %    LA volume 56.71 cm3    LV mass 90.98 g    Left Ventricle Relative Wall Thickness 0.45 cm    AV valve area 1.75 cm2    AV Velocity Ratio 0.65     AV index (prosthetic) 0.71     E/A ratio 0.83     Mean e' 0.08 m/s    Pulm vein S/D ratio 1.16     LVOT area 2.5 cm2    LVOT stroke volume 65.69 cm3    AV peak gradient 11 mmHg    E/E' ratio 11.33 m/s    LV Systolic Volume Index 11.4 mL/m2    LV Diastolic Volume Index 35.28 mL/m2    LA Volume Index 34.0 mL/m2    LV Mass Index 55 g/m2    Triscuspid Valve Regurgitation Peak Gradient 28 mmHg    BSA 1.7 m2    Right Atrial Pressure (from IVC) 3 mmHg    TV rest pulmonary artery pressure 31 mmHg         Pending Diagnostic Studies:     None         Medications:  Reconciled Home Medications:      Medication List      CHANGE how you take these medications    olmesartan 40 MG tablet  Commonly known as:  BENICAR  TAKE 1 TABLET(40 MG) BY MOUTH EVERY DAY  What changed:  Another medication with the same name was removed. Continue taking this medication, and follow the directions you see here.        CONTINUE taking these medications    acetaminophen 500 MG tablet  Commonly known as:  TYLENOL  Take 1,000 mg by mouth every 6 (six) hours as needed for Pain.     aspirin 81 MG EC tablet  Commonly known as:  ECOTRIN  Take 1 tablet (81 mg total) by mouth once daily.     calcium-vitamin D3 500  mg(1,250mg) -200 unit per tablet  Commonly known as:  OS-ASHLEY 500 + D3  Take 2 tablets by mouth 2 (two) times daily.     carvedilol 25 MG tablet  Commonly known as:  COREG  Take 1 tablet (25 mg total) by mouth 2 (two) times daily with meals.     chlorthalidone 25 MG Tab  Commonly known as:  HYGROTEN  Take 1 tablet (25 mg total) by mouth once daily.     cholecalciferol (vitamin D3) 25 mcg (1,000 unit) capsule  Commonly known as:  VITAMIN D3  Take 1 capsule (1,000 Units total) by mouth once daily.     cilostazol 50 MG Tab  Commonly known as:  PLETAL  Take 1 tablet (50 mg total) by mouth 2 (two) times daily.     citalopram 20 MG tablet  Commonly known as:  CELEXA  Take 1 tablet (20 mg total) by mouth once daily.     clopidogrel 75 mg tablet  Commonly known as:  PLAVIX  TAKE 1 TABLET(75 MG) BY MOUTH EVERY DAY     Co Q-10 10 mg capsule  Generic drug:  coenzyme Q10  Take 10 mg by mouth once daily.     diclofenac sodium 1 % Gel  Commonly known as:  VOLTAREN  Apply 2 g topically 2 (two) times daily.     Praluent Pen 75 mg/mL Pnij  Generic drug:  alirocumab  Inject 1 mL (75 mg total) into the skin every 14 (fourteen) days.            Indwelling Lines/Drains at time of discharge:   Lines/Drains/Airways     None                 Time spent on the discharge of patient: 30 minutes  Patient was seen and examined on the date of discharge and determined to be suitable for discharge.         Tip Ritter MD  Department of [facility] Medicine  [facility]

## 2019-12-30 NOTE — PROGRESS NOTES
Pt's /72 manually after administration of PRN hydralazine and scheduled evening Coreg.  Beatrice notified.  Instructed to hold NS infusion.

## 2019-12-30 NOTE — PLAN OF CARE
12/30/19 1537   Final Note   Assessment Type Final Discharge Note   Anticipated Discharge Disposition Home   What phone number can be called within the next 1-3 days to see how you are doing after discharge? [phone]   Discharge plans and expectations educations in teach back method with documentation complete? Yes   Right Care Referral Info   Post Acute Recommendation No Care

## 2019-12-30 NOTE — PLAN OF CARE
CM met with patient at the bedside to discuss discharge planning assessment. Pt lives alone and has family provided transportation at discharge. Pt verified PCP and Pharmacy. CM will continue to follow for discharge needs.        12/30/19 1417   Discharge Assessment   Assessment Type Discharge Planning Assessment   Confirmed/corrected address and phone number on facesheet? Yes   Assessment information obtained from? Patient   Expected Length of Stay (days) 1   Communicated expected length of stay with patient/caregiver yes   Prior to hospitilization cognitive status: Alert/Oriented   Prior to hospitalization functional status: Independent   Current cognitive status: Alert/Oriented   Current Functional Status: Independent   Lives With alone   Able to Return to Prior Arrangements yes   Is patient able to care for self after discharge? Yes   Who are your caregiver(s) and their phone number(s)? Mack Sr-[phone]   Patient's perception of discharge disposition home or selfcare   Readmission Within the Last 30 Days no previous admission in last 30 days   Patient currently being followed by outpatient case management? No   Patient currently receives any other outside agency services? No   Equipment Currently Used at Home none   Do you have any problems affording any of your prescribed medications? No   Is the patient taking medications as prescribed? yes   Does the patient have transportation home? Yes   Transportation Anticipated family or friend will provide   Does the patient receive services at the Coumadin Clinic? No   Discharge Plan A Home   Discharge Plan B Home with family   DME Needed Upon Discharge  none   Patient/Family in Agreement with Plan yes

## 2019-12-30 NOTE — SUBJECTIVE & OBJECTIVE
Interval History: Renal function improved while holding Benicar and chlorthalidone as well as giving hydration. Orthostatics this morning with /75 to 172/74 upon standing. Troponin negative. No TELE events.      Review of Systems   Constitution: Negative for chills and fever.   HENT: Negative for ear discharge.    Eyes: Negative for pain and visual disturbance.   Cardiovascular: Negative for chest pain, dyspnea on exertion, irregular heartbeat, leg swelling, orthopnea, palpitations, paroxysmal nocturnal dyspnea and syncope.   Respiratory: Negative for hemoptysis, shortness of breath and wheezing.    Skin: Negative for rash and suspicious lesions.   Musculoskeletal: Negative for joint pain and muscle weakness.   Gastrointestinal: Negative for abdominal pain, diarrhea, nausea and vomiting.   Genitourinary: Negative for dysuria and frequency.   Neurological: Negative for focal weakness, headaches and tremors.     Objective:     Vital Signs (Most Recent):  Temp: 98.4 °F (36.9 °C) (12/30/19 1129)  Pulse: 74 (12/30/19 1129)  Resp: 17 (12/30/19 1129)  BP: (!) 165/79 (12/30/19 1155)  SpO2: (!) 94 % (12/30/19 1129) Vital Signs (24h Range):  Temp:  [97.4 °F (36.3 °C)-98.7 °F (37.1 °C)] 98.4 °F (36.9 °C)  Pulse:  [67-82] 74  Resp:  [16-22] 17  SpO2:  [94 %-98 %] 94 %  BP: ()/(40-86) 165/79     Weight: 66 kg (145 lb 9.8 oz)  Body mass index is 26.63 kg/m².     SpO2: (!) 94 %  O2 Device (Oxygen Therapy): room air      Intake/Output Summary (Last 24 hours) at 12/30/2019 1237  Last data filed at 12/30/2019 0600  Gross per 24 hour   Intake 1620 ml   Output 1100 ml   Net 520 ml       Lines/Drains/Airways     Peripheral Intravenous Line                 Peripheral IV - Single Lumen 12/29/19 1547 18 G Right Forearm less than 1 day                Physical Exam   Constitutional: She is oriented to person, place, and time. She appears well-developed and well-nourished.   HENT:   Head: Normocephalic and atraumatic.   Eyes: EOM  are normal.   Cardiovascular: Normal rate and regular rhythm. Exam reveals no gallop and no friction rub.   Murmur heard.   Systolic murmur is present with a grade of 1/6 at the upper right sternal border.  Pulmonary/Chest: Effort normal and breath sounds normal. No stridor. She has no wheezes. She has no rales.   Abdominal: Soft. Bowel sounds are normal. There is no rebound and no guarding.   Musculoskeletal: She exhibits no edema.   Neurological: She is alert and oriented to person, place, and time. No cranial nerve deficit.   Skin: Skin is warm and dry.   Psychiatric: She has a normal mood and affect. Her behavior is normal.       Significant Labs: All pertinent lab results from the last 24 hours have been reviewed.

## 2019-12-30 NOTE — PLAN OF CARE
Plan of care reviewed with patient and family.  No pain, falls or signs of infection.  Patient ready for transition home.  Discharge information reviewed, IV and telemetry discontinued.

## 2019-12-30 NOTE — PLAN OF CARE
Pt awake throughout evening.  No complaints of pain.  Pt SR on telemetry.    0300, pt's BP remains 180s after administration of PRN hydralazine and holding NS infusion.  Beatrice notified, additional dose of 25 mg hydralazine ordered.    Pt ambulating to bathroom with standby assist.  UA collected in PM.

## 2019-12-30 NOTE — HOSPITAL COURSE
Mrs. Cottrell was admitted to [facility] Medicine on 12/29 for evaluation of syncope.  She was given 1 L of NS in the ED with improvement in BP.  Labwork in AM noted improvement improvement of renal function to baseline.  Orthostatic vitals performed in the AM reveal no further evidence of orthostasis.  She was able to ambulate without assistance per nursing.  2D echo performed later in the day without significant issue and normal CVP.  Discussed with cardiology, who recommend resuming home PO antihypertensive medications.  Contacted device clinic, who will plan for event monitor today.  She is discharged on 12/30/2019 in good condition.  She will need follow up with her PCP/Cardiologist.  She will resume all previous medications prior to admission.

## 2019-12-30 NOTE — PROGRESS NOTES
[facility] Medicine  Progress Note    Patient Name: Naheed Cottrell  MRN: [id number]  Patient Class: IP- Inpatient   Admission Date: 12/29/2019  Length of Stay: 0 days  Attending Physician: Tip Ritter MD  Primary Care Provider: Mack Cottrell MD    [facility] Medicine Team: [facility] V Tip Ritter MD    Subjective:     Principal Problem:Syncope    HPI:  Naheed Cottrell is a pleasant 86F with renvascular HTN, HFpEF (EF 60), renal artery stenosis, PAD, carotid stenosis, h/o CVA who presents for evaluation of syncope x 2. The patient states she didn't drink much today besides 2 cups of coffee and a lemonade with her AM BP medications. She has been told to drink more water repeatedly. Today after supper she was standing when she felt odd, grabbed onto her daughter, and was eased down onto a chair. She did not fall or hit her head. She was out for a few seconds, came to at baseline mentation. They tried to stand her up from the chair a few minutes later and she again had LOC and returned to baseline a few seconds later. She was never confused, no slurred speech, convulsions, unilateral weakness, facial droop, B/B incontinence. She had otherwise been in her usual state of health - no URI symptoms, dysuria, NVD, melena, BRBPR, CP, SOB, fever. She had an episode of syncope several years ago associated with dehydration in the summer and another time with sepsis. She ambulates independently. EMS was activated and upon their arrival BP was noted to be very low, given 200cc NS bolus,     ED: Intake BP 90/40, improved to 140s/60s with 1L IVFs, orthostatics +, afebrile, no leukocytosis, Hgb stable, slight increase in SCr to 1.3, Tn negative, LA 1.6, EKG with PVCs x 2, CXR negative.      Interval History: Mrs. Cottrell was seen this AM sitting on chair at bedside.  Spoke with her and daughter regarding symptoms.  She states she is feeling much improved after IV fluids.  Discussed labwork, of  which noted an improvement of renal function.  Orthostatic vitals performed in the AM reveal no evidence of orthostasis.  She was able to ambulate without assistance per nursing.  2D echo performed later in the day without significant issue and normal CVP.  Discussed with cardiology, who recommend resuming home PO antihypertensive medications.  In addition, she would require a home event monitor prior to discharge.  Currently working to get event monitor to bedside; if it can be delivered, she can discharge today.      Review of Systems   Constitutional: Negative for chills, fatigue and fever.   HENT: Negative for rhinorrhea, sore throat and trouble swallowing.    Eyes: Negative for pain and visual disturbance.   Respiratory: Negative for cough, shortness of breath and wheezing.    Gastrointestinal: Negative for abdominal distention, abdominal pain, constipation, diarrhea, nausea and vomiting.   Endocrine: Negative for polydipsia and polyphagia.   Genitourinary: Negative for difficulty urinating, flank pain and frequency.   Musculoskeletal: Negative for arthralgias, back pain and myalgias.   Neurological: Negative for speech difficulty, weakness, light-headedness and headaches.   Psychiatric/Behavioral: Negative for confusion and decreased concentration.     Objective:     Vital Signs (Most Recent):  Temp: 98.4 °F (36.9 °C) (12/30/19 1129)  Pulse: 64 (12/30/19 1155)  Resp: 17 (12/30/19 1129)  BP: (!) 165/79 (12/30/19 1155)  SpO2: (!) 94 % (12/30/19 1129) Vital Signs (24h Range):  Temp:  [97.4 °F (36.3 °C)-98.7 °F (37.1 °C)] 98.4 °F (36.9 °C)  Pulse:  [64-82] 64  Resp:  [16-22] 17  SpO2:  [94 %-98 %] 94 %  BP: ()/(40-86) 165/79     Weight: 65.8 kg (145 lb)  Body mass index is 26.52 kg/m².    Intake/Output Summary (Last 24 hours) at 12/30/2019 1540  Last data filed at 12/30/2019 0800  Gross per 24 hour   Intake 1620 ml   Output 1300 ml   Net 320 ml      Physical Exam   Constitutional: She is oriented to person,  place, and time. She appears well-developed and well-nourished. No distress.   Sitting at bedside, no distress noted.    HENT:   Head: Normocephalic and atraumatic.   Mouth/Throat: No oropharyngeal exudate.   Eyes: Pupils are equal, round, and reactive to light.   Neck: Normal range of motion. Neck supple.   Cardiovascular: Normal rate, regular rhythm, normal heart sounds and intact distal pulses.   No murmur heard.  Pulmonary/Chest: Effort normal and breath sounds normal. No respiratory distress. She has no wheezes. She has no rales.   Abdominal: Soft. Bowel sounds are normal. She exhibits no distension. There is no tenderness.   Musculoskeletal: Normal range of motion. She exhibits no edema.   Lymphadenopathy:     She has no cervical adenopathy.   Neurological: She is alert and oriented to person, place, and time. No cranial nerve deficit.   Skin: Skin is warm and dry. She is not diaphoretic.   Psychiatric: She has a normal mood and affect. Her behavior is normal.   Nursing note and vitals reviewed.      Significant Labs:     Recent Results (from the past 24 hour(s))   CBC auto differential    Collection Time: 12/29/19  4:12 PM   Result Value Ref Range    WBC 4.46 3.90 - 12.70 K/uL    RBC 3.71 (L) 4.00 - 5.40 M/uL    Hemoglobin 11.1 (L) 12.0 - 16.0 g/dL    Hematocrit 36.3 (L) 37.0 - 48.5 %    Mean Corpuscular Volume 98 82 - 98 fL    Mean Corpuscular Hemoglobin 29.9 27.0 - 31.0 pg    Mean Corpuscular Hemoglobin Conc 30.6 (L) 32.0 - 36.0 g/dL    RDW 14.0 11.5 - 14.5 %    Platelets 148 (L) 150 - 350 K/uL    MPV 10.1 9.2 - 12.9 fL    Immature Granulocytes 0.2 0.0 - 0.5 %    Gran # (ANC) 3.0 1.8 - 7.7 K/uL    Immature Grans (Abs) 0.01 0.00 - 0.04 K/uL    Lymph # 0.9 (L) 1.0 - 4.8 K/uL    Mono # 0.5 0.3 - 1.0 K/uL    Eos # 0.0 0.0 - 0.5 K/uL    Baso # 0.03 0.00 - 0.20 K/uL    nRBC 0 0 /100 WBC    Gran% 67.4 38.0 - 73.0 %    Lymph% 20.2 18.0 - 48.0 %    Mono% 10.8 4.0 - 15.0 %    Eosinophil% 0.7 0.0 - 8.0 %    Basophil%  0.7 0.0 - 1.9 %    Differential Method Automated    Comprehensive metabolic panel    Collection Time: 12/29/19  4:12 PM   Result Value Ref Range    Sodium 137 136 - 145 mmol/L    Potassium 3.9 3.5 - 5.1 mmol/L    Chloride 103 95 - 110 mmol/L    CO2 22 (L) 23 - 29 mmol/L    Glucose 118 (H) 70 - 110 mg/dL    BUN, Bld 25 (H) 8 - 23 mg/dL    Creatinine 1.3 0.5 - 1.4 mg/dL    Calcium 8.9 8.7 - 10.5 mg/dL    Total Protein 6.5 6.0 - 8.4 g/dL    Albumin 3.4 (L) 3.5 - 5.2 g/dL    Total Bilirubin 0.3 0.1 - 1.0 mg/dL    Alkaline Phosphatase 60 55 - 135 U/L    AST 21 10 - 40 U/L    ALT 9 (L) 10 - 44 U/L    Anion Gap 12 8 - 16 mmol/L    eGFR if African American 42.9 (A) >60 mL/min/1.73 m^2    eGFR if non  37.2 (A) >60 mL/min/1.73 m^2   Troponin I    Collection Time: 12/29/19  4:15 PM   Result Value Ref Range    Troponin I 0.006 0.000 - 0.026 ng/mL   Lactic acid, plasma    Collection Time: 12/29/19  4:24 PM   Result Value Ref Range    Lactate (Lactic Acid) 1.6 0.5 - 2.2 mmol/L   Urinalysis, Reflex to Urine Culture Urine, Clean Catch    Collection Time: 12/29/19  9:10 PM   Result Value Ref Range    Specimen UA Urine, Clean Catch     Color, UA Yellow Yellow, Straw, Nae    Appearance, UA Cloudy (A) Clear    pH, UA 5.0 5.0 - 8.0    Specific Gravity, UA 1.010 1.005 - 1.030    Protein, UA Negative Negative    Glucose, UA Negative Negative    Ketones, UA Negative Negative    Bilirubin (UA) Negative Negative    Occult Blood UA Negative Negative    Nitrite, UA Negative Negative    Leukocytes, UA 2+ (A) Negative   Urinalysis Microscopic    Collection Time: 12/29/19  9:10 PM   Result Value Ref Range    RBC, UA 2 0 - 4 /hpf    WBC, UA 51 (H) 0 - 5 /hpf    Bacteria Rare None-Occ /hpf    Squam Epithel, UA 9 /hpf    Hyaline Casts, UA 6 (A) 0-1/lpf /lpf    Microscopic Comment SEE COMMENT    Troponin I    Collection Time: 12/29/19  9:21 PM   Result Value Ref Range    Troponin I <0.006 0.000 - 0.026 ng/mL   Basic Metabolic Panel  (BMP)    Collection Time: 12/30/19  3:16 AM   Result Value Ref Range    Sodium 140 136 - 145 mmol/L    Potassium 3.6 3.5 - 5.1 mmol/L    Chloride 107 95 - 110 mmol/L    CO2 24 23 - 29 mmol/L    Glucose 92 70 - 110 mg/dL    BUN, Bld 22 8 - 23 mg/dL    Creatinine 0.9 0.5 - 1.4 mg/dL    Calcium 8.5 (L) 8.7 - 10.5 mg/dL    Anion Gap 9 8 - 16 mmol/L    eGFR if African American >60.0 >60 mL/min/1.73 m^2    eGFR if non  58.1 (A) >60 mL/min/1.73 m^2   Magnesium    Collection Time: 12/30/19  3:16 AM   Result Value Ref Range    Magnesium 1.6 1.6 - 2.6 mg/dL   Phosphorus    Collection Time: 12/30/19  3:16 AM   Result Value Ref Range    Phosphorus 2.8 2.7 - 4.5 mg/dL   Hemoglobin A1c    Collection Time: 12/30/19  3:16 AM   Result Value Ref Range    Hemoglobin A1C 5.4 4.0 - 5.6 %    Estimated Avg Glucose 108 68 - 131 mg/dL   CBC with Automated Differential    Collection Time: 12/30/19  3:16 AM   Result Value Ref Range    WBC 3.88 (L) 3.90 - 12.70 K/uL    RBC 3.66 (L) 4.00 - 5.40 M/uL    Hemoglobin 10.7 (L) 12.0 - 16.0 g/dL    Hematocrit 35.9 (L) 37.0 - 48.5 %    Mean Corpuscular Volume 98 82 - 98 fL    Mean Corpuscular Hemoglobin 29.2 27.0 - 31.0 pg    Mean Corpuscular Hemoglobin Conc 29.8 (L) 32.0 - 36.0 g/dL    RDW 13.9 11.5 - 14.5 %    Platelets 137 (L) 150 - 350 K/uL    MPV 10.0 9.2 - 12.9 fL    Immature Granulocytes 0.3 0.0 - 0.5 %    Gran # (ANC) 2.4 1.8 - 7.7 K/uL    Immature Grans (Abs) 0.01 0.00 - 0.04 K/uL    Lymph # 1.0 1.0 - 4.8 K/uL    Mono # 0.5 0.3 - 1.0 K/uL    Eos # 0.0 0.0 - 0.5 K/uL    Baso # 0.02 0.00 - 0.20 K/uL    nRBC 0 0 /100 WBC    Gran% 61.0 38.0 - 73.0 %    Lymph% 25.8 18.0 - 48.0 %    Mono% 11.6 4.0 - 15.0 %    Eosinophil% 0.8 0.0 - 8.0 %    Basophil% 0.5 0.0 - 1.9 %    Differential Method Automated    D dimer, quantitative    Collection Time: 12/30/19 12:21 PM   Result Value Ref Range    D-Dimer 0.96 (H) <0.50 mg/L FEU   Echo Color Flow Doppler? Yes    Collection Time: 12/30/19  1:49  PM   Result Value Ref Range    Ascending aorta 2.82 cm    STJ 2.86 cm    AV mean gradient 6 mmHg    Ao peak juvencio 1.68 m/s    Ao VTI 37.44 cm    IVRT 0.07 msec    IVS 0.87 0.6 - 1.1 cm    LA size 3.63 cm    Left Atrium Major Axis 4.80 cm    Left Atrium Minor Axis 4.90 cm    LVIDD 3.72 3.5 - 6.0 cm    LVIDS 2.34 2.1 - 4.0 cm    LVOT diameter 1.78 cm    LVOT peak VTI 26.41 cm    PW 0.84 0.6 - 1.1 cm    MV Peak A Juvencio 1.02 m/s    E wave decelartion time 211.50 msec    MV Peak E Juvencio 0.85 m/s    PV Peak D Juvencio 0.58 m/s    PV Peak S Juvencio 0.67 m/s    RA Major Axis 4.02 cm    RA Width 4.02 cm    RVDD 3.29 cm    Sinus 2.45 cm    TAPSE 2.25 cm    TR Max Juvencio 2.63 m/s    TDI LATERAL 0.08 m/s    TDI SEPTAL 0.07 m/s    LA WIDTH 3.79 cm    LV Diastolic Volume 58.82 mL    LV Systolic Volume 18.96 mL    RV S' 17.78 cm/s    LVOT peak juvencio 1.09 m/s    LV LATERAL E/E' RATIO 10.63 m/s    LV SEPTAL E/E' RATIO 12.14 m/s    FS 37 %    LA volume 56.71 cm3    LV mass 90.98 g    Left Ventricle Relative Wall Thickness 0.45 cm    AV valve area 1.75 cm2    AV Velocity Ratio 0.65     AV index (prosthetic) 0.71     E/A ratio 0.83     Mean e' 0.08 m/s    Pulm vein S/D ratio 1.16     LVOT area 2.5 cm2    LVOT stroke volume 65.69 cm3    AV peak gradient 11 mmHg    E/E' ratio 11.33 m/s    LV Systolic Volume Index 11.4 mL/m2    LV Diastolic Volume Index 35.28 mL/m2    LA Volume Index 34.0 mL/m2    LV Mass Index 55 g/m2    Triscuspid Valve Regurgitation Peak Gradient 28 mmHg    BSA 1.7 m2    Right Atrial Pressure (from IVC) 3 mmHg    TV rest pulmonary artery pressure 31 mmHg         Significant Imaging: I have reviewed all pertinent imaging results/findings within the past 24 hours.     CXR 12/29/2019    No radiographic evidence for acute cardiopulmonary disease.    2D echo with CFD 12/30/2019    · Normal left ventricular systolic function. The estimated ejection fraction is 65%  · Normal right ventricular systolic function.  · Grade I (mild) left ventricular  diastolic dysfunction consistent with impaired relaxation.  · Concentric left ventricular remodeling.  · Mild left atrial enlargement.  · The estimated PA systolic pressure is 31 mm Hg  · Normal central venous pressure (3 mm Hg).      Assessment/Plan:      * Syncope  Naheed Cottrell is a 86 y.o. lady with hypertension who [place] [facility] [place] for [place]opal events.  Notes that she has had 2 events that were witnessed by family, lasting less than 1 minute each, and noted complete return to baseline after the event.  Overall, etiology likely secondary to orthostatic hypotension versus arrhythmia.  Her blood pressure had improved with IV fluids and can now ambulate without issues.   - discussed with cardiology, can resume all home BP medications, including carvedilol, chlorthalidone, and olmesartan   - encouraged adequate hydration   - will need to pursue arrhythmia evaluation.  Event monitor ordered, will need it placed at bedside prior to discharge.  If it can be placed today, she is stable for discharge home  - will need follow up with cardiology/PCP upon discharge     Acute renal insufficiency  - resolved with IVF  - encourage PO intake of fluids     Essential hypertension  - hypertensive this AM, held home olmesartan and chlorthalidone  - will continue all medications at this time       PAD (peripheral artery disease)  - continue pletal, ASA, plavix    Iron deficiency anemia  - Hgb stable, follow up with PCP upon discharge     Anxiety  - continue home citalopram      Asymptomatic bacteriuria  - no need for treatment at this time, patient asymptomatic     Chronic heart failure with preserved ejection fraction  - continue GDMT  - daily I/Os, daily weights, low salt diet  - compensated  - fluids running overnight for sycope and orthostatic hypotension, if SOB develops then can stop    History of CVA (cerebrovascular accident)  - no residual or new neuro deficits    Right carotid bruit  Last US 06/2019:  There  is 20-39% right Internal Carotid Stenosis.  There is 0-19% left Internal Carotid Stenosis.    Thrombocytopenia, unspecified  - stable    Renovascular hypertension  - continue coreg 25 mg BID  - hold benicar 40 mg daily for mild renal insufficiency  - hold chlorthalidone 25 mg daily for mild renal insufficiency  - resume as able with repeat labs in the AM  - start hydralazine PRN      VTE Risk Mitigation (From admission, onward)         Ordered     IP VTE HIGH RISK PATIENT  Once      12/29/19 1930     Place TRAY hose  Until discontinued      12/29/19 1930     Place sequential compression device  Until discontinued      12/29/19 1930                      Tip Ritter MD  Department of Hospital Medicine   [facility]

## 2019-12-30 NOTE — ASSESSMENT & PLAN NOTE
- hypertensive this AM, held home olmesartan and chlorthalidone  - will continue all medications at this time

## 2019-12-30 NOTE — ASSESSMENT & PLAN NOTE
- continue GDMT  - daily I/Os, daily weights, low salt diet  - compensated  - fluids running overnight for sycope and orthostatic hypotension, if SOB develops then can stop

## 2019-12-30 NOTE — ASSESSMENT & PLAN NOTE
Naheed Cottrell is a 86 y.o. lady with hypertension who [place] [facility] [place].  Notes that she has had 2 events that were witnessed by family, lasting less than 1 minute each, and noted complete return to baseline after the event.  Overall, etiology likely secondary to orthostatic hypotension versus arrhythmia.  Her blood pressure had improved with IV fluids and can now ambulate without issues.   - discussed with cardiology, can resume all home BP medications, including carvedilol, chlorthalidone, and olmesartan   - encouraged adequate hydration   - will need to pursue arrhythmia evaluation.  Event monitor ordered, will need it placed at bedside prior to discharge.  If it can be placed today, she is stable for discharge home  - will need follow up with cardiology/PCP upon discharge

## 2019-12-30 NOTE — NURSING
Pt arrived to floor via stretcher.  Pt SR on telemetry.  Beatrice with hospitalist at bedside.  Pt ambulated to bathroom to obtain urine specimen.

## 2019-12-30 NOTE — PLAN OF CARE
12/30/19 1010   Post-Acute Status   Post-Acute Authorization Other   Other Status No Post-Acute Service Needs     Sw received case on 12/30/19. SW is following this Pt for DC planning needs. There are no identified needs at this time.      Destiny Gaspar, SHAMEKA  [facility]  [id number]

## 2019-12-30 NOTE — H&P
[facility] Hospital Medicine  History & Physical    Patient Name: Naheed Cottrell  MRN: [id number]  Admission Date: 12/29/2019  Attending Physician: Donnell Gillette MD   Primary Care Provider: Mack Cottrell MD    [facility] Medicine Team: [facility] MED  Kareem Barron PA-C     Patient information was obtained from patient, past medical records and ER records.     Subjective:     Principal Problem:Syncope    Chief Complaint:   Chief Complaint   Patient presents with    Loss of Consciousness     arrived to ED via EMS with c/o syncope x2, each lasting approx 10 seconds each, witness states patient passed out when standing up, returned to baseline when she was lying down then passed out again when she tried to get up, denies injury        HPI: Naheed Cottrell is a pleasant 86F with renvascular HTN, HFpEF (EF 60), renal artery stenosis, PAD, carotid stenosis, h/o CVA who presents for evaluation of syncope x 2. The patient states she didn't drink much today besides 2 cups of coffee and a lemonade with her AM BP medications. She has been told to drink more water repeatedly. Today after supper she was standing when she felt odd, grabbed onto her daughter, and was eased down onto a chair. She did not fall or hit her head. She was out for a few seconds, came to at baseline mentation. They tried to stand her up from the chair a few minutes later and she again had LOC and returned to baseline a few seconds later. She was never confused, no slurred speech, convulsions, unilateral weakness, facial droop, B/B incontinence. She had otherwise been in her usual state of health - no URI symptoms, dysuria, NVD, melena, BRBPR, CP, SOB, fever. She had an episode of syncope several years ago associated with dehydration in the summer and another time with sepsis. She ambulates independently. EMS was activated and upon their arrival BP was noted to be very low, given 200cc NS bolus,     ED: Intake BP 90/40, improved  to 140s/60s with 1L IVFs, orthostatics +, afebrile, no leukocytosis, Hgb stable, slight increase in SCr to 1.3, Tn negative, LA 1.6, EKG with PVCs x 2, CXR negative.    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      Current Outpatient Medications on File Prior to Encounter   Medication Sig    acetaminophen (TYLENOL) 500 MG tablet Take 1,000 mg by mouth every 6 (six) hours as needed for Pain.    alirocumab (PRALUENT PEN) 75 mg/mL PnIj Inject 1 mL (75 mg total) into the skin every 14 (fourteen) days.    aspirin (ECOTRIN) 81 MG EC tablet Take 1 tablet (81 mg total) by mouth once daily.    calcium-vitamin D3 500 mg(1,250mg) -200 unit per tablet Take 2 tablets by mouth 2 (two) times daily.    carvedilol (COREG) 25 MG tablet Take 1 tablet (25 mg total) by mouth 2 (two) times daily with meals.    chlorthalidone (HYGROTEN) 25 MG Tab Take 1 tablet (25 mg total) by mouth once daily.    cholecalciferol, vitamin D3, 1,000 unit capsule Take 1 capsule (1,000 Units total) by mouth once daily.    cilostazol (PLETAL) 50 MG Tab Take 1 tablet (50 mg total) by mouth 2 (two) times daily.    citalopram (CELEXA) 20 MG tablet Take 1 tablet (20 mg total) by mouth once daily.    clopidogrel (PLAVIX) 75 mg tablet TAKE 1 TABLET(75 MG) BY MOUTH EVERY DAY    coenzyme Q10 (CO Q-10) 10 mg capsule Take 10 mg by mouth once daily.    diclofenac sodium (VOLTAREN) 1 % Gel Apply 2 g topically 2 (two) times daily.    olmesartan (BENICAR) 40 MG tablet TAKE 1  TABLET(40 MG) BY MOUTH EVERY DAY    olmesartan (BENICAR) 40 MG tablet TAKE 1 TABLET(40 MG) BY MOUTH EVERY DAY     Family History     Problem Relation (Age of Onset)    Hyperlipidemia Mother    Hypertension Mother        Tobacco Use    Smoking status: Never Smoker    Smokeless tobacco: Never Used   Substance and Sexual Activity    Alcohol use: No    Drug use: No    Sexual activity: Not on file     Review of Systems   Constitutional: Negative for activity change, appetite change, chills, fatigue and fever.   HENT: Negative for congestion, postnasal drip and rhinorrhea.    Eyes: Negative for photophobia and visual disturbance.   Respiratory: Negative for cough, shortness of breath and wheezing.    Cardiovascular: Negative for chest pain, palpitations and leg swelling.   Gastrointestinal: Negative for abdominal distention, abdominal pain, blood in stool, diarrhea, nausea and vomiting.   Genitourinary: Negative for difficulty urinating and dysuria.   Musculoskeletal: Negative for arthralgias, back pain and gait problem.   Skin: Negative for rash and wound.   Allergic/Immunologic: Negative for immunocompromised state.   Neurological: Positive for syncope. Negative for seizures, facial asymmetry, weakness, light-headedness, numbness and headaches.   Psychiatric/Behavioral: Negative for agitation and confusion.     Objective:     Vital Signs (Most Recent):  Temp: 97.6 °F (36.4 °C) (12/29/19 2040)  Pulse: 74 (12/29/19 2040)  Resp: 20 (12/29/19 2040)  BP: (!) 184/77 (12/29/19 2040)  SpO2: 95 % (12/29/19 2040) Vital Signs (24h Range):  Temp:  [97.4 °F (36.3 °C)-97.6 °F (36.4 °C)] 97.6 °F (36.4 °C)  Pulse:  [67-79] 74  Resp:  [16-22] 20  SpO2:  [95 %-98 %] 95 %  BP: ()/(40-77) 184/77     Weight: 62.5 kg (137 lb 12.6 oz)  Body mass index is 25.2 kg/m².    Physical Exam   Constitutional: She is oriented to person, place, and time. She appears well-developed and well-nourished. No distress.   HENT:   Head:  Normocephalic and atraumatic.   Eyes: Pupils are equal, round, and reactive to light. EOM are normal.   Neck: Normal range of motion. No JVD present.   Cardiovascular: Normal rate and regular rhythm.   No murmur heard.  Pulmonary/Chest: Effort normal and breath sounds normal. No stridor. No respiratory distress. She has no wheezes.   Abdominal: Soft. Bowel sounds are normal. She exhibits no distension. There is no tenderness.   Musculoskeletal: Normal range of motion. She exhibits no edema.   Neurological: She is alert and oriented to person, place, and time. No cranial nerve deficit.   5/5 strength throughout  Speech clear  No facial droop  Limited RLE knee flexion 2/2 pain/knee replacement   Skin: Skin is warm and dry. She is not diaphoretic.   Psychiatric: She has a normal mood and affect. Her behavior is normal. Judgment and thought content normal.   Nursing note and vitals reviewed.        CRANIAL NERVES     CN III, IV, VI   Pupils are equal, round, and reactive to light.  Extraocular motions are normal.        Significant Labs:   Blood Culture: No results for input(s): LABBLOO in the last 48 hours.  BMP:   Recent Labs   Lab 12/29/19  1612   *      K 3.9      CO2 22*   BUN 25*   CREATININE 1.3   CALCIUM 8.9     CBC:   Recent Labs   Lab 12/29/19  1612   WBC 4.46   HGB 11.1*   HCT 36.3*   *     CMP:   Recent Labs   Lab 12/29/19  1612      K 3.9      CO2 22*   *   BUN 25*   CREATININE 1.3   CALCIUM 8.9   PROT 6.5   ALBUMIN 3.4*   BILITOT 0.3   ALKPHOS 60   AST 21   ALT 9*   ANIONGAP 12   EGFRNONAA 37.2*     Cardiac Markers: No results for input(s): CKMB, MYOGLOBIN, BNP, TROPISTAT in the last 48 hours.  Troponin:   Recent Labs   Lab 12/29/19  1615   TROPONINI 0.006     TSH: No results for input(s): TSH in the last 4320 hours.  Urine Culture: No results for input(s): LABURIN in the last 48 hours.  Urine Studies: No results for input(s): COLORU, APPEARANCEUA, PHUR,  SPECGRAV, PROTEINUA, GLUCUA, KETONESU, BILIRUBINUA, OCCULTUA, NITRITE, UROBILINOGEN, LEUKOCYTESUR, RBCUA, WBCUA, BACTERIA, SQUAMEPITHEL, HYALINECASTS in the last 48 hours.    Invalid input(s): MANDY    Significant Imaging: I have reviewed all pertinent imaging results/findings within the past 24 hours.    Assessment/Plan:     * Syncope  Orthostatic Hypotension  Decreased PO intake  Acute Renal Insufficiency   - history c/w orthostatic hypotension and mild dehydration  - hold AM chlorthalidone and benicar for RAPHAEL, resume if repeat labs improved  - IVFs overnight, trend orthostatics  - maintain on tele  - echo in AM  - cardiology following, requesting holter at d/c    Asymptomatic bacteriuria  - UA nitrite negative, 2+leuks, and 51 WBC, but contaminated with 9 squamE  - asymptomatic  - hold on ABX  - UA reflexed to culture, follow those results    Acute renal insufficiency  - baseline SCr 0.8-1.0, on admit 1.3  - hold HCTZ and benicar for now pending repeat labs in AM  - IVFs overnight  - UA pending  - monitor I/Os, daily weights    Chronic heart failure with preserved ejection fraction  - continue GDMT  - daily I/Os, daily weights, low salt diet  - compensated  - fluids running overnight for sycope and orthostatic hypotension, if SOB develops then can stop    History of CVA (cerebrovascular accident)  - no residual or new neuro deficits    Right carotid bruit  Last US 06/2019:  There is 20-39% right Internal Carotid Stenosis.  There is 0-19% left Internal Carotid Stenosis.    Iron deficiency anemia  - Hgb stable    Thrombocytopenia, unspecified  - stable    PAD (peripheral artery disease)  - continue pletal, ASA, plavix    Renovascular hypertension  - continue coreg 25 mg BID  - hold benicar 40 mg daily for mild renal insufficiency  - hold chlorthalidone 25 mg daily for mild renal insufficiency  - resume as able with repeat labs in the AM  - start hydralazine PRN    VTE Risk Mitigation (From admission, onward)          Ordered     IP VTE HIGH RISK PATIENT  Once      12/29/19 1930     Place TRAY hose  Until discontinued      12/29/19 1930     Place sequential compression device  Until discontinued      12/29/19 1930                   Kareem Barron PA-C  Department of Hospital Medicine   [facility]

## 2019-12-30 NOTE — ASSESSMENT & PLAN NOTE
- continue coreg 25 mg BID  - hold benicar 40 mg daily for mild renal insufficiency  - hold chlorthalidone 25 mg daily for mild renal insufficiency  - resume as able with repeat labs in the AM  - start hydralazine PRN

## 2019-12-30 NOTE — ED NOTES
Telemetry Verification   Patient placed on Telemetry Box  Verified with War Room  Box #    Monitor Tech Yinka   Rate 75   Rhythm NSR

## 2019-12-30 NOTE — ASSESSMENT & PLAN NOTE
- hypovolemia/diuretics on the ddx; will touch base with primary cardiologist(s) re: thiazide diuretic use  - arrhythmia also on the ddx: recommend a 30 day event monitor  - encouraged hydration (patient reports reduced PO intake)  - recommend resuming Benicar for HTN  - f/u TTE with CFD

## 2019-12-30 NOTE — SUBJECTIVE & OBJECTIVE
Interval History: Mrs. Cottrell was seen this AM sitting on chair at bedside.  Spoke with her and daughter regarding symptoms.  She states she is feeling much improved after IV fluids.  Discussed labwork, of which noted an improvement of renal function.  Orthostatic vitals performed in the AM reveal no evidence of orthostasis.  She was able to ambulate without assistance per nursing.  2D echo performed later in the day without significant issue and normal CVP.  Discussed with cardiology, who recommend resuming home PO antihypertensive medications.  In addition, she would require a home event monitor prior to discharge.  Currently working to get event monitor to bedside; if it can be delivered, she can discharge today.      Review of Systems   Constitutional: Negative for chills, fatigue and fever.   HENT: Negative for rhinorrhea, sore throat and trouble swallowing.    Eyes: Negative for pain and visual disturbance.   Respiratory: Negative for cough, shortness of breath and wheezing.    Gastrointestinal: Negative for abdominal distention, abdominal pain, constipation, diarrhea, nausea and vomiting.   Endocrine: Negative for polydipsia and polyphagia.   Genitourinary: Negative for difficulty urinating, flank pain and frequency.   Musculoskeletal: Negative for arthralgias, back pain and myalgias.   Neurological: Negative for speech difficulty, weakness, light-headedness and headaches.   Psychiatric/Behavioral: Negative for confusion and decreased concentration.     Objective:     Vital Signs (Most Recent):  Temp: 98.4 °F (36.9 °C) (12/30/19 1129)  Pulse: 64 (12/30/19 1155)  Resp: 17 (12/30/19 1129)  BP: (!) 165/79 (12/30/19 1155)  SpO2: (!) 94 % (12/30/19 1129) Vital Signs (24h Range):  Temp:  [97.4 °F (36.3 °C)-98.7 °F (37.1 °C)] 98.4 °F (36.9 °C)  Pulse:  [64-82] 64  Resp:  [16-22] 17  SpO2:  [94 %-98 %] 94 %  BP: ()/(40-86) 165/79     Weight: 65.8 kg (145 lb)  Body mass index is 26.52 kg/m².    Intake/Output  Summary (Last 24 hours) at 12/30/2019 1540  Last data filed at 12/30/2019 0800  Gross per 24 hour   Intake 1620 ml   Output 1300 ml   Net 320 ml      Physical Exam   Constitutional: She is oriented to person, place, and time. She appears well-developed and well-nourished. No distress.   Sitting at bedside, no distress noted.    HENT:   Head: Normocephalic and atraumatic.   Mouth/Throat: No oropharyngeal exudate.   Eyes: Pupils are equal, round, and reactive to light.   Neck: Normal range of motion. Neck supple.   Cardiovascular: Normal rate, regular rhythm, normal heart sounds and intact distal pulses.   No murmur heard.  Pulmonary/Chest: Effort normal and breath sounds normal. No respiratory distress. She has no wheezes. She has no rales.   Abdominal: Soft. Bowel sounds are normal. She exhibits no distension. There is no tenderness.   Musculoskeletal: Normal range of motion. She exhibits no edema.   Lymphadenopathy:     She has no cervical adenopathy.   Neurological: She is alert and oriented to person, place, and time. No cranial nerve deficit.   Skin: Skin is warm and dry. She is not diaphoretic.   Psychiatric: She has a normal mood and affect. Her behavior is normal.   Nursing note and vitals reviewed.      Significant Labs:     Recent Results (from the past 24 hour(s))   CBC auto differential    Collection Time: 12/29/19  4:12 PM   Result Value Ref Range    WBC 4.46 3.90 - 12.70 K/uL    RBC 3.71 (L) 4.00 - 5.40 M/uL    Hemoglobin 11.1 (L) 12.0 - 16.0 g/dL    Hematocrit 36.3 (L) 37.0 - 48.5 %    Mean Corpuscular Volume 98 82 - 98 fL    Mean Corpuscular Hemoglobin 29.9 27.0 - 31.0 pg    Mean Corpuscular Hemoglobin Conc 30.6 (L) 32.0 - 36.0 g/dL    RDW 14.0 11.5 - 14.5 %    Platelets 148 (L) 150 - 350 K/uL    MPV 10.1 9.2 - 12.9 fL    Immature Granulocytes 0.2 0.0 - 0.5 %    Gran # (ANC) 3.0 1.8 - 7.7 K/uL    Immature Grans (Abs) 0.01 0.00 - 0.04 K/uL    Lymph # 0.9 (L) 1.0 - 4.8 K/uL    Mono # 0.5 0.3 - 1.0 K/uL     Eos # 0.0 0.0 - 0.5 K/uL    Baso # 0.03 0.00 - 0.20 K/uL    nRBC 0 0 /100 WBC    Gran% 67.4 38.0 - 73.0 %    Lymph% 20.2 18.0 - 48.0 %    Mono% 10.8 4.0 - 15.0 %    Eosinophil% 0.7 0.0 - 8.0 %    Basophil% 0.7 0.0 - 1.9 %    Differential Method Automated    Comprehensive metabolic panel    Collection Time: 12/29/19  4:12 PM   Result Value Ref Range    Sodium 137 136 - 145 mmol/L    Potassium 3.9 3.5 - 5.1 mmol/L    Chloride 103 95 - 110 mmol/L    CO2 22 (L) 23 - 29 mmol/L    Glucose 118 (H) 70 - 110 mg/dL    BUN, Bld 25 (H) 8 - 23 mg/dL    Creatinine 1.3 0.5 - 1.4 mg/dL    Calcium 8.9 8.7 - 10.5 mg/dL    Total Protein 6.5 6.0 - 8.4 g/dL    Albumin 3.4 (L) 3.5 - 5.2 g/dL    Total Bilirubin 0.3 0.1 - 1.0 mg/dL    Alkaline Phosphatase 60 55 - 135 U/L    AST 21 10 - 40 U/L    ALT 9 (L) 10 - 44 U/L    Anion Gap 12 8 - 16 mmol/L    eGFR if African American 42.9 (A) >60 mL/min/1.73 m^2    eGFR if non  37.2 (A) >60 mL/min/1.73 m^2   Troponin I    Collection Time: 12/29/19  4:15 PM   Result Value Ref Range    Troponin I 0.006 0.000 - 0.026 ng/mL   Lactic acid, plasma    Collection Time: 12/29/19  4:24 PM   Result Value Ref Range    Lactate (Lactic Acid) 1.6 0.5 - 2.2 mmol/L   Urinalysis, Reflex to Urine Culture Urine, Clean Catch    Collection Time: 12/29/19  9:10 PM   Result Value Ref Range    Specimen UA Urine, Clean Catch     Color, UA Yellow Yellow, Straw, Nae    Appearance, UA Cloudy (A) Clear    pH, UA 5.0 5.0 - 8.0    Specific Gravity, UA 1.010 1.005 - 1.030    Protein, UA Negative Negative    Glucose, UA Negative Negative    Ketones, UA Negative Negative    Bilirubin (UA) Negative Negative    Occult Blood UA Negative Negative    Nitrite, UA Negative Negative    Leukocytes, UA 2+ (A) Negative   Urinalysis Microscopic    Collection Time: 12/29/19  9:10 PM   Result Value Ref Range    RBC, UA 2 0 - 4 /hpf    WBC, UA 51 (H) 0 - 5 /hpf    Bacteria Rare None-Occ /hpf    Squam Epithel, UA 9 /hpf     Hyaline Casts, UA 6 (A) 0-1/lpf /lpf    Microscopic Comment SEE COMMENT    Troponin I    Collection Time: 12/29/19  9:21 PM   Result Value Ref Range    Troponin I <0.006 0.000 - 0.026 ng/mL   Basic Metabolic Panel (BMP)    Collection Time: 12/30/19  3:16 AM   Result Value Ref Range    Sodium 140 136 - 145 mmol/L    Potassium 3.6 3.5 - 5.1 mmol/L    Chloride 107 95 - 110 mmol/L    CO2 24 23 - 29 mmol/L    Glucose 92 70 - 110 mg/dL    BUN, Bld 22 8 - 23 mg/dL    Creatinine 0.9 0.5 - 1.4 mg/dL    Calcium 8.5 (L) 8.7 - 10.5 mg/dL    Anion Gap 9 8 - 16 mmol/L    eGFR if African American >60.0 >60 mL/min/1.73 m^2    eGFR if non  58.1 (A) >60 mL/min/1.73 m^2   Magnesium    Collection Time: 12/30/19  3:16 AM   Result Value Ref Range    Magnesium 1.6 1.6 - 2.6 mg/dL   Phosphorus    Collection Time: 12/30/19  3:16 AM   Result Value Ref Range    Phosphorus 2.8 2.7 - 4.5 mg/dL   Hemoglobin A1c    Collection Time: 12/30/19  3:16 AM   Result Value Ref Range    Hemoglobin A1C 5.4 4.0 - 5.6 %    Estimated Avg Glucose 108 68 - 131 mg/dL   CBC with Automated Differential    Collection Time: 12/30/19  3:16 AM   Result Value Ref Range    WBC 3.88 (L) 3.90 - 12.70 K/uL    RBC 3.66 (L) 4.00 - 5.40 M/uL    Hemoglobin 10.7 (L) 12.0 - 16.0 g/dL    Hematocrit 35.9 (L) 37.0 - 48.5 %    Mean Corpuscular Volume 98 82 - 98 fL    Mean Corpuscular Hemoglobin 29.2 27.0 - 31.0 pg    Mean Corpuscular Hemoglobin Conc 29.8 (L) 32.0 - 36.0 g/dL    RDW 13.9 11.5 - 14.5 %    Platelets 137 (L) 150 - 350 K/uL    MPV 10.0 9.2 - 12.9 fL    Immature Granulocytes 0.3 0.0 - 0.5 %    Gran # (ANC) 2.4 1.8 - 7.7 K/uL    Immature Grans (Abs) 0.01 0.00 - 0.04 K/uL    Lymph # 1.0 1.0 - 4.8 K/uL    Mono # 0.5 0.3 - 1.0 K/uL    Eos # 0.0 0.0 - 0.5 K/uL    Baso # 0.02 0.00 - 0.20 K/uL    nRBC 0 0 /100 WBC    Gran% 61.0 38.0 - 73.0 %    Lymph% 25.8 18.0 - 48.0 %    Mono% 11.6 4.0 - 15.0 %    Eosinophil% 0.8 0.0 - 8.0 %    Basophil% 0.5 0.0 - 1.9 %     Differential Method Automated    D dimer, quantitative    Collection Time: 12/30/19 12:21 PM   Result Value Ref Range    D-Dimer 0.96 (H) <0.50 mg/L FEU   Echo Color Flow Doppler? Yes    Collection Time: 12/30/19  1:49 PM   Result Value Ref Range    Ascending aorta 2.82 cm    STJ 2.86 cm    AV mean gradient 6 mmHg    Ao peak juvencio 1.68 m/s    Ao VTI 37.44 cm    IVRT 0.07 msec    IVS 0.87 0.6 - 1.1 cm    LA size 3.63 cm    Left Atrium Major Axis 4.80 cm    Left Atrium Minor Axis 4.90 cm    LVIDD 3.72 3.5 - 6.0 cm    LVIDS 2.34 2.1 - 4.0 cm    LVOT diameter 1.78 cm    LVOT peak VTI 26.41 cm    PW 0.84 0.6 - 1.1 cm    MV Peak A Juvencio 1.02 m/s    E wave decelartion time 211.50 msec    MV Peak E Juvencio 0.85 m/s    PV Peak D Juvencio 0.58 m/s    PV Peak S Juvencio 0.67 m/s    RA Major Axis 4.02 cm    RA Width 4.02 cm    RVDD 3.29 cm    Sinus 2.45 cm    TAPSE 2.25 cm    TR Max Juvencio 2.63 m/s    TDI LATERAL 0.08 m/s    TDI SEPTAL 0.07 m/s    LA WIDTH 3.79 cm    LV Diastolic Volume 58.82 mL    LV Systolic Volume 18.96 mL    RV S' 17.78 cm/s    LVOT peak juvencio 1.09 m/s    LV LATERAL E/E' RATIO 10.63 m/s    LV SEPTAL E/E' RATIO 12.14 m/s    FS 37 %    LA volume 56.71 cm3    LV mass 90.98 g    Left Ventricle Relative Wall Thickness 0.45 cm    AV valve area 1.75 cm2    AV Velocity Ratio 0.65     AV index (prosthetic) 0.71     E/A ratio 0.83     Mean e' 0.08 m/s    Pulm vein S/D ratio 1.16     LVOT area 2.5 cm2    LVOT stroke volume 65.69 cm3    AV peak gradient 11 mmHg    E/E' ratio 11.33 m/s    LV Systolic Volume Index 11.4 mL/m2    LV Diastolic Volume Index 35.28 mL/m2    LA Volume Index 34.0 mL/m2    LV Mass Index 55 g/m2    Triscuspid Valve Regurgitation Peak Gradient 28 mmHg    BSA 1.7 m2    Right Atrial Pressure (from IVC) 3 mmHg    TV rest pulmonary artery pressure 31 mmHg         Significant Imaging: I have reviewed all pertinent imaging results/findings within the past 24 hours.     CXR 12/29/2019    No radiographic evidence for acute  cardiopulmonary disease.    2D echo with CFD 12/30/2019    · Normal left ventricular systolic function. The estimated ejection fraction is 65%  · Normal right ventricular systolic function.  · Grade I (mild) left ventricular diastolic dysfunction consistent with impaired relaxation.  · Concentric left ventricular remodeling.  · Mild left atrial enlargement.  · The estimated PA systolic pressure is 31 mm Hg  · Normal central venous pressure (3 mm Hg).

## 2019-12-31 ENCOUNTER — TELEPHONE (OUTPATIENT)
Dept: ELECTROPHYSIOLOGY | Facility: CLINIC | Age: 84
End: 2019-12-31

## 2019-12-31 DIAGNOSIS — R55 SYNCOPE, UNSPECIFIED SYNCOPE TYPE: Primary | ICD-10-CM

## 2019-12-31 NOTE — TELEPHONE ENCOUNTER
Dr Muniz asked me to schedule ILR this week. Spoke with son, Migel, and scheduled for 1/2/20 at 7am (6am arrival). Instructions reviewed with son. No fasting necessary. He has Dial soap for her to wash with the evening prior. Will send formal instructions to email, as requested.

## 2019-12-31 NOTE — TELEPHONE ENCOUNTER
----- Message from Rea Han MA sent at 12/31/2019  9:52 AM CST -----  Contact: Patient's son Migel      ----- Message -----  From: Fanny Live  Sent: 12/31/2019   9:49 AM CST  To: Flavio Ramires    [facility], The pt's son is returning a call. Please call him back @ [phone]. Thanks, Fanny

## 2020-01-02 ENCOUNTER — HOSPITAL ENCOUNTER (OUTPATIENT)
Facility: HOSPITAL | Age: 85
Discharge: HOME | End: 2020-01-02
Attending: INTERNAL MEDICINE | Admitting: INTERNAL MEDICINE
Payer: MEDICARE

## 2020-01-02 VITALS
BODY MASS INDEX: 23.92 KG/M2 | WEIGHT: 135 LBS | HEIGHT: 63 IN | HEART RATE: 51 BPM | TEMPERATURE: 96 F | SYSTOLIC BLOOD PRESSURE: 176 MMHG | RESPIRATION RATE: 18 BRPM | OXYGEN SATURATION: 97 % | DIASTOLIC BLOOD PRESSURE: 74 MMHG

## 2020-01-02 DIAGNOSIS — R55 SYNCOPE: ICD-10-CM

## 2020-01-02 DIAGNOSIS — R55 SYNCOPE, UNSPECIFIED SYNCOPE TYPE: ICD-10-CM

## 2020-01-02 PROCEDURE — C1764 EVENT RECORDER, CARDIAC: HCPCS | Mod: HCNC | Performed by: INTERNAL MEDICINE

## 2020-01-02 PROCEDURE — 33285 INSJ SUBQ CAR RHYTHM MNTR: CPT | Mod: HCNC | Performed by: INTERNAL MEDICINE

## 2020-01-02 PROCEDURE — 33285 PR INSERTION,SUBQ CARDIAC RHYTHM MONITOR, W/PRGRMG: ICD-10-PCS | Mod: HCNC,,, | Performed by: INTERNAL MEDICINE

## 2020-01-02 PROCEDURE — 63600175 PHARM REV CODE 636 W HCPCS: Mod: HCNC | Performed by: INTERNAL MEDICINE

## 2020-01-02 PROCEDURE — 93010 ELECTROCARDIOGRAM REPORT: CPT | Mod: HCNC,,, | Performed by: INTERNAL MEDICINE

## 2020-01-02 PROCEDURE — 25000003 PHARM REV CODE 250: Mod: HCNC | Performed by: INTERNAL MEDICINE

## 2020-01-02 PROCEDURE — 33285 INSJ SUBQ CAR RHYTHM MNTR: CPT | Mod: HCNC,,, | Performed by: INTERNAL MEDICINE

## 2020-01-02 PROCEDURE — 93005 ELECTROCARDIOGRAM TRACING: CPT | Mod: HCNC

## 2020-01-02 PROCEDURE — 93010 EKG 12-LEAD: ICD-10-PCS | Mod: HCNC,,, | Performed by: INTERNAL MEDICINE

## 2020-01-02 DEVICE — IMPLANTABLE DEVICE
Type: IMPLANTABLE DEVICE | Site: CHEST  WALL | Status: FUNCTIONAL
Brand: BIOMONITOR

## 2020-01-02 RX ORDER — ACETAMINOPHEN 325 MG/1
650 TABLET ORAL EVERY 4 HOURS PRN
Status: DISCONTINUED | OUTPATIENT
Start: 2020-01-02 | End: 2020-01-02 | Stop reason: HOSPADM

## 2020-01-02 RX ORDER — CEFAZOLIN SODIUM 1 G/3ML
2 INJECTION, POWDER, FOR SOLUTION INTRAMUSCULAR; INTRAVENOUS
Status: DISCONTINUED | OUTPATIENT
Start: 2020-01-02 | End: 2020-01-02 | Stop reason: HOSPADM

## 2020-01-02 RX ORDER — CEFAZOLIN SODIUM 1 G/3ML
INJECTION, POWDER, FOR SOLUTION INTRAMUSCULAR; INTRAVENOUS
Status: DISCONTINUED | OUTPATIENT
Start: 2020-01-02 | End: 2020-01-02 | Stop reason: HOSPADM

## 2020-01-02 RX ORDER — LIDOCAINE HYDROCHLORIDE AND EPINEPHRINE 5; 5 MG/ML; UG/ML
INJECTION, SOLUTION INFILTRATION; PERINEURAL
Status: DISCONTINUED | OUTPATIENT
Start: 2020-01-02 | End: 2020-01-02 | Stop reason: HOSPADM

## 2020-01-02 NOTE — HOSPITAL COURSE
[employer] ILR implanted with local sedation without complication. Discharged home in good condition.

## 2020-01-02 NOTE — PLAN OF CARE
Pt transferred from recovery via stretcher.  Pt aao, tolerating po. Pt instructed per Dr. Muniz to take morning bp medications.  Son called to bs.  Post op orders and assessment initiated.  Cw incision remains tammy.  0 bleed, 0 hematoma.  Pt in no acute distress and verbalizes no complaints. Will continue to monitor.

## 2020-01-02 NOTE — NURSING
Pt d/c'd to home per md orders.  piv removed intact and without difficulty.  Instructed pt and son on home medications, post procedure precautions and follow up visits. Pt and family verbalizes understanding.  Foam drg applied to cw.  Incision remains tammy.  0 bleed, 0 hematoma.  Pt in no acute distress and verbalizes no complaints.

## 2020-01-02 NOTE — ASSESSMENT & PLAN NOTE
-ILR implantation  -Indication, risks, benefits reviewed with patient who voiced understanding and agreement with the plan

## 2020-01-02 NOTE — DISCHARGE SUMMARY
[facility]  Cardiac Electrophysiology  Discharge Summary      Patient Name: Naheed Cottrell  MRN: [id number]  Admission Date: 1/2/2020  Hospital Length of Stay: 0 days  Discharge Date and Time:  01/02/2020 7:51 AM  Attending Physician: Anton Muniz MD    Discharging Provider: Byron Winter MD  Primary Care Physician: Mack Cottrell MD    HPI:   87 yo F, Cardiology consulted for syncope x2. (Son: Sydney Cottrell, Primary cardiologist Dr. Braswell), PMhx of HTN, stroke, renal artery stenosis, L subclavian stenosis, dyslipidemia, PAD (on DAPT) presenting for ILR implantation after syncopal episode. She was hospitalized 12/29 who presented to the ED for evaluation of syncope. No prodrome concerning for cardiac etiology. Witnessed LOC while standing after dinner for 10 sec, quick return to baseline. Labs unremarkable. Hypotensive on arrival, responsive to 1L IVF. Carotids mild disease 6/2019. EKG unremarkable. Echo normal EF, no AS remarked upon. Telemetry without event. Presents today for ILR.    EKG today NSR, , QRS 82.       Procedure(s) (LRB):  Insertion, Implantable Loop Recorder (N/A)     Indwelling Lines/Drains at time of discharge:  Lines/Drains/Airways     None                 Hospital Course:  Biotronik ILR implanted with local sedation without complication. Discharged home in good condition.     Consults:     Significant Diagnostic Studies: Labs:   BMP: No results for input(s): GLU, NA, K, CL, CO2, BUN, CREATININE, CALCIUM, MG in the last 48 hours., CMP No results for input(s): NA, K, CL, CO2, GLU, BUN, CREATININE, CALCIUM, PROT, ALBUMIN, BILITOT, ALKPHOS, AST, ALT, ANIONGAP, ESTGFRAFRICA, EGFRNONAA in the last 48 hours., CBC No results for input(s): WBC, HGB, HCT, PLT in the last 48 hours., INR   Lab Results   Component Value Date    INR 1.2 06/04/2017    INR 1.0 11/20/2006   , Lipid Panel   Lab Results   Component Value Date    CHOL 146 09/26/2019    HDL 51 09/26/2019    LDLCALC 74.2  09/26/2019    TRIG 104 09/26/2019    CHOLHDL 34.9 09/26/2019    and Troponin   Recent Labs   Lab 12/29/19 2121   TROPONINI <0.006       Pending Diagnostic Studies:     None          Final Active Diagnoses:    Diagnosis Date Noted POA    Syncope [R55] 12/29/2019 Yes      Problems Resolved During this Admission:     No new Assessment & Plan notes have been filed under this hospital service since the last note was generated.  Service: Arrhythmia      Discharged Condition: good    Disposition: Home or Self Care    Follow Up:    Patient Instructions:   No discharge procedures on file.  Medications:  Reconciled Home Medications:      Medication List      CONTINUE taking these medications    acetaminophen 500 MG tablet  Commonly known as:  TYLENOL  Take 1,000 mg by mouth every 6 (six) hours as needed for Pain.     aspirin 81 MG EC tablet  Commonly known as:  ECOTRIN  Take 1 tablet (81 mg total) by mouth once daily.     calcium-vitamin D3 500 mg(1,250mg) -200 unit per tablet  Commonly known as:  OS-ASHLEY 500 + D3  Take 2 tablets by mouth 2 (two) times daily.     carvedilol 25 MG tablet  Commonly known as:  COREG  Take 1 tablet (25 mg total) by mouth 2 (two) times daily with meals.     chlorthalidone 25 MG Tab  Commonly known as:  HYGROTEN  Take 1 tablet (25 mg total) by mouth once daily.     cholecalciferol (vitamin D3) 25 mcg (1,000 unit) capsule  Commonly known as:  VITAMIN D3  Take 1 capsule (1,000 Units total) by mouth once daily.     cilostazol 50 MG Tab  Commonly known as:  PLETAL  Take 1 tablet (50 mg total) by mouth 2 (two) times daily.     citalopram 20 MG tablet  Commonly known as:  CELEXA  Take 1 tablet (20 mg total) by mouth once daily.     clopidogrel 75 mg tablet  Commonly known as:  PLAVIX  TAKE 1 TABLET(75 MG) BY MOUTH EVERY DAY     Co Q-10 10 mg capsule  Generic drug:  coenzyme Q10  Take 10 mg by mouth once daily.     diclofenac sodium 1 % Gel  Commonly known as:  VOLTAREN  Apply 2 g topically 2 (two) times  daily.     olmesartan 40 MG tablet  Commonly known as:  BENICAR  TAKE 1 TABLET(40 MG) BY MOUTH EVERY DAY     Praluent Pen 75 mg/mL Pnij  Generic drug:  alirocumab  Inject 1 mL (75 mg total) into the skin every 14 (fourteen) days.            Time spent on the discharge of patient: 10 minutes    Byron Winter MD  Cardiac Electrophysiology  [facility]

## 2020-01-02 NOTE — HPI
87 yo F, Cardiology consulted for syncope x2. (Son: Sydney Cottrell, Primary cardiologist Dr. Braswell), PMhx of HTN, stroke, renal artery stenosis, L subclavian stenosis, dyslipidemia, PAD (on DAPT) presenting for ILR implantation after syncopal episode. She was hospitalized 12/29 who presented to the ED for evaluation of syncope. No prodrome concerning for cardiac etiology. Witnessed LOC while standing after dinner for 10 sec, quick return to baseline. Labs unremarkable. Hypotensive on arrival, responsive to 1L IVF. Carotids mild disease 6/2019. EKG unremarkable. Echo normal EF, no AS remarked upon. Telemetry without event. Presents today for ILR.    EKG today NSR, , QRS 82.

## 2020-01-02 NOTE — SUBJECTIVE & OBJECTIVE
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      Current Outpatient Medications on File Prior to Encounter   Medication Sig    acetaminophen (TYLENOL) 500 MG tablet Take 1,000 mg by mouth every 6 (six) hours as needed for Pain.    aspirin (ECOTRIN) 81 MG EC tablet Take 1 tablet (81 mg total) by mouth once daily.    calcium-vitamin D3 500 mg(1,250mg) -200 unit per tablet Take 2 tablets by mouth 2 (two) times daily.    carvedilol (COREG) 25 MG tablet Take 1 tablet (25 mg total) by mouth 2 (two) times daily with meals.    chlorthalidone (HYGROTEN) 25 MG Tab Take 1 tablet (25 mg total) by mouth once daily.    cilostazol (PLETAL) 50 MG Tab Take 1 tablet (50 mg total) by mouth 2 (two) times daily.    citalopram (CELEXA) 20 MG tablet Take 1 tablet (20 mg total) by mouth once daily.    clopidogrel (PLAVIX) 75 mg tablet TAKE 1 TABLET(75 MG) BY MOUTH EVERY DAY    coenzyme Q10 (CO Q-10) 10 mg capsule Take 10 mg by mouth once daily.    olmesartan (BENICAR) 40 MG tablet TAKE 1 TABLET(40 MG) BY MOUTH EVERY DAY    alirocumab (PRALUENT PEN) 75 mg/mL PnIj Inject 1 mL (75 mg total) into the skin every 14 (fourteen) days.    cholecalciferol, vitamin D3, 1,000 unit capsule Take 1 capsule (1,000 Units total) by mouth once daily.    diclofenac sodium (VOLTAREN) 1 % Gel Apply 2 g topically 2 (two) times daily.     Family History     Problem Relation (Age of Onset)    Hyperlipidemia Mother    Hypertension Mother         Tobacco Use    Smoking status: Never Smoker    Smokeless tobacco: Never Used   Substance and Sexual Activity    Alcohol use: No    Drug use: No    Sexual activity: Not on file     Review of Systems   All other systems reviewed and are negative.    Objective:     Vital Signs (Most Recent):  Temp: 96.2 °F (35.7 °C) (01/02/20 0617)  Pulse: 66 (01/02/20 0617)  Resp: 18 (01/02/20 0617)  BP: (!) 165/70 (01/02/20 0617)  SpO2: 96 % (01/02/20 0617) Vital Signs (24h Range):  Temp:  [96.2 °F (35.7 °C)] 96.2 °F (35.7 °C)  Pulse:  [66] 66  Resp:  [18] 18  SpO2:  [96 %] 96 %  BP: (165)/(70) 165/70       Weight: 61.2 kg (135 lb)  Body mass index is 23.91 kg/m².    SpO2: 96 %  O2 Device (Oxygen Therapy): room air    Physical Exam   Constitutional: She is oriented to person, place, and time. She appears well-nourished. No distress.   HENT:   Head: Atraumatic.   Eyes: EOM are normal. No scleral icterus.   Neck: Neck supple. No JVD present.   Cardiovascular: Normal rate, regular rhythm and normal heart sounds. Exam reveals no gallop and no friction rub.   No murmur heard.  Pulmonary/Chest: Effort normal and breath sounds normal. No respiratory distress. She has no wheezes. She has no rales.   Abdominal: Soft. Bowel sounds are normal. She exhibits no distension. There is no tenderness.   Musculoskeletal: She exhibits no edema.   Neurological: She is alert and oriented to person, place, and time. No cranial nerve deficit.   Skin: Skin is warm and dry. No erythema.   Psychiatric: She has a normal mood and affect.       Significant Labs: BMP: No results for input(s): GLU, NA, K, CL, CO2, BUN, CREATININE, CALCIUM, MG in the last 48 hours., CMP: No results for input(s): NA, K, CL, CO2, GLU, BUN, CREATININE, CALCIUM, PROT, ALBUMIN, BILITOT, ALKPHOS, AST, ALT, ANIONGAP, ESTGFRAFRICA, EGFRNONAA in the last 48 hours., CBC: No results for input(s): WBC, HGB, HCT, PLT in the last 48 hours., INR: No results for input(s): INR, PROTIME  in the last 48 hours., Lipid Panel No results for input(s): CHOL, HDL, LDLCALC, TRIG, CHOLHDL in the last 48 hours. and Troponin No results for input(s): TROPONINI in the last 48 hours.    Significant Imaging: Echocardiogram:   2D echo with color flow doppler:   Results for orders placed or performed during the hospital encounter of 06/04/17   2D echo with color flow doppler   Result Value Ref Range    QEF 65 55 - 65    Mitral Valve Regurgitation TRIVIAL     Diastolic Dysfunction Yes (A)     Aortic Valve Regurgitation TRIVIAL     Aortic Valve Stenosis MILD (A)     Est. PA Systolic Pressure 47.33 (A)     Tricuspid Valve Regurgitation TRIVIAL TO MILD     Narrative    Date of Procedure: 06/05/2017        TEST DESCRIPTION       Aorta: The aortic root is normal in size, measuring 2.9 cm at sinotubular junction and 3.1 cm at Sinuses of Valsalva. The proximal ascending aorta is normal in size, measuring 2.9 cm across.     Left Atrium: The left atrial volume index is moderately enlarged, measuring 46.19 cc/m2.     Left Ventricle: The left ventricle is normal in size, with an end-diastolic diameter of 4.1 cm, and an end-systolic diameter of 2.1 cm. LV wall thickness is normal, with the septum and the posterior wall each measuring 0.7 cm across. Relative wall   thickness was normal at 0.34, and the LV mass index was 54.5 g/m2 consistent with normal left ventricular mass. There are no regional wall motion abnormalities. Left ventricular systolic function appears normal. Visually estimated ejection fraction is   60-65%. The LV Doppler derived stroke volume equals 64.0 ccs.     Diastolic indices: E wave velocity 1.1 m/s, E/A ratio 1.0,  msec., E/e' ratio(avg) 14. Pulmonary vein systolic/diastolic ratio is normal. Mean left atrial pressures are normal. There is pseudonormalization of mitral inflow pattern consistent with   significant diastolic dysfunction.     Right Atrium: The right atrium is enlarged, measuring 5.1 cm in  length and 4.6 cm in width in the apical view.     Right Ventricle: The right ventricle is enlarged measuring 4.0 cm at the base in the apical right ventricle-focused view. Global right ventricular systolic function appears normal. Tricuspid annular plane systolic excursion (TAPSE) is 2.3 cm. Tissue   Doppler-derived tricuspid annular peak systolic velocity (S prime) is 11.3 cm/s. The estimated PA systolic pressure is greater than 47 mmHg.     Aortic Valve:  The aortic valve is moderately sclerotic with mildly restricted leaflet mobility. The peak velocity obtained across the aortic valve is 1.73 m/s, which translates to a peak gradient of 12 mmHg. The mean gradient is 6 mmHg. Using a left   ventricular outflow tract diameter of 1.8 cm, a left ventricular outflow tract velocity time integral of 26 cm, and a peak instantaneous transvalvular velocity time integral of 35 cm, the calculated aortic valve area is 1.83 cm2, consistent with mild   aortic stenosis. There is a pressure half time of 363.0 msec. There is aortic annular calcification.     Mitral Valve:  The mitral valve is normal in structure. There is trivial mitral regurgitation.     Tricuspid Valve:  There is trivial to mild tricuspid regurgitation.     IVC: The IVC is not visualized.     Intracavitary: There is no evidence of pericardial effusion, intracavity mass, thrombi, or vegetation.         CONCLUSIONS     1 - Normal left ventricular systolic function (EF 60-65%).     2 - Impaired LV relaxation, elevated LAP (grade 2 diastolic dysfunction).     3 - Biatrial enlargement.     4 - Mild aortic stenosis, LIZZIE = 1.83 cm2, peak velocity = 1.73 m/s, mean gradient = 6 mmHg.     5 - Trivial mitral regurgitation.     6 - Pulmonary hypertension. The estimated PA systolic pressure is greater than 47 mmHg.     7 - Right ventricular enlargement with normal systolic function.     8 - Trivial to mild tricuspid regurgitation.             This document has been  electronically    SIGNED BY: Bro Kelly MD On: 06/05/2017 16:14    and Transthoracic echo (TTE) complete (Cupid Only):   Results for orders placed or performed during the hospital encounter of 12/29/19   Echo Color Flow Doppler? Yes   Result Value Ref Range    Ascending aorta 2.82 cm    STJ 2.86 cm    AV mean gradient 6 mmHg    Ao peak juvencio 1.68 m/s    Ao VTI 37.44 cm    IVRT 0.07 msec    IVS 0.87 0.6 - 1.1 cm    LA size 3.63 cm    Left Atrium Major Axis 4.80 cm    Left Atrium Minor Axis 4.90 cm    LVIDD 3.72 3.5 - 6.0 cm    LVIDS 2.34 2.1 - 4.0 cm    LVOT diameter 1.78 cm    LVOT peak VTI 26.41 cm    PW 0.84 0.6 - 1.1 cm    MV Peak A Juvencio 1.02 m/s    E wave decelartion time 211.50 msec    MV Peak E Juvencio 0.85 m/s    PV Peak D Juvencio 0.58 m/s    PV Peak S Juvencio 0.67 m/s    RA Major Axis 4.02 cm    RA Width 4.02 cm    RVDD 3.29 cm    Sinus 2.45 cm    TAPSE 2.25 cm    TR Max Juvencio 2.63 m/s    TDI LATERAL 0.08 m/s    TDI SEPTAL 0.07 m/s    LA WIDTH 3.79 cm    LV Diastolic Volume 58.82 mL    LV Systolic Volume 18.96 mL    RV S' 17.78 cm/s    LVOT peak juvencio 1.09 m/s    LV LATERAL E/E' RATIO 10.63 m/s    LV SEPTAL E/E' RATIO 12.14 m/s    FS 37 %    LA volume 56.71 cm3    LV mass 90.98 g    Left Ventricle Relative Wall Thickness 0.45 cm    AV valve area 1.75 cm2    AV Velocity Ratio 0.65     AV index (prosthetic) 0.71     E/A ratio 0.83     Mean e' 0.08 m/s    Pulm vein S/D ratio 1.16     LVOT area 2.5 cm2    LVOT stroke volume 65.69 cm3    AV peak gradient 11 mmHg    E/E' ratio 11.33 m/s    LV Systolic Volume Index 11.4 mL/m2    LV Diastolic Volume Index 35.28 mL/m2    LA Volume Index 34.0 mL/m2    LV Mass Index 55 g/m2    Triscuspid Valve Regurgitation Peak Gradient 28 mmHg    BSA 1.7 m2    Right Atrial Pressure (from IVC) 3 mmHg    TV rest pulmonary artery pressure 31 mmHg    Narrative    · Normal left ventricular systolic function. The estimated ejection   fraction is 65%  · Normal right ventricular systolic function.  · Grade I  (mild) left ventricular diastolic dysfunction consistent with   impaired relaxation.  · Concentric left ventricular remodeling.  · Mild left atrial enlargement.  · The estimated PA systolic pressure is 31 mm Hg  · Normal central venous pressure (3 mm Hg).

## 2020-01-02 NOTE — H&P
[facility]  Cardiac Electrophysiology  History and Physical     Admission Date: 1/2/2020  Code Status: Prior   Attending Provider: Anton Muniz MD   Principal Problem:<principal problem not specified>    Subjective:     Chief Complaint:  syncope     HPI:  85 yo F, Cardiology consulted for syncope x2. (Son: Sydney Cottrell, Primary cardiologist Dr. Braswell), PMhx of HTN, stroke, renal artery stenosis, L subclavian stenosis, dyslipidemia, PAD (on DAPT) presenting for ILR implantation after syncopal episode. She was hospitalized 12/29 who presented to the ED for evaluation of syncope. No prodrome concerning for cardiac etiology. Witnessed LOC while standing after dinner for 10 sec, quick return to baseline. Labs unremarkable. Hypotensive on arrival, responsive to 1L IVF. Carotids mild disease 6/2019. EKG unremarkable. Echo normal EF, no AS remarked upon. Telemetry without event. Presents today for ILR.    EKG today NSR, , QRS 82.       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      Current Outpatient Medications on File Prior to Encounter   Medication Sig    acetaminophen (TYLENOL) 500 MG tablet Take 1,000 mg by mouth every 6 (six) hours as needed for Pain.    aspirin (ECOTRIN) 81 MG EC tablet Take 1 tablet (81 mg total) by mouth once daily.    calcium-vitamin D3 500 mg(1,250mg) -200 unit per tablet Take 2 tablets by mouth 2 (two) times daily.    carvedilol  (COREG) 25 MG tablet Take 1 tablet (25 mg total) by mouth 2 (two) times daily with meals.    chlorthalidone (HYGROTEN) 25 MG Tab Take 1 tablet (25 mg total) by mouth once daily.    cilostazol (PLETAL) 50 MG Tab Take 1 tablet (50 mg total) by mouth 2 (two) times daily.    citalopram (CELEXA) 20 MG tablet Take 1 tablet (20 mg total) by mouth once daily.    clopidogrel (PLAVIX) 75 mg tablet TAKE 1 TABLET(75 MG) BY MOUTH EVERY DAY    coenzyme Q10 (CO Q-10) 10 mg capsule Take 10 mg by mouth once daily.    olmesartan (BENICAR) 40 MG tablet TAKE 1 TABLET(40 MG) BY MOUTH EVERY DAY    alirocumab (PRALUENT PEN) 75 mg/mL PnIj Inject 1 mL (75 mg total) into the skin every 14 (fourteen) days.    cholecalciferol, vitamin D3, 1,000 unit capsule Take 1 capsule (1,000 Units total) by mouth once daily.    diclofenac sodium (VOLTAREN) 1 % Gel Apply 2 g topically 2 (two) times daily.     Family History     Problem Relation (Age of Onset)    Hyperlipidemia Mother    Hypertension Mother        Tobacco Use    Smoking status: Never Smoker    Smokeless tobacco: Never Used   Substance and Sexual Activity    Alcohol use: No    Drug use: No    Sexual activity: Not on file     Review of Systems   All other systems reviewed and are negative.    Objective:     Vital Signs (Most Recent):  Temp: 96.2 °F (35.7 °C) (01/02/20 0617)  Pulse: 66 (01/02/20 0617)  Resp: 18 (01/02/20 0617)  BP: (!) 165/70 (01/02/20 0617)  SpO2: 96 % (01/02/20 0617) Vital Signs (24h Range):  Temp:  [96.2 °F (35.7 °C)] 96.2 °F (35.7 °C)  Pulse:  [66] 66  Resp:  [18] 18  SpO2:  [96 %] 96 %  BP: (165)/(70) 165/70       Weight: 61.2 kg (135 lb)  Body mass index is 23.91 kg/m².    SpO2: 96 %  O2 Device (Oxygen Therapy): room air    Physical Exam   Constitutional: She is oriented to person, place, and time. She appears well-nourished. No distress.   HENT:   Head: Atraumatic.   Eyes: EOM are normal. No scleral icterus.   Neck: Neck supple. No JVD present.    Cardiovascular: Normal rate, regular rhythm and normal heart sounds. Exam reveals no gallop and no friction rub.   No murmur heard.  Pulmonary/Chest: Effort normal and breath sounds normal. No respiratory distress. She has no wheezes. She has no rales.   Abdominal: Soft. Bowel sounds are normal. She exhibits no distension. There is no tenderness.   Musculoskeletal: She exhibits no edema.   Neurological: She is alert and oriented to person, place, and time. No cranial nerve deficit.   Skin: Skin is warm and dry. No erythema.   Psychiatric: She has a normal mood and affect.       Significant Labs: BMP: No results for input(s): GLU, NA, K, CL, CO2, BUN, CREATININE, CALCIUM, MG in the last 48 hours., CMP: No results for input(s): NA, K, CL, CO2, GLU, BUN, CREATININE, CALCIUM, PROT, ALBUMIN, BILITOT, ALKPHOS, AST, ALT, ANIONGAP, ESTGFRAFRICA, EGFRNONAA in the last 48 hours., CBC: No results for input(s): WBC, HGB, HCT, PLT in the last 48 hours., INR: No results for input(s): INR, PROTIME in the last 48 hours., Lipid Panel No results for input(s): CHOL, HDL, LDLCALC, TRIG, CHOLHDL in the last 48 hours. and Troponin No results for input(s): TROPONINI in the last 48 hours.    Significant Imaging: Echocardiogram:   2D echo with color flow doppler:   Results for orders placed or performed during the hospital encounter of 06/04/17   2D echo with color flow doppler   Result Value Ref Range    QEF 65 55 - 65    Mitral Valve Regurgitation TRIVIAL     Diastolic Dysfunction Yes (A)     Aortic Valve Regurgitation TRIVIAL     Aortic Valve Stenosis MILD (A)     Est. PA Systolic Pressure 47.33 (A)     Tricuspid Valve Regurgitation TRIVIAL TO MILD     Narrative    Date of Procedure: 06/05/2017        TEST DESCRIPTION       Aorta: The aortic root is normal in size, measuring 2.9 cm at sinotubular junction and 3.1 cm at Sinuses of Valsalva. The proximal ascending aorta is normal in size, measuring 2.9 cm across.     Left Atrium: The  left atrial volume index is moderately enlarged, measuring 46.19 cc/m2.     Left Ventricle: The left ventricle is normal in size, with an end-diastolic diameter of 4.1 cm, and an end-systolic diameter of 2.1 cm. LV wall thickness is normal, with the septum and the posterior wall each measuring 0.7 cm across. Relative wall   thickness was normal at 0.34, and the LV mass index was 54.5 g/m2 consistent with normal left ventricular mass. There are no regional wall motion abnormalities. Left ventricular systolic function appears normal. Visually estimated ejection fraction is   60-65%. The LV Doppler derived stroke volume equals 64.0 ccs.     Diastolic indices: E wave velocity 1.1 m/s, E/A ratio 1.0,  msec., E/e' ratio(avg) 14. Pulmonary vein systolic/diastolic ratio is normal. Mean left atrial pressures are normal. There is pseudonormalization of mitral inflow pattern consistent with   significant diastolic dysfunction.     Right Atrium: The right atrium is enlarged, measuring 5.1 cm in length and 4.6 cm in width in the apical view.     Right Ventricle: The right ventricle is enlarged measuring 4.0 cm at the base in the apical right ventricle-focused view. Global right ventricular systolic function appears normal. Tricuspid annular plane systolic excursion (TAPSE) is 2.3 cm. Tissue   Doppler-derived tricuspid annular peak systolic velocity (S prime) is 11.3 cm/s. The estimated PA systolic pressure is greater than 47 mmHg.     Aortic Valve:  The aortic valve is moderately sclerotic with mildly restricted leaflet mobility. The peak velocity obtained across the aortic valve is 1.73 m/s, which translates to a peak gradient of 12 mmHg. The mean gradient is 6 mmHg. Using a left   ventricular outflow tract diameter of 1.8 cm, a left ventricular outflow tract velocity time integral of 26 cm, and a peak instantaneous transvalvular velocity time integral of 35 cm, the calculated aortic valve area is 1.83 cm2, consistent  with mild   aortic stenosis. There is a pressure half time of 363.0 msec. There is aortic annular calcification.     Mitral Valve:  The mitral valve is normal in structure. There is trivial mitral regurgitation.     Tricuspid Valve:  There is trivial to mild tricuspid regurgitation.     IVC: The IVC is not visualized.     Intracavitary: There is no evidence of pericardial effusion, intracavity mass, thrombi, or vegetation.         CONCLUSIONS     1 - Normal left ventricular systolic function (EF 60-65%).     2 - Impaired LV relaxation, elevated LAP (grade 2 diastolic dysfunction).     3 - Biatrial enlargement.     4 - Mild aortic stenosis, LIZZIE = 1.83 cm2, peak velocity = 1.73 m/s, mean gradient = 6 mmHg.     5 - Trivial mitral regurgitation.     6 - Pulmonary hypertension. The estimated PA systolic pressure is greater than 47 mmHg.     7 - Right ventricular enlargement with normal systolic function.     8 - Trivial to mild tricuspid regurgitation.             This document has been electronically    SIGNED BY: Bro Kelly MD On: 06/05/2017 16:14    and Transthoracic echo (TTE) complete (Cupid Only):   Results for orders placed or performed during the hospital encounter of 12/29/19   Echo Color Flow Doppler? Yes   Result Value Ref Range    Ascending aorta 2.82 cm    STJ 2.86 cm    AV mean gradient 6 mmHg    Ao peak juvencio 1.68 m/s    Ao VTI 37.44 cm    IVRT 0.07 msec    IVS 0.87 0.6 - 1.1 cm    LA size 3.63 cm    Left Atrium Major Axis 4.80 cm    Left Atrium Minor Axis 4.90 cm    LVIDD 3.72 3.5 - 6.0 cm    LVIDS 2.34 2.1 - 4.0 cm    LVOT diameter 1.78 cm    LVOT peak VTI 26.41 cm    PW 0.84 0.6 - 1.1 cm    MV Peak A Juvencio 1.02 m/s    E wave decelartion time 211.50 msec    MV Peak E Juvencio 0.85 m/s    PV Peak D Juvencio 0.58 m/s    PV Peak S Juvencio 0.67 m/s    RA Major Axis 4.02 cm    RA Width 4.02 cm    RVDD 3.29 cm    Sinus 2.45 cm    TAPSE 2.25 cm    TR Max Juvencio 2.63 m/s    TDI LATERAL 0.08 m/s    TDI SEPTAL 0.07 m/s    LA WIDTH  3.79 cm    LV Diastolic Volume 58.82 mL    LV Systolic Volume 18.96 mL    RV S' 17.78 cm/s    LVOT peak brook 1.09 m/s    LV LATERAL E/E' RATIO 10.63 m/s    LV SEPTAL E/E' RATIO 12.14 m/s    FS 37 %    LA volume 56.71 cm3    LV mass 90.98 g    Left Ventricle Relative Wall Thickness 0.45 cm    AV valve area 1.75 cm2    AV Velocity Ratio 0.65     AV index (prosthetic) 0.71     E/A ratio 0.83     Mean e' 0.08 m/s    Pulm vein S/D ratio 1.16     LVOT area 2.5 cm2    LVOT stroke volume 65.69 cm3    AV peak gradient 11 mmHg    E/E' ratio 11.33 m/s    LV Systolic Volume Index 11.4 mL/m2    LV Diastolic Volume Index 35.28 mL/m2    LA Volume Index 34.0 mL/m2    LV Mass Index 55 g/m2    Triscuspid Valve Regurgitation Peak Gradient 28 mmHg    BSA 1.7 m2    Right Atrial Pressure (from IVC) 3 mmHg    TV rest pulmonary artery pressure 31 mmHg    Narrative    · Normal left ventricular systolic function. The estimated ejection   fraction is 65%  · Normal right ventricular systolic function.  · Grade I (mild) left ventricular diastolic dysfunction consistent with   impaired relaxation.  · Concentric left ventricular remodeling.  · Mild left atrial enlargement.  · The estimated PA systolic pressure is 31 mm Hg  · Normal central venous pressure (3 mm Hg).        Assessment and Plan:     Syncope  -ILR implantation  -Indication, risks, benefits reviewed with patient who voiced understanding and agreement with the plan         Byron Winter MD  Cardiac Electrophysiology  [facility]

## 2020-01-02 NOTE — PLAN OF CARE
Pt arrived to unit accompanied by son. Vss.  Oriented pt to rm and unit. Pre op orders and assessment initiated.  Pt in no acute distress and verbalizes no complaints.  Will continue to monitor.  Call bell within reach.

## 2020-01-06 RX ORDER — CITALOPRAM 20 MG/1
TABLET, FILM COATED ORAL
Qty: 90 TABLET | Refills: 2 | Status: SHIPPED | OUTPATIENT
Start: 2020-01-06 | End: 2020-08-17 | Stop reason: SDUPTHER

## 2020-01-07 ENCOUNTER — TELEPHONE (OUTPATIENT)
Dept: ELECTROPHYSIOLOGY | Facility: CLINIC | Age: 85
End: 2020-01-07

## 2020-01-07 DIAGNOSIS — I49.8 OTHER SPECIFIED CARDIAC ARRHYTHMIAS: Primary | ICD-10-CM

## 2020-01-10 ENCOUNTER — CLINICAL SUPPORT (OUTPATIENT)
Dept: CARDIOLOGY | Facility: HOSPITAL | Age: 85
End: 2020-01-10
Attending: INTERNAL MEDICINE
Payer: MEDICARE

## 2020-01-10 DIAGNOSIS — R55 SYNCOPE, UNSPECIFIED SYNCOPE TYPE: ICD-10-CM

## 2020-01-17 ENCOUNTER — TELEPHONE (OUTPATIENT)
Dept: CARDIOLOGY | Facility: CLINIC | Age: 85
End: 2020-01-17

## 2020-01-17 DIAGNOSIS — E78.5 DYSLIPIDEMIA: Primary | Chronic | ICD-10-CM

## 2020-01-17 DIAGNOSIS — Z88.8 ALLERGY TO STATIN MEDICATION: ICD-10-CM

## 2020-01-21 ENCOUNTER — TELEPHONE (OUTPATIENT)
Dept: PHARMACY | Facility: CLINIC | Age: 85
End: 2020-01-21

## 2020-01-21 NOTE — TELEPHONE ENCOUNTER
LVM for callback to inform patient that [facility] received prescription for Repatha and prior authorization is required.  [facility] will be back in touch once insurance determination is received.

## 2020-02-03 NOTE — TELEPHONE ENCOUNTER
Documentation Only: PA has been approved for Repatha from 1/29/2020 to 7/27/2020 Case #: [id number] Copay: $47.00 Patient Assistance IS required. Forward to Financial Assistance for review. -HBR

## 2020-02-05 ENCOUNTER — CLINICAL SUPPORT (OUTPATIENT)
Dept: CARDIOLOGY | Facility: HOSPITAL | Age: 85
End: 2020-02-05
Attending: INTERNAL MEDICINE
Payer: MEDICARE

## 2020-02-05 DIAGNOSIS — R55 SYNCOPE, UNSPECIFIED SYNCOPE TYPE: ICD-10-CM

## 2020-02-05 DIAGNOSIS — I49.8 OTHER SPECIFIED CARDIAC ARRHYTHMIAS: ICD-10-CM

## 2020-02-05 PROCEDURE — G2066 INTER DEVC REMOTE 30D: HCPCS | Mod: HCNC

## 2020-02-05 NOTE — TELEPHONE ENCOUNTER
Informed patient of medication approval for Repatha 140mg/mL Sureclick pens - $47 copay, [facility].  Repatha will be shipped on 20 via [employer] to arrive to patient's home on 20. Confirmed 2 patient identifiers - Name and .  Patient previously on Praluent with last dose on 20, switched to insurance preferred Repatha.  She plans to start Repatha on 20, no lapse in therapy.  Administration directions reviewed - Inject 140mg (1 pen) SQ Q14D.  Patient declined further consultation since both Praluent Pens and Repatha Pens are both autoinjectors and SAME directions, administration, and storage.  She stated that her son is law is physician at [facility] who is available to assist with injections if needed.  No Sharps container needed. All questions answered and addressed to patients satisfaction. [facility] will follow up with patient in 3 weeks to arrange refill.  Pt verbalized understanding.

## 2020-02-07 ENCOUNTER — CLINICAL SUPPORT (OUTPATIENT)
Dept: CARDIOLOGY | Facility: HOSPITAL | Age: 85
End: 2020-02-07
Payer: MEDICARE

## 2020-02-07 DIAGNOSIS — Z95.818 PRESENCE OF OTHER CARDIAC IMPLANTS AND GRAFTS: ICD-10-CM

## 2020-02-07 PROCEDURE — 93298 CARDIAC DEVICE CHECK - REMOTE: ICD-10-PCS | Mod: ,,, | Performed by: INTERNAL MEDICINE

## 2020-02-07 PROCEDURE — G2066 INTER DEVC REMOTE 30D: HCPCS | Performed by: INTERNAL MEDICINE

## 2020-02-07 PROCEDURE — 93298 REM INTERROG DEV EVAL SCRMS: CPT | Mod: ,,, | Performed by: INTERNAL MEDICINE

## 2020-02-10 DIAGNOSIS — I73.9 PAD (PERIPHERAL ARTERY DISEASE): ICD-10-CM

## 2020-02-10 RX ORDER — CHLORTHALIDONE 25 MG/1
TABLET ORAL
Qty: 90 TABLET | Refills: 11 | Status: SHIPPED | OUTPATIENT
Start: 2020-02-10 | End: 2020-08-17 | Stop reason: SDUPTHER

## 2020-02-10 RX ORDER — CILOSTAZOL 50 MG/1
TABLET ORAL
Qty: 180 TABLET | Refills: 3 | Status: ON HOLD | OUTPATIENT
Start: 2020-02-10 | End: 2020-06-26 | Stop reason: HOSPADM

## 2020-02-20 ENCOUNTER — TELEPHONE (OUTPATIENT)
Dept: ELECTROPHYSIOLOGY | Facility: CLINIC | Age: 85
End: 2020-02-20

## 2020-02-27 ENCOUNTER — TELEPHONE (OUTPATIENT)
Dept: PHARMACY | Facility: CLINIC | Age: 85
End: 2020-02-27

## 2020-03-30 ENCOUNTER — TELEPHONE (OUTPATIENT)
Dept: PHARMACY | Facility: CLINIC | Age: 85
End: 2020-03-30

## 2020-04-13 ENCOUNTER — TELEPHONE (OUTPATIENT)
Dept: ELECTROPHYSIOLOGY | Facility: CLINIC | Age: 85
End: 2020-04-13

## 2020-04-13 NOTE — TELEPHONE ENCOUNTER
Spoke with patient to cancel appointments on 4/29/2020 with JOJO Ceballos. Is interested in a virtual visit with Dr. Muniz. States is not having cardiac/arrhyhmia issues at this time.

## 2020-04-15 DIAGNOSIS — I49.8 OTHER SPECIFIED CARDIAC ARRHYTHMIAS: ICD-10-CM

## 2020-04-15 DIAGNOSIS — R55 SYNCOPE, UNSPECIFIED SYNCOPE TYPE: Primary | ICD-10-CM

## 2020-04-17 ENCOUNTER — TELEPHONE (OUTPATIENT)
Dept: ELECTROPHYSIOLOGY | Facility: CLINIC | Age: 85
End: 2020-04-17

## 2020-04-17 NOTE — TELEPHONE ENCOUNTER
Spoke with the pt to let her know that she has not had any events on her ILR, and Dr. Muniz is okay will r/s her appt in 3 months.  We will give her a call once his schedule opens up for July 2020 to schedule her appt.

## 2020-04-28 ENCOUNTER — TELEPHONE (OUTPATIENT)
Dept: PHARMACY | Facility: CLINIC | Age: 85
End: 2020-04-28

## 2020-04-29 RX ORDER — CARVEDILOL 25 MG/1
TABLET ORAL
Qty: 180 TABLET | Refills: 11 | Status: SHIPPED | OUTPATIENT
Start: 2020-04-29 | End: 2020-08-17 | Stop reason: SDUPTHER

## 2020-05-21 ENCOUNTER — CLINICAL SUPPORT (OUTPATIENT)
Dept: CARDIOLOGY | Facility: HOSPITAL | Age: 85
End: 2020-05-21
Attending: INTERNAL MEDICINE
Payer: MEDICARE

## 2020-05-21 DIAGNOSIS — R55 SYNCOPE, UNSPECIFIED SYNCOPE TYPE: ICD-10-CM

## 2020-05-21 DIAGNOSIS — Z95.818 PRESENCE OF OTHER CARDIAC IMPLANTS AND GRAFTS: ICD-10-CM

## 2020-05-21 DIAGNOSIS — I49.8 OTHER SPECIFIED CARDIAC ARRHYTHMIAS: ICD-10-CM

## 2020-05-21 PROCEDURE — 93298 CARDIAC DEVICE CHECK - REMOTE: ICD-10-PCS | Mod: HCNC,,, | Performed by: INTERNAL MEDICINE

## 2020-05-21 PROCEDURE — G2066 INTER DEVC REMOTE 30D: HCPCS | Mod: HCNC | Performed by: INTERNAL MEDICINE

## 2020-05-21 PROCEDURE — 93298 REM INTERROG DEV EVAL SCRMS: CPT | Mod: HCNC,,, | Performed by: INTERNAL MEDICINE

## 2020-05-25 ENCOUNTER — TELEPHONE (OUTPATIENT)
Dept: PHARMACY | Facility: CLINIC | Age: 85
End: 2020-05-25

## 2020-06-18 ENCOUNTER — TELEPHONE (OUTPATIENT)
Dept: CARDIOLOGY | Facility: CLINIC | Age: 85
End: 2020-06-18

## 2020-06-18 DIAGNOSIS — Z86.73 HISTORY OF CVA (CEREBROVASCULAR ACCIDENT): ICD-10-CM

## 2020-06-18 DIAGNOSIS — I73.9 PAD (PERIPHERAL ARTERY DISEASE): Primary | ICD-10-CM

## 2020-06-18 RX ORDER — CLOPIDOGREL BISULFATE 75 MG/1
75 TABLET ORAL DAILY
Qty: 90 TABLET | Refills: 3 | Status: SHIPPED | OUTPATIENT
Start: 2020-06-18 | End: 2020-08-17 | Stop reason: SDUPTHER

## 2020-06-20 ENCOUNTER — CLINICAL SUPPORT (OUTPATIENT)
Dept: CARDIOLOGY | Facility: HOSPITAL | Age: 85
End: 2020-06-20
Attending: INTERNAL MEDICINE
Payer: MEDICARE

## 2020-06-20 DIAGNOSIS — Z95.818 PRESENCE OF OTHER CARDIAC IMPLANTS AND GRAFTS: ICD-10-CM

## 2020-06-20 DIAGNOSIS — I49.8 OTHER SPECIFIED CARDIAC ARRHYTHMIAS: ICD-10-CM

## 2020-06-20 DIAGNOSIS — R55 SYNCOPE, UNSPECIFIED SYNCOPE TYPE: ICD-10-CM

## 2020-06-20 PROCEDURE — 93298 CARDIAC DEVICE CHECK - REMOTE: ICD-10-PCS | Mod: HCNC,,, | Performed by: INTERNAL MEDICINE

## 2020-06-20 PROCEDURE — 93298 REM INTERROG DEV EVAL SCRMS: CPT | Mod: HCNC,,, | Performed by: INTERNAL MEDICINE

## 2020-06-20 PROCEDURE — G2066 INTER DEVC REMOTE 30D: HCPCS | Mod: HCNC | Performed by: INTERNAL MEDICINE

## 2020-06-22 ENCOUNTER — TELEPHONE (OUTPATIENT)
Dept: PHARMACY | Facility: CLINIC | Age: 85
End: 2020-06-22

## 2020-06-22 NOTE — TELEPHONE ENCOUNTER
Patient called for refill readiness on Repatha. No answer - lvm for call back. Unable to send mychart msg.     Cecilia Nguyen, Pharm.D.  Pharmacy Resident, PGY-1   [facility]  [phone]

## 2020-06-25 ENCOUNTER — DOCUMENTATION ONLY (OUTPATIENT)
Dept: CARDIOLOGY | Facility: HOSPITAL | Age: 85
End: 2020-06-25

## 2020-06-25 ENCOUNTER — HOSPITAL ENCOUNTER (OUTPATIENT)
Facility: HOSPITAL | Age: 85
Discharge: HOME | End: 2020-06-28
Attending: EMERGENCY MEDICINE | Admitting: EMERGENCY MEDICINE
Payer: MEDICARE

## 2020-06-25 DIAGNOSIS — I51.7 ENLARGED LA (LEFT ATRIUM): ICD-10-CM

## 2020-06-25 DIAGNOSIS — I77.1 SUBCLAVIAN ARTERY STENOSIS, LEFT: ICD-10-CM

## 2020-06-25 DIAGNOSIS — I50.32 CHRONIC HEART FAILURE WITH PRESERVED EJECTION FRACTION: ICD-10-CM

## 2020-06-25 DIAGNOSIS — R55 SYNCOPE: ICD-10-CM

## 2020-06-25 DIAGNOSIS — I70.1 RIGHT RENAL ARTERY STENOSIS: ICD-10-CM

## 2020-06-25 DIAGNOSIS — I10 ESSENTIAL HYPERTENSION: Chronic | ICD-10-CM

## 2020-06-25 DIAGNOSIS — I15.0 RENOVASCULAR HYPERTENSION: ICD-10-CM

## 2020-06-25 DIAGNOSIS — S70.01XA CONTUSION OF RIGHT HIP, INITIAL ENCOUNTER: Primary | ICD-10-CM

## 2020-06-25 DIAGNOSIS — N39.0 URINARY TRACT INFECTION WITHOUT HEMATURIA, SITE UNSPECIFIED: ICD-10-CM

## 2020-06-25 DIAGNOSIS — E78.5 DYSLIPIDEMIA: Chronic | ICD-10-CM

## 2020-06-25 DIAGNOSIS — D69.6 THROMBOCYTOPENIA, UNSPECIFIED: ICD-10-CM

## 2020-06-25 DIAGNOSIS — D64.9 NORMOCYTIC ANEMIA: ICD-10-CM

## 2020-06-25 DIAGNOSIS — R63.4 ABNORMAL WEIGHT LOSS: ICD-10-CM

## 2020-06-25 DIAGNOSIS — S42.202D CLOSED FRACTURE OF PROXIMAL END OF LEFT HUMERUS WITH ROUTINE HEALING, UNSPECIFIED FRACTURE MORPHOLOGY, SUBSEQUENT ENCOUNTER: ICD-10-CM

## 2020-06-25 DIAGNOSIS — R82.71 ASYMPTOMATIC BACTERIURIA: ICD-10-CM

## 2020-06-25 DIAGNOSIS — L65.9 ALOPECIA: ICD-10-CM

## 2020-06-25 DIAGNOSIS — F41.9 ANXIETY: ICD-10-CM

## 2020-06-25 DIAGNOSIS — R10.31 RIGHT GROIN PAIN: ICD-10-CM

## 2020-06-25 DIAGNOSIS — D50.9 IRON DEFICIENCY ANEMIA, UNSPECIFIED IRON DEFICIENCY ANEMIA TYPE: ICD-10-CM

## 2020-06-25 DIAGNOSIS — Z88.8 ALLERGY TO STATIN MEDICATION: ICD-10-CM

## 2020-06-25 DIAGNOSIS — I63.411 EMBOLIC STROKE INVOLVING RIGHT MIDDLE CEREBRAL ARTERY: ICD-10-CM

## 2020-06-25 DIAGNOSIS — R09.89 RIGHT CAROTID BRUIT: ICD-10-CM

## 2020-06-25 DIAGNOSIS — F45.22 BODY DYSMORPHIC DISORDER: ICD-10-CM

## 2020-06-25 DIAGNOSIS — S32.591A PUBIC RAMUS FRACTURE, RIGHT, CLOSED, INITIAL ENCOUNTER: ICD-10-CM

## 2020-06-25 DIAGNOSIS — E55.9 VITAMIN D DEFICIENCY: ICD-10-CM

## 2020-06-25 DIAGNOSIS — Z86.73 HISTORY OF CVA (CEREBROVASCULAR ACCIDENT): ICD-10-CM

## 2020-06-25 DIAGNOSIS — L81.9 DISCOLORATION OF SKIN OF FOOT: ICD-10-CM

## 2020-06-25 DIAGNOSIS — I73.9 PAD (PERIPHERAL ARTERY DISEASE): ICD-10-CM

## 2020-06-25 DIAGNOSIS — N28.9 ACUTE RENAL INSUFFICIENCY: ICD-10-CM

## 2020-06-25 PROBLEM — N30.00 ACUTE CYSTITIS: Status: ACTIVE | Noted: 2020-06-25

## 2020-06-25 PROBLEM — S32.10XA CLOSED FRACTURE OF SACRUM: Status: ACTIVE | Noted: 2020-06-25

## 2020-06-25 PROBLEM — M25.559 HIP PAIN: Status: ACTIVE | Noted: 2020-06-25

## 2020-06-25 LAB
ALBUMIN SERPL BCP-MCNC: 3.5 G/DL (ref 3.5–5.2)
ALP SERPL-CCNC: 61 U/L (ref 55–135)
ALT SERPL W/O P-5'-P-CCNC: 12 U/L (ref 10–44)
ANION GAP SERPL CALC-SCNC: 10 MMOL/L (ref 8–16)
AST SERPL-CCNC: 15 U/L (ref 10–40)
BACTERIA #/AREA URNS AUTO: ABNORMAL /HPF
BASOPHILS # BLD AUTO: 0.03 K/UL (ref 0–0.2)
BASOPHILS NFR BLD: 0.4 % (ref 0–1.9)
BILIRUB SERPL-MCNC: 0.3 MG/DL (ref 0.1–1)
BILIRUB UR QL STRIP: NEGATIVE
BUN SERPL-MCNC: 36 MG/DL (ref 8–23)
CALCIUM SERPL-MCNC: 9 MG/DL (ref 8.7–10.5)
CHLORIDE SERPL-SCNC: 106 MMOL/L (ref 95–110)
CLARITY UR REFRACT.AUTO: ABNORMAL
CO2 SERPL-SCNC: 22 MMOL/L (ref 23–29)
COLOR UR AUTO: YELLOW
CREAT SERPL-MCNC: 1.2 MG/DL (ref 0.5–1.4)
DIFFERENTIAL METHOD: ABNORMAL
EOSINOPHIL # BLD AUTO: 0 K/UL (ref 0–0.5)
EOSINOPHIL NFR BLD: 0.5 % (ref 0–8)
ERYTHROCYTE [DISTWIDTH] IN BLOOD BY AUTOMATED COUNT: 14 % (ref 11.5–14.5)
EST. GFR  (AFRICAN AMERICAN): 47 ML/MIN/1.73 M^2
EST. GFR  (NON AFRICAN AMERICAN): 40.7 ML/MIN/1.73 M^2
GLUCOSE SERPL-MCNC: 104 MG/DL (ref 70–110)
GLUCOSE UR QL STRIP: NEGATIVE
HCT VFR BLD AUTO: 33.3 % (ref 37–48.5)
HGB BLD-MCNC: 11 G/DL (ref 12–16)
HGB UR QL STRIP: ABNORMAL
IMM GRANULOCYTES # BLD AUTO: 0.06 K/UL (ref 0–0.04)
IMM GRANULOCYTES NFR BLD AUTO: 0.8 % (ref 0–0.5)
KETONES UR QL STRIP: NEGATIVE
LEUKOCYTE ESTERASE UR QL STRIP: ABNORMAL
LYMPHOCYTES # BLD AUTO: 1 K/UL (ref 1–4.8)
LYMPHOCYTES NFR BLD: 13.1 % (ref 18–48)
MCH RBC QN AUTO: 31.1 PG (ref 27–31)
MCHC RBC AUTO-ENTMCNC: 33 G/DL (ref 32–36)
MCV RBC AUTO: 94 FL (ref 82–98)
MICROSCOPIC COMMENT: ABNORMAL
MONOCYTES # BLD AUTO: 0.4 K/UL (ref 0.3–1)
MONOCYTES NFR BLD: 5.7 % (ref 4–15)
NEUTROPHILS # BLD AUTO: 6 K/UL (ref 1.8–7.7)
NEUTROPHILS NFR BLD: 79.5 % (ref 38–73)
NITRITE UR QL STRIP: POSITIVE
NRBC BLD-RTO: 0 /100 WBC
PH UR STRIP: 6 [PH] (ref 5–8)
PLATELET # BLD AUTO: 166 K/UL (ref 150–350)
PMV BLD AUTO: 10.4 FL (ref 9.2–12.9)
POTASSIUM SERPL-SCNC: 4.1 MMOL/L (ref 3.5–5.1)
PROT SERPL-MCNC: 6.5 G/DL (ref 6–8.4)
PROT UR QL STRIP: NEGATIVE
RBC # BLD AUTO: 3.54 M/UL (ref 4–5.4)
RBC #/AREA URNS AUTO: >100 /HPF (ref 0–4)
SARS-COV-2 RDRP RESP QL NAA+PROBE: NEGATIVE
SODIUM SERPL-SCNC: 138 MMOL/L (ref 136–145)
SP GR UR STRIP: 1.01 (ref 1–1.03)
SQUAMOUS #/AREA URNS AUTO: 3 /HPF
URN SPEC COLLECT METH UR: ABNORMAL
WBC # BLD AUTO: 7.55 K/UL (ref 3.9–12.7)
WBC #/AREA URNS AUTO: >100 /HPF (ref 0–5)

## 2020-06-25 PROCEDURE — G0378 HOSPITAL OBSERVATION PER HR: HCPCS | Mod: HCNC

## 2020-06-25 PROCEDURE — U0002 COVID-19 LAB TEST NON-CDC: HCPCS | Mod: HCNC

## 2020-06-25 PROCEDURE — 93005 ELECTROCARDIOGRAM TRACING: CPT | Mod: HCNC

## 2020-06-25 PROCEDURE — 90471 IMMUNIZATION ADMIN: CPT | Mod: HCNC | Performed by: EMERGENCY MEDICINE

## 2020-06-25 PROCEDURE — 87077 CULTURE AEROBIC IDENTIFY: CPT | Mod: HCNC

## 2020-06-25 PROCEDURE — 81001 URINALYSIS AUTO W/SCOPE: CPT | Mod: HCNC

## 2020-06-25 PROCEDURE — 25000003 PHARM REV CODE 250: Mod: HCNC | Performed by: EMERGENCY MEDICINE

## 2020-06-25 PROCEDURE — 99220 PR INITIAL OBSERVATION CARE,LEVL III: ICD-10-PCS | Mod: HCNC,,, | Performed by: HOSPITALIST

## 2020-06-25 PROCEDURE — 96374 THER/PROPH/DIAG INJ IV PUSH: CPT | Mod: HCNC

## 2020-06-25 PROCEDURE — 90715 TDAP VACCINE 7 YRS/> IM: CPT | Mod: HCNC | Performed by: EMERGENCY MEDICINE

## 2020-06-25 PROCEDURE — 63600175 PHARM REV CODE 636 W HCPCS: Mod: HCNC | Performed by: EMERGENCY MEDICINE

## 2020-06-25 PROCEDURE — 99284 PR EMERGENCY DEPT VISIT,LEVEL IV: ICD-10-PCS | Mod: HCNC,,, | Performed by: EMERGENCY MEDICINE

## 2020-06-25 PROCEDURE — 85025 COMPLETE CBC W/AUTO DIFF WBC: CPT | Mod: HCNC

## 2020-06-25 PROCEDURE — 25000003 PHARM REV CODE 250: Mod: HCNC | Performed by: HOSPITALIST

## 2020-06-25 PROCEDURE — 99285 EMERGENCY DEPT VISIT HI MDM: CPT | Mod: 25,HCNC

## 2020-06-25 PROCEDURE — 87086 URINE CULTURE/COLONY COUNT: CPT | Mod: HCNC

## 2020-06-25 PROCEDURE — 93010 ELECTROCARDIOGRAM REPORT: CPT | Mod: HCNC,,, | Performed by: INTERNAL MEDICINE

## 2020-06-25 PROCEDURE — 99220 PR INITIAL OBSERVATION CARE,LEVL III: CPT | Mod: HCNC,,, | Performed by: HOSPITALIST

## 2020-06-25 PROCEDURE — 87186 SC STD MICRODIL/AGAR DIL: CPT | Mod: HCNC

## 2020-06-25 PROCEDURE — 80053 COMPREHEN METABOLIC PANEL: CPT | Mod: HCNC

## 2020-06-25 PROCEDURE — 93010 EKG 12-LEAD: ICD-10-PCS | Mod: HCNC,,, | Performed by: INTERNAL MEDICINE

## 2020-06-25 PROCEDURE — 87088 URINE BACTERIA CULTURE: CPT | Mod: HCNC

## 2020-06-25 PROCEDURE — 99284 EMERGENCY DEPT VISIT MOD MDM: CPT | Mod: HCNC,,, | Performed by: EMERGENCY MEDICINE

## 2020-06-25 RX ORDER — CHLORTHALIDONE 25 MG/1
25 TABLET ORAL DAILY
Status: DISCONTINUED | OUTPATIENT
Start: 2020-06-26 | End: 2020-06-28 | Stop reason: HOSPADM

## 2020-06-25 RX ORDER — IBUPROFEN 200 MG
16 TABLET ORAL
Status: DISCONTINUED | OUTPATIENT
Start: 2020-06-25 | End: 2020-06-28 | Stop reason: HOSPADM

## 2020-06-25 RX ORDER — CILOSTAZOL 50 MG/1
50 TABLET ORAL 2 TIMES DAILY
Status: DISCONTINUED | OUTPATIENT
Start: 2020-06-26 | End: 2020-06-26

## 2020-06-25 RX ORDER — CARVEDILOL 25 MG/1
25 TABLET ORAL 2 TIMES DAILY
Status: DISCONTINUED | OUTPATIENT
Start: 2020-06-25 | End: 2020-06-27

## 2020-06-25 RX ORDER — CITALOPRAM 10 MG/1
20 TABLET ORAL DAILY
Status: DISCONTINUED | OUTPATIENT
Start: 2020-06-26 | End: 2020-06-28 | Stop reason: HOSPADM

## 2020-06-25 RX ORDER — ASPIRIN 81 MG/1
81 TABLET ORAL DAILY
Status: DISCONTINUED | OUTPATIENT
Start: 2020-06-26 | End: 2020-06-25

## 2020-06-25 RX ORDER — CEFTRIAXONE 1 G/1
1 INJECTION, POWDER, FOR SOLUTION INTRAMUSCULAR; INTRAVENOUS
Status: COMPLETED | OUTPATIENT
Start: 2020-06-25 | End: 2020-06-25

## 2020-06-25 RX ORDER — AMOXICILLIN 250 MG
1 CAPSULE ORAL 2 TIMES DAILY
Status: DISCONTINUED | OUTPATIENT
Start: 2020-06-25 | End: 2020-06-28 | Stop reason: HOSPADM

## 2020-06-25 RX ORDER — SODIUM CHLORIDE 0.9 % (FLUSH) 0.9 %
10 SYRINGE (ML) INJECTION
Status: DISCONTINUED | OUTPATIENT
Start: 2020-06-25 | End: 2020-06-28 | Stop reason: HOSPADM

## 2020-06-25 RX ORDER — ONDANSETRON 8 MG/1
8 TABLET, ORALLY DISINTEGRATING ORAL EVERY 8 HOURS PRN
Status: DISCONTINUED | OUTPATIENT
Start: 2020-06-25 | End: 2020-06-28 | Stop reason: HOSPADM

## 2020-06-25 RX ORDER — MORPHINE SULFATE 2 MG/ML
2 INJECTION, SOLUTION INTRAMUSCULAR; INTRAVENOUS EVERY 4 HOURS PRN
Status: DISCONTINUED | OUTPATIENT
Start: 2020-06-25 | End: 2020-06-28 | Stop reason: HOSPADM

## 2020-06-25 RX ORDER — POLYETHYLENE GLYCOL 3350 17 G/17G
17 POWDER, FOR SOLUTION ORAL DAILY
Status: DISCONTINUED | OUTPATIENT
Start: 2020-06-26 | End: 2020-06-28 | Stop reason: HOSPADM

## 2020-06-25 RX ORDER — GLUCAGON 1 MG
1 KIT INJECTION
Status: DISCONTINUED | OUTPATIENT
Start: 2020-06-25 | End: 2020-06-28 | Stop reason: HOSPADM

## 2020-06-25 RX ORDER — CLOPIDOGREL BISULFATE 75 MG/1
75 TABLET ORAL DAILY
Status: DISCONTINUED | OUTPATIENT
Start: 2020-06-26 | End: 2020-06-25

## 2020-06-25 RX ORDER — METHOCARBAMOL 500 MG/1
500 TABLET, FILM COATED ORAL 4 TIMES DAILY
Status: DISCONTINUED | OUTPATIENT
Start: 2020-06-25 | End: 2020-06-28 | Stop reason: HOSPADM

## 2020-06-25 RX ORDER — OXYCODONE HYDROCHLORIDE 5 MG/1
5 TABLET ORAL EVERY 6 HOURS PRN
Status: DISCONTINUED | OUTPATIENT
Start: 2020-06-25 | End: 2020-06-25

## 2020-06-25 RX ORDER — CEPHALEXIN 500 MG/1
500 CAPSULE ORAL EVERY 8 HOURS
Qty: 15 CAPSULE | Refills: 0 | Status: SHIPPED | OUTPATIENT
Start: 2020-06-25 | End: 2020-06-26 | Stop reason: HOSPADM

## 2020-06-25 RX ORDER — ACETAMINOPHEN 325 MG/1
650 TABLET ORAL EVERY 6 HOURS
Status: DISCONTINUED | OUTPATIENT
Start: 2020-06-25 | End: 2020-06-28 | Stop reason: HOSPADM

## 2020-06-25 RX ORDER — BISACODYL 10 MG
10 SUPPOSITORY, RECTAL RECTAL DAILY PRN
Status: DISCONTINUED | OUTPATIENT
Start: 2020-06-25 | End: 2020-06-28 | Stop reason: HOSPADM

## 2020-06-25 RX ORDER — OLMESARTAN MEDOXOMIL 20 MG/1
40 TABLET ORAL DAILY
Status: DISCONTINUED | OUTPATIENT
Start: 2020-06-26 | End: 2020-06-28 | Stop reason: HOSPADM

## 2020-06-25 RX ORDER — FERROUS SULFATE, DRIED 160(50) MG
2 TABLET, EXTENDED RELEASE ORAL 2 TIMES DAILY
Status: DISCONTINUED | OUTPATIENT
Start: 2020-06-25 | End: 2020-06-28 | Stop reason: HOSPADM

## 2020-06-25 RX ORDER — TRAMADOL HYDROCHLORIDE 50 MG/1
50 TABLET ORAL EVERY 6 HOURS PRN
Status: DISCONTINUED | OUTPATIENT
Start: 2020-06-25 | End: 2020-06-26

## 2020-06-25 RX ORDER — IBUPROFEN 200 MG
24 TABLET ORAL
Status: DISCONTINUED | OUTPATIENT
Start: 2020-06-25 | End: 2020-06-28 | Stop reason: HOSPADM

## 2020-06-25 RX ORDER — TALC
6 POWDER (GRAM) TOPICAL NIGHTLY PRN
Status: DISCONTINUED | OUTPATIENT
Start: 2020-06-25 | End: 2020-06-28 | Stop reason: HOSPADM

## 2020-06-25 RX ORDER — CEFTRIAXONE 1 G/1
1 INJECTION, POWDER, FOR SOLUTION INTRAMUSCULAR; INTRAVENOUS
Status: DISCONTINUED | OUTPATIENT
Start: 2020-06-25 | End: 2020-06-28 | Stop reason: HOSPADM

## 2020-06-25 RX ADMIN — CEFTRIAXONE SODIUM 1 G: 1 INJECTION, POWDER, FOR SOLUTION INTRAMUSCULAR; INTRAVENOUS at 01:06

## 2020-06-25 RX ADMIN — CLOSTRIDIUM TETANI TOXOID ANTIGEN (FORMALDEHYDE INACTIVATED), CORYNEBACTERIUM DIPHTHERIAE TOXOID ANTIGEN (FORMALDEHYDE INACTIVATED), BORDETELLA PERTUSSIS TOXOID ANTIGEN (GLUTARALDEHYDE INACTIVATED), BORDETELLA PERTUSSIS FILAMENTOUS HEMAGGLUTININ ANTIGEN (FORMALDEHYDE INACTIVATED), BORDETELLA PERTUSSIS PERTACTIN ANTIGEN, AND BORDETELLA PERTUSSIS FIMBRIAE 2/3 ANTIGEN 0.5 ML: 5; 2; 2.5; 5; 3; 5 INJECTION, SUSPENSION INTRAMUSCULAR at 02:06

## 2020-06-25 RX ADMIN — SODIUM CHLORIDE 500 ML: 0.9 INJECTION, SOLUTION INTRAVENOUS at 04:06

## 2020-06-25 RX ADMIN — METHOCARBAMOL TABLETS 500 MG: 500 TABLET, COATED ORAL at 09:06

## 2020-06-25 RX ADMIN — ACETAMINOPHEN 650 MG: 325 TABLET ORAL at 11:06

## 2020-06-25 RX ADMIN — OYSTER SHELL CALCIUM WITH VITAMIN D 2 TABLET: 500; 200 TABLET, FILM COATED ORAL at 09:06

## 2020-06-25 RX ADMIN — ACETAMINOPHEN 650 MG: 325 TABLET ORAL at 05:06

## 2020-06-25 RX ADMIN — DOCUSATE SODIUM 50 MG AND SENNOSIDES 8.6 MG 1 TABLET: 8.6; 5 TABLET, FILM COATED ORAL at 09:06

## 2020-06-25 RX ADMIN — CARVEDILOL 25 MG: 25 TABLET, FILM COATED ORAL at 09:06

## 2020-06-25 NOTE — ED TRIAGE NOTES
She was walking on the street and caught her right shoe on the edge of the sidewalk.  She fell on her right side scraping her right forearm and landing on her right hip.  There was no head injury as witnessed by her daughter.  Afterwards she felt weak and anxious.  There was no loss of consciousness at the time of the accident but when they tried to get her up she felt weak and almost passed out

## 2020-06-25 NOTE — H&P
"History and Physical  Hospital Medicine       Patient Name: Naheed Cottrell  MRN:  [id number]  Hospital Medicine Team: [facility]  Yohana Lopez MD  Date of Admission:  6/25/2020     Principal Problem:  Pubic ramus fracture, right, closed, initial encounter   Primary Care Physician: Mack Cottrell MD      History of Present Illness:     Ms. Naheed Cottrell is a 87 y.o. female with past medical history of HTN, chronic diastolic dysfunction (EF 65%), history CVA (no residual deficits), renal/subclavian artery stenosis, HLD (on repatha), PVD, falls with history of Left hip fx 20 years ago, Left humerus fracture 2017 in setting of UTI-sepsis with fall presentation, who was in her usual state of health until earlier today when she went for a walk with her grandchild when she tripped over the sidewalk and sustained a fall. Didn't hit her head ,was helped up by someone on the street at the time and was "okay" from pain standpoint but felt somewhat dizzy and off after so they brought her to the ER for evaluation, Xrays of femur/pelvis wnl, cardiac device interrogation- loop recorder without arrythmias (denies syncope or feeling bad at all prior) and then when was standing up to ensure could safetly ambulate she had very significant pain to the right hip/leg and unable to bear weight per daughter at beside which is unlike her. She has high pain tolerance normally and she "wanted to go home" from the ER, her BP went to 200s systolic and she had severe pain in the right hip radiating to the right groin and she vomited at the time, likely secondary to pain from BP spike. As she couldn't safely ambulate and the ER was worried about occult fracture patient was admitted to obs and MRI ordered.     Patient was in MRI on initial eval and discussed all above with daughter at bedside. On return back to the ER room to update her, her daughter and her daughters  who is radiologist also at bedside. Still having significant " pain at rest in the right groin/pelvis area. Discussed MRI results which showed Right superior ramus fracture, Right inferior pubic ramus fx, and Right sacral ala fractures.   Discussed pain control now and orthopedics consult for weigh bearing status and to ensure the plans going forward, discussed in -general non operative- but orthopedics to discuss and assess if this is the case given sacral ala fx also. She has intolerance to percocet in the last so added to allergies and she prefers doing very light pain medications to start so have sravan tylenol, robaxin and prefers tramadol over norco for sever pain, have 2 mg morphine for severe pain for breakthrough but she prefers to not use as long as pain controlled.  She has a UTI and was likely a small nidus for this as she has had other falls in the past when she has a UTI, but this was more mechanical, may just have easier ability to not aware of surroundings or fall when she has a UTI. Her daughter states that she uses blue toilet bowl bleach so its hard to tell at home if her urine is dark or symptoms but her urine was dark in the ER when sampled.   Her loop recorder was interrogated in ER and no arrythmias found, on history it does not appear to be pre-syncopal in origin and mechanical fall.    Med History: HTN, chronic diastolic HF - EF 65%, history CVA- no residual deficits, renal artery stenosis, subclavian artery stenosis on left (falsely low BPS in the left arm- only do BP checks on right), left hip fx 20 years ago s/p repair, HLD, peripheral arterial disease, syncopal episodes s/p loop recorder, left humerus fracture, vit D deficiency, hx of sepsis/bacteremia from Ecoli UTI    Surg History- MCL repair, left femur fx repair, loop recorder insertion    Social history: lives at home, independent in ADLs, does not use ambulatory devices. Daughter at bedside with  who is radiologist. 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  Sees Cardiology here at main campus regularly.    Last BM today. No alcohol use. Family has HH company they prefer if HH is an option in future. They feel she's okay to stay here overnight but if need special permission to stay with her, is okay with myself and can look into it with charge nurse on floor if she has any issues with sundowning, has not had delirium issues in the hospital at previous admissions.    Review of Systems   Constitutional: positive chills. Negative for fatigue, fever.   HENT: Negative for sore throat, trouble swallowing.    Eyes: Negative for photophobia, visual disturbance.   Respiratory: Negative for cough, shortness of breath.    Cardiovascular: Negative for chest pain, palpitations, leg swelling.   Gastrointestinal: Negative for abdominal pain, constipation, diarrhea, + for nausea, vomiting.   Endocrine: Negative for cold intolerance, heat intolerance.   Genitourinary: + for dysuria, frequency. , dark urine  Musculoskeletal: + for arthralgias, myalgias.   Skin: Negative for rash, wound, erythema   Neurological: Negative for dizziness, syncope, weakness, light-headedness.  + for fall  Psychiatric/Behavioral: Negative for confusion, hallucinations, anxiety  All other systems reviewed and are negative.      Past Medical History: Patient has a past medical history of Anticoagulant long-term use, Arthritis, Cataract, Embolic stroke involving right middle cerebral artery (6/6/2017), Encounter for blood transfusion, Fall, accidental, Hypertension, Right renal artery stenosis (3/28/2017), and Stenosis of left subclavian artery.    Past Surgical History: Patient has a past surgical history that includes Eye surgery; Fracture surgery; and Insertion of implantable loop recorder (N/A, 1/2/2020).    Social History: Patient reports that she has never smoked. She has never used smokeless tobacco. She reports that she does not drink  "alcohol or use drugs.    Family History: family history includes Hyperlipidemia in her mother; Hypertension in her mother.    Medications: Scheduled Meds:   [START ON 6/26/2020] aspirin  81 mg Oral Daily    calcium-vitamin D3  2 tablet Oral BID    carvediloL  25 mg Oral BID    cefTRIAXone (ROCEPHIN) IVPB  1 g Intravenous Q24H    [START ON 6/26/2020] chlorthalidone  25 mg Oral Daily    [START ON 6/26/2020] cilostazoL  50 mg Oral BID    [START ON 6/26/2020] citalopram  20 mg Oral Daily    [START ON 6/26/2020] clopidogreL  75 mg Oral Daily    methocarbamoL  500 mg Oral QID    [START ON 6/26/2020] olmesartan  40 mg Oral Daily     Continuous Infusions:  PRN Meds:.    Allergies: Patient has No Known Allergies.    Physical Exam:     Vital Signs (Most Recent):  Temp: 97.1 °F (36.2 °C) (06/25/20 1134)  Pulse: 84 (06/25/20 1522)  Resp: 16 (06/25/20 1134)  BP: (!) 184/82 (06/25/20 1547)  SpO2: (!) 92 % (06/25/20 1522) Vital Signs Range (Last 24H):  Temp:  [97.1 °F (36.2 °C)]   Pulse:  [70-86]   Resp:  [16]   BP: (101-197)/(55-99)   SpO2:  [92 %-97 %]    Body mass index is 22.31 kg/m².     Physical Exam:  Constitutional: Appears well-developed and well-nourished. Pleasant.  Head: Normocephalic and atraumatic.   Mouth/Throat: Oropharynx is clear and moist.   Eyes: EOM are normal. Pupils are equal, round, and reactive to light. No scleral icterus.   Neck: Normal range of motion. Neck supple.   Cardiovascular: Normal rate and regular rhythm.  No murmur heard.  Pulmonary/Chest: Effort normal and breath sounds normal. No respiratory distress. No wheezes, rales, or rhonchi  Abdominal: Soft. Bowel sounds are normal.  No distension or tenderness  Musculoskeletal: pain with palpation of right hip with "shock"sensation into groin with palpation. No edema in LE. Pulses present, warm to touch lower extremities. Small brush burn on RUE, arm and skin tear from fall.   Neurological: Alert and oriented to person, place, and time. "   Skin: Skin is warm and dry.   Psychiatric: Normal mood and affect. Behavior is normal.   Vitals reviewed.    Recent Labs   Lab 06/25/20  1211   WBC 7.55   HGB 11.0*   HCT 33.3*          Recent Labs   Lab 06/25/20  1211      K 4.1      CO2 22*   BUN 36*   CREATININE 1.2      CALCIUM 9.0     Recent Labs   Lab 06/25/20  1211   ALKPHOS 61   ALT 12   AST 15   ALBUMIN 3.5   PROT 6.5   BILITOT 0.3      No results for input(s): POCTGLUCOSE in the last 168 hours.      Assessment and Plan:     Ms. Naheed Cottrell is a 87 y.o. female who presented to [facility] on 6/25/2020 with     Active Hospital Problems    Diagnosis  POA    Contusion of right hip [S70.01XA]  Yes    Acute cystitis [N30.00]  Yes    Hip pain [M25.559]  Yes    Normocytic anemia [D64.9]  Yes    History of CVA (cerebrovascular accident) [Z86.73]  Not Applicable    Chronic heart failure with preserved ejection fraction [I50.32]  Yes    Iron deficiency anemia [D50.9]  Yes    Thrombocytopenia, unspecified [D69.6]  Yes    Vitamin D deficiency [E55.9]  Yes    Right renal artery stenosis [I70.1]  Yes     60-69%      Dyslipidemia [E78.5]  Yes     Chronic    Essential hypertension [I10]  Yes     Chronic    PAD (peripheral artery disease) [I73.9]  Yes    Renovascular hypertension [I15.0]  Yes    Subclavian artery stenosis, left [I77.1]  Yes      Resolved Hospital Problems   No resolved problems to display.       Right Superior pubic rami Fx  Right Inferior pubic Rami Fx  Right Sacral Ala Fx  -secondary to mechanical fall in setting of UTI as likely precipitant of fall as has precipitated easy falls in past as well (last 2017 major fall with humerus fx)  -initial xrays neg, MRI revealed above. Updated family of above and ortho consult pending now  -pain control- sravan tylenol, robaxin. Has oxy/percocet intolerance in past, morphine for severe pain unresponsive to orals, prefers tramadol for severe pain. Can consider norco, appears  received 1-2 times in 2017 on chart review briefly  -vit D ordered for AM with history of insufficiency  -Holding on PT/OT recs now until ortho eval to ensure non op, bed rest until then. Will need orders later before 4 am if is non op once plans confirmed to ensure gets PT friday  -hold plavix, asa, anticoags until conform non op TEDS/SCDs  -Tdap given in ER    Acute Cystitis  -nitrite, blood, leukos, >100 rbc, wbc, many bacteria on UA  -continue rocephin  -ecoli in 2017 in blood was pan sensitive  -WBC 7 now, does not appear septic on exam  -f/u Cx    Chronic normocytic Anemia  Hx of iron deficiency Anemia  -Hg at baseline of 11 on admission  -check ferritin,b12,folate in AM labs    HTN  -BP 100s systolic on admit but spiked to 200s systolic with pain when she moved, now back to 170s in ER, likely pain contirbutor, took all home meds this AM, due for coreg this PM  -cont coreg, olmesartan, chlorthalidone    Vitamin D deficiency  -cont home vit D, recheck in Am    Peripheral vascular disease  -hold asa, plavix until confirmed non op, if so restart in AM  -continue cilostazol BID  -has statin intolerance, takes repatha every 2 weeks, due on Sunday, hold while inpatient    Subclavian artery stenosis  -on left (imaging 2015), if bP is taken in left arm it will look falsely low, only take BP in right arm  -family reports patient will tell nurses if they do it wrong also, nursing comm placed for this    Renal Artery Stenosis  -no acute issues, chronic    Recurrent Syncope  -has loop recorder in place, interrogated in ER without arrythmias  -event now was mechanical in nature  -no acute issues  -EKG with flat T wavse in V1-V2 which was her baseline previously.     Chronic diastolic Heart Failure  -echo up to date, EF 65%, chronic diastolic dysfunction, not on diuretics, euvolemic  -no acute issues. CXR wnl on admission    History CVA  -no residual deficits      Diet:  Low sodium     DVT PPx:   TEDS/SCDs        Disposition:  Pending ortho eval. If non op will need PT/OT orders overnight for weight bearing recs for tomorrow to get a Friday PT session. If not then will need NPO, etc.  Pain control plans    Restart asa plavix for tomorrow if she is non op plans.     Daughter Erica- is local contact now- lives 5 min from hospital- [phone]

## 2020-06-25 NOTE — ED PROVIDER NOTES
Encounter Date: 6/25/2020       History     Chief Complaint   Patient presents with    Fall     fell while walking, now having right groin pain, stood her up and had a syncopal episode     87-year-old female presents with an injury to her right hip.  She was walking on the street and caught her right shoe on the edge of the sidewalk.  She fell on her right side scraping her right forearm and landing on her right hip.  There was no head injury as witnessed by her daughter.  Afterwards she felt weak and anxious.  There was no loss of consciousness at the time of the accident but when they tried to get her up she felt weak and almost passed out.  At that point they called the ambulance for transportation to the emergency department.  She states she feels stronger now and her only symptom is pain to her right groin.  There was no preceding headache and she does not have a now.  There is no shortness of breath chest pain and she feels normal in her chest now.  She has a loop recorder.        Review of patient's allergies indicates:  No Known Allergies  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    Encounter for blood transfusion     Fall, accidental     Hypertension     Right renal artery stenosis 3/28/2017    Stenosis of left subclavian artery      Past Surgical History:   Procedure Laterality Date    EYE SURGERY      FRACTURE SURGERY      INSERTION OF IMPLANTABLE LOOP RECORDER N/A 1/2/2020    Procedure: Insertion, Implantable Loop Recorder;  Surgeon: Anton Muniz MD;  Location: [facility];  Service: Cardiology;  Laterality: N/A;  Syncope, ILR, Bio, Local, SK, 3 Prep     Family History   Problem Relation Age of Onset    Hyperlipidemia Mother     Hypertension Mother      Social History     Tobacco Use    Smoking status: Never Smoker    Smokeless tobacco: Never Used   Substance Use  Topics    Alcohol use: No    Drug use: No     Review of Systems    Physical Exam     Initial Vitals [06/25/20 1134]   BP Pulse Resp Temp SpO2   (!) 101/55 86 16 97.1 °F (36.2 °C) 97 %      MAP       --         Physical Exam    Constitutional: She appears well-developed and well-nourished. She is not diaphoretic. No distress.   HENT:   Head: Normocephalic.   Eyes: EOM are normal. Pupils are equal, round, and reactive to light.   Neck: Normal range of motion. Neck supple. No JVD present.   Cardiovascular: Normal rate, regular rhythm and normal heart sounds.   No murmur heard.  Pulmonary/Chest: Breath sounds normal. No respiratory distress. She has no wheezes. She has no rales.   Abdominal: Soft. Bowel sounds are normal. She exhibits no distension. There is no abdominal tenderness.   Musculoskeletal:      Comments: Full ROM of right leg.  No pain on axial load.  Clinically, doubt hip fx.  Consider pelvic fx   Neurological: She is alert. She has normal strength. No sensory deficit.         ED Course   Procedures  Labs Reviewed   CBC W/ AUTO DIFFERENTIAL   COMPREHENSIVE METABOLIC PANEL   URINALYSIS, REFLEX TO URINE CULTURE        ECG Results          EKG 12-lead (In process)  Result time 06/25/20 12:21:06    In process by Interface, Lab In [facility] (06/25/20 12:21:06)                 Narrative:    Test Reason : R55,    Vent. Rate : 067 BPM     Atrial Rate : 070 BPM     P-R Int : 198 ms          QRS Dur : 074 ms      QT Int : 388 ms       P-R-T Axes : 074 057 055 degrees     QTc Int : 410 ms    Age and gender specific analysis  Normal sinus rhythm  Septal infarct ,age undetermined  Abnormal ECG  When compared with ECG of 02-JAN-2020 06:31,  Septal infarct is now Present    Referred By:             Confirmed By:                             Imaging Results          X-Ray Chest AP Portable (In process)                X-Ray Pelvis Routine AP (In process)                X-Ray Femur Ap/Lat Right (In process)                   Medical Decision Making:   History:   Old Medical Records: I decided to obtain old medical records.  Initial Assessment:   Patient with fall and injury to the hip.  Clinically she does not have a fracture but will do x-rays.  She also felt very weak afterwards.  Will check basic labs and urine.  Will also interrogate loop recorder  ED Management:  X-rays were negative for hip fracture.  However, when we attempted to ambulate she had severe pain.  I discussed with MRI and will do MRI.  Her loop recorder has been downloaded and come passed a bowl with MRI    Loop recorder interrogated showed no arrhythmia    Labs show a significant UTI.  She had episode of nausea and vomiting when we tried to ambulate the patient.  Will give dose of Rocephin IV and Tylenol.  She is declining antiemetics.  I am concerned about sending her home in this condition.  Will order MRI.  May benefit from physical therapy if negative.  Will ask Medicine to observe with significant UTI and nausea vomiting.                                 Clinical Impression:       ICD-10-CM ICD-9-CM   1. Contusion of right hip, initial encounter  S70.01XA 924.01   2. Syncope  R55 780.2   3. Right groin pain  R10.31 789.03   4. Urinary tract infection without hematuria, site unspecified  N39.0 599.0         Disposition:   Disposition: Discharged  Condition: Stable                        Gideon Gordon MD  06/25/20 2412       Gideon Gordon MD  07/01/20 8800

## 2020-06-25 NOTE — TREATMENT PLAN
06/25/2020  1:15 PM    Review of interrogation of ILR shows no arrhythmias associated with patient's fall and presyncope today. Full report will be posted under cardiac interrogations.    Sergey Perez

## 2020-06-26 PROBLEM — S32.82XA MULTIPLE CLOSED STABLE LATERAL COMPRESSION FRACTURES OF PELVIS: Status: ACTIVE | Noted: 2020-06-25

## 2020-06-26 LAB
25(OH)D3+25(OH)D2 SERPL-MCNC: 65 NG/ML (ref 30–96)
ALBUMIN SERPL BCP-MCNC: 3.3 G/DL (ref 3.5–5.2)
ALP SERPL-CCNC: 59 U/L (ref 55–135)
ALT SERPL W/O P-5'-P-CCNC: 10 U/L (ref 10–44)
ANION GAP SERPL CALC-SCNC: 10 MMOL/L (ref 8–16)
AST SERPL-CCNC: 22 U/L (ref 10–40)
BASOPHILS # BLD AUTO: 0.03 K/UL (ref 0–0.2)
BASOPHILS NFR BLD: 0.3 % (ref 0–1.9)
BILIRUB SERPL-MCNC: 0.5 MG/DL (ref 0.1–1)
BUN SERPL-MCNC: 30 MG/DL (ref 8–23)
CALCIUM SERPL-MCNC: 9.3 MG/DL (ref 8.7–10.5)
CHLORIDE SERPL-SCNC: 107 MMOL/L (ref 95–110)
CO2 SERPL-SCNC: 23 MMOL/L (ref 23–29)
CREAT SERPL-MCNC: 1 MG/DL (ref 0.5–1.4)
DIFFERENTIAL METHOD: ABNORMAL
EOSINOPHIL # BLD AUTO: 0.1 K/UL (ref 0–0.5)
EOSINOPHIL NFR BLD: 0.6 % (ref 0–8)
ERYTHROCYTE [DISTWIDTH] IN BLOOD BY AUTOMATED COUNT: 14.4 % (ref 11.5–14.5)
EST. GFR  (AFRICAN AMERICAN): 58.5 ML/MIN/1.73 M^2
EST. GFR  (NON AFRICAN AMERICAN): 50.8 ML/MIN/1.73 M^2
FERRITIN SERPL-MCNC: 140 NG/ML (ref 20–300)
FOLATE SERPL-MCNC: 17 NG/ML (ref 4–24)
GLUCOSE SERPL-MCNC: 97 MG/DL (ref 70–110)
HCT VFR BLD AUTO: 33.4 % (ref 37–48.5)
HGB BLD-MCNC: 10.5 G/DL (ref 12–16)
IMM GRANULOCYTES # BLD AUTO: 0.03 K/UL (ref 0–0.04)
IMM GRANULOCYTES NFR BLD AUTO: 0.3 % (ref 0–0.5)
IRON SERPL-MCNC: 15 UG/DL (ref 30–160)
LYMPHOCYTES # BLD AUTO: 1.1 K/UL (ref 1–4.8)
LYMPHOCYTES NFR BLD: 11.9 % (ref 18–48)
MCH RBC QN AUTO: 30.3 PG (ref 27–31)
MCHC RBC AUTO-ENTMCNC: 31.4 G/DL (ref 32–36)
MCV RBC AUTO: 97 FL (ref 82–98)
MONOCYTES # BLD AUTO: 0.6 K/UL (ref 0.3–1)
MONOCYTES NFR BLD: 5.9 % (ref 4–15)
NEUTROPHILS # BLD AUTO: 7.7 K/UL (ref 1.8–7.7)
NEUTROPHILS NFR BLD: 81 % (ref 38–73)
NRBC BLD-RTO: 0 /100 WBC
PLATELET # BLD AUTO: 146 K/UL (ref 150–350)
PMV BLD AUTO: 10.4 FL (ref 9.2–12.9)
POTASSIUM SERPL-SCNC: 3.8 MMOL/L (ref 3.5–5.1)
PROT SERPL-MCNC: 6.2 G/DL (ref 6–8.4)
RBC # BLD AUTO: 3.46 M/UL (ref 4–5.4)
SATURATED IRON: 5 % (ref 20–50)
SODIUM SERPL-SCNC: 140 MMOL/L (ref 136–145)
TOTAL IRON BINDING CAPACITY: 315 UG/DL (ref 250–450)
TRANSFERRIN SERPL-MCNC: 213 MG/DL (ref 200–375)
VIT B12 SERPL-MCNC: 492 PG/ML (ref 210–950)
WBC # BLD AUTO: 9.46 K/UL (ref 3.9–12.7)

## 2020-06-26 PROCEDURE — 80053 COMPREHEN METABOLIC PANEL: CPT | Mod: HCNC

## 2020-06-26 PROCEDURE — 82607 VITAMIN B-12: CPT | Mod: HCNC

## 2020-06-26 PROCEDURE — 97535 SELF CARE MNGMENT TRAINING: CPT | Mod: HCNC

## 2020-06-26 PROCEDURE — 96376 TX/PRO/DX INJ SAME DRUG ADON: CPT

## 2020-06-26 PROCEDURE — 82746 ASSAY OF FOLIC ACID SERUM: CPT | Mod: HCNC

## 2020-06-26 PROCEDURE — 36415 COLL VENOUS BLD VENIPUNCTURE: CPT | Mod: HCNC

## 2020-06-26 PROCEDURE — 82306 VITAMIN D 25 HYDROXY: CPT | Mod: HCNC

## 2020-06-26 PROCEDURE — 97116 GAIT TRAINING THERAPY: CPT | Mod: HCNC,59

## 2020-06-26 PROCEDURE — 83540 ASSAY OF IRON: CPT | Mod: HCNC

## 2020-06-26 PROCEDURE — 96372 THER/PROPH/DIAG INJ SC/IM: CPT

## 2020-06-26 PROCEDURE — 97165 OT EVAL LOW COMPLEX 30 MIN: CPT | Mod: HCNC

## 2020-06-26 PROCEDURE — 82728 ASSAY OF FERRITIN: CPT | Mod: HCNC

## 2020-06-26 PROCEDURE — 25000003 PHARM REV CODE 250: Mod: HCNC | Performed by: HOSPITALIST

## 2020-06-26 PROCEDURE — 99225 PR SUBSEQUENT OBSERVATION CARE,LEVEL II: ICD-10-PCS | Mod: HCNC,,, | Performed by: HOSPITALIST

## 2020-06-26 PROCEDURE — G0378 HOSPITAL OBSERVATION PER HR: HCPCS | Mod: HCNC

## 2020-06-26 PROCEDURE — 63600175 PHARM REV CODE 636 W HCPCS: Mod: HCNC | Performed by: HOSPITALIST

## 2020-06-26 PROCEDURE — 97530 THERAPEUTIC ACTIVITIES: CPT | Mod: HCNC

## 2020-06-26 PROCEDURE — 97161 PT EVAL LOW COMPLEX 20 MIN: CPT | Mod: HCNC

## 2020-06-26 PROCEDURE — 99225 PR SUBSEQUENT OBSERVATION CARE,LEVEL II: CPT | Mod: HCNC,,, | Performed by: HOSPITALIST

## 2020-06-26 PROCEDURE — 85025 COMPLETE CBC W/AUTO DIFF WBC: CPT | Mod: HCNC

## 2020-06-26 RX ORDER — ONDANSETRON 8 MG/1
8 TABLET, ORALLY DISINTEGRATING ORAL EVERY 8 HOURS PRN
Qty: 30 TABLET | Refills: 1 | Status: SHIPPED | OUTPATIENT
Start: 2020-06-26 | End: 2020-09-01

## 2020-06-26 RX ORDER — HYDROCODONE BITARTRATE AND ACETAMINOPHEN 5; 325 MG/1; MG/1
1 TABLET ORAL EVERY 6 HOURS PRN
Qty: 30 TABLET | Refills: 0 | Status: SHIPPED | OUTPATIENT
Start: 2020-06-26 | End: 2020-06-27 | Stop reason: HOSPADM

## 2020-06-26 RX ORDER — TRAMADOL HYDROCHLORIDE 50 MG/1
50 TABLET ORAL EVERY 8 HOURS
Qty: 45 TABLET | Refills: 0 | Status: SHIPPED | OUTPATIENT
Start: 2020-06-26 | End: 2020-06-27

## 2020-06-26 RX ORDER — HYDROCODONE BITARTRATE AND ACETAMINOPHEN 5; 325 MG/1; MG/1
1 TABLET ORAL EVERY 6 HOURS PRN
Status: DISCONTINUED | OUTPATIENT
Start: 2020-06-26 | End: 2020-06-28 | Stop reason: HOSPADM

## 2020-06-26 RX ORDER — CLOPIDOGREL BISULFATE 75 MG/1
75 TABLET ORAL DAILY
Status: DISCONTINUED | OUTPATIENT
Start: 2020-06-26 | End: 2020-06-28 | Stop reason: HOSPADM

## 2020-06-26 RX ORDER — ACETAMINOPHEN 325 MG/1
650 TABLET ORAL EVERY 6 HOURS
Refills: 0
Start: 2020-06-26 | End: 2022-12-15

## 2020-06-26 RX ORDER — AMOXICILLIN 250 MG
1 CAPSULE ORAL 2 TIMES DAILY
Qty: 30 TABLET | Refills: 1 | Status: SHIPPED | OUTPATIENT
Start: 2020-06-26 | End: 2021-11-09

## 2020-06-26 RX ORDER — ENOXAPARIN SODIUM 100 MG/ML
40 INJECTION SUBCUTANEOUS EVERY 24 HOURS
Status: DISCONTINUED | OUTPATIENT
Start: 2020-06-26 | End: 2020-06-28 | Stop reason: HOSPADM

## 2020-06-26 RX ORDER — POLYETHYLENE GLYCOL 3350 17 G/17G
17 POWDER, FOR SOLUTION ORAL DAILY
Qty: 30 PACKET | Refills: 1 | Status: SHIPPED | OUTPATIENT
Start: 2020-06-27 | End: 2020-09-01

## 2020-06-26 RX ORDER — CLOPIDOGREL BISULFATE 75 MG/1
75 TABLET ORAL DAILY
Status: DISCONTINUED | OUTPATIENT
Start: 2020-06-26 | End: 2020-06-26

## 2020-06-26 RX ORDER — TRAMADOL HYDROCHLORIDE 50 MG/1
50 TABLET ORAL EVERY 8 HOURS
Status: DISCONTINUED | OUTPATIENT
Start: 2020-06-26 | End: 2020-06-28 | Stop reason: HOSPADM

## 2020-06-26 RX ORDER — METHOCARBAMOL 500 MG/1
500 TABLET, FILM COATED ORAL 4 TIMES DAILY
Qty: 60 TABLET | Refills: 0 | Status: SHIPPED | OUTPATIENT
Start: 2020-06-26 | End: 2020-06-29

## 2020-06-26 RX ORDER — ENOXAPARIN SODIUM 100 MG/ML
40 INJECTION SUBCUTANEOUS DAILY
Qty: 11.2 ML | Refills: 0 | Status: SHIPPED | OUTPATIENT
Start: 2020-06-26 | End: 2020-07-24

## 2020-06-26 RX ADMIN — METHOCARBAMOL TABLETS 500 MG: 500 TABLET, COATED ORAL at 02:06

## 2020-06-26 RX ADMIN — OYSTER SHELL CALCIUM WITH VITAMIN D 2 TABLET: 500; 200 TABLET, FILM COATED ORAL at 09:06

## 2020-06-26 RX ADMIN — CARVEDILOL 25 MG: 25 TABLET, FILM COATED ORAL at 09:06

## 2020-06-26 RX ADMIN — TRAMADOL HYDROCHLORIDE 50 MG: 50 TABLET ORAL at 11:06

## 2020-06-26 RX ADMIN — CEFTRIAXONE SODIUM 1 G: 1 INJECTION, POWDER, FOR SOLUTION INTRAMUSCULAR; INTRAVENOUS at 11:06

## 2020-06-26 RX ADMIN — CILOSTAZOL 50 MG: 50 TABLET ORAL at 10:06

## 2020-06-26 RX ADMIN — METHOCARBAMOL TABLETS 500 MG: 500 TABLET, COATED ORAL at 06:06

## 2020-06-26 RX ADMIN — CITALOPRAM HYDROBROMIDE 20 MG: 10 TABLET ORAL at 09:06

## 2020-06-26 RX ADMIN — METHOCARBAMOL TABLETS 500 MG: 500 TABLET, COATED ORAL at 09:06

## 2020-06-26 RX ADMIN — ACETAMINOPHEN 650 MG: 325 TABLET ORAL at 12:06

## 2020-06-26 RX ADMIN — ACETAMINOPHEN 650 MG: 325 TABLET ORAL at 06:06

## 2020-06-26 RX ADMIN — ACETAMINOPHEN 650 MG: 325 TABLET ORAL at 11:06

## 2020-06-26 RX ADMIN — POLYETHYLENE GLYCOL 3350 17 G: 17 POWDER, FOR SOLUTION ORAL at 09:06

## 2020-06-26 RX ADMIN — CARVEDILOL 25 MG: 25 TABLET, FILM COATED ORAL at 06:06

## 2020-06-26 RX ADMIN — DOCUSATE SODIUM 50 MG AND SENNOSIDES 8.6 MG 1 TABLET: 8.6; 5 TABLET, FILM COATED ORAL at 09:06

## 2020-06-26 RX ADMIN — CHLORTHALIDONE 25 MG: 25 TABLET ORAL at 09:06

## 2020-06-26 RX ADMIN — ENOXAPARIN SODIUM 40 MG: 40 INJECTION SUBCUTANEOUS at 06:06

## 2020-06-26 RX ADMIN — CLOPIDOGREL BISULFATE 75 MG: 75 TABLET ORAL at 03:06

## 2020-06-26 RX ADMIN — OLMESARTAN MEDOXOMIL 40 MG: 20 TABLET, FILM COATED ORAL at 09:06

## 2020-06-26 NOTE — PLAN OF CARE
06/26/20 1541   Post-Acute Status   Post-Acute Authorization Home Health   Post-Acute Placement Status Set-up Complete   Home Health Status Set-up Complete   Patient set up with [facility] for HH who is in contact with [facility] for PT/OT. At this time, HH set up complete.  Shamika De La Vega LMSW  [facility]

## 2020-06-26 NOTE — SUBJECTIVE & OBJECTIVE
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    Encounter for blood transfusion     Fall, accidental     Hypertension     Right renal artery stenosis 3/28/2017    Stenosis of left subclavian artery        Past Surgical History:   Procedure Laterality Date    EYE SURGERY      FRACTURE SURGERY      INSERTION OF IMPLANTABLE LOOP RECORDER N/A 1/2/2020    Procedure: Insertion, Implantable Loop Recorder;  Surgeon: Anton Muniz MD;  Location: [facility];  Service: Cardiology;  Laterality: N/A;  Syncope, ILR, Bio, Local, SK, 3 Prep       Review of patient's allergies indicates:   Allergen Reactions    Percocet [oxycodone-acetaminophen]      Did not tolerate in past, not allergy       Current Facility-Administered Medications   Medication    acetaminophen tablet 650 mg    bisacodyL suppository 10 mg    calcium-vitamin D3 500 mg(1,250mg) -200 unit per tablet 2 tablet    carvediloL tablet 25 mg    cefTRIAXone injection 1 g    [START ON 6/26/2020] chlorthalidone tablet 25 mg    [START ON 6/26/2020] cilostazoL tablet 50 mg    [START ON 6/26/2020] citalopram tablet 20 mg    dextrose 50% injection 12.5 g    dextrose 50% injection 25 g    glucagon (human recombinant) injection 1 mg    glucose chewable tablet 16 g    glucose chewable tablet 24 g    melatonin tablet 6 mg    methocarbamoL tablet 500 mg    morphine injection 2 mg    [START ON 6/26/2020] olmesartan tablet 40 mg    ondansetron disintegrating tablet 8 mg    [START ON 6/26/2020] polyethylene glycol packet 17 g    senna-docusate 8.6-50 mg per tablet 1 tablet    sodium chloride 0.9% flush 10 mL    traMADoL tablet 50 mg     Family History     Problem Relation (Age of Onset)    Hyperlipidemia Mother    Hypertension Mother        Tobacco Use    Smoking status: Never Smoker    Smokeless tobacco: Never Used   Substance and  "Sexual Activity    Alcohol use: No    Drug use: No    Sexual activity: Not on file     ROS per ED 6/25/20  Objective:     Vital Signs (Most Recent):  Temp: 97.1 °F (36.2 °C) (06/25/20 1134)  Pulse: 86 (06/25/20 2127)  Resp: 16 (06/25/20 1134)  BP: (!) 189/74 (06/25/20 2123)  SpO2: (!) 94 % (06/25/20 1807) Vital Signs (24h Range):  Temp:  [97.1 °F (36.2 °C)] 97.1 °F (36.2 °C)  Pulse:  [70-92] 86  Resp:  [16] 16  SpO2:  [92 %-97 %] 94 %  BP: (101-197)/(55-99) 189/74     Weight: 59 kg (130 lb)  Height: 5' 4" (162.6 cm)  Body mass index is 22.31 kg/m².      Ortho/SPM Exam     Vitals: Afebrile.  Vital signs stable.  General: No acute distress.  Cardio: Regular rate.  Chest: No increased work of breathing.    Right Upper Extremity    - Abrasion over palm and olecranon  - NTTP  - Painless ROM at elbow, wrist, shoulder   -Compartments soft and compressible  -ROM full (shoulder/elbow/wrist)  -SILT M/R/U  -Motor intact Ain/PIN/U/M  -Brisk cap refill  -Warm well perfused extremities  -2+ Radial palpable    Left Upper Extremity    -Skin intact, no deformity, no ecchymoses, no edema   NTTP  -Compartments soft and compressible  -ROM full (shoulder/elbow/wrist)  -SILT M/R/U  -Motor intact Ain/PIN/U/M  -Brisk cap refill  -Warm well perfused extremities  -2+ Radial palpable    Right Lower Extremity Exam    - Skin intact, no deformity, no ecchymoses, no edema  - TTP over groin  - No pain with log roll  - Compartments soft and compressible  - ROM full (eversion,inversion,plantar/dorsiflexion)  - TA/EHL/Gastroc/FHL assessed in isolation without deficit  - SILT throughout  - DP and PT palpated  2+  - Capillary Refill <3s      Left Lower Extremity Exam    - Skin intact, no deformity, no ecchymoses, no edema  - NTTP  - Compartments soft and compressible  - ROM full (eversion,inversion,plantar/dorsiflexion)  - TA/EHL/Gastroc/FHL assessed in isolation without deficit  - SILT throughout  - DP and PT palpated  2+  - Capillary Refill " <3s    All other joints (shoulder/elbow/wrist/hip/knee/ankle) were examined and had full ROM and were non-tender to palpation.      Significant Labs:   A1C:   Recent Labs   Lab 12/30/19  0316   HGBA1C 5.4     Blood Culture: No results for input(s): LABBLOO in the last 48 hours.  BMP:   Recent Labs   Lab 06/25/20  1211         K 4.1      CO2 22*   BUN 36*   CREATININE 1.2   CALCIUM 9.0     CBC:   Recent Labs   Lab 06/25/20  1211   WBC 7.55   HGB 11.0*   HCT 33.3*        CMP:   Recent Labs   Lab 06/25/20  1211      K 4.1      CO2 22*      BUN 36*   CREATININE 1.2   CALCIUM 9.0   PROT 6.5   ALBUMIN 3.5   BILITOT 0.3   ALKPHOS 61   AST 15   ALT 12   ANIONGAP 10   EGFRNONAA 40.7*     Coagulation: No results for input(s): LABPROT, INR, APTT in the last 48 hours.  CRP: No results for input(s): CRP in the last 48 hours.  Lactic Acid: No results for input(s): LACTATE in the last 48 hours.  POCT Glucose: No results for input(s): POCTGLUCOSE in the last 48 hours.  Prealbumin: No results for input(s): PREALBUMIN in the last 48 hours.  Troponin: No results for input(s): TROPONINI in the last 48 hours.  Urine Culture: No results for input(s): LABURIN in the last 48 hours.  Urine Studies:   Recent Labs   Lab 06/25/20  1214   COLORU Yellow   APPEARANCEUA Cloudy*   PHUR 6.0   SPECGRAV 1.010   PROTEINUA Negative   GLUCUA Negative   KETONESU Negative   BILIRUBINUA Negative   OCCULTUA 3+*   NITRITE Positive*   LEUKOCYTESUR 3+*   RBCUA >100*   WBCUA >100*   BACTERIA Many*   SQUAMEPITHEL 3     All pertinent labs within the past 24 hours have been reviewed.    Significant Imaging: I have reviewed all pertinent imaging results/findings.     MRI showing R superior/inferior pubic ramus fractures and zone 1 sacral ala fracture    CT pelvis with same as above

## 2020-06-26 NOTE — PLAN OF CARE
06/26/20 1247   Post-Acute Status   Post-Acute Authorization Home Health   Post-Acute Placement Status Referrals Sent   Home Health Status Awaiting Orders for HH   ROQUE met with patient and daughter in room to discuss tx team recs for HH for post acute care. Patient daughter identified [facility] as preferred provider. Daughter states patient will discharge to her home for discharge: [address] [place] [address]. Patient daughter also requesting a hospital bed for discharge. ROQUE informed CM of patient discharge plans.     ROQUE outreached [facility] at [phone] to follow on patient HH referral. No answer. Left voicemail requesting return call. ROQUE hard faxed HH referral to [phone]. Pending HH orders at this time.   Shamika De La Vega LMSW [facility]     (2:01PM) ROQUE received call from Sandy with [facility] who states able to accept patient once HH orders are in. They will send to an agency that they contract with in order to expedite referral set up. Pending HH orders and PT therapy notes in order to process request. ROQUE will continue to follow.  Shamika De La Vega LMSW [facility]     (2:19PM) ROQUE sent HH orders to [facility] via hard fax ([phone]).  Shamika De La Vega LMSW [facility]     (2:32PM) ROQUE received call from Sandy with [facility] who says to send referral to Medical Team as they [place] [facility] ROQUE sent referral to provider via [employer].  Shamika De La Vega LMSW [facility]

## 2020-06-26 NOTE — PLAN OF CARE
Patient to DC to [facility] [phone] her daughters address:   [address] [place] [address].   Hospital Bed, BSC, and W/C have been ordered from [facility].     06/26/20 1123   Discharge Assessment   Assessment Type Discharge Planning Assessment   Confirmed/corrected address and phone number on facesheet? Yes   Assessment information obtained from? Patient   Expected Length of Stay (days) 2   Communicated expected length of stay with patient/caregiver yes   Prior to hospitilization cognitive status: Alert/Oriented   Prior to hospitalization functional status: Independent   Current cognitive status: Alert/Oriented   Current Functional Status: Needs Assistance   Lives With alone  (going to stay with [facility] dgt. when dc'd)   Able to Return to Prior Arrangements yes   Is patient able to care for self after discharge? No   Who are your caregiver(s) and their phone number(s)? Mack cheatham [phone]   Patient's perception of discharge disposition home health   Readmission Within the Last 30 Days no previous admission in last 30 days   Patient currently being followed by outpatient case management? No   Patient currently receives any other outside agency services? No   Equipment Currently Used at Home none   Do you have any problems affording any of your prescribed medications? No   Is the patient taking medications as prescribed? yes   Does the patient have transportation home? Yes   Transportation Anticipated family or friend will provide   Dialysis Name and Scheduled days n/a   Does the patient receive services at the [facility]? No   Discharge Plan A Home Health   Discharge Plan B Skilled Nursing Facility   DME Needed Upon Discharge  wheelchair;hospital bed;bedside commode   Patient/Family in Agreement with Plan yes

## 2020-06-26 NOTE — PLAN OF CARE
[facility]    HOME HEALTH ORDERS  FACE TO FACE ENCOUNTER    Patient Name: Naheed Cottrell  YOB: 1933    PCP: Mack Cottrell MD   PCP Address: [address] / [place] [address]  PCP Phone Number: [phone]  PCP Fax: [phone]    Encounter Date: 06/26/2020    Admit to Home Health    Diagnoses:  Active Hospital Problems    Diagnosis  POA    *Pubic ramus fracture, right, closed, initial encounter [S32.591A]  Yes    Contusion of right hip [S70.01XA]  Yes    Acute cystitis [N30.00]  Yes    Hip pain [M25.559]  Yes    Normocytic anemia [D64.9]  Yes    Closed fracture of sacrum [S32.10XA]  Yes    History of CVA (cerebrovascular accident) [Z86.73]  Not Applicable    Chronic heart failure with preserved ejection fraction [I50.32]  Yes    Iron deficiency anemia [D50.9]  Yes    Thrombocytopenia, unspecified [D69.6]  Yes    Vitamin D deficiency [E55.9]  Yes    Right renal artery stenosis [I70.1]  Yes     60-69%      Dyslipidemia [E78.5]  Yes     Chronic    Essential hypertension [I10]  Yes     Chronic    PAD (peripheral artery disease) [I73.9]  Yes    Renovascular hypertension [I15.0]  Yes    Subclavian artery stenosis, left [I77.1]  Yes      Resolved Hospital Problems   No resolved problems to display.       Future Appointments   Date Time Provider Department Center   7/20/2020 11:00 AM [facility]     Follow-up Information     Schedule an appointment as soon as possible for a visit  with Ronald Braswell MD.    Specialty: Cardiology  Contact information:  [address]  [facility] [address]  [phone]             [facility]    Specialty: Emergency Medicine  Why: If symptoms worsen  Contact information:  [address]  [facility] [address] [phone]                   I have seen and examined this patient face to face today. My clinical findings that support the need for the  home health skilled services and home bound status are the following:  Weakness/numbness causing balance and gait disturbance due to Fracture and Weakness/Debility making it taxing to leave home.  Requiring assistive device to leave home due to unsteady gait caused by  Fracture and Weakness/Debility.    Allergies:  Review of patient's allergies indicates:   Allergen Reactions    Percocet [oxycodone-acetaminophen]      Did not tolerate in past, not allergy       Diet: cardiac diet    Activities: weight bearing as tolerated to bilateral lower extremities    Nursing:   SN to complete comprehensive assessment including routine vital signs. Instruct on disease process and s/s of complications to report to MD. Review/verify medication list sent home with the patient at time of discharge  and instruct patient/caregiver as needed. Frequency may be adjusted depending on start of care date.    Notify MD if SBP > 160 or < 90; DBP > 90 or < 50; HR > 120 or < 50; Temp > 101; Other:         CONSULTS:    Physical Therapy to evaluate and treat. Evaluate for home safety and equipment needs; Establish/upgrade home exercise program. Perform / instruct on therapeutic exercises, gait training, transfer training, and Range of Motion.  Occupational Therapy to evaluate and treat. Evaluate home environment for safety and equipment needs. Perform/Instruct on transfers, ADL training, ROM, and therapeutic exercises.  Aide to provide assistance with personal care, ADLs, and vital signs.    MISCELLANEOUS CARE:  Routine Skin for Bedridden Patients: Instruct patient/caregiver to apply moisture barrier cream to all skin folds and wet areas in perineal area daily and after baths and all bowel movements.    WOUND CARE ORDERS  n/a      Medications: Review discharge medications with patient and family and provide education.      Current Discharge Medication List      START taking these medications    Details   enoxaparin (LOVENOX) 40 mg/0.4 mL Syrg  Inject 0.4 mLs (40 mg total) into the skin once daily.  Qty: 11.2 mL, Refills: 0   -end date July 24th 2020   HYDROcodone-acetaminophen (NORCO) 5-325 mg per tablet Take 1 tablet by mouth every 6 (six) hours as needed.  Qty: 30 tablet, Refills: 0    Comments: Quantity prescribed more than 7 day supply? No   -severe pain 7-10 scale   methocarbamoL (ROBAXIN) 500 MG Tab Take 1 tablet (500 mg total) by mouth 4 (four) times daily.  Qty: 60 tablet, Refills: 0      ondansetron (ZOFRAN-ODT) 8 MG TbDL Take 1 tablet (8 mg total) by mouth every 8 (eight) hours as needed.  Qty: 30 tablet, Refills: 1      polyethylene glycol (GLYCOLAX) 17 gram PwPk Take 17 g by mouth once daily.  Qty: 30 packet, Refills: 1      senna-docusate 8.6-50 mg (PERICOLACE) 8.6-50 mg per tablet Take 1 tablet by mouth 2 (two) times daily.  Qty: 30 tablet, Refills: 1      traMADoL (ULTRAM) 50 mg tablet Take 1 tablet (50 mg total) by mouth every 8 (eight) hours.  Qty: 45 tablet, Refills: 0    Comments: Quantity prescribed more than 7 day supply? No         CONTINUE these medications which have CHANGED    Details   acetaminophen (TYLENOL) 325 MG tablet Take 2 tablets (650 mg total) by mouth every 6 (six) hours.  Refills: 0         CONTINUE these medications which have NOT CHANGED    Details   calcium-vitamin D3 500 mg(1,250mg) -200 unit per tablet Take 2 tablets by mouth 2 (two) times daily.      carvediloL (COREG) 25 MG tablet TAKE 1 TABLET(25 MG) BY MOUTH TWICE DAILY WITH MEALS  Qty: 180 tablet, Refills: 11      chlorthalidone (HYGROTEN) 25 MG Tab TAKE 1 TABLET(25 MG) BY MOUTH EVERY DAY  Qty: 90 tablet, Refills: 11      cholecalciferol, vitamin D3, 1,000 unit capsule Take 1 capsule (1,000 Units total) by mouth once daily.  Refills: 0    Associated Diagnoses: Vitamin D deficiency      citalopram (CELEXA) 20 MG tablet TAKE 1 TABLET(20 MG) BY MOUTH EVERY DAY  Qty: 90 tablet, Refills: 2      clopidogreL (PLAVIX) 75 mg tablet Take 1 tablet (75 mg total) by mouth  once daily.  Qty: 90 tablet, Refills: 3    Associated Diagnoses: PAD (peripheral artery disease); History of CVA (cerebrovascular accident)      coenzyme Q10 (CO Q-10) 10 mg capsule Take 10 mg by mouth once daily.      diclofenac sodium (VOLTAREN) 1 % Gel Apply 2 g topically 2 (two) times daily.  Qty: 1 Tube, Refills: 3      evolocumab (REPATHA SURECLICK) 140 mg/mL PnIj Inject 1 mL (140 mg total) into the skin every 14 (fourteen) days.  Qty: 6 mL, Refills: 3    Associated Diagnoses: Dyslipidemia; Allergy to statin medication      olmesartan (BENICAR) 40 MG tablet TAKE 1 TABLET(40 MG) BY MOUTH EVERY DAY  Qty: 90 tablet, Refills: 3    Associated Diagnoses: Essential hypertension         STOP taking these medications       aspirin (ECOTRIN) 81 MG EC tablet Comments:   Reason for Stopping:     -restart July 25th 2020 after lovenox done    cilostazoL (PLETAL) 50 MG Tab Comments:   Reason for Stopping:     -restart July 25 2020 after lovenox done      -- antibiotics for UTI -- TBD.      I certify that this patient is confined to her home and needs intermittent skilled nursing care, physical therapy and occupational therapy.

## 2020-06-26 NOTE — PLAN OF CARE
OT Acute eval complete. Goals established. Pt motivated for therapy and tolerated session fairly secondary to pain. Pt required Min A in bed mobility, t/f's, and ambulation due to pain. Pt ambulated ~8ft Min A using RW to increase activity tolerance required for adls and functional mobility. Pt became dizzy/lightheaded due to pain when ambulating and was quickly returned to supine. Pt is safe to d/c home with 24 hour assistance and home health when pain in managed.     Problem: Occupational Therapy Goal  Goal: Occupational Therapy Goal  Description: Goals to be met by: 7/26/2020    Patient will increase functional independence with ADLs by performing:    UE Dressing with Stand-by Assistance.  LE Dressing with Minimal Assistance.  Grooming while seated with Set-up Assistance.  Toileting from bedside commode with Stand-by Assistance for hygiene and clothing management.   Step transfer with Stand-by Assistance  Toilet transfer to bedside commode with Stand-by Assistance.    Outcome: Ongoing, Progressing

## 2020-06-26 NOTE — PROGRESS NOTES
"Progress Note  Hospital Medicine       Patient Name: Naheed Cottrell  MRN:  [id number]  Hospital Medicine Team: [facility] MED  Yohana Lopez MD  Date of Admission:  6/25/2020     Principal Problem:  Pubic ramus fracture, right, closed, initial encounter   Primary Care Physician: Mack Cottrell MD      History of Present Illness:     Ms. Naheed Cottrell is a 87 y.o. female with past medical history of HTN, chronic diastolic dysfunction (EF 65%), history CVA (no residual deficits), renal/subclavian artery stenosis, HLD (on repatha), PVD, falls with history of Left hip fx 20 years ago, Left humerus fracture 2017 in setting of UTI-sepsis with fall presentation, who was in her usual state of health until earlier today when she went for a walk with her grandchild when she tripped over the sidewalk and sustained a fall. Didn't hit her head ,was helped up by someone on the street at the time and was "okay" from pain standpoint but felt somewhat dizzy and off after so they brought her to the ER for evaluation, Xrays of femur/pelvis wnl, cardiac device interrogation- loop recorder without arrythmias (denies syncope or feeling bad at all prior) and then when was standing up to ensure could safetly ambulate she had very significant pain to the right hip/leg and unable to bear weight per daughter at beside which is unlike her. She has high pain tolerance normally and she "wanted to go home" from the ER, her BP went to 200s systolic and she had severe pain in the right hip radiating to the right groin and she vomited at the time, likely secondary to pain from BP spike. As she couldn't safely ambulate and the ER was worried about occult fracture patient was admitted to obs and MRI ordered.     Patient was in MRI on initial eval and discussed all above with daughter at bedside. On return back to the ER room to update her, her daughter and her daughters  who is radiologist also at bedside. Still having significant pain " at rest in the right groin/pelvis area. Discussed MRI results which showed Right superior ramus fracture, Right inferior pubic ramus fx, and Right sacral ala fractures.   Discussed pain control now and orthopedics consult for weigh bearing status and to ensure the plans going forward, discussed in -general non operative- but orthopedics to discuss and assess if this is the case given sacral ala fx also. She has intolerance to percocet in the last so added to allergies and she prefers doing very light pain medications to start so have sravan tylenol, robaxin and prefers tramadol over norco for sever pain, have 2 mg morphine for severe pain for breakthrough but she prefers to not use as long as pain controlled.  She has a UTI and was likely a small nidus for this as she has had other falls in the past when she has a UTI, but this was more mechanical, may just have easier ability to not aware of surroundings or fall when she has a UTI. Her daughter states that she uses blue toilet bowl bleach so its hard to tell at home if her urine is dark or symptoms but her urine was dark in the ER when sampled.   Her loop recorder was interrogated in ER and no arrythmias found, on history it does not appear to be pre-syncopal in origin and mechanical fall.    Med History: HTN, chronic diastolic HF - EF 65%, history CVA- no residual deficits, renal artery stenosis, subclavian artery stenosis on left (falsely low BPS in the left arm- only do BP checks on right), left hip fx 20 years ago s/p repair, HLD, peripheral arterial disease, syncopal episodes s/p loop recorder, left humerus fracture, vit D deficiency, hx of sepsis/bacteremia from Ecoli UTI    Surg History- MCL repair, left femur fx repair, loop recorder insertion    Social history: lives at home, independent in ADLs, does not use ambulatory devices. Daughter at bedside with  who is radiologist. Son is cardiologist at [employer], former cardiologist at [employer] for many years,  is on conference now but other family members in room today.  Sees Cardiology here at main campus regularly.    Last BM today. No alcohol use. Family has HH company they prefer if HH is an option in future. They feel she's okay to stay here overnight but if need special permission to stay with her, is okay with myself and can look into it with charge nurse on floor if she has any issues with sundowning, has not had delirium issues in the hospital at previous admissions.    Review of Systems   Constitutional: positive chills. Negative for fatigue, fever.   HENT: Negative for sore throat, trouble swallowing.    Eyes: Negative for photophobia, visual disturbance.   Respiratory: Negative for cough, shortness of breath.    Cardiovascular: Negative for chest pain, palpitations, leg swelling.   Gastrointestinal: Negative for abdominal pain, constipation, diarrhea, + for nausea, vomiting.   Endocrine: Negative for cold intolerance, heat intolerance.   Genitourinary: + for dysuria, frequency. , dark urine  Musculoskeletal: + for arthralgias, myalgias.   Skin: Negative for rash, wound, erythema   Neurological: Negative for dizziness, syncope, weakness, light-headedness.  + for fall  Psychiatric/Behavioral: Negative for confusion, hallucinations, anxiety  All other systems reviewed and are negative.        Interval History:    Pain not controlled well with PT/OT, per therapists she got flushed, almost past out, severe pain and chills, which also happened in ER yseterday. She didn't take any prns yestrday. Discussed with her at length better control needed will sravan tramadol TID now and put norco for severe pain. Have room to uptitrate robaxin also if needed. Discussed with son- dr gregg (Cards(, will stop cilostazol and asa now while doing lovenox for 1 month to avoid bleed risk on triple therapy and cont plavix for PVD for her. Urine CX pending. xrays after PT per ortho.  Will come by later to assess regimen changes, plan  for HH once pain controlled this weekend per discussion with son and daughter both, PT/OT feels is safe but need pain control better now      Past Medical History: Patient has a past medical history of Anticoagulant long-term use, Arthritis, Cataract, Embolic stroke involving right middle cerebral artery (6/6/2017), Encounter for blood transfusion, Fall, accidental, Hypertension, Right renal artery stenosis (3/28/2017), and Stenosis of left subclavian artery.    Past Surgical History: Patient has a past surgical history that includes Eye surgery; Fracture surgery; and Insertion of implantable loop recorder (N/A, 1/2/2020).    Social History: Patient reports that she has never smoked. She has never used smokeless tobacco. She reports that she does not drink alcohol or use drugs.    Family History: family history includes Hyperlipidemia in her mother; Hypertension in her mother.    Medications: Scheduled Meds:   acetaminophen  650 mg Oral Q6H    calcium-vitamin D3  2 tablet Oral BID    carvediloL  25 mg Oral BID    cefTRIAXone (ROCEPHIN) IVPB  1 g Intravenous Q24H    chlorthalidone  25 mg Oral Daily    citalopram  20 mg Oral Daily    clopidogreL  75 mg Oral Daily    enoxaparin  40 mg Subcutaneous Q24H    methocarbamoL  500 mg Oral QID    olmesartan  40 mg Oral Daily    polyethylene glycol  17 g Oral Daily    senna-docusate 8.6-50 mg  1 tablet Oral BID    traMADoL  50 mg Oral Q8H     Continuous Infusions:  PRN Meds:.    Allergies: Patient is allergic to percocet [oxycodone-acetaminophen].    Physical Exam:     Vital Signs (Most Recent):  Temp: 98 °F (36.7 °C) (06/26/20 0813)  Pulse: 80 (06/26/20 0813)  Resp: 14 (06/26/20 0813)  BP: (!) 140/72 (06/26/20 0813)  SpO2: (!) 92 % (06/26/20 0813) Vital Signs Range (Last 24H):  Temp:  [97.1 °F (36.2 °C)-98 °F (36.7 °C)]   Pulse:  [70-92]   Resp:  [14-18]   BP: (101-197)/(55-99)   SpO2:  [92 %-97 %]    Body mass index is 22.31 kg/m².     Physical  "Exam:  Constitutional: Appears well-developed and well-nourished. Pleasant. Sitting in chair.  Head: Normocephalic and atraumatic.   Mouth/Throat: Oropharynx is clear and moist.   Eyes: EOM are normal. Pupils are equal, round, and reactive to light. No scleral icterus.   Neck: Normal range of motion. Neck supple.   Cardiovascular: Normal rate and regular rhythm.  No murmur heard.  Pulmonary/Chest: Effort normal and breath sounds normal. No respiratory distress. No wheezes, rales, or rhonchi  Abdominal: Soft. Bowel sounds are normal.  No distension or tenderness  Musculoskeletal: pain with palpation of right hip with "shock"sensation into groin with palpation. No edema in LE. Pulses present, warm to touch lower extremities. Small brush burn on RUE, arm and skin tear from fall.   Neurological: Alert and oriented to person, place, and time.   Skin: Skin is warm and dry.   Psychiatric: Normal mood and affect. Behavior is normal.   Vitals reviewed.    Recent Labs   Lab 06/25/20  1211 06/26/20  0339   WBC 7.55 9.46   HGB 11.0* 10.5*   HCT 33.3* 33.4*    146*       Recent Labs   Lab 06/25/20  1211 06/26/20  0339    140   K 4.1 3.8    107   CO2 22* 23   BUN 36* 30*   CREATININE 1.2 1.0    97   CALCIUM 9.0 9.3     Recent Labs   Lab 06/25/20  1211 06/26/20  0339   ALKPHOS 61 59   ALT 12 10   AST 15 22   ALBUMIN 3.5 3.3*   PROT 6.5 6.2   BILITOT 0.3 0.5      No results for input(s): POCTGLUCOSE in the last 168 hours.      Assessment and Plan:     Ms. Naheed Cottrell is a 87 y.o. female who presented to [facility] on 6/25/2020 with     Active Hospital Problems    Diagnosis  POA    *Pubic ramus fracture, right, closed, initial encounter [S32.591A]  Yes    Contusion of right hip [S70.01XA]  Yes    Acute cystitis [N30.00]  Yes    Hip pain [M25.559]  Yes    Normocytic anemia [D64.9]  Yes    Closed fracture of sacrum [S32.10XA]  Yes    History of CVA (cerebrovascular accident) [Z86.73]  Not Applicable "    Chronic heart failure with preserved ejection fraction [I50.32]  Yes    Iron deficiency anemia [D50.9]  Yes    Thrombocytopenia, unspecified [D69.6]  Yes    Vitamin D deficiency [E55.9]  Yes    Right renal artery stenosis [I70.1]  Yes     60-69%      Dyslipidemia [E78.5]  Yes     Chronic    Essential hypertension [I10]  Yes     Chronic    PAD (peripheral artery disease) [I73.9]  Yes    Renovascular hypertension [I15.0]  Yes    Subclavian artery stenosis, left [I77.1]  Yes      Resolved Hospital Problems   No resolved problems to display.       Right Superior pubic rami Fx  Right Inferior pubic Rami Fx  Right Sacral Ala Fx  -secondary to mechanical fall in setting of UTI as likely precipitant of fall as has precipitated easy falls in past as well (last 2017 major fall with humerus fx)  -initial xrays neg, MRI revealed above.   -pain control- sravan tylenol, robaxin.  sravan tramadol now, norco severe pain. Consider uptitrate robaxin if not improved later today also.  xrays post mobilization per ortho. Non op plans  -lovenox x 1 month, hold asa cilostazol while on there to avoid bleed risk.  -HH when med stable once pain better controlled  -vit D at goal 65  - TEDS/SCDs  -Tdap given in ER    Acute Cystitis  -nitrite, blood, leukos, >100 rbc, wbc, many bacteria on UA  -continue rocephin  -ecoli in 2017 in blood was pan sensitive  -WBC 7 now, does not appear septic on exam  -f/u Cx- pending now    Chronic normocytic Anemia  Hx of iron deficiency Anemia  -Hg at baseline of 11 on admission  -wnl ferritin,b12,folate     HTN  -BP 100s systolic on admit but spiked to 200s systolic with pain when she moved, now back to 170s in ER, likely pain contirbutor, took all home meds this AM, due for coreg this PM  -cont coreg, olmesartan, chlorthalidone    Vitamin D deficiency  -cont home vit D, recheck 65    Peripheral vascular disease  -hold asa until off lovenox, cont plavix   -hold cilostazol while on lovenox per discussion  with son cadriologist on phone today  -has statin intolerance, takes repatha every 2 weeks, due on Sunday, hold while inpatient    Subclavian artery stenosis  -on left (imaging 2015), if bP is taken in left arm it will look falsely low, only take BP in right arm  -family reports patient will tell nurses if they do it wrong also, nursing comm placed for this    Renal Artery Stenosis  -no acute issues, chronic    Recurrent Syncope  -has loop recorder in place, interrogated in ER without arrythmias  -event now was mechanical in nature  -no acute issues  -EKG with flat T wavse in V1-V2 which was her baseline previously.     Chronic diastolic Heart Failure  -echo up to date, EF 65%, chronic diastolic dysfunction, not on diuretics, euvolemic  -no acute issues. CXR wnl on admission    History CVA  -no residual deficits      Diet:  Low sodium     DVT PPx:  TEDS/SCDs, lovenox        Disposition:  Repeat xrays, pain control.  hh PTPT and ortho f/u once pan controlled, regimen adjusted today  Suspect sat/sun if pain better with HH.    Ortho f/u OP      Daughter Erica- is local contact now- lives 5 min from hospital- [phone]

## 2020-06-26 NOTE — PT/OT/SLP EVAL
Occupational Therapy   Evaluation    Name: Naheed Cottrell  MRN: [id number]  Admitting Diagnosis:  Pubic ramus fracture, right, closed, initial encounter      Recommendations:     Discharge Recommendations: home with home health  Discharge Equipment Recommendations:  walker, rolling, wheelchair, bedside commode  Barriers to discharge:       Assessment:     Naheed Cottrell is a 87 y.o. female with a medical diagnosis of Pubic ramus fracture, right, closed, initial encounter.  She presents with the following Performance deficits affecting function: weakness, impaired endurance, impaired self care skills, impaired functional mobilty, gait instability, impaired balance, decreased lower extremity function, decreased safety awareness, pain.      OT Acute eval complete. Goals established. Pt motivated for therapy and tolerated session fairly secondary to pain. Pt required Min A in bed mobility, t/f's, and ambulation due to pain. Pt ambulated ~8ft Min A using RW to increase activity tolerance required for adls and functional mobility. Pt became dizzy/lightheaded due to pain when ambulating and was quickly returned to supine. Pt is safe to d/c home with 24 hour assistance and home health when pain in managed.     Rehab Prognosis: Good; patient would benefit from acute skilled OT services to address these deficits and reach maximum level of function.       Plan:     Patient to be seen daily to address the above listed problems via self-care/home management, therapeutic activities, therapeutic exercises  · Plan of Care Expires: 07/25/20  · Plan of Care Reviewed with: patient    Subjective     Chief Complaint: pain  Patient/Family Comments/goals: to go home    Occupational Profile:  Living Environment: Pt lives alone in [facility] with elevator. 0STE  Previous level of function: Independent  Equipment Used at Home:  none  Assistance upon Discharge: Pt will required 24 hour assistance upon d/c. Pt states family will be able to stay  with her 24/7.    Pain/Comfort:  · Pain Rating 1: 0/10  · Location - Side 1: Right  · Location 1: groin  · Pain Addressed 1: Pre-medicate for activity, Reposition, Nurse notified, Cessation of Activity    Patients cultural, spiritual, Jainism conflicts given the current situation: no    Objective:     Communicated with: RN prior to session.  Patient found supine with PureWick upon OT entry to room.    General Precautions: Standard, fall   Orthopedic Precautions:RLE weight bearing as tolerated, LLE weight bearing as tolerated   Braces:       Occupational Performance:    Bed Mobility:    · Patient completed Scooting/Bridging with minimum assistance  · Patient completed Supine to Sit with minimum assistance    Functional Mobility/Transfers:  · Patient completed Sit <> Stand Transfer with minimum assistance  with  rolling walker   · Patient completed Bed <> Chair Transfer using Step Transfer technique with minimum assistance with rolling walker  · Functional Mobility: OT Acute eval complete. Goals established. Pt motivated for therapy and tolerated session fairly secondary to pain. Pt required Min A in bed mobility, t/f's, and ambulation due to pain. Pt ambulated ~8ft Min A using RW to increase activity tolerance required for adls and functional mobility. Pt became dizzy/lightheaded due to pain when ambulating and was quickly returned to supine. Pt is safe to d/c home with 24 hour assistance and home health when pain in managed.     Activities of Daily Living:  · Upper Body Dressing: minimum assistance elizabeth gown  · Lower Body Dressing: total assistance elizabeth socks    Cognitive/Visual Perceptual:  Cognitive/Psychosocial Skills:     -       Oriented to: Person, Place, Time and Situation   -       Safety awareness/insight to disability: intact     Physical Exam:  Upper Extremity Range of Motion:     -       Right Upper Extremity: WFL  -       Left Upper Extremity: WFL  Upper Extremity Strength:    -       Right Upper  Extremity: WFL  -       Left Upper Extremity: WFL   Strength:    -       Right Upper Extremity: WFL  -       Left Upper Extremity: WFL    AMPAC 6 Click ADL:  AMPAC Total Score: 21    Treatment & Education:  - OT/POC-  - Importance of mobility to maximize recovery.  - safety w/ functional mobility; hand placement to ensure safe transfers to various surfaces in prep for ADLs  - Reviewed gait sequence and RW management via verbalization and demonstration   - encouraged to ambulate within household environment at least every hour to hour 1/2  - whiteboard updated    Education:    Patient left up in chair with all lines intact, call button in reach and RN notified    GOALS:   Multidisciplinary Problems     Occupational Therapy Goals        Problem: Occupational Therapy Goal    Goal Priority Disciplines Outcome Interventions   Occupational Therapy Goal     OT, PT/OT Ongoing, Progressing    Description: Goals to be met by: 7/26/2020    Patient will increase functional independence with ADLs by performing:    UE Dressing with Stand-by Assistance.  LE Dressing with Minimal Assistance.  Grooming while seated with Set-up Assistance.  Toileting from bedside commode with Stand-by Assistance for hygiene and clothing management.   Step transfer with Stand-by Assistance  Toilet transfer to bedside commode with Stand-by Assistance.                     History:     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    Encounter for blood transfusion     Fall, accidental     Hypertension     Right renal artery stenosis 3/28/2017    Stenosis of left subclavian artery        Past Surgical History:   Procedure Laterality Date    EYE SURGERY      FRACTURE SURGERY      INSERTION OF IMPLANTABLE LOOP RECORDER N/A 1/2/2020    Procedure: Insertion, Implantable Loop Recorder;  Surgeon: Anton Muniz MD;  Location:  [facility];  Service: Cardiology;  Laterality: N/A;  Syncope, ILR, Bio, Local, SK, 3 Prep       Time Tracking:     OT Date of Treatment: 06/26/20  OT Start Time: 0835  OT Stop Time: 0905  OT Total Time (min): 30 min    Billable Minutes:Evaluation 10  Self Care/Home Management 10  Therapeutic Activity 10    Radha Danielson OT  6/26/2020

## 2020-06-26 NOTE — ASSESSMENT & PLAN NOTE
Naheed Cottrell is a 87 y.o. female with right sided LC 1 pelvis fractures closed, NVI.    Patient admitted to hospital medicine service. She will need PT/OT for mobilization. After mobilization ortho will obtain post mobilization films. Recommend lovenox x3 0 days for DVT ppx. WBAT. Ok with diet. No abx at this time. Optimize nutrition. Vit D and calcium supplement. Pain control multimodal. Orthopedics will follow along, call with any questions. Will discuss plan with staff.

## 2020-06-26 NOTE — PLAN OF CARE
Problem: Physical Therapy Goal  Goal: Physical Therapy Goal  Description: Goals to be met by: 7/10/20    Patient will increase functional independence with mobility by performin. Supine to sit with Stand-by Assistance  2. Sit to supine with Stand-by Assistance  3. Sit to stand transfer with Stand-by Assistance  4. Gait  x 100 feet with Stand-by Assistance using Rolling Walker.   5.  Patient will demonstrate independence with a home exercise program  6. Patient's balance will be GOOD: Needs SUPERVISION only during gait and able to self right with moderate LOB.  Outcome: Ongoing, Progressing     PT evaluation completed, goals established and plan of care reviewed with patient.  Patient demonstrates good motivation and decreased activity tolerance secondary to pain and lightheadedness.  Patient demonstrated orthostatic hypotension during out of bed activity secondary to greatly elevated pain during ambulation.  Patient required min A for bed mobility, transfers and ambulation.  Patient's pain and lightheadedness limited overall ambulation tolerance but patient ambulated 3 ft with rolling walker and min A.  Patient will benefit from skilled PT for strengthening and mobility training to increase independence and home safety.      Kareem Ware, PT, DPT  2020  Pager #: [phone]

## 2020-06-26 NOTE — ASSESSMENT & PLAN NOTE
Naheed Cottrell is a 87 y.o. female with right sided LC 1 pelvis fractures and zone 1 sacral ala fracture closed, NVI.    Patient admitted to hospital medicine service. She will need PT/OT for mobilization today. After mobilization ortho will obtain post mobilization films. Recommend lovenox x30 days for DVT ppx. WBAT. Ok with diet. No abx at this time. Optimize nutrition. Vit D and calcium supplement. Pain control multimodal. Orthopedics will follow along, call with any questions.

## 2020-06-26 NOTE — HPI
"Naheed Cottrell is a 87 y.o. female past medical history of HTN, chronic diastolic dysfunction (EF 65%), history CVA (no residual deficits), renal/subclavian artery stenosis, HLD (on repatha), PVD, falls with history of Left hip fx 20 years ago fixed with [facility], Left humerus fracture 2017 in setting of UTI-sepsis with fall presentation, who was in her usual state of health until earlier today when she went for a walk with her grandchild when she tripped over the sidewalk and sustained a fall. Didn't hit her head ,was helped up by someone on the street at the time and was "okay" from pain standpoint but felt somewhat dizzy and off after so they brought her to the ER for evaluation, Xrays of femur/pelvis wnl, cardiac device interrogation- loop recorder without arrythmias (denies syncope or feeling bad at all prior) and then when was standing up to ensure could safetly ambulate she had very significant pain to the right hip/leg and unable to bear weight per daughter at beside which is unlike her. She has high pain tolerance normally and she "wanted to go home" from the ER, her BP went to 200s systolic and she had severe pain in the right hip radiating to the right groin and she vomited at the time, likely secondary to pain from BP spike. As she couldn't safely ambulate and the ER was worried about occult fracture patient was admitted to obs and MRI ordered.      MRI results which showed Right superior ramus fracture, Right inferior pubic ramus fx, and Right sacral ala fractures.     Pt lives at home, independent in ADLs, does not use ambulatory devices.   Sees Cardiology here at main campus regularly. No alcohol use.  Takes aspirin and plavix at home.     "

## 2020-06-26 NOTE — PLAN OF CARE
06/26/20 0856   Post-Acute Status   Post-Acute Authorization Placement   Post-Acute Placement Status Awaiting Therapy Documentation   SW following patient for post acute care needs. Pending therapy recs at this time. SW will continue to follow.  Shamika De La Vega LMSW [facility]     (11:40AM) ROQUE spoke with PT/OT who recommend HH for post acute care. Patient family have HH agency that they want patient to use for post acute care. SW Will follow up with family to identify preferred HH provider. SW will continue to follow.  Shamika De La Vega LMSW [facility]     (11:47AM) ROQUE outreached Erica (patient daughter) at [phone]. No answer. Unable to leave voicemail.  Shamika De La Vega LMSW [facility]     (11:48AM) ROQUE outreached Mack (patient son) at [phone]. No answer. Left voicemail requesting return call.  Shamika De La Vega LMSW [facility]

## 2020-06-26 NOTE — PROGRESS NOTES
[facility]  Orthopedics  Progress Note    Attg Note:  I agree with the resident's assessment and plan.  WBAT.  She has a great deal of help at home to include several physicians and wishes to be d/vaibhav home when appropriate.    Jose J Wilder MD      Patient Name: Naheed Cottrell  MRN: [id number]  Admission Date: 6/25/2020  Hospital Length of Stay: 0 days  Attending Provider: Yohana Lopez MD  Primary Care Provider: Mack Cottrell MD    Subjective:     Principal Problem:Pubic ramus fracture, right, closed, initial encounter    Principal Orthopedic Problem: Same    Interval History: Pt seen and examined ON. Poor sleep due to being side sleeper and unable to tolerate a lateral position. Pain controlled. No new complaints.     Review of patient's allergies indicates:   Allergen Reactions    Percocet [oxycodone-acetaminophen]      Did not tolerate in past, not allergy       Current Facility-Administered Medications   Medication    acetaminophen tablet 650 mg    bisacodyL suppository 10 mg    calcium-vitamin D3 500 mg(1,250mg) -200 unit per tablet 2 tablet    carvediloL tablet 25 mg    cefTRIAXone injection 1 g    chlorthalidone tablet 25 mg    cilostazoL tablet 50 mg    citalopram tablet 20 mg    clopidogreL tablet 75 mg    dextrose 50% injection 12.5 g    dextrose 50% injection 25 g    enoxaparin injection 40 mg    glucagon (human recombinant) injection 1 mg    glucose chewable tablet 16 g    glucose chewable tablet 24 g    melatonin tablet 6 mg    methocarbamoL tablet 500 mg    morphine injection 2 mg    olmesartan tablet 40 mg    ondansetron disintegrating tablet 8 mg    polyethylene glycol packet 17 g    senna-docusate 8.6-50 mg per tablet 1 tablet    sodium chloride 0.9% flush 10 mL    traMADoL tablet 50 mg     Objective:     Vital Signs (Most Recent):  Temp: 98 °F (36.7 °C) (06/26/20 0813)  Pulse: 80 (06/26/20 0813)  Resp: 14 (06/26/20 0813)  BP: (!) 140/72  "(06/26/20 0813)  SpO2: (!) 92 % (06/26/20 0813) Vital Signs (24h Range):  Temp:  [97.1 °F (36.2 °C)-98 °F (36.7 °C)] 98 °F (36.7 °C)  Pulse:  [70-92] 80  Resp:  [14-18] 14  SpO2:  [92 %-97 %] 92 %  BP: (101-197)/(55-99) 140/72     Weight: 59 kg (130 lb)  Height: 5' 4" (162.6 cm)  Body mass index is 22.31 kg/m².      Ortho/SPM Exam     Vitals: Afebrile.  Vital signs stable.  General: No acute distress.  Cardio: Regular rate.  Chest: No increased work of breathing.       Right Lower Extremity Exam     - Skin intact, no deformity, no ecchymoses, no edema  - TTP over groin  - No pain with log roll  - Compartments soft and compressible  - ROM full (eversion,inversion,plantar/dorsiflexion)  - TA/EHL/Gastroc/FHL assessed in isolation without deficit  - SILT throughout  - DP and PT palpated  2+  - Capillary Refill <3s    Significant Labs:   BMP:   Recent Labs   Lab 06/26/20  0339   GLU 97      K 3.8      CO2 23   BUN 30*   CREATININE 1.0   CALCIUM 9.3     CBC:   Recent Labs   Lab 06/25/20  1211 06/26/20  0339   WBC 7.55 9.46   HGB 11.0* 10.5*   HCT 33.3* 33.4*    146*     CMP:   Recent Labs   Lab 06/25/20  1211 06/26/20  0339    140   K 4.1 3.8    107   CO2 22* 23    97   BUN 36* 30*   CREATININE 1.2 1.0   CALCIUM 9.0 9.3   PROT 6.5 6.2   ALBUMIN 3.5 3.3*   BILITOT 0.3 0.5   ALKPHOS 61 59   AST 15 22   ALT 12 10   ANIONGAP 10 10   EGFRNONAA 40.7* 50.8*     Coagulation: No results for input(s): LABPROT, INR, APTT in the last 48 hours.  CRP: No results for input(s): CRP in the last 48 hours.  Lactic Acid: No results for input(s): LACTATE in the last 48 hours.  POCT Glucose: No results for input(s): POCTGLUCOSE in the last 48 hours.  Prealbumin: No results for input(s): PREALBUMIN in the last 48 hours.  Troponin: No results for input(s): TROPONINI in the last 48 hours.  Urine Culture: No results for input(s): LABURIN in the last 48 hours.  Urine Studies:   Recent Labs   Lab 06/25/20  1214 "   COLORU Yellow   APPEARANCEUA Cloudy*   PHUR 6.0   SPECGRAV 1.010   PROTEINUA Negative   GLUCUA Negative   KETONESU Negative   BILIRUBINUA Negative   OCCULTUA 3+*   NITRITE Positive*   LEUKOCYTESUR 3+*   RBCUA >100*   WBCUA >100*   BACTERIA Many*   SQUAMEPITHEL 3     All pertinent labs within the past 24 hours have been reviewed.    Significant Imaging: I have reviewed all pertinent imaging results/findings.    Assessment/Plan:     * Pubic ramus fracture, right, closed, initial encounter  Naheed Cottrell is a 87 y.o. female with right sided LC 1 pelvis fractures and zone 1 sacral ala fracture closed, NVI.    Patient admitted to hospital medicine service. She will need PT/OT for mobilization today. After mobilization ortho will obtain post mobilization films. Recommend lovenox x30 days for DVT ppx. WBAT. Ok with diet. No abx at this time. Optimize nutrition. Vit D and calcium supplement. Pain control multimodal. Orthopedics will follow along, call with any questions.             Sunil Etienne MD  Orthopedics  [facility]

## 2020-06-26 NOTE — CONSULTS
"[facility]  Orthopedics  Consult Note    Patient Name: Naheed Cottrell  MRN: [id number]  Admission Date: 6/25/2020  Hospital Length of Stay: 0 days  Attending Provider: Yohana Lopez MD  Primary Care Provider: Mack Cottrell MD      Inpatient consult to Orthopedics  Consult performed by: Sunil Etienne MD  Consult ordered by: oYhana Lopez MD        Subjective:     Principal Problem:Pubic ramus fracture, right, closed, initial encounter    Chief Complaint:   Chief Complaint   Patient presents with    Fall     fell while walking, now having right groin pain, stood her up and had a syncopal episode        HPI: Naheed Cottrell is a 87 y.o. female past medical history of HTN, chronic diastolic dysfunction (EF 65%), history CVA (no residual deficits), renal/subclavian artery stenosis, HLD (on repatha), PVD, falls with history of Left hip fx 20 years ago fixed with [facility], Left humerus fracture 2017 in setting of UTI-sepsis with fall presentation, who was in her usual state of health until earlier today when she went for a walk with her grandchild when she tripped over the sidewalk and sustained a fall. Didn't hit her head ,was helped up by someone on the street at the time and was "okay" from pain standpoint but felt somewhat dizzy and off after so they brought her to the ER for evaluation, Xrays of femur/pelvis wnl, cardiac device interrogation- loop recorder without arrythmias (denies syncope or feeling bad at all prior) and then when was standing up to ensure could safetly ambulate she had very significant pain to the right hip/leg and unable to bear weight per daughter at beside which is unlike her. She has high pain tolerance normally and she "wanted to go home" from the ER, her BP went to 200s systolic and she had severe pain in the right hip radiating to the right groin and she vomited at the time, likely secondary to pain from BP spike. As she couldn't safely ambulate and the " ER was worried about occult fracture patient was admitted to obs and MRI ordered.      MRI results which showed Right superior ramus fracture, Right inferior pubic ramus fx, and Right sacral ala fractures.     Pt lives at home, independent in ADLs, does not use ambulatory devices.   Sees Cardiology here at main campus regularly. No alcohol use.  Takes aspirin and plavix at home.       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    Encounter for blood transfusion     Fall, accidental     Hypertension     Right renal artery stenosis 3/28/2017    Stenosis of left subclavian artery        Past Surgical History:   Procedure Laterality Date    EYE SURGERY      FRACTURE SURGERY      INSERTION OF IMPLANTABLE LOOP RECORDER N/A 1/2/2020    Procedure: Insertion, Implantable Loop Recorder;  Surgeon: Anton Muniz MD;  Location: [facility];  Service: Cardiology;  Laterality: N/A;  Syncope, ILR, Bio, Local, SK, 3 Prep       Review of patient's allergies indicates:   Allergen Reactions    Percocet [oxycodone-acetaminophen]      Did not tolerate in past, not allergy       Current Facility-Administered Medications   Medication    acetaminophen tablet 650 mg    bisacodyL suppository 10 mg    calcium-vitamin D3 500 mg(1,250mg) -200 unit per tablet 2 tablet    carvediloL tablet 25 mg    cefTRIAXone injection 1 g    [START ON 6/26/2020] chlorthalidone tablet 25 mg    [START ON 6/26/2020] cilostazoL tablet 50 mg    [START ON 6/26/2020] citalopram tablet 20 mg    dextrose 50% injection 12.5 g    dextrose 50% injection 25 g    glucagon (human recombinant) injection 1 mg    glucose chewable tablet 16 g    glucose chewable tablet 24 g    melatonin tablet 6 mg    methocarbamoL tablet 500 mg    morphine injection 2 mg    [START ON 6/26/2020] olmesartan tablet 40 mg    ondansetron disintegrating  "tablet 8 mg    [START ON 6/26/2020] polyethylene glycol packet 17 g    senna-docusate 8.6-50 mg per tablet 1 tablet    sodium chloride 0.9% flush 10 mL    traMADoL tablet 50 mg     Family History     Problem Relation (Age of Onset)    Hyperlipidemia Mother    Hypertension Mother        Tobacco Use    Smoking status: Never Smoker    Smokeless tobacco: Never Used   Substance and Sexual Activity    Alcohol use: No    Drug use: No    Sexual activity: Not on file     ROS per ED 6/25/20  Objective:     Vital Signs (Most Recent):  Temp: 97.1 °F (36.2 °C) (06/25/20 1134)  Pulse: 86 (06/25/20 2127)  Resp: 16 (06/25/20 1134)  BP: (!) 189/74 (06/25/20 2123)  SpO2: (!) 94 % (06/25/20 1807) Vital Signs (24h Range):  Temp:  [97.1 °F (36.2 °C)] 97.1 °F (36.2 °C)  Pulse:  [70-92] 86  Resp:  [16] 16  SpO2:  [92 %-97 %] 94 %  BP: (101-197)/(55-99) 189/74     Weight: 59 kg (130 lb)  Height: 5' 4" (162.6 cm)  Body mass index is 22.31 kg/m².      Ortho/SPM Exam     Vitals: Afebrile.  Vital signs stable.  General: No acute distress.  Cardio: Regular rate.  Chest: No increased work of breathing.    Right Upper Extremity    - Abrasion over palm and olecranon  - NTTP  - Painless ROM at elbow, wrist, shoulder   -Compartments soft and compressible  -ROM full (shoulder/elbow/wrist)  -SILT M/R/U  -Motor intact Ain/PIN/U/M  -Brisk cap refill  -Warm well perfused extremities  -2+ Radial palpable    Left Upper Extremity    -Skin intact, no deformity, no ecchymoses, no edema   NTTP  -Compartments soft and compressible  -ROM full (shoulder/elbow/wrist)  -SILT M/R/U  -Motor intact Ain/PIN/U/M  -Brisk cap refill  -Warm well perfused extremities  -2+ Radial palpable    Right Lower Extremity Exam    - Skin intact, no deformity, no ecchymoses, no edema  - TTP over groin  - No pain with log roll  - Compartments soft and compressible  - ROM full (eversion,inversion,plantar/dorsiflexion)  - TA/EHL/Gastroc/FHL assessed in isolation without " deficit  - SILT throughout  - DP and PT palpated  2+  - Capillary Refill <3s      Left Lower Extremity Exam    - Skin intact, no deformity, no ecchymoses, no edema  - NTTP  - Compartments soft and compressible  - ROM full (eversion,inversion,plantar/dorsiflexion)  - TA/EHL/Gastroc/FHL assessed in isolation without deficit  - SILT throughout  - DP and PT palpated  2+  - Capillary Refill <3s    All other joints (shoulder/elbow/wrist/hip/knee/ankle) were examined and had full ROM and were non-tender to palpation.      Significant Labs:   A1C:   Recent Labs   Lab 12/30/19  0316   HGBA1C 5.4     Blood Culture: No results for input(s): LABBLOO in the last 48 hours.  BMP:   Recent Labs   Lab 06/25/20  1211         K 4.1      CO2 22*   BUN 36*   CREATININE 1.2   CALCIUM 9.0     CBC:   Recent Labs   Lab 06/25/20  1211   WBC 7.55   HGB 11.0*   HCT 33.3*        CMP:   Recent Labs   Lab 06/25/20  1211      K 4.1      CO2 22*      BUN 36*   CREATININE 1.2   CALCIUM 9.0   PROT 6.5   ALBUMIN 3.5   BILITOT 0.3   ALKPHOS 61   AST 15   ALT 12   ANIONGAP 10   EGFRNONAA 40.7*     Coagulation: No results for input(s): LABPROT, INR, APTT in the last 48 hours.  CRP: No results for input(s): CRP in the last 48 hours.  Lactic Acid: No results for input(s): LACTATE in the last 48 hours.  POCT Glucose: No results for input(s): POCTGLUCOSE in the last 48 hours.  Prealbumin: No results for input(s): PREALBUMIN in the last 48 hours.  Troponin: No results for input(s): TROPONINI in the last 48 hours.  Urine Culture: No results for input(s): LABURIN in the last 48 hours.  Urine Studies:   Recent Labs   Lab 06/25/20  1214   COLORU Yellow   APPEARANCEUA Cloudy*   PHUR 6.0   SPECGRAV 1.010   PROTEINUA Negative   GLUCUA Negative   KETONESU Negative   BILIRUBINUA Negative   OCCULTUA 3+*   NITRITE Positive*   LEUKOCYTESUR 3+*   RBCUA >100*   WBCUA >100*   BACTERIA Many*   SQUAMEPITHEL 3     All pertinent labs  within the past 24 hours have been reviewed.    Significant Imaging: I have reviewed all pertinent imaging results/findings.     MRI showing R superior/inferior pubic ramus fractures and zone 1 sacral ala fracture    CT pelvis with same as above    Assessment/Plan:     * Pubic ramus fracture, right, closed, initial encounter  Naheed Cottrell is a 87 y.o. female with right sided LC 1 pelvis fractures closed, NVI.    Patient admitted to hospital medicine service. She will need PT/OT for mobilization. After mobilization ortho will obtain post mobilization films. Recommend lovenox x3 0 days for DVT ppx. WBAT. Ok with diet. No abx at this time. Optimize nutrition. Vit D and calcium supplement. Pain control multimodal. Orthopedics will follow along, call with any questions. Will discuss plan with staff.             Sunil Etienne MD  Orthopedics  [facility]

## 2020-06-26 NOTE — SUBJECTIVE & OBJECTIVE
"Principal Problem:Pubic ramus fracture, right, closed, initial encounter    Principal Orthopedic Problem: Same    Interval History: Pt seen and examined ON. Poor sleep due to being side sleeper and unable to tolerate a lateral position. Pain controlled. No new complaints.     Review of patient's allergies indicates:   Allergen Reactions    Percocet [oxycodone-acetaminophen]      Did not tolerate in past, not allergy       Current Facility-Administered Medications   Medication    acetaminophen tablet 650 mg    bisacodyL suppository 10 mg    calcium-vitamin D3 500 mg(1,250mg) -200 unit per tablet 2 tablet    carvediloL tablet 25 mg    cefTRIAXone injection 1 g    chlorthalidone tablet 25 mg    cilostazoL tablet 50 mg    citalopram tablet 20 mg    clopidogreL tablet 75 mg    dextrose 50% injection 12.5 g    dextrose 50% injection 25 g    enoxaparin injection 40 mg    glucagon (human recombinant) injection 1 mg    glucose chewable tablet 16 g    glucose chewable tablet 24 g    melatonin tablet 6 mg    methocarbamoL tablet 500 mg    morphine injection 2 mg    olmesartan tablet 40 mg    ondansetron disintegrating tablet 8 mg    polyethylene glycol packet 17 g    senna-docusate 8.6-50 mg per tablet 1 tablet    sodium chloride 0.9% flush 10 mL    traMADoL tablet 50 mg     Objective:     Vital Signs (Most Recent):  Temp: 98 °F (36.7 °C) (06/26/20 0813)  Pulse: 80 (06/26/20 0813)  Resp: 14 (06/26/20 0813)  BP: (!) 140/72 (06/26/20 0813)  SpO2: (!) 92 % (06/26/20 0813) Vital Signs (24h Range):  Temp:  [97.1 °F (36.2 °C)-98 °F (36.7 °C)] 98 °F (36.7 °C)  Pulse:  [70-92] 80  Resp:  [14-18] 14  SpO2:  [92 %-97 %] 92 %  BP: (101-197)/(55-99) 140/72     Weight: 59 kg (130 lb)  Height: 5' 4" (162.6 cm)  Body mass index is 22.31 kg/m².      Ortho/SPM Exam     Vitals: Afebrile.  Vital signs stable.  General: No acute distress.  Cardio: Regular rate.  Chest: No increased work of breathing.       Right Lower " Extremity Exam     - Skin intact, no deformity, no ecchymoses, no edema  - TTP over groin  - No pain with log roll  - Compartments soft and compressible  - ROM full (eversion,inversion,plantar/dorsiflexion)  - TA/EHL/Gastroc/FHL assessed in isolation without deficit  - SILT throughout  - DP and PT palpated  2+  - Capillary Refill <3s    Significant Labs:   BMP:   Recent Labs   Lab 06/26/20  0339   GLU 97      K 3.8      CO2 23   BUN 30*   CREATININE 1.0   CALCIUM 9.3     CBC:   Recent Labs   Lab 06/25/20  1211 06/26/20  0339   WBC 7.55 9.46   HGB 11.0* 10.5*   HCT 33.3* 33.4*    146*     CMP:   Recent Labs   Lab 06/25/20  1211 06/26/20  0339    140   K 4.1 3.8    107   CO2 22* 23    97   BUN 36* 30*   CREATININE 1.2 1.0   CALCIUM 9.0 9.3   PROT 6.5 6.2   ALBUMIN 3.5 3.3*   BILITOT 0.3 0.5   ALKPHOS 61 59   AST 15 22   ALT 12 10   ANIONGAP 10 10   EGFRNONAA 40.7* 50.8*     Coagulation: No results for input(s): LABPROT, INR, APTT in the last 48 hours.  CRP: No results for input(s): CRP in the last 48 hours.  Lactic Acid: No results for input(s): LACTATE in the last 48 hours.  POCT Glucose: No results for input(s): POCTGLUCOSE in the last 48 hours.  Prealbumin: No results for input(s): PREALBUMIN in the last 48 hours.  Troponin: No results for input(s): TROPONINI in the last 48 hours.  Urine Culture: No results for input(s): LABURIN in the last 48 hours.  Urine Studies:   Recent Labs   Lab 06/25/20  1214   COLORU Yellow   APPEARANCEUA Cloudy*   PHUR 6.0   SPECGRAV 1.010   PROTEINUA Negative   GLUCUA Negative   KETONESU Negative   BILIRUBINUA Negative   OCCULTUA 3+*   NITRITE Positive*   LEUKOCYTESUR 3+*   RBCUA >100*   WBCUA >100*   BACTERIA Many*   SQUAMEPITHEL 3     All pertinent labs within the past 24 hours have been reviewed.    Significant Imaging: I have reviewed all pertinent imaging results/findings.

## 2020-06-27 LAB
ALBUMIN SERPL BCP-MCNC: 2.9 G/DL (ref 3.5–5.2)
ALP SERPL-CCNC: 59 U/L (ref 55–135)
ALT SERPL W/O P-5'-P-CCNC: 11 U/L (ref 10–44)
ANION GAP SERPL CALC-SCNC: 10 MMOL/L (ref 8–16)
AST SERPL-CCNC: 23 U/L (ref 10–40)
BACTERIA UR CULT: ABNORMAL
BASOPHILS # BLD AUTO: 0.03 K/UL (ref 0–0.2)
BASOPHILS NFR BLD: 0.3 % (ref 0–1.9)
BILIRUB SERPL-MCNC: 0.3 MG/DL (ref 0.1–1)
BUN SERPL-MCNC: 35 MG/DL (ref 8–23)
CALCIUM SERPL-MCNC: 9.2 MG/DL (ref 8.7–10.5)
CHLORIDE SERPL-SCNC: 104 MMOL/L (ref 95–110)
CO2 SERPL-SCNC: 23 MMOL/L (ref 23–29)
CREAT SERPL-MCNC: 1.1 MG/DL (ref 0.5–1.4)
DIFFERENTIAL METHOD: ABNORMAL
EOSINOPHIL # BLD AUTO: 0.1 K/UL (ref 0–0.5)
EOSINOPHIL NFR BLD: 1.3 % (ref 0–8)
ERYTHROCYTE [DISTWIDTH] IN BLOOD BY AUTOMATED COUNT: 14.6 % (ref 11.5–14.5)
EST. GFR  (AFRICAN AMERICAN): 52.2 ML/MIN/1.73 M^2
EST. GFR  (NON AFRICAN AMERICAN): 45.3 ML/MIN/1.73 M^2
GLUCOSE SERPL-MCNC: 134 MG/DL (ref 70–110)
HCT VFR BLD AUTO: 30.3 % (ref 37–48.5)
HGB BLD-MCNC: 9.6 G/DL (ref 12–16)
IMM GRANULOCYTES # BLD AUTO: 0.03 K/UL (ref 0–0.04)
IMM GRANULOCYTES NFR BLD AUTO: 0.3 % (ref 0–0.5)
LYMPHOCYTES # BLD AUTO: 1.3 K/UL (ref 1–4.8)
LYMPHOCYTES NFR BLD: 14.5 % (ref 18–48)
MCH RBC QN AUTO: 30.5 PG (ref 27–31)
MCHC RBC AUTO-ENTMCNC: 31.7 G/DL (ref 32–36)
MCV RBC AUTO: 96 FL (ref 82–98)
MONOCYTES # BLD AUTO: 0.7 K/UL (ref 0.3–1)
MONOCYTES NFR BLD: 7.1 % (ref 4–15)
NEUTROPHILS # BLD AUTO: 7 K/UL (ref 1.8–7.7)
NEUTROPHILS NFR BLD: 76.5 % (ref 38–73)
NRBC BLD-RTO: 0 /100 WBC
PLATELET # BLD AUTO: 135 K/UL (ref 150–350)
PMV BLD AUTO: 10.4 FL (ref 9.2–12.9)
POTASSIUM SERPL-SCNC: 3.1 MMOL/L (ref 3.5–5.1)
PROT SERPL-MCNC: 5.9 G/DL (ref 6–8.4)
RBC # BLD AUTO: 3.15 M/UL (ref 4–5.4)
SODIUM SERPL-SCNC: 137 MMOL/L (ref 136–145)
WBC # BLD AUTO: 9.15 K/UL (ref 3.9–12.7)

## 2020-06-27 PROCEDURE — 97530 THERAPEUTIC ACTIVITIES: CPT | Mod: HCNC,CQ

## 2020-06-27 PROCEDURE — 96372 THER/PROPH/DIAG INJ SC/IM: CPT

## 2020-06-27 PROCEDURE — 99225 PR SUBSEQUENT OBSERVATION CARE,LEVEL II: CPT | Mod: HCNC,,, | Performed by: HOSPITALIST

## 2020-06-27 PROCEDURE — 96376 TX/PRO/DX INJ SAME DRUG ADON: CPT

## 2020-06-27 PROCEDURE — 99225 PR SUBSEQUENT OBSERVATION CARE,LEVEL II: ICD-10-PCS | Mod: HCNC,,, | Performed by: HOSPITALIST

## 2020-06-27 PROCEDURE — 63600175 PHARM REV CODE 636 W HCPCS: Mod: HCNC | Performed by: HOSPITALIST

## 2020-06-27 PROCEDURE — G0378 HOSPITAL OBSERVATION PER HR: HCPCS | Mod: HCNC

## 2020-06-27 PROCEDURE — 36415 COLL VENOUS BLD VENIPUNCTURE: CPT | Mod: HCNC

## 2020-06-27 PROCEDURE — 80053 COMPREHEN METABOLIC PANEL: CPT | Mod: HCNC

## 2020-06-27 PROCEDURE — 97530 THERAPEUTIC ACTIVITIES: CPT | Mod: HCNC

## 2020-06-27 PROCEDURE — 85025 COMPLETE CBC W/AUTO DIFF WBC: CPT | Mod: HCNC

## 2020-06-27 PROCEDURE — 97535 SELF CARE MNGMENT TRAINING: CPT | Mod: HCNC

## 2020-06-27 PROCEDURE — 97116 GAIT TRAINING THERAPY: CPT | Mod: HCNC,CQ,59

## 2020-06-27 PROCEDURE — 25000003 PHARM REV CODE 250: Mod: HCNC | Performed by: HOSPITALIST

## 2020-06-27 RX ORDER — POTASSIUM CHLORIDE 20 MEQ/1
40 TABLET, EXTENDED RELEASE ORAL ONCE
Status: COMPLETED | OUTPATIENT
Start: 2020-06-27 | End: 2020-06-27

## 2020-06-27 RX ORDER — TRAMADOL HYDROCHLORIDE 50 MG/1
50 TABLET ORAL EVERY 8 HOURS
Qty: 45 TABLET | Refills: 0 | Status: SHIPPED | OUTPATIENT
Start: 2020-06-27 | End: 2020-07-07 | Stop reason: SDUPTHER

## 2020-06-27 RX ADMIN — CLOPIDOGREL BISULFATE 75 MG: 75 TABLET ORAL at 09:06

## 2020-06-27 RX ADMIN — METHOCARBAMOL TABLETS 500 MG: 500 TABLET, COATED ORAL at 09:06

## 2020-06-27 RX ADMIN — METHOCARBAMOL TABLETS 500 MG: 500 TABLET, COATED ORAL at 02:06

## 2020-06-27 RX ADMIN — ACETAMINOPHEN 650 MG: 325 TABLET ORAL at 06:06

## 2020-06-27 RX ADMIN — TRAMADOL HYDROCHLORIDE 50 MG: 50 TABLET ORAL at 10:06

## 2020-06-27 RX ADMIN — CITALOPRAM HYDROBROMIDE 20 MG: 10 TABLET ORAL at 09:06

## 2020-06-27 RX ADMIN — TRAMADOL HYDROCHLORIDE 50 MG: 50 TABLET ORAL at 06:06

## 2020-06-27 RX ADMIN — ACETAMINOPHEN 650 MG: 325 TABLET ORAL at 11:06

## 2020-06-27 RX ADMIN — TRAMADOL HYDROCHLORIDE 50 MG: 50 TABLET ORAL at 02:06

## 2020-06-27 RX ADMIN — ACETAMINOPHEN 650 MG: 325 TABLET ORAL at 05:06

## 2020-06-27 RX ADMIN — METHOCARBAMOL TABLETS 500 MG: 500 TABLET, COATED ORAL at 10:06

## 2020-06-27 RX ADMIN — DOCUSATE SODIUM 50 MG AND SENNOSIDES 8.6 MG 1 TABLET: 8.6; 5 TABLET, FILM COATED ORAL at 10:06

## 2020-06-27 RX ADMIN — CEFTRIAXONE SODIUM 1 G: 1 INJECTION, POWDER, FOR SOLUTION INTRAMUSCULAR; INTRAVENOUS at 11:06

## 2020-06-27 RX ADMIN — ENOXAPARIN SODIUM 40 MG: 40 INJECTION SUBCUTANEOUS at 05:06

## 2020-06-27 RX ADMIN — OYSTER SHELL CALCIUM WITH VITAMIN D 2 TABLET: 500; 200 TABLET, FILM COATED ORAL at 10:06

## 2020-06-27 RX ADMIN — OYSTER SHELL CALCIUM WITH VITAMIN D 2 TABLET: 500; 200 TABLET, FILM COATED ORAL at 09:06

## 2020-06-27 RX ADMIN — POTASSIUM CHLORIDE 40 MEQ: 20 TABLET, EXTENDED RELEASE ORAL at 09:06

## 2020-06-27 RX ADMIN — HYDROCODONE BITARTRATE AND ACETAMINOPHEN 1 TABLET: 5; 325 TABLET ORAL at 05:06

## 2020-06-27 RX ADMIN — METHOCARBAMOL TABLETS 500 MG: 500 TABLET, COATED ORAL at 06:06

## 2020-06-27 RX ADMIN — DOCUSATE SODIUM 50 MG AND SENNOSIDES 8.6 MG 1 TABLET: 8.6; 5 TABLET, FILM COATED ORAL at 09:06

## 2020-06-27 NOTE — PT/OT/SLP PROGRESS
Occupational Therapy   Treatment    Name: Naheed Cottrell  MRN: [id number]  Admitting Diagnosis:  Multiple closed stable lateral compression fractures of pelvis       Recommendations:     Discharge Recommendations: home with home health  Discharge Equipment Recommendations:  walker, rolling, wheelchair, bedside commode, hospital bed  Barriers to discharge:  None    Assessment:     Naheed Cottrell is a 87 y.o. female with a medical diagnosis of Multiple closed stable lateral compression fractures of pelvis.  She presents with the following Performance deficits affecting function are weakness, impaired endurance, impaired self care skills, impaired functional mobilty, gait instability, impaired balance, decreased lower extremity function, pain, decreased upper extremity function.     Pt motivated for therapy and tolerated session well secondary to pain. Pt with demonstrated progress and adequate pain control this therapy session. Pt ambulated ~20ft CGA using RW to increase activity tolerance required for adls and functional mobility. Pt completed grooming and toileting tasks with CGA for standing balance. Pt continues to progress toward her goals. Continue OT POC.    Rehab Prognosis:  Good; patient would benefit from acute skilled OT services to address these deficits and reach maximum level of function.       Plan:     Patient to be seen daily to address the above listed problems via self-care/home management, therapeutic activities, therapeutic exercises  · Plan of Care Expires: 07/25/20  · Plan of Care Reviewed with: patient    Subjective     Pain/Comfort:  · Pain Rating 1: 0/10  · Location - Side 1: Right  · Location 1: groin  · Pain Addressed 1: Pre-medicate for activity, Reposition, Distraction, Cessation of Activity    Objective:     Communicated with: RN prior to session.  Patient found supine with SCD upon OT entry to room.    General Precautions: Standard, fall   Orthopedic Precautions:LLE weight bearing as  tolerated, RLE weight bearing as tolerated   Braces: N/A     Occupational Performance:     Bed Mobility:    · Patient completed Scooting/Bridging with stand by assistance  · Patient completed Supine to Sit with stand by assistance     Functional Mobility/Transfers:  · Patient completed Sit <> Stand Transfer with minimum assistance  with  rolling walker   · Patient completed Bed <> Chair Transfer using Step Transfer technique with contact guard assistance with rolling walker  · Patient completed Toilet Transfer Step Transfer technique with contact guard assistance with  rolling walker  · Functional Mobility: Pt motivated for therapy and tolerated session well secondary to pain. Pt with demonstrated progress and adequate pain control this therapy session. Pt ambulated ~20ft CGA using RW to increase activity tolerance required for adls and functional mobility. Pt completed grooming and toileting tasks with CGA for standing balance. Pt continues to progress toward her goals. Continue OT POC.    Activities of Daily Living:  · Grooming: contact guard assistance for standing balance at sink  · Upper Body Dressing: minimum assistance elizabeth gown  · Lower Body Dressing: total assistance elizabeth socks  · Toileting: contact guard assistance for standing balance and gown management      [facility] 6 Click ADL: 21    Treatment & Education:  - OT/POC-  - Importance of mobility to maximize recovery.  - safety w/ functional mobility; hand placement to ensure safe transfers to various surfaces in prep for ADLs  - Reviewed gait sequence and RW management via verbalization and demonstration   - whiteboard updated  - encouraged to ambulate within household environment at least every hour to hour 1/2      Patient left up in chair with all lines intact, call button in reach and RN notifiedEducation:      GOALS:   Multidisciplinary Problems     Occupational Therapy Goals        Problem: Occupational Therapy Goal    Goal Priority Disciplines Outcome  Interventions   Occupational Therapy Goal     OT, PT/OT Ongoing, Progressing    Description: Goals to be met by: 7/26/2020    Patient will increase functional independence with ADLs by performing:    UE Dressing with Stand-by Assistance.  LE Dressing with Minimal Assistance.  Grooming while seated with Set-up Assistance.  Toileting from bedside commode with Stand-by Assistance for hygiene and clothing management.   Step transfer with Stand-by Assistance  Toilet transfer to bedside commode with Stand-by Assistance.                     Time Tracking:     OT Date of Treatment: 06/27/20  OT Start Time: 0745  OT Stop Time: 0824  OT Total Time (min): 39 min    Billable Minutes:Self Care/Home Management 25  Therapeutic Activity 14    Radha Danielson OT  6/27/2020

## 2020-06-27 NOTE — PROGRESS NOTES
"Progress Note  Hospital Medicine       Patient Name: Naheed Cottrell  MRN:  [id number]  Hospital Medicine Team: [facility] MED  Yohana Lopez MD  Date of Admission:  6/25/2020     Principal Problem:  Multiple closed stable lateral compression fractures of pelvis   Primary Care Physician: Mack Cottrell MD      History of Present Illness:     Ms. Naheed Cottrell is a 87 y.o. female with past medical history of HTN, chronic diastolic dysfunction (EF 65%), history CVA (no residual deficits), renal/subclavian artery stenosis, HLD (on repatha), PVD, falls with history of Left hip fx 20 years ago, Left humerus fracture 2017 in setting of UTI-sepsis with fall presentation, who was in her usual state of health until earlier today when she went for a walk with her grandchild when she tripped over the sidewalk and sustained a fall. Didn't hit her head ,was helped up by someone on the street at the time and was "okay" from pain standpoint but felt somewhat dizzy and off after so they brought her to the ER for evaluation, Xrays of femur/pelvis wnl, cardiac device interrogation- loop recorder without arrythmias (denies syncope or feeling bad at all prior) and then when was standing up to ensure could safetly ambulate she had very significant pain to the right hip/leg and unable to bear weight per daughter at beside which is unlike her. She has high pain tolerance normally and she "wanted to go home" from the ER, her BP went to 200s systolic and she had severe pain in the right hip radiating to the right groin and she vomited at the time, likely secondary to pain from BP spike. As she couldn't safely ambulate and the ER was worried about occult fracture patient was admitted to obs and MRI ordered.     Patient was in MRI on initial eval and discussed all above with daughter at bedside. On return back to the ER room to update her, her daughter and her daughters  who is radiologist also at bedside. Still having " significant pain at rest in the right groin/pelvis area. Discussed MRI results which showed Right superior ramus fracture, Right inferior pubic ramus fx, and Right sacral ala fractures.   Discussed pain control now and orthopedics consult for weigh bearing status and to ensure the plans going forward, discussed in -general non operative- but orthopedics to discuss and assess if this is the case given sacral ala fx also. She has intolerance to percocet in the last so added to allergies and she prefers doing very light pain medications to start so have sravan tylenol, robaxin and prefers tramadol over norco for sever pain, have 2 mg morphine for severe pain for breakthrough but she prefers to not use as long as pain controlled.  She has a UTI and was likely a small nidus for this as she has had other falls in the past when she has a UTI, but this was more mechanical, may just have easier ability to not aware of surroundings or fall when she has a UTI. Her daughter states that she uses blue toilet bowl bleach so its hard to tell at home if her urine is dark or symptoms but her urine was dark in the ER when sampled.   Her loop recorder was interrogated in ER and no arrythmias found, on history it does not appear to be pre-syncopal in origin and mechanical fall.    Med History: HTN, chronic diastolic HF - EF 65%, history CVA- no residual deficits, renal artery stenosis, subclavian artery stenosis on left (falsely low BPS in the left arm- only do BP checks on right), left hip fx 20 years ago s/p repair, HLD, peripheral arterial disease, syncopal episodes s/p loop recorder, left humerus fracture, vit D deficiency, hx of sepsis/bacteremia from Ecoli UTI    Surg History- MCL repair, left femur fx repair, loop recorder insertion    Social history: lives at home, independent in ADLs, does not use ambulatory devices. Daughter at bedside with  who is radiologist. 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  Sees Cardiology here at main campus regularly.    Last BM today. No alcohol use. Family has HH company they prefer if HH is an option in future. They feel she's okay to stay here overnight but if need special permission to stay with her, is okay with myself and can look into it with charge nurse on floor if she has any issues with sundowning, has not had delirium issues in the hospital at previous admissions.    Review of Systems   Constitutional: positive chills. Negative for fatigue, fever.   HENT: Negative for sore throat, trouble swallowing.    Eyes: Negative for photophobia, visual disturbance.   Respiratory: Negative for cough, shortness of breath.    Cardiovascular: Negative for chest pain, palpitations, leg swelling.   Gastrointestinal: Negative for abdominal pain, constipation, diarrhea, + for nausea, vomiting.   Endocrine: Negative for cold intolerance, heat intolerance.   Genitourinary: + for dysuria, frequency. , dark urine  Musculoskeletal: + for arthralgias, myalgias.   Skin: Negative for rash, wound, erythema   Neurological: Negative for dizziness, syncope, weakness, light-headedness.  + for fall  Psychiatric/Behavioral: Negative for confusion, hallucinations, anxiety  All other systems reviewed and are negative.        Interval History:    Did well with OT and PT today with pain controlled during them. Has had some lower BPS, unclear etiology, she says sometimes the machines give low readings and manuals are higher, but holding BP meds now and trending, feels good, looks okay on 2 exams now, repeat exam with family at bedside at 330 pm, she did ogod with 2nd session, only mild pain on eam but much better overall. xrays completed and stable fx areas. Will watch to make sure she feels well overnight as she did a good bit of PT/OT today and ensure BP is stablized as seems like it may be a falsely low reading, they are using  the correct arm per her, does not have any symptoms of the reported low BPs. Will plan to see early in AM and discharge before lunch if all stays well as she does look a little fatigued now from therapies. Her room seems to have an issue with AC, charge nurse garrett put in a work order to see if we can get someone to come look at it now.    Past Medical History: Patient has a past medical history of Anticoagulant long-term use, Arthritis, Cataract, Embolic stroke involving right middle cerebral artery (6/6/2017), Encounter for blood transfusion, Fall, accidental, Hypertension, Right renal artery stenosis (3/28/2017), and Stenosis of left subclavian artery.    Past Surgical History: Patient has a past surgical history that includes Eye surgery; Fracture surgery; and Insertion of implantable loop recorder (N/A, 1/2/2020).    Social History: Patient reports that she has never smoked. She has never used smokeless tobacco. She reports that she does not drink alcohol or use drugs.    Family History: family history includes Hyperlipidemia in her mother; Hypertension in her mother.    Medications: Scheduled Meds:   acetaminophen  650 mg Oral Q6H    calcium-vitamin D3  2 tablet Oral BID    carvediloL  25 mg Oral BID    cefTRIAXone (ROCEPHIN) IVPB  1 g Intravenous Q24H    chlorthalidone  25 mg Oral Daily    citalopram  20 mg Oral Daily    clopidogreL  75 mg Oral Daily    enoxaparin  40 mg Subcutaneous Q24H    methocarbamoL  500 mg Oral QID    olmesartan  40 mg Oral Daily    polyethylene glycol  17 g Oral Daily    senna-docusate 8.6-50 mg  1 tablet Oral BID    traMADoL  50 mg Oral Q8H     Continuous Infusions:  PRN Meds:.    Allergies: Patient is allergic to percocet [oxycodone-acetaminophen].    Physical Exam:     Vital Signs (Most Recent):  Temp: 98.6 °F (37 °C) (06/27/20 1433)  Pulse: 75 (06/27/20 1433)  Resp: 16 (06/27/20 1434)  BP: (!) 108/54 (06/27/20 1433)  SpO2: 95 % (06/27/20 1433) Vital Signs Range (Last  "24H):  Temp:  [95 °F (35 °C)-98.6 °F (37 °C)]   Pulse:  [54-77]   Resp:  [14-18]   BP: ()/(50-80)   SpO2:  [90 %-96 %]    Body mass index is 22.31 kg/m².     Physical Exam:  Constitutional: Appears well-developed and well-nourished. Pleasant. Sitting in bed  Head: Normocephalic and atraumatic.   Mouth/Throat: Oropharynx is clear and moist.   Eyes: EOM are normal. Pupils are equal, round, and reactive to light. No scleral icterus.   Neck: Normal range of motion. Neck supple.   Cardiovascular: Normal rate and regular rhythm.  No murmur heard.  Pulmonary/Chest: Effort normal and breath sounds normal. No respiratory distress. No wheezes, rales, or rhonchi  Abdominal: Soft. Bowel sounds are normal.  No distension or tenderness  Musculoskeletal: pain with palpation of right hip with "shock"sensation into groin with palpation improved, mild now. No edema in LE. Pulses present, warm to touch lower extremities. Small brush burn on RUE, arm and skin tear from fall.   Neurological: Alert and oriented to person, place, and time.   Skin: Skin is warm and dry.   Psychiatric: Normal mood and affect. Behavior is normal.   Vitals reviewed.    Recent Labs   Lab 06/25/20 1211 06/26/20 0339 06/27/20  0333   WBC 7.55 9.46 9.15   HGB 11.0* 10.5* 9.6*   HCT 33.3* 33.4* 30.3*    146* 135*       Recent Labs   Lab 06/25/20 1211 06/26/20  0339 06/27/20  0333    140 137   K 4.1 3.8 3.1*    107 104   CO2 22* 23 23   BUN 36* 30* 35*   CREATININE 1.2 1.0 1.1    97 134*   CALCIUM 9.0 9.3 9.2     Recent Labs   Lab 06/25/20  1211 06/26/20  0339 06/27/20  0333   ALKPHOS 61 59 59   ALT 12 10 11   AST 15 22 23   ALBUMIN 3.5 3.3* 2.9*   PROT 6.5 6.2 5.9*   BILITOT 0.3 0.5 0.3      No results for input(s): POCTGLUCOSE in the last 168 hours.      Assessment and Plan:     Ms. Naheed Cottrell is a 87 y.o. female who presented to [facility] on 6/25/2020 with     Active Hospital Problems    Diagnosis  POA    *Multiple closed " stable lateral compression fractures of pelvis - Zone 1 R sacrum incomplete S1 only.  R parasymphyseal ramus. [S32.82XA]  Yes    Contusion of right hip [S70.01XA]  Yes    Acute cystitis [N30.00]  Yes    Hip pain [M25.559]  Yes    Normocytic anemia [D64.9]  Yes    History of CVA (cerebrovascular accident) [Z86.73]  Not Applicable    Chronic heart failure with preserved ejection fraction [I50.32]  Yes    Iron deficiency anemia [D50.9]  Yes    Thrombocytopenia, unspecified [D69.6]  Yes    Vitamin D deficiency [E55.9]  Yes    Right renal artery stenosis [I70.1]  Yes     60-69%      Dyslipidemia [E78.5]  Yes     Chronic    Essential hypertension [I10]  Yes     Chronic    PAD (peripheral artery disease) [I73.9]  Yes    Renovascular hypertension [I15.0]  Yes    Subclavian artery stenosis, left [I77.1]  Yes      Resolved Hospital Problems   No resolved problems to display.       Right Superior pubic rami Fx  Right Inferior pubic Rami Fx  Right Sacral Ala Fx  -secondary to mechanical fall in setting of UTI as likely precipitant of fall as has precipitated easy falls in past as well (last 2017 major fall with humerus fx)  -initial xrays neg, MRI revealed above.   -pain control- sravan tylenol, robaxin.  sravan tramadol now, norco severe pain (did not require). Consider uptitrate robaxin if not improved  But much better 6/27, did 2 sessions with PT/OT and did well with both, 20 and 30 feet.  xrays post mobilization per ortho stable. Non op plans  -lovenox x 1 month, hold asa cilostazol while on there to avoid bleed risk.  -HH when med stable once pain better controlled  -vit D at goal 65  - TEDS/SCDs  -Tdap given in ER    Acute Cystitis  -nitrite, blood, leukos, >100 rbc, wbc, many bacteria on UA  -continue rocephin  -ecoli in 2017 in blood was pan sensitive  -WBC 7 now, does not appear septic on exam  -f/u Cx- pan sensitive Ecoli    Chronic normocytic Anemia  Hx of iron deficiency Anemia  -Hg at baseline of 11 on  admission  -wnl ferritin,b12,folate     HTN  -BP 100s systolic on admit but spiked to 200s systolic with pain when she moved, now back to 170s in ER, likely pain contirbutor, took all home meds this AM, due for coreg this PM  -cont coreg, olmesartan, chlorthalidone  6/27: held BP meds this AM for lower BPS, unsure if falsely low readings, using right arm, but trending upwards now 110s after 100s this AM systolic. Will watch, no signs of adverse effects, asymptomatic    Vitamin D deficiency  -cont home vit D, recheck 65    Peripheral vascular disease  -hold asa until off lovenox, cont plavix   -hold cilostazol while on lovenox per discussion with son cadriologist on phone today  -has statin intolerance, takes repatha every 2 weeks, due on Sunday, hold while inpatient    Subclavian artery stenosis  -on left (imaging 2015), if bP is taken in left arm it will look falsely low, only take BP in right arm  -family reports patient will tell nurses if they do it wrong also, nursing comm placed for this    Renal Artery Stenosis  -no acute issues, chronic    Recurrent Syncope  -has loop recorder in place, interrogated in ER without arrythmias  -event now was mechanical in nature  -no acute issues  -EKG with flat T wavse in V1-V2 which was her baseline previously.     Chronic diastolic Heart Failure  -echo up to date, EF 65%, chronic diastolic dysfunction, not on diuretics, euvolemic  -no acute issues. CXR wnl on admission    History CVA  -no residual deficits      Diet:  Low sodium     DVT PPx:  TEDS/SCDs, lovenox        Disposition:   Will watch BP this evening and ensure pain still doing well after length sessions today but did good with pt/OT today, will plan to dc in AM tomorrow before lunch is all stays on this track overnight    Ortho f/u OP      Daughter Erica- is local contact now- lives 5 min from hospital- [phone]

## 2020-06-27 NOTE — PLAN OF CARE
Goals remain appropriate.     Problem: Physical Therapy Goal  Goal: Physical Therapy Goal  Description: Goals to be met by: 7/10/20    Patient will increase functional independence with mobility by performin. Supine to sit with Stand-by Assistance  2. Sit to supine with Stand-by Assistance  3. Sit to stand transfer with Stand-by Assistance  4. Gait  x 100 feet with Stand-by Assistance using Rolling Walker.   5.  Patient will demonstrate independence with a home exercise program  6. Patient's balance will be GOOD: Needs SUPERVISION only during gait and able to self right with moderate LOB.  Outcome: Ongoing, Progressing

## 2020-06-27 NOTE — PT/OT/SLP PROGRESS
"Physical Therapy Treatment    Patient Name:  Naheed Cottrell   MRN:  [id number]    Recommendations:     Discharge Recommendations:  home with home health   Discharge Equipment Recommendations: walker, rolling, wheelchair, bedside commode, hospital bed   Barriers to discharge: Decreased caregiver support    Assessment:     Naheed Cottrell is a 87 y.o. female admitted with a medical diagnosis of Multiple closed stable lateral compression fractures of pelvis.  She presents with the following impairments/functional limitations:  weakness, impaired endurance, impaired self care skills, impaired functional mobilty, gait instability, impaired balance, decreased lower extremity function, pain. Pt tolerates session well with focus on transfers and gait training. Pt progressing well with increased distance tolerated with gait training. Pts overall requiring CGA/Min A to mobilize but her overall pace of mobility remains slow and unsafe. Pt limited by pain. Pt will continue to benefit from therapy services to address impairments listed above.     Rehab Prognosis: Good; patient would benefit from acute skilled PT services to address these deficits and reach maximum level of function.    Recent Surgery: * No surgery found *      Plan:     During this hospitalization, patient to be seen daily to address the identified rehab impairments via gait training, therapeutic activities, therapeutic exercises, neuromuscular re-education and progress toward the following goals:    · Plan of Care Expires:  07/26/20    Subjective     Chief Complaint: pain  Patient/Family Comments/goals: "It's not too bad just sitting here, but this area(gesturing to pubic/groin region) really hurts when I'm standing or walking."  Pain/Comfort:  · Pain Rating 1: 2/10  · Location - Side 1: Right  · Location 1: groin  · Pain Rating Post-Intervention 1: 6/10      Objective:     Communicated with NSG prior to session.  Patient found up in chair with no lines attached " upon PTA entry to room.     General Precautions: Standard, fall   Orthopedic Precautions:RLE weight bearing as tolerated, LLE weight bearing as tolerated   Braces: N/A     Functional Mobility:  · Bed Mobility:     · Sit to Supine: moderate assistance; increased assistance for height of transport stretcher  · Transfers:     · Sit to Stand:  minimum assistance with rolling walker  · Gait: Pt ambulates ~32 ft and takes steps with stand pivot using RW and requiring CGA. Pt steady but demonstrates step-to/stop and go gait pattern, she is antalgic over RLE. Pt with increased time in double stance and slow mikaela. Close chair follow. Seated rest before step to pivot to stretcher.      AM-PAC 6 CLICK MOBILITY  Turning over in bed (including adjusting bedclothes, sheets and blankets)?: 3  Sitting down on and standing up from a chair with arms (e.g., wheelchair, bedside commode, etc.): 3  Moving from lying on back to sitting on the side of the bed?: 3  Moving to and from a bed to a chair (including a wheelchair)?: 3  Need to walk in hospital room?: 3  Climbing 3-5 steps with a railing?: 2  Basic Mobility Total Score: 17       Therapeutic Activities and Exercises:   Pt assisted with functional mobility as noted above.   Pt performs sit<>stand x2 trials with RW and Min A for balance and assisting transition of BUE to RW.   Pt and daughter at bedside educated on progression, assistance needed, and safety with OOB mobility.     Patient left supine on transport stretcher with transporter present..    GOALS:   Multidisciplinary Problems     Physical Therapy Goals        Problem: Physical Therapy Goal    Goal Priority Disciplines Outcome Goal Variances Interventions   Physical Therapy Goal     PT, PT/OT Ongoing, Progressing     Description: Goals to be met by: 7/10/20    Patient will increase functional independence with mobility by performin. Supine to sit with Stand-by Assistance  2. Sit to supine with Stand-by  Assistance  3. Sit to stand transfer with Stand-by Assistance  4. Gait  x 100 feet with Stand-by Assistance using Rolling Walker.   5.  Patient will demonstrate independence with a home exercise program  6. Patient's balance will be GOOD: Needs SUPERVISION only during gait and able to self right with moderate LOB.                   Time Tracking:     PT Received On: 06/27/20  PT Start Time: 1336     PT Stop Time: 1359  PT Total Time (min): 23 min     Billable Minutes: Gait Training 15 and Therapeutic Activity 8    Treatment Type: Treatment  PT/PTA: PTA     PTA Visit Number: 1     Yvan Guzman, PTA  06/27/2020

## 2020-06-27 NOTE — PLAN OF CARE
Pt motivated for therapy and tolerated session well secondary to pain. Pt with demonstrated progress and adequate pain control this therapy session. Pt ambulated ~20ft CGA using RW to increase activity tolerance required for adls and functional mobility. Pt completed grooming and toileting tasks with CGA for standing balance. Pt continues to progress toward her goals. Continue OT POC.        Problem: Occupational Therapy Goal  Goal: Occupational Therapy Goal  Description: Goals to be met by: 7/26/2020    Patient will increase functional independence with ADLs by performing:    UE Dressing with Stand-by Assistance.  LE Dressing with Minimal Assistance.  Grooming while seated with Set-up Assistance. - Met 6/27/2020  Toileting from bedside commode with Stand-by Assistance for hygiene and clothing management.   Step transfer with Stand-by Assistance  Toilet transfer to bedside commode with Stand-by Assistance.    Outcome: Ongoing, Progressing

## 2020-06-27 NOTE — CARE UPDATE
Inlet outlet xrays reviewed; LC1 pelvic fx appears stable after cruising 32 ft. Fu in clinic with xrays. Recommend DVT ppx x 30 days, currently on 40 mg lovenox

## 2020-06-27 NOTE — PLAN OF CARE
Problem: Fall Injury Risk  Goal: Absence of Fall and Fall-Related Injury  Outcome: Ongoing, Progressing     Problem: Adult Inpatient Plan of Care  Goal: Optimal Comfort and Wellbeing  Outcome: Ongoing, Progressing     Problem: Skin Injury Risk Increased  Goal: Skin Health and Integrity  Outcome: Ongoing, Progressing     Pt sitting up in bed with no distress noted.  Respirations even and unlabored.  Pt c/o some discomfort at right groin buttock area.  Does not require po pain medication at this time.  IV to Left FA patent and flushes well.  Currently saline locked.  SCD's in place.  Foam dressings placed on heels for protection.  Pt uses bedpan with assistance.  She is able to lift her bottom up to place bedpan under.  Pulses, color and temp to alanna LE WNL.  Bed locked and lowered for safety.  Call light within reach.  Communication board updated.  IS within reach.  No current needs voiced at this time.

## 2020-06-28 VITALS
BODY MASS INDEX: 22.2 KG/M2 | DIASTOLIC BLOOD PRESSURE: 55 MMHG | SYSTOLIC BLOOD PRESSURE: 109 MMHG | WEIGHT: 130 LBS | OXYGEN SATURATION: 98 % | RESPIRATION RATE: 16 BRPM | TEMPERATURE: 98 F | HEART RATE: 70 BPM | HEIGHT: 64 IN

## 2020-06-28 LAB
ALBUMIN SERPL BCP-MCNC: 3 G/DL (ref 3.5–5.2)
ALP SERPL-CCNC: 55 U/L (ref 55–135)
ALT SERPL W/O P-5'-P-CCNC: 13 U/L (ref 10–44)
ANION GAP SERPL CALC-SCNC: 10 MMOL/L (ref 8–16)
AST SERPL-CCNC: 22 U/L (ref 10–40)
BASOPHILS # BLD AUTO: 0.03 K/UL (ref 0–0.2)
BASOPHILS NFR BLD: 0.3 % (ref 0–1.9)
BILIRUB SERPL-MCNC: 0.3 MG/DL (ref 0.1–1)
BUN SERPL-MCNC: 48 MG/DL (ref 8–23)
CALCIUM SERPL-MCNC: 10.6 MG/DL (ref 8.7–10.5)
CHLORIDE SERPL-SCNC: 99 MMOL/L (ref 95–110)
CO2 SERPL-SCNC: 27 MMOL/L (ref 23–29)
CREAT SERPL-MCNC: 1.4 MG/DL (ref 0.5–1.4)
DIFFERENTIAL METHOD: ABNORMAL
EOSINOPHIL # BLD AUTO: 0.1 K/UL (ref 0–0.5)
EOSINOPHIL NFR BLD: 0.6 % (ref 0–8)
ERYTHROCYTE [DISTWIDTH] IN BLOOD BY AUTOMATED COUNT: 14.6 % (ref 11.5–14.5)
EST. GFR  (AFRICAN AMERICAN): 39 ML/MIN/1.73 M^2
EST. GFR  (NON AFRICAN AMERICAN): 33.8 ML/MIN/1.73 M^2
GLUCOSE SERPL-MCNC: 119 MG/DL (ref 70–110)
HCT VFR BLD AUTO: 31.9 % (ref 37–48.5)
HGB BLD-MCNC: 9.9 G/DL (ref 12–16)
IMM GRANULOCYTES # BLD AUTO: 0.04 K/UL (ref 0–0.04)
IMM GRANULOCYTES NFR BLD AUTO: 0.4 % (ref 0–0.5)
LYMPHOCYTES # BLD AUTO: 1.4 K/UL (ref 1–4.8)
LYMPHOCYTES NFR BLD: 13.3 % (ref 18–48)
MCH RBC QN AUTO: 30.4 PG (ref 27–31)
MCHC RBC AUTO-ENTMCNC: 31 G/DL (ref 32–36)
MCV RBC AUTO: 98 FL (ref 82–98)
MONOCYTES # BLD AUTO: 0.7 K/UL (ref 0.3–1)
MONOCYTES NFR BLD: 6.7 % (ref 4–15)
NEUTROPHILS # BLD AUTO: 8.4 K/UL (ref 1.8–7.7)
NEUTROPHILS NFR BLD: 78.7 % (ref 38–73)
NRBC BLD-RTO: 0 /100 WBC
PLATELET # BLD AUTO: 143 K/UL (ref 150–350)
PMV BLD AUTO: 10.7 FL (ref 9.2–12.9)
POTASSIUM SERPL-SCNC: 4 MMOL/L (ref 3.5–5.1)
PROT SERPL-MCNC: 6.2 G/DL (ref 6–8.4)
RBC # BLD AUTO: 3.26 M/UL (ref 4–5.4)
SODIUM SERPL-SCNC: 136 MMOL/L (ref 136–145)
WBC # BLD AUTO: 10.66 K/UL (ref 3.9–12.7)

## 2020-06-28 PROCEDURE — 25000003 PHARM REV CODE 250: Mod: HCNC | Performed by: HOSPITALIST

## 2020-06-28 PROCEDURE — 80053 COMPREHEN METABOLIC PANEL: CPT | Mod: HCNC

## 2020-06-28 PROCEDURE — 85025 COMPLETE CBC W/AUTO DIFF WBC: CPT | Mod: HCNC

## 2020-06-28 PROCEDURE — G0378 HOSPITAL OBSERVATION PER HR: HCPCS | Mod: HCNC

## 2020-06-28 PROCEDURE — 97116 GAIT TRAINING THERAPY: CPT | Mod: HCNC,CQ

## 2020-06-28 PROCEDURE — 99217 PR OBSERVATION CARE DISCHARGE: CPT | Mod: HCNC,,, | Performed by: HOSPITALIST

## 2020-06-28 PROCEDURE — 97530 THERAPEUTIC ACTIVITIES: CPT | Mod: HCNC

## 2020-06-28 PROCEDURE — 97535 SELF CARE MNGMENT TRAINING: CPT | Mod: HCNC

## 2020-06-28 PROCEDURE — 36415 COLL VENOUS BLD VENIPUNCTURE: CPT | Mod: HCNC

## 2020-06-28 PROCEDURE — 99217 PR OBSERVATION CARE DISCHARGE: ICD-10-PCS | Mod: HCNC,,, | Performed by: HOSPITALIST

## 2020-06-28 RX ORDER — HYDROCODONE BITARTRATE AND ACETAMINOPHEN 5; 325 MG/1; MG/1
1 TABLET ORAL EVERY 6 HOURS PRN
Qty: 30 TABLET | Refills: 0 | Status: SHIPPED | OUTPATIENT
Start: 2020-06-28 | End: 2020-09-01

## 2020-06-28 RX ADMIN — TRAMADOL HYDROCHLORIDE 50 MG: 50 TABLET ORAL at 06:06

## 2020-06-28 RX ADMIN — HYDROCODONE BITARTRATE AND ACETAMINOPHEN 1 TABLET: 5; 325 TABLET ORAL at 09:06

## 2020-06-28 RX ADMIN — OYSTER SHELL CALCIUM WITH VITAMIN D 2 TABLET: 500; 200 TABLET, FILM COATED ORAL at 09:06

## 2020-06-28 RX ADMIN — POLYETHYLENE GLYCOL 3350 17 G: 17 POWDER, FOR SOLUTION ORAL at 09:06

## 2020-06-28 RX ADMIN — DOCUSATE SODIUM 50 MG AND SENNOSIDES 8.6 MG 1 TABLET: 8.6; 5 TABLET, FILM COATED ORAL at 09:06

## 2020-06-28 RX ADMIN — METHOCARBAMOL TABLETS 500 MG: 500 TABLET, COATED ORAL at 09:06

## 2020-06-28 RX ADMIN — CITALOPRAM HYDROBROMIDE 20 MG: 10 TABLET ORAL at 09:06

## 2020-06-28 RX ADMIN — CLOPIDOGREL BISULFATE 75 MG: 75 TABLET ORAL at 09:06

## 2020-06-28 RX ADMIN — ACETAMINOPHEN 650 MG: 325 TABLET ORAL at 06:06

## 2020-06-28 NOTE — PLAN OF CARE
06/28/20 1216   Post-Acute Status   Post-Acute Authorization HME   HME Status Pending Delivery Home     SW [facility]. Patient's DME is scheduled for delivery tomorrow.

## 2020-06-28 NOTE — PLAN OF CARE
Problem: Physical Therapy Goal  Goal: Physical Therapy Goal  Description: Goals to be met by: 7/10/20    Patient will increase functional independence with mobility by performin. Supine to sit with Stand-by Assistance  2. Sit to supine with Stand-by Assistance  3. Sit to stand transfer with Stand-by Assistance  4. Gait  x 100 feet with Stand-by Assistance using Rolling Walker.   5.  Patient will demonstrate independence with a home exercise program  6. Patient's balance will be GOOD: Needs SUPERVISION only during gait and able to self right with moderate LOB.  Outcome: Ongoing, Progressing.  Patient continues to progress well towards her goals as evidence of transfer ability.

## 2020-06-28 NOTE — PT/OT/SLP PROGRESS
Occupational Therapy   Treatment    Name: Naheed Cottrell  MRN: [id number]  Admitting Diagnosis:  Multiple closed stable lateral compression fractures of pelvis       Recommendations:     Discharge Recommendations: home health OT  Discharge Equipment Recommendations:  walker, rolling, wheelchair, bedside commode, hospital bed  Barriers to discharge:  None    Assessment:     Naheed Cottrell is a 87 y.o. female with a medical diagnosis of Multiple closed stable lateral compression fractures of pelvis. Progressing - plan to D/C home today. Performance deficits affecting function are weakness, impaired endurance, impaired self care skills, impaired functional mobilty, gait instability, impaired balance, decreased coordination.     Rehab Prognosis:  Good; patient would benefit from acute skilled OT services to address these deficits and reach maximum level of function.       Plan:     Patient to be seen daily to address the above listed problems via self-care/home management, therapeutic activities, therapeutic exercises  · Plan of Care Expires: 07/25/20  · Plan of Care Reviewed with: patient    Subjective     Pain/Comfort:  · Pain Rating 1: 0/10    Objective:     Communicated with: rn prior to session.  Patient found supine with   upon OT entry to room.    General Precautions: Standard, fall   Orthopedic Precautions:RLE weight bearing as tolerated, LLE weight bearing as tolerated   Braces:       Occupational Performance:     Bed Mobility:    · Patient completed Rolling/Turning to Right with minimum assistance  · Patient completed Scooting/Bridging with stand by assistance  · Patient completed Supine to Sit with minimum assistance     Functional Mobility/Transfers:  · Patient completed Sit <> Stand Transfer with minimum assistance  with  rolling walker   · Patient completed Bed <> Chair Transfer using Step Transfer technique with contact guard assistance with rolling walker  · Patient completed Toilet Transfer Step Transfer  technique with minimum assistance and maximal assistance with  rolling walker and grab bars  · Functional Mobility: CGA with a RW.    Activities of Daily Living:  · Grooming: stand by assistance in standing.  · Upper Body Dressing: minimum assistance  · Toileting: stand by assistance for pericare.    [facility] 6 Click ADL: 21    Treatment & Education:  Educated on proper safe t/fs and hand placement.  Discussed OT POC and progress.    Patient left up in chair with call button in reachEducation:      GOALS:   Multidisciplinary Problems     Occupational Therapy Goals        Problem: Occupational Therapy Goal    Goal Priority Disciplines Outcome Interventions   Occupational Therapy Goal     OT, PT/OT Ongoing, Progressing    Description: Goals to be met by: 7/26/2020    Patient will increase functional independence with ADLs by performing:    UE Dressing with Stand-by Assistance.  LE Dressing with Minimal Assistance.  Grooming while seated with Set-up Assistance.  Toileting from bedside commode with Stand-by Assistance for hygiene and clothing management.   Step transfer with Stand-by Assistance  Toilet transfer to bedside commode with Stand-by Assistance.                     Time Tracking:     OT Date of Treatment: 06/28/20  OT Start Time: 0829  OT Stop Time: 0857  OT Total Time (min): 28 min    Billable Minutes:Self Care/Home Management 14  Therapeutic Activity 14    DONITA Mcelroy  6/28/2020

## 2020-06-28 NOTE — PLAN OF CARE
Problem: Fall Injury Risk  Goal: Absence of Fall and Fall-Related Injury  Outcome: Ongoing, Progressing     Problem: Adult Inpatient Plan of Care  Goal: Plan of Care Review  Outcome: Ongoing, Progressing     Problem: Adult Inpatient Plan of Care  Goal: Optimal Comfort and Wellbeing  Outcome: Ongoing, Progressing     Problem: Skin Injury Risk Increased  Goal: Skin Health and Integrity  Outcome: Ongoing, Progressing    Pt is AAOx4. Pt's systolic BP was low in the 100s, this morning, BP meds were held per MD order. No falls, injury as pt calls for assistance when needed. No s/s of breakdown as pt is independent with positioning self. Pain being monitored and controlled with PRN and scheduled meds. Safety precautions remain, bed in lowest position, call light within reach.

## 2020-06-28 NOTE — DISCHARGE SUMMARY
DISCHARGE SUMMARY  Hospital Medicine    Team: [facility]    Patient Name: Naheed Cottrell  YOB: 1933    Admit Date: 6/25/2020    Discharge Date: 06/28/2020    Discharge Attending Physician: Yohana Lopez MD    Principal Diagnoses:  Active Hospital Problems    Diagnosis  POA    *Multiple closed stable lateral compression fractures of pelvis - Zone 1 R sacrum incomplete S1 only.  R parasymphyseal ramus. [S32.82XA]  Yes    Contusion of right hip [S70.01XA]  Yes    Acute cystitis [N30.00]  Yes    Hip pain [M25.559]  Yes    Normocytic anemia [D64.9]  Yes    History of CVA (cerebrovascular accident) [Z86.73]  Not Applicable    Chronic heart failure with preserved ejection fraction [I50.32]  Yes    Iron deficiency anemia [D50.9]  Yes    Thrombocytopenia, unspecified [D69.6]  Yes    Vitamin D deficiency [E55.9]  Yes    Right renal artery stenosis [I70.1]  Yes     60-69%      Dyslipidemia [E78.5]  Yes     Chronic    Essential hypertension [I10]  Yes     Chronic    PAD (peripheral artery disease) [I73.9]  Yes    Renovascular hypertension [I15.0]  Yes    Subclavian artery stenosis, left [I77.1]  Yes      Resolved Hospital Problems   No resolved problems to display.       Discharged Condition: improved     Interval history: she reports pain is doing better with scheduled tylenol, robaxin, tramadol. Had some significant more pain after PT yeterday and did the norco 5 and tolerated well without nausea like she has with oxycodone before. Her room has had AC issues and maintenance attepted to fix but is still very hot this AM which was distressing overnight. Her BP was on the lower end 100s systolics, held BP meds yest and this AM, was up to 120s overnight, she doesn't feel any symptoms from it, mild Cr uptick from baseline, advised her daughter to have her hydrate at leats 2L or so and to hold the BP meds until her systolics are 120s-130s consistently on home BP meter befor resumine, would not  resume if she is 100s systolics at home now. BM yesterday, would continue stool softeners while using pain meds to prevent constipation, lovenox x 30 days and cont home plavix per discussion with her son her PCP who is cardiology, then he susi restart asa and home cilostazol once the lovenox completes.she will have ortho f/u on discharge as well. Her Cx was pan sensitive ecoli and completed 3 days for UTI treatment.    Temp:  [96.3 °F (35.7 °C)-98.6 °F (37 °C)]   Pulse:  [70-80]   Resp:  [12-18]   BP: (108-136)/(54-63)   SpO2:  [92 %-98 %]      Physical Exam:  Constitutional: Appears well-developed and well-nourished. Pleasant. Sitting in chair.  Head: Normocephalic and atraumatic.   Mouth/Throat: Oropharynx is clear and moist.   Eyes: EOM are normal. Pupils are equal, round, and reactive to light. No scleral icterus.   Neck: Normal range of motion. Neck supple.   Cardiovascular: Normal rate and regular rhythm.  No murmur heard.  Pulmonary/Chest: Effort normal and breath sounds normal. No respiratory distress. No wheezes, rales, or rhonchi  Abdominal: Soft. Bowel sounds are normal.  No distension or tenderness  Musculoskeletal: pain with palpation of right hip with palpation only with deep palpation and after PT, minimal at rest.. No edema in LE. Pulses present, warm to touch lower extremities. Small brush burn on RUE, arm and skin tear from fall.   Neurological: Alert and oriented to person, place, and time.   Skin: Skin is warm and dry.   Psychiatric: Normal mood and affect. Behavior is normal.   Vitals reviewed.    HOSPITAL COURSE:      Initial Presentation:    Ms. Naheed Cottrell is a 87 y.o. female with past medical history of HTN, chronic diastolic dysfunction (EF 65%), history CVA (no residual deficits), renal/subclavian artery stenosis, HLD (on repatha), PVD, falls with history of Left hip fx 20 years ago, Left humerus fracture 2017 in setting of UTI-sepsis with fall presentation, who was in her usual state  "of health until earlier today when she went for a walk with her grandchild when she tripped over the sidewalk and sustained a fall. Didn't hit her head ,was helped up by someone on the street at the time and was "okay" from pain standpoint but felt somewhat dizzy and off after so they brought her to the ER for evaluation, Xrays of femur/pelvis wnl, cardiac device interrogation- loop recorder without arrythmias (denies syncope or feeling bad at all prior) and then when was standing up to ensure could safetly ambulate she had very significant pain to the right hip/leg and unable to bear weight per daughter at beside which is unlike her. She has high pain tolerance normally and she "wanted to go home" from the ER, her BP went to 200s systolic and she had severe pain in the right hip radiating to the right groin and she vomited at the time, likely secondary to pain from BP spike. As she couldn't safely ambulate and the ER was worried about occult fracture patient was admitted to obs and MRI ordered.      Patient was in MRI on initial eval and discussed all above with daughter at bedside. On return back to the ER room to update her, her daughter and her daughters  who is radiologist also at bedside. Still having significant pain at rest in the right groin/pelvis area. Discussed MRI results which showed Right superior ramus fracture, Right inferior pubic ramus fx, and Right sacral ala fractures.   Discussed pain control now and orthopedics consult for weigh bearing status and to ensure the plans going forward, discussed in -general non operative- but orthopedics to discuss and assess if this is the case given sacral ala fx also. She has intolerance to percocet in the last so added to allergies and she prefers doing very light pain medications to start so have sravan tylenol, robaxin and prefers tramadol over norco for sever pain, have 2 mg morphine for severe pain for breakthrough but she prefers to not use as long as " pain controlled.  She has a UTI and was likely a small nidus for this as she has had other falls in the past when she has a UTI, but this was more mechanical, may just have easier ability to not aware of surroundings or fall when she has a UTI. Her daughter states that she uses blue toilet bowl bleach so its hard to tell at home if her urine is dark or symptoms but her urine was dark in the ER when sampled.   Her loop recorder was interrogated in ER and no arrythmias found, on history it does not appear to be pre-syncopal in origin and mechanical fall.     Med History: HTN, chronic diastolic HF - EF 65%, history CVA- no residual deficits, renal artery stenosis, subclavian artery stenosis on left (falsely low BPS in the left arm- only do BP checks on right), left hip fx 20 years ago s/p repair, HLD, peripheral arterial disease, syncopal episodes s/p loop recorder, left humerus fracture, vit D deficiency, hx of sepsis/bacteremia from Ecoli UTI     Surg History- MCL repair, left femur fx repair, loop recorder insertion     Social history: lives at home, independent in ADLs, does not use ambulatory devices. Daughter at bedside with  who is radiologist. Son is cardiologist at [employer], former cardiologist at [employer] for many years, is on conference now but other family members in room today.  Sees Cardiology here at [facility] regularly.     Last BM today. No alcohol use. Family has HH company they prefer if HH is an option in future. They feel she's okay to stay here overnight but if need special permission to stay with her, is okay with myself and can look into it with charge nurse on floor if she has any issues with sundowning, has not had delirium issues in the hospital at previous admissions.    Course of Principle Problem for Admission:    Right Superior pubic rami Fx  Right Inferior pubic Rami Fx  Right Sacral Ala Fx  -secondary to mechanical fall in setting of UTI as likely precipitant of fall as has  precipitated easy falls in past as well (last 2017 major fall with humerus fx)  -initial xrays neg, MRI revealed above.   -pain control- sravan tylenol, robaxin.  sravan tramadol now, norco severe pain (required x 2 here for pain after PT/OT with improvement).   - much better 6/27, did 2 sessions with PT/OT and did well with both, 20 and 30 feet and another good session 6/28 with OT this AM, naomy use the norco at home for PRN when has pain with PT sessions and continue the scheduled tylenol, robaxin, tramadol at home now until improvements and then adjust to PRN as she tolerates.  -xrays post mobilization per ortho stable. Non op plans. Ortho follow up on discharge to ensure doing well as outpatient  -lovenox x 1 month, hold asa cilostazol while on there to avoid bleed risk (July 24th last date lovenox)  -HH and DME ordered for discharge for her.  -vit D at goal 65  -Tdap given in ER    Other Medical Problems Addressed in the Hospital:    Acute Cystitis  -nitrite, blood, leukos, >100 rbc, wbc, many bacteria on UA  -ecoli in 2017 in blood was pan sensitive  -WBC 7 now, does not appear septic on exam  -f/u Cx- pan sensitive Ecoli, completed 3 days therapy here with rocephin     Chronic normocytic Anemia  Hx of iron deficiency Anemia  -Hg at baseline of 11 on admission  -wnl ferritin,b12,folate      HTN  -BP 100s systolic on admit but spiked to 200s systolic with pain when she moved, now back to 170s in ER, likely pain contirbutor, took all home meds this AM, due for coreg this PM  -cont coreg, olmesartan, chlorthalidone  6/27: held BP meds this AM for lower BPS, unsure if falsely low readings, using right arm, but trending upwards now 110s after 100s this AM systolic. Will watch, no signs of adverse effects, asymptomatic  6/28: BP to 120s systolic overnight but this AM in 100s at times, held home bP meds yesterday and this AM, small Cr uptick to 1.4, suspect some oral hydration contributing, advised to drink 2L at least of  water today to daughter, to hold BP meds on dsicharge until 120s-130s consistent bPs, is asymptomatic, suspect better hydration will help and access to water at home freely will improve BPs they have bP check to check at home and plan to do this, placed on dc instrutinos also     Vitamin D deficiency  -cont home vit D, recheck 65     Peripheral vascular disease  -hold asa until off lovenox, cont plavix   -hold cilostazol while on lovenox per discussion with son cadriologist  (July 25th resume once lovenox for 1 month completes day previous)  -has statin intolerance, takes repatha every 2 weeks, due on Sunday, hold while inpatient and can resume today at home vs tomorrow whichever she prefers     Subclavian artery stenosis  -on left (imaging 2015), if bP is taken in left arm it will look falsely low, only take BP in right arm  -family reports patient will tell nurses if they do it wrong also, nursing comm placed for this     Renal Artery Stenosis  -no acute issues, chronic     Recurrent Syncope  -has loop recorder in place, interrogated in ER without arrythmias  -event now was mechanical in nature  -no acute issues  -EKG with flat T wavse in V1-V2 which was her baseline previously.      Chronic diastolic Heart Failure  -echo up to date, EF 65%, chronic diastolic dysfunction, not on diuretics, euvolemic  -no acute issues. CXR wnl on admission     History CVA  -no residual deficits      Consults: ortho    Last CBC/BMP:    CBC/Anemia Labs: Coags:    Recent Labs   Lab 06/26/20  0339 06/27/20  0333 06/28/20  0405   WBC 9.46 9.15 10.66   HGB 10.5* 9.6* 9.9*   HCT 33.4* 30.3* 31.9*   * 135* 143*   MCV 97 96 98   RDW 14.4 14.6* 14.6*   IRON 15*  --   --    FERRITIN 140  --   --    FOLATE 17.0  --   --    [id number] 492  --   --     No results for input(s): PT, INR, APTT in the last 168 hours.     Chemistries:   Recent Labs   Lab 06/26/20  0339 06/27/20  0333 06/28/20  0405    137 136   K 3.8 3.1* 4.0     104 99   CO2 23 23 27   BUN 30* 35* 48*   CREATININE 1.0 1.1 1.4   CALCIUM 9.3 9.2 10.6*   PROT 6.2 5.9* 6.2   BILITOT 0.5 0.3 0.3   ALKPHOS 59 59 55   ALT 10 11 13   AST 22 23 22              Special Treatments/Procedures:   * No surgery found *     Disposition: Home-Health Care [facility]      Future Scheduled Appointments:  Future Appointments   Date Time Provider Department Center   7/20/2020 11:00 AM [facility]           Discharge Medication List:       Naheed Cottrell CYNDEE   Home Medication Instructions RALF:[id number]    Printed on:06/28/20 4132   Medication Information                      acetaminophen (TYLENOL) 325 MG tablet  Take 2 tablets (650 mg total) by mouth every 6 (six) hours.             calcium-vitamin D3 500 mg(1,250mg) -200 unit per tablet  Take 2 tablets by mouth 2 (two) times daily.             carvediloL (COREG) 25 MG tablet  TAKE 1 TABLET(25 MG) BY MOUTH TWICE DAILY WITH MEALS             chlorthalidone (HYGROTEN) 25 MG Tab  TAKE 1 TABLET(25 MG) BY MOUTH EVERY DAY             cholecalciferol, vitamin D3, 1,000 unit capsule  Take 1 capsule (1,000 Units total) by mouth once daily.             citalopram (CELEXA) 20 MG tablet  TAKE 1 TABLET(20 MG) BY MOUTH EVERY DAY             clopidogreL (PLAVIX) 75 mg tablet  Take 1 tablet (75 mg total) by mouth once daily.             coenzyme Q10 (CO Q-10) 10 mg capsule  Take 10 mg by mouth once daily.             diclofenac sodium (VOLTAREN) 1 % Gel  Apply 2 g topically 2 (two) times daily.             enoxaparin (LOVENOX) 40 mg/0.4 mL Syrg  Inject 0.4 mLs (40 mg total) into the skin once daily.             evolocumab (REPATHA SURECLICK) 140 mg/mL PnIj  Inject 1 mL (140 mg total) into the skin every 14 (fourteen) days.             HYDROcodone-acetaminophen (NORCO) 5-325 mg per tablet  Take 1 tablet by mouth every 6 (six) hours as needed for Pain (severe 7-10 scale, or after PT sessions PRN).             methocarbamoL  "(ROBAXIN) 500 MG Tab  Take 1 tablet (500 mg total) by mouth 4 (four) times daily.             olmesartan (BENICAR) 40 MG tablet  TAKE 1 TABLET(40 MG) BY MOUTH EVERY DAY             ondansetron (ZOFRAN-ODT) 8 MG TbDL  Take 1 tablet (8 mg total) by mouth every 8 (eight) hours as needed.             polyethylene glycol (GLYCOLAX) 17 gram PwPk  Take 17 g by mouth once daily.             senna-docusate 8.6-50 mg (PERICOLACE) 8.6-50 mg per tablet  Take 1 tablet by mouth 2 (two) times daily.             traMADoL (ULTRAM) 50 mg tablet  Take 1 tablet (50 mg total) by mouth every 8 (eight) hours.                 Patient Instructions:  Discharge Procedure Orders   WHEELCHAIR FOR HOME USE   Order Comments: Plan dc tomorrow-6/27     Order Specific Question Answer Comments   Hours in W/C per day: 12    Type of Wheelchair: Standard    Size(Width): 18"(STD adult)    Leg Support: STD footrests    Lap Belt: Velcro    Cushion: Basic    Height: 5' 4" (1.626 m)    Weight: 59 kg (130 lb)    Does patient have medical equipment at home? none    Length of need (1-99 months): 99    Please check all that apply: Caregiver is capable and willing to operate wheelchair safely.    Please check all that apply: The patient requires the use of a w/c for activities of daily living within the Home.    Vendor: [facility]    Expected Date of Delivery: 6/26/2020      COMMODE FOR HOME USE   Order Comments: Plan dc tomorrow-6/27     Order Specific Question Answer Comments   Type: Standard    Height: 5' 4" (1.626 m)    Weight: 59 kg (130 lb)    Does patient have medical equipment at home? none    Length of need (1-99 months): 99    Vendor: [facility]    Expected Date of Delivery: 6/26/2020      HOSPITAL BED FOR HOME USE     Order Specific Question Answer Comments   Type: Semi-electric    Length of need (1-99 months): 99    Does patient have medical equipment at home? none    Height: 5' 4" (1.626 m)    Weight: 59 kg (130 lb)    Please check all that apply: " "Patient requires positioning of the body in ways not feasible in an ordinary bed due to a medical condition which is expected to last at least one month.    Please check all that apply: Patient requires, for the alleviation of pain, positioning of the body in ways not feasible in an ordinary bed.    Vendor: [facility]    Expected Date of Delivery: 6/26/2020      WALKER FOR HOME USE     Order Specific Question Answer Comments   Type of Walker: Adult (5'4"-6'6")    With wheels? Yes    Height: 5' 4" (1.626 m)    Weight: 59 kg (130 lb)    Length of need (1-99 months): 99    Does patient have medical equipment at home? none    Please check all that apply: Patient's condition impairs ambulation.    Please check all that apply: Patient is unable to safely ambulate without equipment.      Activity as tolerated       At the time of discharge patient was told to take all medications as prescribed, to keep all followup appointments, and to call their primary care physician or return to the emergency room if they have any worsening or concerning symptoms.    Signing Physician:  Yohana Lopez MD  "

## 2020-06-28 NOTE — DISCHARGE INSTRUCTIONS
Hold BP meds until systolics 120s-130s at home    Hydrate with at least 2L water at home in next 2 days

## 2020-06-28 NOTE — NURSING
Discharge Note: Pt ready for discharge. Discharge instructions given and explained to pt. Pt will  meds at local pharmacy, pt received paper for medication prescriptions.IV  removed. No further questions from pt at this time.Pt being discharged with non rolling walker, will borrow one from friend. Spoke with VALENTINA CUEVA and family to follow up on rolling walker. Pt left via wheelchair, escorted by staff.

## 2020-06-29 ENCOUNTER — TELEPHONE (OUTPATIENT)
Dept: ORTHOPEDICS | Facility: CLINIC | Age: 85
End: 2020-06-29

## 2020-06-29 ENCOUNTER — TELEPHONE (OUTPATIENT)
Dept: HEPATOLOGY | Facility: HOSPITAL | Age: 85
End: 2020-06-29

## 2020-06-29 RX ORDER — METHOCARBAMOL 500 MG/1
500 TABLET, FILM COATED ORAL 4 TIMES DAILY
Qty: 60 TABLET | Refills: 0 | Status: SHIPPED | OUTPATIENT
Start: 2020-06-29 | End: 2020-07-07 | Stop reason: SDUPTHER

## 2020-06-29 RX ORDER — METHOCARBAMOL 500 MG/1
500 TABLET, FILM COATED ORAL 4 TIMES DAILY
Qty: 60 TABLET | Refills: 0 | Status: SHIPPED | OUTPATIENT
Start: 2020-06-29 | End: 2020-06-29 | Stop reason: SDUPTHER

## 2020-06-29 NOTE — TELEPHONE ENCOUNTER
----- Message from Cordelia Villarreal sent at 6/29/2020  3:48 PM CDT -----  Regarding: PT Program  Contact: [facility]) [phone]  [facility] called to speak to someone regarding the patient's restrictions. Please contact the patient to discuss further.

## 2020-06-29 NOTE — TELEPHONE ENCOUNTER
Spoke with pt and her daugher, Erica.    They are not sure of any restrictions.  Advised Erica that I will check with Dr Wilder and return her call tomorrow

## 2020-06-30 ENCOUNTER — TELEPHONE (OUTPATIENT)
Dept: CARDIOLOGY | Facility: CLINIC | Age: 85
End: 2020-06-30

## 2020-06-30 PROCEDURE — G0180 MD CERTIFICATION HHA PATIENT: HCPCS | Mod: ,,, | Performed by: HOSPITALIST

## 2020-06-30 PROCEDURE — G0180 PR HOME HEALTH MD CERTIFICATION: ICD-10-PCS | Mod: ,,, | Performed by: HOSPITALIST

## 2020-06-30 NOTE — TELEPHONE ENCOUNTER
Spoke with her son, Mack Maldonadorodrigo CUEVA, who reports since her hospital discharge, her BP has been in the 90s. He had her stop the Chlorthalidone, Benicar , and reduced Carvedilol to 1/2 tablet twice a day.

## 2020-07-07 ENCOUNTER — TELEPHONE (OUTPATIENT)
Dept: CARDIOLOGY | Facility: CLINIC | Age: 85
End: 2020-07-07

## 2020-07-07 DIAGNOSIS — S32.82XS MULTIPLE CLOSED STABLE LATERAL COMPRESSION FRACTURES OF PELVIS, SEQUELA: Primary | ICD-10-CM

## 2020-07-07 RX ORDER — TRAMADOL HYDROCHLORIDE 50 MG/1
50 TABLET ORAL EVERY 6 HOURS PRN
Qty: 50 TABLET | Refills: 3 | Status: SHIPPED | OUTPATIENT
Start: 2020-07-07 | End: 2020-09-01

## 2020-07-07 RX ORDER — METHOCARBAMOL 500 MG/1
500 TABLET, FILM COATED ORAL 4 TIMES DAILY
Qty: 40 TABLET | Refills: 0 | Status: SHIPPED | OUTPATIENT
Start: 2020-07-07 | End: 2020-07-17

## 2020-07-13 NOTE — TELEPHONE ENCOUNTER
DOCUMENTATION ONLY:  Prior authorization for REPATHA approved from 7/13/2020 to 12/31/2020.    Case ID# [id number] [id number]    Co-pay: $120.41    Patient Assistance IS required.    Forwarding to FA for review. -HBR

## 2020-07-15 ENCOUNTER — TELEPHONE (OUTPATIENT)
Dept: PHARMACY | Facility: CLINIC | Age: 85
End: 2020-07-15

## 2020-07-17 ENCOUNTER — EXTERNAL HOME HEALTH (OUTPATIENT)
Dept: HOME HEALTH SERVICES | Facility: HOSPITAL | Age: 85
End: 2020-07-17
Payer: MEDICARE

## 2020-07-19 RX ORDER — FUROSEMIDE 20 MG/1
20 TABLET ORAL DAILY
Qty: 30 TABLET | Refills: 11 | Status: SHIPPED | OUTPATIENT
Start: 2020-07-19 | End: 2020-09-01

## 2020-07-21 ENCOUNTER — OFFICE VISIT (OUTPATIENT)
Dept: ORTHOPEDICS | Facility: CLINIC | Age: 85
End: 2020-07-21
Payer: MEDICARE

## 2020-07-21 ENCOUNTER — HOSPITAL ENCOUNTER (OUTPATIENT)
Dept: RADIOLOGY | Facility: HOSPITAL | Age: 85
Discharge: HOME | End: 2020-07-21
Attending: NURSE PRACTITIONER
Payer: MEDICARE

## 2020-07-21 VITALS
HEIGHT: 64 IN | HEART RATE: 73 BPM | WEIGHT: 130.06 LBS | DIASTOLIC BLOOD PRESSURE: 64 MMHG | SYSTOLIC BLOOD PRESSURE: 138 MMHG | BODY MASS INDEX: 22.2 KG/M2

## 2020-07-21 DIAGNOSIS — S32.591A CLOSED FRACTURE OF MULTIPLE PUBIC RAMI, RIGHT, INITIAL ENCOUNTER: ICD-10-CM

## 2020-07-21 DIAGNOSIS — S32.591A CLOSED FRACTURE OF MULTIPLE PUBIC RAMI, RIGHT, INITIAL ENCOUNTER: Primary | ICD-10-CM

## 2020-07-21 PROCEDURE — 99214 PR OFFICE/OUTPT VISIT, EST, LEVL IV, 30-39 MIN: ICD-10-PCS | Mod: HCNC,S$GLB,, | Performed by: NURSE PRACTITIONER

## 2020-07-21 PROCEDURE — 99999 PR PBB SHADOW E&M-EST. PATIENT-LVL IV: ICD-10-PCS | Mod: PBBFAC,HCNC,, | Performed by: NURSE PRACTITIONER

## 2020-07-21 PROCEDURE — 1126F PR PAIN SEVERITY QUANTIFIED, NO PAIN PRESENT: ICD-10-PCS | Mod: HCNC,S$GLB,, | Performed by: NURSE PRACTITIONER

## 2020-07-21 PROCEDURE — 1126F AMNT PAIN NOTED NONE PRSNT: CPT | Mod: HCNC,S$GLB,, | Performed by: NURSE PRACTITIONER

## 2020-07-21 PROCEDURE — 1159F MED LIST DOCD IN RCRD: CPT | Mod: HCNC,S$GLB,, | Performed by: NURSE PRACTITIONER

## 2020-07-21 PROCEDURE — 3288F PR FALLS RISK ASSESSMENT DOCUMENTED: ICD-10-PCS | Mod: HCNC,CPTII,S$GLB, | Performed by: NURSE PRACTITIONER

## 2020-07-21 PROCEDURE — 72190 X-RAY EXAM OF PELVIS: CPT | Mod: TC,HCNC

## 2020-07-21 PROCEDURE — 99999 PR PBB SHADOW E&M-EST. PATIENT-LVL IV: CPT | Mod: PBBFAC,HCNC,, | Performed by: NURSE PRACTITIONER

## 2020-07-21 PROCEDURE — 1100F PR PT FALLS ASSESS DOC 2+ FALLS/FALL W/INJURY/YR: ICD-10-PCS | Mod: HCNC,CPTII,S$GLB, | Performed by: NURSE PRACTITIONER

## 2020-07-21 PROCEDURE — 99214 OFFICE O/P EST MOD 30 MIN: CPT | Mod: HCNC,S$GLB,, | Performed by: NURSE PRACTITIONER

## 2020-07-21 PROCEDURE — 1159F PR MEDICATION LIST DOCUMENTED IN MEDICAL RECORD: ICD-10-PCS | Mod: HCNC,S$GLB,, | Performed by: NURSE PRACTITIONER

## 2020-07-21 PROCEDURE — 72190 XR PELVIS AP INLET AND OUTLET: ICD-10-PCS | Mod: 26,HCNC,, | Performed by: RADIOLOGY

## 2020-07-21 PROCEDURE — 3288F FALL RISK ASSESSMENT DOCD: CPT | Mod: HCNC,CPTII,S$GLB, | Performed by: NURSE PRACTITIONER

## 2020-07-21 PROCEDURE — 1100F PTFALLS ASSESS-DOCD GE2>/YR: CPT | Mod: HCNC,CPTII,S$GLB, | Performed by: NURSE PRACTITIONER

## 2020-07-21 PROCEDURE — 72190 X-RAY EXAM OF PELVIS: CPT | Mod: 26,HCNC,, | Performed by: RADIOLOGY

## 2020-07-21 NOTE — PROGRESS NOTES
SUBJECTIVE:     Chief Complaint & History of Present Illness:  Naheed Cottrell is a New 87 y.o. year old female patient presenting today for intermittent right hip pain which started approximately 3 weeks ago.  There is a history of trauma.  She reports she was walking with her family and tripped and fell from a standing position.  She went to the ED due to having dizzy spells and difficulty placing weight down through her right foot.  At the time, she was found to have right inferior/superior pubic ramus fracture and zone 1 sacral ala fracture confirmed by CT and MRI.  Orthopedics was consulted who recommended non-operative treatment, weight bear as tolerated with PT/OT.  She was then discharged home with home health.    Currently patient reports she has been discharged from OT but PT still working with her.  She is getting home health.  She currently has no pain.  Family is giving her Tramadol bid and Robaxin bid to prevent pain.  She denies any falls or injuries since her injury 3 weeks ago.  She has been dealing with chronic bilateral leg edema which she was recently prescribed Lasix daily.       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    Encounter for blood transfusion     Fall, accidental     Hypertension     Right renal artery stenosis 3/28/2017    Stenosis of left subclavian artery        Past Surgical History:   Procedure Laterality Date    EYE SURGERY      FRACTURE SURGERY      INSERTION OF IMPLANTABLE LOOP RECORDER N/A 1/2/2020    Procedure: Insertion, Implantable Loop Recorder;  Surgeon: Anton Muniz MD;  Location: [facility] EP LAB;  Service: Cardiology;  Laterality: N/A;  Syncope, ILR, Bio, Local, SK, 3 Prep       Family History   Problem Relation Age of Onset    Hyperlipidemia Mother     Hypertension Mother        Review of patient's allergies indicates:   Allergen  Reactions    Percocet [oxycodone-acetaminophen]      Did not tolerate in past, not allergy         Current Outpatient Medications:     acetaminophen (TYLENOL) 325 MG tablet, Take 2 tablets (650 mg total) by mouth every 6 (six) hours., Disp: , Rfl: 0    carvediloL (COREG) 25 MG tablet, TAKE 1 TABLET(25 MG) BY MOUTH TWICE DAILY WITH MEALS, Disp: 180 tablet, Rfl: 11    chlorthalidone (HYGROTEN) 25 MG Tab, TAKE 1 TABLET(25 MG) BY MOUTH EVERY DAY, Disp: 90 tablet, Rfl: 11    cholecalciferol, vitamin D3, 1,000 unit capsule, Take 1 capsule (1,000 Units total) by mouth once daily., Disp: , Rfl: 0    citalopram (CELEXA) 20 MG tablet, TAKE 1 TABLET(20 MG) BY MOUTH EVERY DAY, Disp: 90 tablet, Rfl: 2    clopidogreL (PLAVIX) 75 mg tablet, Take 1 tablet (75 mg total) by mouth once daily., Disp: 90 tablet, Rfl: 3    coenzyme Q10 (CO Q-10) 10 mg capsule, Take 10 mg by mouth once daily., Disp: , Rfl:     diclofenac sodium (VOLTAREN) 1 % Gel, Apply 2 g topically 2 (two) times daily., Disp: 1 Tube, Rfl: 3    evolocumab (REPATHA SURECLICK) 140 mg/mL PnIj, Inject 1 mL (140 mg total) into the skin every 14 (fourteen) days., Disp: 6 mL, Rfl: 3    furosemide (LASIX) 20 MG tablet, Take 1 tablet (20 mg total) by mouth once daily., Disp: 30 tablet, Rfl: 11    olmesartan (BENICAR) 40 MG tablet, TAKE 1 TABLET(40 MG) BY MOUTH EVERY DAY, Disp: 90 tablet, Rfl: 3    traMADoL (ULTRAM) 50 mg tablet, Take 1 tablet (50 mg total) by mouth every 6 (six) hours as needed for Pain., Disp: 50 tablet, Rfl: 3    calcium-vitamin D3 500 mg(1,250mg) -200 unit per tablet, Take 2 tablets by mouth 2 (two) times daily., Disp: , Rfl:     enoxaparin (LOVENOX) 40 mg/0.4 mL Syrg, Inject 0.4 mLs (40 mg total) into the skin once daily. (Patient not taking: Reported on 7/21/2020), Disp: 11.2 mL, Rfl: 0    HYDROcodone-acetaminophen (NORCO) 5-325 mg per tablet, Take 1 tablet by mouth every 6 (six) hours as needed for Pain (severe 7-10 scale, or after PT  "sessions PRN). (Patient not taking: Reported on 7/21/2020), Disp: 30 tablet, Rfl: 0    ondansetron (ZOFRAN-ODT) 8 MG TbDL, Take 1 tablet (8 mg total) by mouth every 8 (eight) hours as needed. (Patient not taking: Reported on 7/21/2020), Disp: 30 tablet, Rfl: 1    polyethylene glycol (GLYCOLAX) 17 gram PwPk, Take 17 g by mouth once daily. (Patient not taking: Reported on 7/21/2020), Disp: 30 packet, Rfl: 1    senna-docusate 8.6-50 mg (PERICOLACE) 8.6-50 mg per tablet, Take 1 tablet by mouth 2 (two) times daily. (Patient not taking: Reported on 7/21/2020), Disp: 30 tablet, Rfl: 1    Review of Systems:  ROS:  Constitutional: no fever or chills  Eyes: no visual changes  ENT: no nasal congestion or sore throat  Respiratory: no cough or shortness of breath  Cardiovascular: no chest pain or palpitations  Gastrointestinal: no nausea or vomiting, tolerating diet  Genitourinary: no hematuria or dysuria  Integument/Breast: no rash or pruritis  Hematologic/Lymphatic: no easy bruising or lymphadenopathy  Musculoskeletal: positive for arthralgias  Neurological: no seizures or tremors  Behavioral/Psych: no auditory or visual hallucinations  Endocrine: no heat or cold intolerance      PE:  /64   Pulse 73   Ht 5' 4" (1.626 m)   Wt 59 kg (130 lb 1.1 oz)   LMP 10/01/1980 (Approximate)   BMI 22.33 kg/m²   General: Pleasant, cooperative, NAD   HEENT: NCAT, sclera nonicteric   Lungs: Respirations are equal and unlabored.   Abdomen: Soft and non-tender.  CV: 2+ bilateral upper and lower extremity pulses.   Skin: Intact throughout LE with no rashes, erythema, or lesions  Extremities: No LE edema, NVI lower extremities      Hip Exam:   rightnormal    70 degrees flexion  0 degrees extension     She has no pain to her lower leg.  She is NVI  PPP x 2  Mild bilateral lower edema noted.      RADIOGRAPHS:  X-ray of pelvis with inlet/outlet views obtained today and personally reviewed by me.  Her pubic ramus fractures appear stable " and in good position. Hardware to her left hip appears in good position and alignment, no hardware failure noted.    ASSESSMENT/PLAN:       ICD-10-CM ICD-9-CM   1. Closed fracture of multiple pubic rami, right, initial encounter  S32.591A 808.2       Plan: We discussed with the patient at length all the different treatment options available for arthrosis of the hip including anti-inflammatories, acetaminophen, rest, ice, lower extremity strengthening exercise, occasional cortisone injections for temporary relief, and finally total hip arthroplasty.     -Naheed Cottrell presents to clinic today with c/c right pubic ramus fracture approximately 3 weeks ago  -X-ray as above.  -Continue non-operative management.  -Recommend RICE therapy.  -Recommend to continue home health PT to improve functional strength.  -Try to wean from Tramadol and Robaxin, use Tylenol up to 500 mg tid PRN.  -Continue to use standard walker until balance is safe.  -I will see her back in 6 weeks, at time repeat pelvis x-ray with inlet/outlet views.  -Call for questions or concerns.

## 2020-07-22 ENCOUNTER — TELEPHONE (OUTPATIENT)
Dept: CARDIOLOGY | Facility: CLINIC | Age: 85
End: 2020-07-22

## 2020-07-22 ENCOUNTER — TELEPHONE (OUTPATIENT)
Dept: ELECTROPHYSIOLOGY | Facility: CLINIC | Age: 85
End: 2020-07-22

## 2020-07-22 DIAGNOSIS — S32.301S: Primary | ICD-10-CM

## 2020-07-22 DIAGNOSIS — I49.8 OTHER SPECIFIED CARDIAC ARRHYTHMIAS: Primary | ICD-10-CM

## 2020-07-22 NOTE — TELEPHONE ENCOUNTER
----- Message from Lilly Pike LPN sent at 7/21/2020  4:26 PM CDT -----  Regarding: FW: orders for rolling walker  Contact: Soco cee/ [facility]    ----- Message -----  From: Thelma Ruelas  Sent: 7/21/2020   4:02 PM CDT  To: Ronit MILAN Jr Staff  Subject: orders for rolling walker                        Type: Needs Medical Advice  Who Called:  Soco cee/ [facility]  Best Call Back Number: [phone]  Additional Information: Pls call regarding status of order for rolling walker

## 2020-08-14 ENCOUNTER — CLINICAL SUPPORT (OUTPATIENT)
Dept: CARDIOLOGY | Facility: HOSPITAL | Age: 85
End: 2020-08-14
Attending: INTERNAL MEDICINE
Payer: MEDICARE

## 2020-08-14 DIAGNOSIS — R55 SYNCOPE, UNSPECIFIED SYNCOPE TYPE: ICD-10-CM

## 2020-08-14 DIAGNOSIS — Z95.818 PRESENCE OF OTHER CARDIAC IMPLANTS AND GRAFTS: ICD-10-CM

## 2020-08-14 DIAGNOSIS — I49.8 OTHER SPECIFIED CARDIAC ARRHYTHMIAS: ICD-10-CM

## 2020-08-14 PROCEDURE — 93298 CARDIAC DEVICE CHECK - REMOTE: ICD-10-PCS | Mod: HCNC,,, | Performed by: INTERNAL MEDICINE

## 2020-08-14 PROCEDURE — G2066 INTER DEVC REMOTE 30D: HCPCS | Mod: HCNC | Performed by: INTERNAL MEDICINE

## 2020-08-14 PROCEDURE — 93298 REM INTERROG DEV EVAL SCRMS: CPT | Mod: HCNC,,, | Performed by: INTERNAL MEDICINE

## 2020-08-17 ENCOUNTER — TELEPHONE (OUTPATIENT)
Dept: PHARMACY | Facility: CLINIC | Age: 85
End: 2020-08-17

## 2020-08-17 DIAGNOSIS — I10 ESSENTIAL HYPERTENSION: Chronic | ICD-10-CM

## 2020-08-17 DIAGNOSIS — Z86.73 HISTORY OF CVA (CEREBROVASCULAR ACCIDENT): ICD-10-CM

## 2020-08-17 DIAGNOSIS — I73.9 PAD (PERIPHERAL ARTERY DISEASE): ICD-10-CM

## 2020-08-17 RX ORDER — CARVEDILOL 25 MG/1
25 TABLET ORAL 2 TIMES DAILY WITH MEALS
Qty: 180 TABLET | Refills: 3 | Status: SHIPPED | OUTPATIENT
Start: 2020-08-17 | End: 2021-08-16

## 2020-08-17 RX ORDER — CLOPIDOGREL BISULFATE 75 MG/1
75 TABLET ORAL DAILY
Qty: 90 TABLET | Refills: 3 | Status: SHIPPED | OUTPATIENT
Start: 2020-08-17 | End: 2021-10-19 | Stop reason: SDUPTHER

## 2020-08-17 RX ORDER — CITALOPRAM 20 MG/1
20 TABLET, FILM COATED ORAL DAILY
Qty: 90 TABLET | Refills: 2 | Status: SHIPPED | OUTPATIENT
Start: 2020-08-17 | End: 2021-05-17

## 2020-08-17 RX ORDER — OLMESARTAN MEDOXOMIL 40 MG/1
40 TABLET ORAL DAILY
Qty: 90 TABLET | Refills: 3 | Status: SHIPPED | OUTPATIENT
Start: 2020-08-17 | End: 2021-10-19 | Stop reason: SDUPTHER

## 2020-08-17 RX ORDER — CHLORTHALIDONE 25 MG/1
TABLET ORAL
Qty: 90 TABLET | Refills: 3 | Status: SHIPPED | OUTPATIENT
Start: 2020-08-17 | End: 2020-09-01

## 2020-09-01 ENCOUNTER — HOSPITAL ENCOUNTER (OUTPATIENT)
Dept: RADIOLOGY | Facility: HOSPITAL | Age: 85
Discharge: HOME | End: 2020-09-01
Attending: NURSE PRACTITIONER
Payer: MEDICARE

## 2020-09-01 ENCOUNTER — OFFICE VISIT (OUTPATIENT)
Dept: ORTHOPEDICS | Facility: CLINIC | Age: 85
End: 2020-09-01
Payer: MEDICARE

## 2020-09-01 VITALS
DIASTOLIC BLOOD PRESSURE: 70 MMHG | WEIGHT: 130.06 LBS | SYSTOLIC BLOOD PRESSURE: 178 MMHG | HEIGHT: 64 IN | HEART RATE: 73 BPM | BODY MASS INDEX: 22.2 KG/M2

## 2020-09-01 DIAGNOSIS — S32.591A CLOSED FRACTURE OF MULTIPLE PUBIC RAMI, RIGHT, INITIAL ENCOUNTER: Primary | ICD-10-CM

## 2020-09-01 DIAGNOSIS — R10.2 PAIN IN PELVIS: ICD-10-CM

## 2020-09-01 DIAGNOSIS — R10.2 PAIN IN PELVIS: Primary | ICD-10-CM

## 2020-09-01 PROCEDURE — 1100F PR PT FALLS ASSESS DOC 2+ FALLS/FALL W/INJURY/YR: ICD-10-PCS | Mod: HCNC,CPTII,S$GLB, | Performed by: NURSE PRACTITIONER

## 2020-09-01 PROCEDURE — 99999 PR PBB SHADOW E&M-EST. PATIENT-LVL III: CPT | Mod: PBBFAC,HCNC,, | Performed by: NURSE PRACTITIONER

## 2020-09-01 PROCEDURE — 72190 X-RAY EXAM OF PELVIS: CPT | Mod: 26,HCNC,, | Performed by: RADIOLOGY

## 2020-09-01 PROCEDURE — 3288F FALL RISK ASSESSMENT DOCD: CPT | Mod: HCNC,CPTII,S$GLB, | Performed by: NURSE PRACTITIONER

## 2020-09-01 PROCEDURE — 1159F PR MEDICATION LIST DOCUMENTED IN MEDICAL RECORD: ICD-10-PCS | Mod: HCNC,S$GLB,, | Performed by: NURSE PRACTITIONER

## 2020-09-01 PROCEDURE — 3288F PR FALLS RISK ASSESSMENT DOCUMENTED: ICD-10-PCS | Mod: HCNC,CPTII,S$GLB, | Performed by: NURSE PRACTITIONER

## 2020-09-01 PROCEDURE — 1126F AMNT PAIN NOTED NONE PRSNT: CPT | Mod: HCNC,S$GLB,, | Performed by: NURSE PRACTITIONER

## 2020-09-01 PROCEDURE — 72190 X-RAY EXAM OF PELVIS: CPT | Mod: TC,HCNC

## 2020-09-01 PROCEDURE — 72190 XR PELVIS AP INLET AND OUTLET: ICD-10-PCS | Mod: 26,HCNC,, | Performed by: RADIOLOGY

## 2020-09-01 PROCEDURE — 1126F PR PAIN SEVERITY QUANTIFIED, NO PAIN PRESENT: ICD-10-PCS | Mod: HCNC,S$GLB,, | Performed by: NURSE PRACTITIONER

## 2020-09-01 PROCEDURE — 99213 OFFICE O/P EST LOW 20 MIN: CPT | Mod: HCNC,S$GLB,, | Performed by: NURSE PRACTITIONER

## 2020-09-01 PROCEDURE — 1159F MED LIST DOCD IN RCRD: CPT | Mod: HCNC,S$GLB,, | Performed by: NURSE PRACTITIONER

## 2020-09-01 PROCEDURE — 99999 PR PBB SHADOW E&M-EST. PATIENT-LVL III: ICD-10-PCS | Mod: PBBFAC,HCNC,, | Performed by: NURSE PRACTITIONER

## 2020-09-01 PROCEDURE — 1100F PTFALLS ASSESS-DOCD GE2>/YR: CPT | Mod: HCNC,CPTII,S$GLB, | Performed by: NURSE PRACTITIONER

## 2020-09-01 PROCEDURE — 99213 PR OFFICE/OUTPT VISIT, EST, LEVL III, 20-29 MIN: ICD-10-PCS | Mod: HCNC,S$GLB,, | Performed by: NURSE PRACTITIONER

## 2020-09-01 NOTE — PROGRESS NOTES
SUBJECTIVE:     Chief Complaint & History of Present Illness:  Naheed Cottrell is a Established 87 y.o. year old female patient here for follow up for her right inferior/superior pubic ramus fracture and zone 1 sacral ala fracture due to a fall approximately 7 weeks ago.      Currently, she reports no pain.  She is using a walker when she leaves the home for safety but states she is able to walk unassisted.  She denies falling since last seen.  She is no longer getting therapy.  She reports her lower leg swelling has improved, states cardiologist adjusted some of her medications.        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    Encounter for blood transfusion     Fall, accidental     Hypertension     Right renal artery stenosis 3/28/2017    Stenosis of left subclavian artery        Past Surgical History:   Procedure Laterality Date    EYE SURGERY      FRACTURE SURGERY      INSERTION OF IMPLANTABLE LOOP RECORDER N/A 1/2/2020    Procedure: Insertion, Implantable Loop Recorder;  Surgeon: Anton Muniz MD;  Location: [facility] EP LAB;  Service: Cardiology;  Laterality: N/A;  Syncope, ILR, Bio, Local, SK, 3 Prep       Family History   Problem Relation Age of Onset    Hyperlipidemia Mother     Hypertension Mother        Review of patient's allergies indicates:   Allergen Reactions    Percocet [oxycodone-acetaminophen]      Did not tolerate in past, not allergy         Current Outpatient Medications:     acetaminophen (TYLENOL) 325 MG tablet, Take 2 tablets (650 mg total) by mouth every 6 (six) hours., Disp: , Rfl: 0    carvediloL (COREG) 25 MG tablet, Take 1 tablet (25 mg total) by mouth 2 (two) times daily with meals., Disp: 180 tablet, Rfl: 3    cholecalciferol, vitamin D3, 1,000 unit capsule, Take 1 capsule (1,000 Units total) by mouth once daily., Disp: , Rfl: 0    citalopram  (CELEXA) 20 MG tablet, Take 1 tablet (20 mg total) by mouth once daily., Disp: 90 tablet, Rfl: 2    clopidogreL (PLAVIX) 75 mg tablet, Take 1 tablet (75 mg total) by mouth once daily., Disp: 90 tablet, Rfl: 3    coenzyme Q10 (CO Q-10) 10 mg capsule, Take 10 mg by mouth once daily., Disp: , Rfl:     diclofenac sodium (VOLTAREN) 1 % Gel, Apply 2 g topically 2 (two) times daily., Disp: 1 Tube, Rfl: 3    evolocumab (REPATHA SURECLICK) 140 mg/mL PnIj, Inject 1 mL (140 mg total) into the skin every 14 (fourteen) days., Disp: 6 mL, Rfl: 3    olmesartan (BENICAR) 40 MG tablet, Take 1 tablet (40 mg total) by mouth once daily., Disp: 90 tablet, Rfl: 3    senna-docusate 8.6-50 mg (PERICOLACE) 8.6-50 mg per tablet, Take 1 tablet by mouth 2 (two) times daily., Disp: 30 tablet, Rfl: 1    calcium-vitamin D3 500 mg(1,250mg) -200 unit per tablet, Take 2 tablets by mouth 2 (two) times daily., Disp: , Rfl:     chlorthalidone (HYGROTEN) 25 MG Tab, One half tablet po daily (Patient not taking: Reported on 9/1/2020), Disp: 90 tablet, Rfl: 3    furosemide (LASIX) 20 MG tablet, Take 1 tablet (20 mg total) by mouth once daily. (Patient not taking: Reported on 9/1/2020), Disp: 30 tablet, Rfl: 11    HYDROcodone-acetaminophen (NORCO) 5-325 mg per tablet, Take 1 tablet by mouth every 6 (six) hours as needed for Pain (severe 7-10 scale, or after PT sessions PRN). (Patient not taking: Reported on 7/21/2020), Disp: 30 tablet, Rfl: 0    ondansetron (ZOFRAN-ODT) 8 MG TbDL, Take 1 tablet (8 mg total) by mouth every 8 (eight) hours as needed. (Patient not taking: Reported on 7/21/2020), Disp: 30 tablet, Rfl: 1    polyethylene glycol (GLYCOLAX) 17 gram PwPk, Take 17 g by mouth once daily. (Patient not taking: Reported on 7/21/2020), Disp: 30 packet, Rfl: 1    traMADoL (ULTRAM) 50 mg tablet, Take 1 tablet (50 mg total) by mouth every 6 (six) hours as needed for Pain. (Patient not taking: Reported on 9/1/2020), Disp: 50 tablet, Rfl:  "3    Review of Systems:  ROS:  Constitutional: no fever or chills  Eyes: no visual changes  ENT: no nasal congestion or sore throat  Respiratory: no cough or shortness of breath  Cardiovascular: no chest pain or palpitations  Gastrointestinal: no nausea or vomiting, tolerating diet  Genitourinary: no hematuria or dysuria  Integument/Breast: no rash or pruritis  Hematologic/Lymphatic: no easy bruising or lymphadenopathy  Musculoskeletal: positive for arthralgias  Neurological: no seizures or tremors  Behavioral/Psych: no auditory or visual hallucinations  Endocrine: no heat or cold intolerance      PE:  BP (!) 178/70   Pulse 73   Ht 5' 4" (1.626 m)   Wt 59 kg (130 lb 1.1 oz)   LMP 10/01/1980 (Approximate)   BMI 22.33 kg/m²   General: Pleasant, cooperative, NAD   HEENT: NCAT, sclera nonicteric   Lungs: Respirations are equal and unlabored.   Abdomen: Soft and non-tender.  CV: 2+ bilateral upper and lower extremity pulses.   Skin: Intact throughout LE with no rashes, erythema, or lesions  Extremities: No LE edema, NVI lower extremities      Hip Exam:   rightnormal    90 degrees flexion  0 degrees extension     She has no pain to her lower leg.  She is NVI  PPP x 2  Patient is able to ambulate in exam room without loss of balance.    RADIOGRAPHS:  X-ray of pelvis with inlet/outlet views obtained today and personally reviewed by me.  Her pubic ramus fractures appear stable and in good position.  Callus formation is seen. Hardware to her left hip appears in good position and alignment, no hardware failure noted.    ASSESSMENT/PLAN:       ICD-10-CM ICD-9-CM   1. Closed fracture of multiple pubic rami, right, initial encounter  S32.591A 808.2       Plan: We discussed with the patient at length all the different treatment options available for arthrosis of the hip including anti-inflammatories, acetaminophen, rest, ice, lower extremity strengthening exercise, occasional cortisone injections for temporary relief, and " finally total hip arthroplasty.     -Naheed Cottrell presents to clinic today with c/c right pubic ramus fracture approximately 7 weeks ago  -X-ray as above.  -Continue non-operative management.  -Recommend RICE therapy.  -She can try to transition from walker to standard cane.  She is instructed to try to avoid falling.  -She is not requiring anything for pain, therefore will update medication record.  -Recommend she avoid driving for now until pelvis heals.  We can discuss further at follow up.  -I will see her back in 6 weeks, at time repeat pelvis x-ray with inlet/outlet views.  -Call for questions or concerns.

## 2020-09-02 NOTE — ASSESSMENT & PLAN NOTE
- continue pletal, ASA, plavix   Clipped pages to be sent. In Dr. Reeves's box for review. LMOR with indication of recent US. Also informed mom preparing pages to be faxed to Dr. Bennett. Instructed mom to call with further questions.

## 2020-09-14 ENCOUNTER — TELEPHONE (OUTPATIENT)
Dept: PHARMACY | Facility: CLINIC | Age: 85
End: 2020-09-14

## 2020-09-23 ENCOUNTER — CLINICAL SUPPORT (OUTPATIENT)
Dept: CARDIOLOGY | Facility: HOSPITAL | Age: 85
End: 2020-09-23
Attending: INTERNAL MEDICINE
Payer: MEDICARE

## 2020-09-23 DIAGNOSIS — R55 SYNCOPE, UNSPECIFIED SYNCOPE TYPE: ICD-10-CM

## 2020-09-23 DIAGNOSIS — I49.8 OTHER SPECIFIED CARDIAC ARRHYTHMIAS: ICD-10-CM

## 2020-09-23 DIAGNOSIS — Z95.818 PRESENCE OF OTHER CARDIAC IMPLANTS AND GRAFTS: ICD-10-CM

## 2020-09-23 PROCEDURE — G2066 INTER DEVC REMOTE 30D: HCPCS | Mod: HCNC | Performed by: INTERNAL MEDICINE

## 2020-09-23 PROCEDURE — 93298 CARDIAC DEVICE CHECK - REMOTE: ICD-10-PCS | Mod: HCNC,,, | Performed by: INTERNAL MEDICINE

## 2020-09-23 PROCEDURE — 93298 REM INTERROG DEV EVAL SCRMS: CPT | Mod: HCNC,,, | Performed by: INTERNAL MEDICINE

## 2020-10-06 ENCOUNTER — TELEPHONE (OUTPATIENT)
Dept: ELECTROPHYSIOLOGY | Facility: CLINIC | Age: 85
End: 2020-10-06

## 2020-10-06 NOTE — PROGRESS NOTES
Subjective:    Patient ID:  Naheed Cottrell is a 87 y.o. female who presents for follow-up of Loss of Consciousness      HPI 86 yo female with syncope, Htn, PAD, CVA, anxiety.  She is the mother of Dr. Grimesrick Ronit.    12/29/19 had an episode of syncope while standing after dinner.  ILR implanted 1/2/20.  6/20 admitted with pelvic fracture after fall (not syncope).  Denies syncope.    ECG reveals nsr.  ILR reveals no significant arrhythmias.        Review of Systems   Constitution: Negative. Negative for fever and malaise/fatigue.   HENT: Negative for congestion and sore throat.    Cardiovascular: Negative for chest pain, dyspnea on exertion, irregular heartbeat, leg swelling, near-syncope, orthopnea, palpitations, paroxysmal nocturnal dyspnea and syncope.   Respiratory: Negative for cough and shortness of breath.    Gastrointestinal: Negative for abdominal pain, constipation and diarrhea.   Neurological: Negative for dizziness, light-headedness and weakness.   Psychiatric/Behavioral: Negative for depression. The patient is not nervous/anxious.         Objective:    Physical Exam   Constitutional: She is oriented to person, place, and time. She appears well-developed and well-nourished.   Eyes: Conjunctivae are normal. No scleral icterus.   Neck: No JVD present. No tracheal deviation present.   Cardiovascular: Normal rate and regular rhythm. PMI is not displaced.   Pulmonary/Chest: Effort normal and breath sounds normal. No respiratory distress.   Abdominal: Soft. There is no hepatosplenomegaly. There is no abdominal tenderness.   Musculoskeletal:         General: No tenderness or edema.   Neurological: She is alert and oriented to person, place, and time.   Skin: Skin is warm and dry. No rash noted.   Psychiatric: She has a normal mood and affect. Her behavior is normal.         Assessment:       1. Syncope, unspecified syncope type    2. Essential hypertension    3. PAD (peripheral artery disease)    4. Embolic  stroke involving right middle cerebral artery    5. Anxiety         Plan:             Doing well overall.  No significant arrhythmias.  Continue monitoring as scheduled, f/u with me in one year.

## 2020-10-07 ENCOUNTER — OFFICE VISIT (OUTPATIENT)
Dept: ELECTROPHYSIOLOGY | Facility: CLINIC | Age: 85
End: 2020-10-07
Payer: MEDICARE

## 2020-10-07 ENCOUNTER — HOSPITAL ENCOUNTER (OUTPATIENT)
Dept: CARDIOLOGY | Facility: CLINIC | Age: 85
Discharge: HOME | End: 2020-10-07
Payer: MEDICARE

## 2020-10-07 VITALS
DIASTOLIC BLOOD PRESSURE: 84 MMHG | HEIGHT: 64 IN | SYSTOLIC BLOOD PRESSURE: 160 MMHG | BODY MASS INDEX: 21.64 KG/M2 | WEIGHT: 126.75 LBS | HEART RATE: 66 BPM

## 2020-10-07 DIAGNOSIS — I10 ESSENTIAL HYPERTENSION: Chronic | ICD-10-CM

## 2020-10-07 DIAGNOSIS — I63.411 EMBOLIC STROKE INVOLVING RIGHT MIDDLE CEREBRAL ARTERY: ICD-10-CM

## 2020-10-07 DIAGNOSIS — I73.9 PAD (PERIPHERAL ARTERY DISEASE): ICD-10-CM

## 2020-10-07 DIAGNOSIS — I49.8 OTHER SPECIFIED CARDIAC ARRHYTHMIAS: ICD-10-CM

## 2020-10-07 DIAGNOSIS — R55 SYNCOPE, UNSPECIFIED SYNCOPE TYPE: Primary | ICD-10-CM

## 2020-10-07 DIAGNOSIS — F41.9 ANXIETY: ICD-10-CM

## 2020-10-07 PROCEDURE — 93005 ELECTROCARDIOGRAM TRACING: CPT | Mod: HCNC,S$GLB,, | Performed by: INTERNAL MEDICINE

## 2020-10-07 PROCEDURE — 99999 PR PBB SHADOW E&M-EST. PATIENT-LVL III: ICD-10-PCS | Mod: PBBFAC,HCNC,, | Performed by: INTERNAL MEDICINE

## 2020-10-07 PROCEDURE — 1101F PT FALLS ASSESS-DOCD LE1/YR: CPT | Mod: HCNC,CPTII,S$GLB, | Performed by: INTERNAL MEDICINE

## 2020-10-07 PROCEDURE — 99499 RISK ADDL DX/OHS AUDIT: ICD-10-PCS | Mod: S$GLB,,, | Performed by: INTERNAL MEDICINE

## 2020-10-07 PROCEDURE — 99499 UNLISTED E&M SERVICE: CPT | Mod: S$GLB,,, | Performed by: INTERNAL MEDICINE

## 2020-10-07 PROCEDURE — 99213 PR OFFICE/OUTPT VISIT, EST, LEVL III, 20-29 MIN: ICD-10-PCS | Mod: HCNC,S$GLB,, | Performed by: INTERNAL MEDICINE

## 2020-10-07 PROCEDURE — 93010 ELECTROCARDIOGRAM REPORT: CPT | Mod: HCNC,S$GLB,, | Performed by: INTERNAL MEDICINE

## 2020-10-07 PROCEDURE — 1126F AMNT PAIN NOTED NONE PRSNT: CPT | Mod: HCNC,S$GLB,, | Performed by: INTERNAL MEDICINE

## 2020-10-07 PROCEDURE — 1159F MED LIST DOCD IN RCRD: CPT | Mod: HCNC,S$GLB,, | Performed by: INTERNAL MEDICINE

## 2020-10-07 PROCEDURE — 1101F PR PT FALLS ASSESS DOC 0-1 FALLS W/OUT INJ PAST YR: ICD-10-PCS | Mod: HCNC,CPTII,S$GLB, | Performed by: INTERNAL MEDICINE

## 2020-10-07 PROCEDURE — 99999 PR PBB SHADOW E&M-EST. PATIENT-LVL III: CPT | Mod: PBBFAC,HCNC,, | Performed by: INTERNAL MEDICINE

## 2020-10-07 PROCEDURE — 93010 RHYTHM STRIP: ICD-10-PCS | Mod: HCNC,S$GLB,, | Performed by: INTERNAL MEDICINE

## 2020-10-07 PROCEDURE — 99213 OFFICE O/P EST LOW 20 MIN: CPT | Mod: HCNC,S$GLB,, | Performed by: INTERNAL MEDICINE

## 2020-10-07 PROCEDURE — 1126F PR PAIN SEVERITY QUANTIFIED, NO PAIN PRESENT: ICD-10-PCS | Mod: HCNC,S$GLB,, | Performed by: INTERNAL MEDICINE

## 2020-10-07 PROCEDURE — 1159F PR MEDICATION LIST DOCUMENTED IN MEDICAL RECORD: ICD-10-PCS | Mod: HCNC,S$GLB,, | Performed by: INTERNAL MEDICINE

## 2020-10-07 PROCEDURE — 93005 RHYTHM STRIP: ICD-10-PCS | Mod: HCNC,S$GLB,, | Performed by: INTERNAL MEDICINE

## 2020-10-07 RX ORDER — CHLORTHALIDONE 25 MG/1
25 TABLET ORAL DAILY
COMMUNITY
End: 2021-01-15 | Stop reason: SDUPTHER

## 2020-10-07 RX ORDER — ASPIRIN 81 MG/1
81 TABLET ORAL DAILY
COMMUNITY

## 2020-10-08 ENCOUNTER — TELEPHONE (OUTPATIENT)
Dept: PHARMACY | Facility: CLINIC | Age: 85
End: 2020-10-08

## 2020-10-08 NOTE — TELEPHONE ENCOUNTER
Repatha refill confirmed. Repatha refill will ship 10/14 for delivery 10/15 $118.13 -004. Patient reports 0 injections on hand, dose due 10/18 (injections every other Sunday). Patient reports no new medications, allergies, or health conditions at this time. Patient reports on side effects, inject site reactions, or missed doses at this time. Patient had no questions or concerns.

## 2020-10-13 ENCOUNTER — HOSPITAL ENCOUNTER (OUTPATIENT)
Dept: RADIOLOGY | Facility: HOSPITAL | Age: 85
Discharge: HOME | End: 2020-10-13
Attending: NURSE PRACTITIONER
Payer: MEDICARE

## 2020-10-13 ENCOUNTER — OFFICE VISIT (OUTPATIENT)
Dept: ORTHOPEDICS | Facility: CLINIC | Age: 85
End: 2020-10-13
Payer: MEDICARE

## 2020-10-13 VITALS — BODY MASS INDEX: 21.91 KG/M2 | WEIGHT: 128.31 LBS | HEIGHT: 64 IN

## 2020-10-13 DIAGNOSIS — R10.2 PAIN IN PELVIS: ICD-10-CM

## 2020-10-13 DIAGNOSIS — R10.2 PAIN IN PELVIS: Primary | ICD-10-CM

## 2020-10-13 DIAGNOSIS — S32.591D FRACTURE OF MULTIPLE PUBIC RAMI, RIGHT, WITH ROUTINE HEALING, SUBSEQUENT ENCOUNTER: Primary | ICD-10-CM

## 2020-10-13 PROCEDURE — 1126F AMNT PAIN NOTED NONE PRSNT: CPT | Mod: HCNC,S$GLB,, | Performed by: NURSE PRACTITIONER

## 2020-10-13 PROCEDURE — 1100F PR PT FALLS ASSESS DOC 2+ FALLS/FALL W/INJURY/YR: ICD-10-PCS | Mod: HCNC,CPTII,S$GLB, | Performed by: NURSE PRACTITIONER

## 2020-10-13 PROCEDURE — 3288F PR FALLS RISK ASSESSMENT DOCUMENTED: ICD-10-PCS | Mod: HCNC,CPTII,S$GLB, | Performed by: NURSE PRACTITIONER

## 2020-10-13 PROCEDURE — 1159F PR MEDICATION LIST DOCUMENTED IN MEDICAL RECORD: ICD-10-PCS | Mod: HCNC,S$GLB,, | Performed by: NURSE PRACTITIONER

## 2020-10-13 PROCEDURE — 3288F FALL RISK ASSESSMENT DOCD: CPT | Mod: HCNC,CPTII,S$GLB, | Performed by: NURSE PRACTITIONER

## 2020-10-13 PROCEDURE — 72190 X-RAY EXAM OF PELVIS: CPT | Mod: 26,HCNC,, | Performed by: RADIOLOGY

## 2020-10-13 PROCEDURE — 99999 PR PBB SHADOW E&M-EST. PATIENT-LVL III: CPT | Mod: PBBFAC,HCNC,, | Performed by: NURSE PRACTITIONER

## 2020-10-13 PROCEDURE — 99213 PR OFFICE/OUTPT VISIT, EST, LEVL III, 20-29 MIN: ICD-10-PCS | Mod: HCNC,S$GLB,, | Performed by: NURSE PRACTITIONER

## 2020-10-13 PROCEDURE — 1100F PTFALLS ASSESS-DOCD GE2>/YR: CPT | Mod: HCNC,CPTII,S$GLB, | Performed by: NURSE PRACTITIONER

## 2020-10-13 PROCEDURE — 99213 OFFICE O/P EST LOW 20 MIN: CPT | Mod: HCNC,S$GLB,, | Performed by: NURSE PRACTITIONER

## 2020-10-13 PROCEDURE — 72190 X-RAY EXAM OF PELVIS: CPT | Mod: TC,HCNC

## 2020-10-13 PROCEDURE — 1159F MED LIST DOCD IN RCRD: CPT | Mod: HCNC,S$GLB,, | Performed by: NURSE PRACTITIONER

## 2020-10-13 PROCEDURE — 1126F PR PAIN SEVERITY QUANTIFIED, NO PAIN PRESENT: ICD-10-PCS | Mod: HCNC,S$GLB,, | Performed by: NURSE PRACTITIONER

## 2020-10-13 PROCEDURE — 72190 XR PELVIS AP INLET AND OUTLET: ICD-10-PCS | Mod: 26,HCNC,, | Performed by: RADIOLOGY

## 2020-10-13 PROCEDURE — 99999 PR PBB SHADOW E&M-EST. PATIENT-LVL III: ICD-10-PCS | Mod: PBBFAC,HCNC,, | Performed by: NURSE PRACTITIONER

## 2020-10-13 NOTE — PROGRESS NOTES
SUBJECTIVE:     Chief Complaint & History of Present Illness:  Naheed Cottrell is a Established 87 y.o. year old female patient here for follow up for her right inferior/superior pubic ramus fracture and zone 1 sacral ala fracture due to a fall.      Currently, she reports no pain.  She is using a straight cane to ambulate.  Son admits she is driving short distances.  She denies falling since last seen.  She is no longer getting therapy.  No fever or chills and no reports of numbness or tingling sensation to her lower legs.       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    Encounter for blood transfusion     Fall, accidental     Hypertension     Right renal artery stenosis 3/28/2017    Stenosis of left subclavian artery        Past Surgical History:   Procedure Laterality Date    EYE SURGERY      FRACTURE SURGERY      INSERTION OF IMPLANTABLE LOOP RECORDER N/A 1/2/2020    Procedure: Insertion, Implantable Loop Recorder;  Surgeon: Anton Muniz MD;  Location: [facility] EP LAB;  Service: Cardiology;  Laterality: N/A;  Syncope, ILR, Bio, Local, SK, 3 Prep       Family History   Problem Relation Age of Onset    Hyperlipidemia Mother     Hypertension Mother        Review of patient's allergies indicates:   Allergen Reactions    Percocet [oxycodone-acetaminophen]      Did not tolerate in past, not allergy         Current Outpatient Medications:     acetaminophen (TYLENOL) 325 MG tablet, Take 2 tablets (650 mg total) by mouth every 6 (six) hours. (Patient not taking: Reported on 10/7/2020), Disp: , Rfl: 0    aspirin (ECOTRIN) 81 MG EC tablet, Take 81 mg by mouth once daily., Disp: , Rfl:     calcium-vitamin D3 500 mg(1,250mg) -200 unit per tablet, Take 2 tablets by mouth 2 (two) times daily., Disp: , Rfl:     carvediloL (COREG) 25 MG tablet, Take 1 tablet (25 mg total) by mouth 2 (two)  times daily with meals., Disp: 180 tablet, Rfl: 3    chlorthalidone (HYGROTEN) 25 MG Tab, Take 25 mg by mouth once daily., Disp: , Rfl:     cholecalciferol, vitamin D3, 1,000 unit capsule, Take 1 capsule (1,000 Units total) by mouth once daily., Disp: , Rfl: 0    citalopram (CELEXA) 20 MG tablet, Take 1 tablet (20 mg total) by mouth once daily., Disp: 90 tablet, Rfl: 2    clopidogreL (PLAVIX) 75 mg tablet, Take 1 tablet (75 mg total) by mouth once daily., Disp: 90 tablet, Rfl: 3    coenzyme Q10 (CO Q-10) 10 mg capsule, Take 10 mg by mouth once daily., Disp: , Rfl:     diclofenac sodium (VOLTAREN) 1 % Gel, Apply 2 g topically 2 (two) times daily., Disp: 1 Tube, Rfl: 3    evolocumab (REPATHA SURECLICK) 140 mg/mL PnIj, Inject 1 mL (140 mg total) into the skin every 14 (fourteen) days., Disp: 6 mL, Rfl: 3    olmesartan (BENICAR) 40 MG tablet, Take 1 tablet (40 mg total) by mouth once daily., Disp: 90 tablet, Rfl: 3    senna-docusate 8.6-50 mg (PERICOLACE) 8.6-50 mg per tablet, Take 1 tablet by mouth 2 (two) times daily. (Patient not taking: Reported on 10/7/2020), Disp: 30 tablet, Rfl: 1    Review of Systems:  ROS:  Constitutional: no fever or chills  Eyes: no visual changes  ENT: no nasal congestion or sore throat  Respiratory: no cough or shortness of breath  Cardiovascular: no chest pain or palpitations  Gastrointestinal: no nausea or vomiting, tolerating diet  Genitourinary: no hematuria or dysuria  Integument/Breast: no rash or pruritis  Hematologic/Lymphatic: no easy bruising or lymphadenopathy  Musculoskeletal: positive for arthralgias  Neurological: no seizures or tremors  Behavioral/Psych: no auditory or visual hallucinations  Endocrine: no heat or cold intolerance      PE:  LMP 10/01/1980 (Approximate)   General: Pleasant, cooperative, NAD   HEENT: NCAT, sclera nonicteric   Lungs: Respirations are equal and unlabored.   Abdomen: Soft and non-tender.  CV: 2+ bilateral upper and lower extremity pulses.    Skin: Intact throughout LE with no rashes, erythema, or lesions  Extremities: No LE edema, NVI lower extremities      Hip Exam:   rightnormal    90 degrees flexion  0 degrees extension     She has no pain to her lower leg.  She is NVI  PPP x 2  Patient is able to ambulate in exam room without loss of balance.    RADIOGRAPHS:  X-ray of pelvis with inlet/outlet views obtained today and personally reviewed by me.  Her pubic ramus fractures appears to be healing and remains in good position.  Callus formation is seen. Hardware to her left hip appears in good position and alignment, no hardware failure noted.    ASSESSMENT/PLAN:       ICD-10-CM ICD-9-CM   1. Fracture of multiple pubic rami, right, with routine healing, subsequent encounter  S32.591D V54.19       Plan: We discussed with the patient at length all the different treatment options available for arthrosis of the hip including anti-inflammatories, acetaminophen, rest, ice, lower extremity strengthening exercise, occasional cortisone injections for temporary relief, and finally total hip arthroplasty.     -Naheed Cottrell presents to clinic today with c/c right pubic ramus fracture due to fall on 6/25/2020  -X-ray as above.  -Continue non-operative management.  -Recommend RICE therapy.  -She can resume driving but caution her to avoid long distance driving.  -She can continue weight bear as tolerated.  -No need for any further therapy at this point.  -She can follow up in Ortho PRN.  She can call if pain in her right groin develops or she falls.  -Call for questions or concerns.

## 2020-10-23 ENCOUNTER — CLINICAL SUPPORT (OUTPATIENT)
Dept: CARDIOLOGY | Facility: HOSPITAL | Age: 85
End: 2020-10-23
Payer: MEDICARE

## 2020-10-23 DIAGNOSIS — Z95.818 PRESENCE OF OTHER CARDIAC IMPLANTS AND GRAFTS: ICD-10-CM

## 2020-10-23 PROCEDURE — 93298 REM INTERROG DEV EVAL SCRMS: CPT | Mod: HCNC,,, | Performed by: INTERNAL MEDICINE

## 2020-10-23 PROCEDURE — 93298 CARDIAC DEVICE CHECK - REMOTE: ICD-10-PCS | Mod: HCNC,,, | Performed by: INTERNAL MEDICINE

## 2020-10-23 PROCEDURE — G2066 INTER DEVC REMOTE 30D: HCPCS | Mod: HCNC | Performed by: INTERNAL MEDICINE

## 2020-10-28 RX ORDER — DICLOFENAC SODIUM 10 MG/G
2 GEL TOPICAL 2 TIMES DAILY
Qty: 1 TUBE | Refills: 3 | Status: SHIPPED | OUTPATIENT
Start: 2020-10-28

## 2020-11-10 ENCOUNTER — SPECIALTY PHARMACY (OUTPATIENT)
Dept: PHARMACY | Facility: CLINIC | Age: 85
End: 2020-11-10

## 2020-11-10 DIAGNOSIS — E78.5 DYSLIPIDEMIA: Primary | ICD-10-CM

## 2020-11-10 NOTE — TELEPHONE ENCOUNTER
Specialty Pharmacy - Refill Coordination    Specialty Medication Orders Linked to Encounter      Most Recent Value   Medication #1  evolocumab (REPATHA SURECLICK) 140 mg/mL PnIj (Order#[id number], Rx#[id number])          Refill Questions - Documented Responses      Most Recent Value   Relationship to patient of person spoken to?  Self   HIPAA/medical authority confirmed?  Yes   Any changes in contact preferences or allowed representatives?  No   Has the patient had any insurance changes?  No   Has the patient had any changes to specialty medication, dose, or instructions?  No   Has the patient started taking any new medications, herbals, or supplements?  No   Has the patient been diagnosed with any new medical conditions?  No   Does the patient have any new allergies to medications or foods?  No   Does the patient have any concerns about side effects?  No   Can the patient store medication/sharps container properly (at the correct temperature, away from children/pets, etc.)?  Yes   Can the patient call emergency services (861) in the event of an emergency?  Yes   Does the patient have any concerns or questions about taking or administering this medication as prescribed?  No   How many doses did the patient miss in the past 4 weeks or since the last fill?  0   How many doses does the patient have on hand?  0   Does the number of doses/days supply remaining match pharmacy expected amounts?  Yes   Does the patient feel that this medication is effective?  Yes   During the past 4 weeks, has patient missed any activities due to condition or medication?  No   During the past 4 weeks, did patient have any of the following urgent care visits?  None   How will the patient receive the medication?  Mail   When does the patient need to receive the medication?  11/15/20   Shipping Address  Home   Address in [facility] confirmed and updated if neccessary?  Yes   Expected Copay ($)  118.13   Is the patient able to afford the  medication copay?  Yes   Payment Method  CC on file   Days supply of Refill  28   Would patient like to speak to a pharmacist?  No   Do you want to trigger an intervention?  No   Do you want to trigger an additional referral task?  No   Refill activity completed?  Yes   Refill activity plan  Refill scheduled   Shipment/Pickup Date:  11/11/20          Current Outpatient Medications   Medication Sig    acetaminophen (TYLENOL) 325 MG tablet Take 2 tablets (650 mg total) by mouth every 6 (six) hours.    aspirin (ECOTRIN) 81 MG EC tablet Take 81 mg by mouth once daily.    calcium-vitamin D3 500 mg(1,250mg) -200 unit per tablet Take 2 tablets by mouth 2 (two) times daily.    carvediloL (COREG) 25 MG tablet Take 1 tablet (25 mg total) by mouth 2 (two) times daily with meals.    chlorthalidone (HYGROTEN) 25 MG Tab Take 25 mg by mouth once daily.    cholecalciferol, vitamin D3, 1,000 unit capsule Take 1 capsule (1,000 Units total) by mouth once daily.    citalopram (CELEXA) 20 MG tablet Take 1 tablet (20 mg total) by mouth once daily.    clopidogreL (PLAVIX) 75 mg tablet Take 1 tablet (75 mg total) by mouth once daily.    coenzyme Q10 (CO Q-10) 10 mg capsule Take 10 mg by mouth once daily.    diclofenac sodium (VOLTAREN) 1 % Gel Apply 2 g topically 2 (two) times daily.    evolocumab (REPATHA SURECLICK) 140 mg/mL PnIj Inject 1 mL (140 mg total) into the skin every 14 (fourteen) days.    olmesartan (BENICAR) 40 MG tablet Take 1 tablet (40 mg total) by mouth once daily.    senna-docusate 8.6-50 mg (PERICOLACE) 8.6-50 mg per tablet Take 1 tablet by mouth 2 (two) times daily. (Patient not taking: Reported on 10/7/2020)   Last reviewed on 11/10/2020  4:42 PM by Joanie Thompson    Review of patient's allergies indicates:   Allergen Reactions    Percocet [oxycodone-acetaminophen]      Did not tolerate in past, not allergy    Last reviewed on  11/10/2020 4:41 PM by Joanie Thompson      Tasks added this encounter    12/3/2020 - Refill Call (Auto Added)   Tasks due within next 3 months   No tasks due.     Joanie Thompson  [facility]  [address] [place] [id number]  Phone: [phone]  Fax: [phone]

## 2020-11-27 ENCOUNTER — TELEPHONE (OUTPATIENT)
Dept: CARDIOLOGY | Facility: CLINIC | Age: 85
End: 2020-11-27

## 2020-11-27 DIAGNOSIS — E78.5 DYSLIPIDEMIA: Primary | Chronic | ICD-10-CM

## 2020-11-27 DIAGNOSIS — I10 ESSENTIAL HYPERTENSION: Chronic | ICD-10-CM

## 2020-11-27 DIAGNOSIS — D64.9 NORMOCYTIC ANEMIA: Primary | ICD-10-CM

## 2020-11-27 NOTE — TELEPHONE ENCOUNTER
----- Message from Jaimie Marrero sent at 11/27/2020  9:47 AM CST -----  Type:  Needs Medical Advice    Who Called: Patient  Reason for call: Would like to have orders placed for labs before appointment scheduled on 12/2  Would the patient rather a call back or a response via [facility]? callback  Best Call Back Number: [phone]  Additional Information:

## 2020-11-27 NOTE — TELEPHONE ENCOUNTER
Returned patient's call and told her we will ask Dr Braswell what labs he wants to do and we will call her back to schedule  (she wants labs done at [facility]).      Dr Braswell - Please let me know what labs you want done before the visit and put orders in. I will call her to schedule. Thanks - Zahraa.      Jaimie GONSALVES Staff   Caller: Unspecified (Today,  9:47 AM)             Type:  Needs Medical Advice     Who Called: Patient   Reason for call: Would like to have orders placed for labs before appointment scheduled on 12/2   Would the patient rather a call back or a response via [facility]? callback   Best Call Back Number: [phone]   Additional Information:

## 2020-11-30 ENCOUNTER — LAB VISIT (OUTPATIENT)
Dept: LAB | Facility: HOSPITAL | Age: 85
End: 2020-11-30
Attending: INTERNAL MEDICINE
Payer: MEDICARE

## 2020-11-30 DIAGNOSIS — E78.5 DYSLIPIDEMIA: Chronic | ICD-10-CM

## 2020-11-30 DIAGNOSIS — I10 ESSENTIAL HYPERTENSION: Chronic | ICD-10-CM

## 2020-11-30 DIAGNOSIS — D64.9 NORMOCYTIC ANEMIA: ICD-10-CM

## 2020-11-30 LAB
ALBUMIN SERPL BCP-MCNC: 3.5 G/DL (ref 3.5–5.2)
ALP SERPL-CCNC: 76 U/L (ref 55–135)
ALT SERPL W/O P-5'-P-CCNC: 10 U/L (ref 10–44)
ANION GAP SERPL CALC-SCNC: 9 MMOL/L (ref 8–16)
AST SERPL-CCNC: 17 U/L (ref 10–40)
BASOPHILS # BLD AUTO: 0.04 K/UL (ref 0–0.2)
BASOPHILS NFR BLD: 0.8 % (ref 0–1.9)
BILIRUB SERPL-MCNC: 0.3 MG/DL (ref 0.1–1)
BUN SERPL-MCNC: 23 MG/DL (ref 8–23)
CALCIUM SERPL-MCNC: 9.2 MG/DL (ref 8.7–10.5)
CHLORIDE SERPL-SCNC: 107 MMOL/L (ref 95–110)
CHOLEST SERPL-MCNC: 129 MG/DL (ref 120–199)
CHOLEST/HDLC SERPL: 2.9 {RATIO} (ref 2–5)
CO2 SERPL-SCNC: 27 MMOL/L (ref 23–29)
CREAT SERPL-MCNC: 0.8 MG/DL (ref 0.5–1.4)
DIFFERENTIAL METHOD: ABNORMAL
EOSINOPHIL # BLD AUTO: 0.1 K/UL (ref 0–0.5)
EOSINOPHIL NFR BLD: 1.4 % (ref 0–8)
ERYTHROCYTE [DISTWIDTH] IN BLOOD BY AUTOMATED COUNT: 15.7 % (ref 11.5–14.5)
EST. GFR  (AFRICAN AMERICAN): >60 ML/MIN/1.73 M^2
EST. GFR  (NON AFRICAN AMERICAN): >60 ML/MIN/1.73 M^2
GLUCOSE SERPL-MCNC: 91 MG/DL (ref 70–110)
HCT VFR BLD AUTO: 40 % (ref 37–48.5)
HDLC SERPL-MCNC: 45 MG/DL (ref 40–75)
HDLC SERPL: 34.9 % (ref 20–50)
HGB BLD-MCNC: 12.2 G/DL (ref 12–16)
IMM GRANULOCYTES # BLD AUTO: 0.01 K/UL (ref 0–0.04)
IMM GRANULOCYTES NFR BLD AUTO: 0.2 % (ref 0–0.5)
LDLC SERPL CALC-MCNC: 52.6 MG/DL (ref 63–159)
LYMPHOCYTES # BLD AUTO: 1.3 K/UL (ref 1–4.8)
LYMPHOCYTES NFR BLD: 26.6 % (ref 18–48)
MCH RBC QN AUTO: 29.6 PG (ref 27–31)
MCHC RBC AUTO-ENTMCNC: 30.5 G/DL (ref 32–36)
MCV RBC AUTO: 97 FL (ref 82–98)
MONOCYTES # BLD AUTO: 0.4 K/UL (ref 0.3–1)
MONOCYTES NFR BLD: 8.2 % (ref 4–15)
NEUTROPHILS # BLD AUTO: 3 K/UL (ref 1.8–7.7)
NEUTROPHILS NFR BLD: 62.8 % (ref 38–73)
NONHDLC SERPL-MCNC: 84 MG/DL
NRBC BLD-RTO: 0 /100 WBC
PLATELET # BLD AUTO: 196 K/UL (ref 150–350)
PMV BLD AUTO: 10.5 FL (ref 9.2–12.9)
POTASSIUM SERPL-SCNC: 4.2 MMOL/L (ref 3.5–5.1)
PROT SERPL-MCNC: 6.9 G/DL (ref 6–8.4)
RBC # BLD AUTO: 4.12 M/UL (ref 4–5.4)
SODIUM SERPL-SCNC: 143 MMOL/L (ref 136–145)
TRIGL SERPL-MCNC: 157 MG/DL (ref 30–150)
WBC # BLD AUTO: 4.85 K/UL (ref 3.9–12.7)

## 2020-11-30 PROCEDURE — 36415 COLL VENOUS BLD VENIPUNCTURE: CPT | Mod: HCNC,PO

## 2020-11-30 PROCEDURE — 85025 COMPLETE CBC W/AUTO DIFF WBC: CPT | Mod: HCNC

## 2020-11-30 PROCEDURE — 80053 COMPREHEN METABOLIC PANEL: CPT | Mod: HCNC

## 2020-11-30 PROCEDURE — 80061 LIPID PANEL: CPT | Mod: HCNC

## 2020-12-02 ENCOUNTER — OFFICE VISIT (OUTPATIENT)
Dept: CARDIOLOGY | Facility: CLINIC | Age: 85
End: 2020-12-02
Payer: MEDICARE

## 2020-12-02 VITALS
SYSTOLIC BLOOD PRESSURE: 152 MMHG | HEART RATE: 60 BPM | WEIGHT: 128.06 LBS | BODY MASS INDEX: 22.69 KG/M2 | DIASTOLIC BLOOD PRESSURE: 84 MMHG | HEIGHT: 63 IN | OXYGEN SATURATION: 97 %

## 2020-12-02 DIAGNOSIS — I10 ESSENTIAL HYPERTENSION: Primary | Chronic | ICD-10-CM

## 2020-12-02 DIAGNOSIS — I63.411 EMBOLIC STROKE INVOLVING RIGHT MIDDLE CEREBRAL ARTERY: ICD-10-CM

## 2020-12-02 DIAGNOSIS — I70.1 RIGHT RENAL ARTERY STENOSIS: ICD-10-CM

## 2020-12-02 DIAGNOSIS — I77.1 SUBCLAVIAN ARTERY STENOSIS, LEFT: ICD-10-CM

## 2020-12-02 DIAGNOSIS — I73.9 PAD (PERIPHERAL ARTERY DISEASE): ICD-10-CM

## 2020-12-02 DIAGNOSIS — E78.5 DYSLIPIDEMIA: Chronic | ICD-10-CM

## 2020-12-02 PROCEDURE — 1159F MED LIST DOCD IN RCRD: CPT | Mod: HCNC,S$GLB,, | Performed by: INTERNAL MEDICINE

## 2020-12-02 PROCEDURE — 1126F PR PAIN SEVERITY QUANTIFIED, NO PAIN PRESENT: ICD-10-PCS | Mod: HCNC,S$GLB,, | Performed by: INTERNAL MEDICINE

## 2020-12-02 PROCEDURE — 1126F AMNT PAIN NOTED NONE PRSNT: CPT | Mod: HCNC,S$GLB,, | Performed by: INTERNAL MEDICINE

## 2020-12-02 PROCEDURE — 99214 PR OFFICE/OUTPT VISIT, EST, LEVL IV, 30-39 MIN: ICD-10-PCS | Mod: HCNC,S$GLB,, | Performed by: INTERNAL MEDICINE

## 2020-12-02 PROCEDURE — 99999 PR PBB SHADOW E&M-EST. PATIENT-LVL V: ICD-10-PCS | Mod: PBBFAC,HCNC,, | Performed by: INTERNAL MEDICINE

## 2020-12-02 PROCEDURE — 99214 OFFICE O/P EST MOD 30 MIN: CPT | Mod: HCNC,S$GLB,, | Performed by: INTERNAL MEDICINE

## 2020-12-02 PROCEDURE — 99999 PR PBB SHADOW E&M-EST. PATIENT-LVL V: CPT | Mod: PBBFAC,HCNC,, | Performed by: INTERNAL MEDICINE

## 2020-12-02 PROCEDURE — 1159F PR MEDICATION LIST DOCUMENTED IN MEDICAL RECORD: ICD-10-PCS | Mod: HCNC,S$GLB,, | Performed by: INTERNAL MEDICINE

## 2020-12-02 NOTE — PROGRESS NOTES
Subjective:   Patient ID:  Naheed Cottrell is a 87 y.o. female who presents for follow-up of Essential hypertension (14 month f/u )      HPI:   Naheed Cottrell presents for follow up of hypertension.  Clinic blood pressures have been in the 150 range or higher.  She has not been diligent in checking her blood pressure at home.  She has known right renal artery stent doses with preserved kidney function.  Patient also has PAD stating that issue walks 1-2 blocks her legs will become tired.  She has left subclavian stenosis with no recent dizziness or syncope.  Patient has dyslipidemia with LDL at goal on Praluent.Naheed Cottrell denies chest pain, shortness of breath, palpitations, presyncope , or syncope.   Review of Systems   Constitution: Negative for malaise/fatigue, weight gain and weight loss.   Eyes: Negative for blurred vision.   Cardiovascular: Positive for claudication. Negative for chest pain, cyanosis, dyspnea on exertion, irregular heartbeat, leg swelling, near-syncope, orthopnea, palpitations, paroxysmal nocturnal dyspnea and syncope.   Respiratory: Negative for cough, shortness of breath and wheezing.    Skin:             Alopecia    Musculoskeletal: Positive for joint pain. Negative for falls and myalgias.   Gastrointestinal: Negative for abdominal pain, heartburn, nausea and vomiting.   Genitourinary: Negative for nocturia.   Neurological: Positive for loss of balance. Negative for brief paralysis, dizziness, focal weakness, headaches, numbness, paresthesias and weakness.   Psychiatric/Behavioral: Negative for altered mental status.       Current Outpatient Medications   Medication Sig    acetaminophen (TYLENOL) 325 MG tablet Take 2 tablets (650 mg total) by mouth every 6 (six) hours.    aspirin (ECOTRIN) 81 MG EC tablet Take 81 mg by mouth once daily.    carvediloL (COREG) 25 MG tablet Take 1 tablet (25 mg total) by mouth 2 (two) times daily with meals.    chlorthalidone (HYGROTEN) 25 MG Tab  "Take 25 mg by mouth once daily.    cholecalciferol, vitamin D3, 1,000 unit capsule Take 1 capsule (1,000 Units total) by mouth once daily.    citalopram (CELEXA) 20 MG tablet Take 1 tablet (20 mg total) by mouth once daily.    clopidogreL (PLAVIX) 75 mg tablet Take 1 tablet (75 mg total) by mouth once daily.    coenzyme Q10 (CO Q-10) 10 mg capsule Take 10 mg by mouth once daily.    diclofenac sodium (VOLTAREN) 1 % Gel Apply 2 g topically 2 (two) times daily.    evolocumab (REPATHA SURECLICK) 140 mg/mL PnIj Inject 1 mL (140 mg total) into the skin every 14 (fourteen) days.    multivitamin capsule Take 1 capsule by mouth once daily.    olmesartan (BENICAR) 40 MG tablet Take 1 tablet (40 mg total) by mouth once daily.    calcium-vitamin D3 500 mg(1,250mg) -200 unit per tablet Take 2 tablets by mouth 2 (two) times daily.    flu vac 2020 65up-[id number],PF, (FLUAD QUAD 2020-21,65Y UP,,PF,) 60 mcg (15 mcg x 4)/0.5 mL Syrg Inject 0.5 mLs into the muscle once. for 1 dose    senna-docusate 8.6-50 mg (PERICOLACE) 8.6-50 mg per tablet Take 1 tablet by mouth 2 (two) times daily. (Patient not taking: Reported on 12/2/2020)     No current facility-administered medications for this visit.      Objective:   Physical Exam   Constitutional: She is oriented to person, place, and time. She appears well-developed. No distress.   BP (!) 152/84 (BP Location: Right arm, Patient Position: Sitting, BP Method: Medium (Manual))   Pulse 60   Ht 5' 3" (1.6 m)   Wt 58.1 kg (128 lb 1.4 oz)   LMP 10/01/1980 (Approximate)   SpO2 97%   BMI 22.69 kg/m²    HENT:   Head: Normocephalic.   Eyes: Pupils are equal, round, and reactive to light. Conjunctivae are normal. No scleral icterus.   Neck: Neck supple. No JVD present. No thyromegaly present.   Cardiovascular: Normal rate, regular rhythm and intact distal pulses. PMI is not displaced. Exam reveals no gallop and no friction rub.   Murmur heard.   Medium-pitched midsystolic murmur is " present with a grade of 1/6 at the upper right sternal border and upper left sternal border.  Pulses:       Radial pulses are 2+ on the right side and 0 on the left side.        Dorsalis pedis pulses are 0 on the right side and 0 on the left side.        Posterior tibial pulses are 0 on the right side and 0 on the left side.   Pulmonary/Chest: Effort normal and breath sounds normal. No respiratory distress. She has no wheezes. She has no rales.   Abdominal: Soft. She exhibits no distension. There is no splenomegaly or hepatomegaly. There is no abdominal tenderness.   Musculoskeletal:         General: No tenderness or edema.      Comments: Gait normal   Neurological: She is alert and oriented to person, place, and time.   Skin: Skin is warm and dry. She is not diaphoretic.   Psychiatric: She has a normal mood and affect. Her behavior is normal.       Lab Results   Component Value Date     11/30/2020    K 4.2 11/30/2020     11/30/2020    CO2 27 11/30/2020    BUN 23 11/30/2020    CREATININE 0.8 11/30/2020    GLU 91 11/30/2020    HGBA1C 5.4 12/30/2019    MG 1.6 12/30/2019    AST 17 11/30/2020    ALT 10 11/30/2020    ALBUMIN 3.5 11/30/2020    PROT 6.9 11/30/2020    BILITOT 0.3 11/30/2020    WBC 4.85 11/30/2020    HGB 12.2 11/30/2020    HCT 40.0 11/30/2020    MCV 97 11/30/2020     11/30/2020    TSH 1.743 10/16/2016    CHOL 129 11/30/2020    HDL 45 11/30/2020    LDLCALC 52.6 (L) 11/30/2020    TRIG 157 (H) 11/30/2020       Assessment:     1. Essential hypertension: Moderate elevation on Clinic visits    2. PAD (peripheral artery disease) : Stable claudication   3. Subclavian artery stenosis, left : asx   4. Dyslipidemia : At LDL goal   5. Right renal artery stenosis Preserved renal function   6. Embolic stroke involving right middle cerebral artery: No recurrence and no arrhythmia on ILR        Plan:     Naheed was seen today for essential hypertension.    Diagnoses and all orders for this  visit:    Essential hypertension  Patient asked to check her blood pressure at home and maintain a log.  For [facility] blood pressure 150/or less  PAD (peripheral artery disease)    Subclavian artery stenosis, left    Dyslipidemia  Continue current regimen  Right renal artery stenosis    Embolic stroke involving right middle cerebral artery    Other orders  -     multivitamin capsule; Take 1 capsule by mouth once daily.

## 2020-12-02 NOTE — PATIENT INSTRUCTIONS
Continue current regimen  .It is recommended that  blood pressure be checked 3-4 times a week and a log  Maintained. Let me know if greater than 150/

## 2020-12-03 ENCOUNTER — IMMUNIZATION (OUTPATIENT)
Dept: PHARMACY | Facility: CLINIC | Age: 85
End: 2020-12-03
Payer: MEDICARE

## 2020-12-08 ENCOUNTER — SPECIALTY PHARMACY (OUTPATIENT)
Dept: PHARMACY | Facility: CLINIC | Age: 85
End: 2020-12-08

## 2020-12-08 NOTE — TELEPHONE ENCOUNTER
Specialty Pharmacy - Refill Coordination    Specialty Medication Orders Linked to Encounter      Most Recent Value   Medication #1  evolocumab (REPATHA SURECLICK) 140 mg/mL PnIj (Order#[id number], Rx#[id number])          Refill Questions - Documented Responses      Most Recent Value   Relationship to patient of person spoken to?  Self   HIPAA/medical authority confirmed?  Yes   Any changes in contact preferences or allowed representatives?  No   Has the patient had any insurance changes?  No   Has the patient had any changes to specialty medication, dose, or instructions?  No   Has the patient started taking any new medications, herbals, or supplements?  No   Has the patient been diagnosed with any new medical conditions?  No   Does the patient have any new allergies to medications or foods?  No   Does the patient have any concerns about side effects?  No   Can the patient store medication/sharps container properly (at the correct temperature, away from children/pets, etc.)?  Yes   Can the patient call emergency services (861) in the event of an emergency?  Yes   Does the patient have any concerns or questions about taking or administering this medication as prescribed?  No   How many doses did the patient miss in the past 4 weeks or since the last fill?  0   How many doses does the patient have on hand?  0   How many days does the patient report on hand quantity will last?  0   Does the number of doses/days supply remaining match pharmacy expected amounts?  Yes   Does the patient feel that this medication is effective?  Yes   How will the patient receive the medication?  Mail   When does the patient need to receive the medication?  12/13/20   Shipping Address  Home   Address in [facility] confirmed and updated if neccessary?  Yes   Expected Copay ($)  118.13   Is the patient able to afford the medication copay?  Yes   Payment Method  CC on file   Days supply of Refill  28   Would patient like to speak to a  pharmacist?  No   Do you want to trigger an additional referral task?  No   Refill activity completed?  Yes   Refill activity plan  Refill scheduled   Shipment/Pickup Date:  12/09/20          Current Outpatient Medications   Medication Sig    acetaminophen (TYLENOL) 325 MG tablet Take 2 tablets (650 mg total) by mouth every 6 (six) hours.    aspirin (ECOTRIN) 81 MG EC tablet Take 81 mg by mouth once daily.    calcium-vitamin D3 500 mg(1,250mg) -200 unit per tablet Take 2 tablets by mouth 2 (two) times daily.    carvediloL (COREG) 25 MG tablet Take 1 tablet (25 mg total) by mouth 2 (two) times daily with meals.    chlorthalidone (HYGROTEN) 25 MG Tab Take 25 mg by mouth once daily.    cholecalciferol, vitamin D3, 1,000 unit capsule Take 1 capsule (1,000 Units total) by mouth once daily.    citalopram (CELEXA) 20 MG tablet Take 1 tablet (20 mg total) by mouth once daily.    clopidogreL (PLAVIX) 75 mg tablet Take 1 tablet (75 mg total) by mouth once daily.    coenzyme Q10 (CO Q-10) 10 mg capsule Take 10 mg by mouth once daily.    diclofenac sodium (VOLTAREN) 1 % Gel Apply 2 g topically 2 (two) times daily.    evolocumab (REPATHA SURECLICK) 140 mg/mL PnIj Inject 1 mL (140 mg total) into the skin every 14 (fourteen) days.    multivitamin capsule Take 1 capsule by mouth once daily.    olmesartan (BENICAR) 40 MG tablet Take 1 tablet (40 mg total) by mouth once daily.    senna-docusate 8.6-50 mg (PERICOLACE) 8.6-50 mg per tablet Take 1 tablet by mouth 2 (two) times daily. (Patient not taking: Reported on 12/2/2020)   Last reviewed on 12/8/2020 10:50 AM by Guero Farmer    Review of patient's allergies indicates:   Allergen Reactions    Percocet [oxycodone-acetaminophen]      Did not tolerate in past, not allergy    Last reviewed on  12/8/2020 10:50 AM by Guero Farmer      Tasks added this encounter   12/31/2020 - Refill Call (Auto Added)   Tasks due within next 3 months   No tasks due.     Guero  Darian  [facility]  [address] [facility] [address]  Phone: [phone]  Fax: [phone]

## 2020-12-22 ENCOUNTER — CLINICAL SUPPORT (OUTPATIENT)
Dept: CARDIOLOGY | Facility: HOSPITAL | Age: 85
End: 2020-12-22
Attending: INTERNAL MEDICINE
Payer: MEDICARE

## 2020-12-22 DIAGNOSIS — Z95.818 PRESENCE OF OTHER CARDIAC IMPLANTS AND GRAFTS: ICD-10-CM

## 2020-12-22 PROCEDURE — 93298 CARDIAC DEVICE CHECK - REMOTE: ICD-10-PCS | Mod: HCNC,,, | Performed by: INTERNAL MEDICINE

## 2020-12-22 PROCEDURE — G2066 INTER DEVC REMOTE 30D: HCPCS | Mod: HCNC | Performed by: INTERNAL MEDICINE

## 2020-12-22 PROCEDURE — 93298 REM INTERROG DEV EVAL SCRMS: CPT | Mod: HCNC,,, | Performed by: INTERNAL MEDICINE

## 2021-01-05 ENCOUNTER — IMMUNIZATION (OUTPATIENT)
Dept: INTERNAL MEDICINE | Facility: CLINIC | Age: 86
End: 2021-01-05
Payer: MEDICARE

## 2021-01-05 DIAGNOSIS — Z88.8 ALLERGY TO STATIN MEDICATION: ICD-10-CM

## 2021-01-05 DIAGNOSIS — E78.5 DYSLIPIDEMIA: Chronic | ICD-10-CM

## 2021-01-05 DIAGNOSIS — Z23 NEED FOR VACCINATION: ICD-10-CM

## 2021-01-05 PROCEDURE — 91300 COVID-19, MRNA, LNP-S, PF, 30 MCG/0.3 ML DOSE VACCINE: CPT | Mod: PBBFAC | Performed by: FAMILY MEDICINE

## 2021-01-06 ENCOUNTER — SPECIALTY PHARMACY (OUTPATIENT)
Dept: PHARMACY | Facility: CLINIC | Age: 86
End: 2021-01-06

## 2021-01-15 DIAGNOSIS — I10 ESSENTIAL HYPERTENSION: Primary | Chronic | ICD-10-CM

## 2021-01-15 RX ORDER — CHLORTHALIDONE 25 MG/1
25 TABLET ORAL DAILY
Qty: 90 TABLET | Refills: 2 | Status: SHIPPED | OUTPATIENT
Start: 2021-01-15 | End: 2021-10-15

## 2021-01-21 ENCOUNTER — CLINICAL SUPPORT (OUTPATIENT)
Dept: CARDIOLOGY | Facility: HOSPITAL | Age: 86
End: 2021-01-21
Payer: MEDICARE

## 2021-01-21 DIAGNOSIS — Z95.818 PRESENCE OF OTHER CARDIAC IMPLANTS AND GRAFTS: ICD-10-CM

## 2021-01-21 PROCEDURE — 93298 REM INTERROG DEV EVAL SCRMS: CPT | Mod: HCNC,,, | Performed by: INTERNAL MEDICINE

## 2021-01-21 PROCEDURE — 93298 CARDIAC DEVICE CHECK - REMOTE: ICD-10-PCS | Mod: HCNC,,, | Performed by: INTERNAL MEDICINE

## 2021-01-21 PROCEDURE — G2066 INTER DEVC REMOTE 30D: HCPCS | Mod: HCNC | Performed by: INTERNAL MEDICINE

## 2021-01-27 ENCOUNTER — IMMUNIZATION (OUTPATIENT)
Dept: INTERNAL MEDICINE | Facility: CLINIC | Age: 86
End: 2021-01-27
Payer: MEDICARE

## 2021-01-27 DIAGNOSIS — Z23 NEED FOR VACCINATION: Primary | ICD-10-CM

## 2021-01-27 PROCEDURE — 91300 COVID-19, MRNA, LNP-S, PF, 30 MCG/0.3 ML DOSE VACCINE: CPT | Mod: PBBFAC | Performed by: FAMILY MEDICINE

## 2021-01-27 PROCEDURE — 0002A COVID-19, MRNA, LNP-S, PF, 30 MCG/0.3 ML DOSE VACCINE: CPT | Mod: PBBFAC | Performed by: FAMILY MEDICINE

## 2021-01-29 ENCOUNTER — SPECIALTY PHARMACY (OUTPATIENT)
Dept: PHARMACY | Facility: CLINIC | Age: 86
End: 2021-01-29

## 2021-02-20 ENCOUNTER — CLINICAL SUPPORT (OUTPATIENT)
Dept: CARDIOLOGY | Facility: HOSPITAL | Age: 86
End: 2021-02-20
Attending: INTERNAL MEDICINE
Payer: MEDICARE

## 2021-02-20 DIAGNOSIS — Z95.818 PRESENCE OF OTHER CARDIAC IMPLANTS AND GRAFTS: ICD-10-CM

## 2021-02-20 PROCEDURE — G2066 INTER DEVC REMOTE 30D: HCPCS | Performed by: INTERNAL MEDICINE

## 2021-02-20 PROCEDURE — 93298 CARDIAC DEVICE CHECK - REMOTE: ICD-10-PCS | Mod: ,,, | Performed by: INTERNAL MEDICINE

## 2021-02-20 PROCEDURE — 93298 REM INTERROG DEV EVAL SCRMS: CPT | Mod: ,,, | Performed by: INTERNAL MEDICINE

## 2021-02-26 ENCOUNTER — SPECIALTY PHARMACY (OUTPATIENT)
Dept: PHARMACY | Facility: CLINIC | Age: 86
End: 2021-02-26

## 2021-03-22 ENCOUNTER — CLINICAL SUPPORT (OUTPATIENT)
Dept: CARDIOLOGY | Facility: HOSPITAL | Age: 86
End: 2021-03-22
Payer: MEDICARE

## 2021-03-22 DIAGNOSIS — Z95.818 PRESENCE OF OTHER CARDIAC IMPLANTS AND GRAFTS: ICD-10-CM

## 2021-03-22 PROCEDURE — 93298 CARDIAC DEVICE CHECK - REMOTE: ICD-10-PCS | Mod: ,,, | Performed by: INTERNAL MEDICINE

## 2021-03-22 PROCEDURE — G2066 INTER DEVC REMOTE 30D: HCPCS | Performed by: INTERNAL MEDICINE

## 2021-03-22 PROCEDURE — 93298 REM INTERROG DEV EVAL SCRMS: CPT | Mod: ,,, | Performed by: INTERNAL MEDICINE

## 2021-03-26 ENCOUNTER — SPECIALTY PHARMACY (OUTPATIENT)
Dept: PHARMACY | Facility: CLINIC | Age: 86
End: 2021-03-26

## 2021-04-21 ENCOUNTER — CLINICAL SUPPORT (OUTPATIENT)
Dept: CARDIOLOGY | Facility: HOSPITAL | Age: 86
End: 2021-04-21
Payer: MEDICARE

## 2021-04-21 DIAGNOSIS — Z95.818 PRESENCE OF OTHER CARDIAC IMPLANTS AND GRAFTS: ICD-10-CM

## 2021-04-21 PROCEDURE — 93298 REM INTERROG DEV EVAL SCRMS: CPT | Mod: ,,, | Performed by: INTERNAL MEDICINE

## 2021-04-21 PROCEDURE — G2066 INTER DEVC REMOTE 30D: HCPCS | Performed by: INTERNAL MEDICINE

## 2021-04-21 PROCEDURE — 93298 CARDIAC DEVICE CHECK - REMOTE: ICD-10-PCS | Mod: ,,, | Performed by: INTERNAL MEDICINE

## 2021-04-23 ENCOUNTER — SPECIALTY PHARMACY (OUTPATIENT)
Dept: PHARMACY | Facility: CLINIC | Age: 86
End: 2021-04-23

## 2021-04-30 ENCOUNTER — EXTERNAL CHRONIC CARE MANAGEMENT (OUTPATIENT)
Dept: PRIMARY CARE CLINIC | Facility: CLINIC | Age: 86
End: 2021-04-30
Payer: MEDICARE

## 2021-04-30 PROCEDURE — 99490 CHRNC CARE MGMT STAFF 1ST 20: CPT | Mod: S$GLB,,, | Performed by: FAMILY MEDICINE

## 2021-04-30 PROCEDURE — 99490 PR CHRONIC CARE MGMT, 1ST 20 MIN: ICD-10-PCS | Mod: S$GLB,,, | Performed by: FAMILY MEDICINE

## 2021-05-21 ENCOUNTER — SPECIALTY PHARMACY (OUTPATIENT)
Dept: PHARMACY | Facility: CLINIC | Age: 86
End: 2021-05-21

## 2021-05-21 ENCOUNTER — CLINICAL SUPPORT (OUTPATIENT)
Dept: CARDIOLOGY | Facility: HOSPITAL | Age: 86
End: 2021-05-21
Payer: MEDICARE

## 2021-05-21 DIAGNOSIS — Z95.818 PRESENCE OF OTHER CARDIAC IMPLANTS AND GRAFTS: ICD-10-CM

## 2021-05-21 PROCEDURE — 93298 REM INTERROG DEV EVAL SCRMS: CPT | Mod: ,,, | Performed by: INTERNAL MEDICINE

## 2021-05-21 PROCEDURE — 93298 CARDIAC DEVICE CHECK - REMOTE: ICD-10-PCS | Mod: ,,, | Performed by: INTERNAL MEDICINE

## 2021-05-21 PROCEDURE — G2066 INTER DEVC REMOTE 30D: HCPCS | Performed by: INTERNAL MEDICINE

## 2021-06-18 ENCOUNTER — SPECIALTY PHARMACY (OUTPATIENT)
Dept: PHARMACY | Facility: CLINIC | Age: 86
End: 2021-06-18

## 2021-06-20 ENCOUNTER — CLINICAL SUPPORT (OUTPATIENT)
Dept: CARDIOLOGY | Facility: HOSPITAL | Age: 86
End: 2021-06-20
Payer: MEDICARE

## 2021-06-20 DIAGNOSIS — Z95.818 PRESENCE OF OTHER CARDIAC IMPLANTS AND GRAFTS: ICD-10-CM

## 2021-06-20 PROCEDURE — G2066 INTER DEVC REMOTE 30D: HCPCS | Performed by: INTERNAL MEDICINE

## 2021-06-20 PROCEDURE — 93298 REM INTERROG DEV EVAL SCRMS: CPT | Mod: ,,, | Performed by: INTERNAL MEDICINE

## 2021-06-20 PROCEDURE — 93298 CARDIAC DEVICE CHECK - REMOTE: ICD-10-PCS | Mod: ,,, | Performed by: INTERNAL MEDICINE

## 2021-07-19 ENCOUNTER — SPECIALTY PHARMACY (OUTPATIENT)
Dept: PHARMACY | Facility: CLINIC | Age: 86
End: 2021-07-19

## 2021-07-20 ENCOUNTER — CLINICAL SUPPORT (OUTPATIENT)
Dept: CARDIOLOGY | Facility: HOSPITAL | Age: 86
End: 2021-07-20
Payer: MEDICARE

## 2021-07-20 DIAGNOSIS — Z95.818 PRESENCE OF OTHER CARDIAC IMPLANTS AND GRAFTS: ICD-10-CM

## 2021-07-20 PROCEDURE — 93298 REM INTERROG DEV EVAL SCRMS: CPT | Mod: ,,, | Performed by: INTERNAL MEDICINE

## 2021-07-20 PROCEDURE — G2066 INTER DEVC REMOTE 30D: HCPCS | Performed by: INTERNAL MEDICINE

## 2021-07-20 PROCEDURE — 93298 CARDIAC DEVICE CHECK - REMOTE: ICD-10-PCS | Mod: ,,, | Performed by: INTERNAL MEDICINE

## 2021-08-13 ENCOUNTER — SPECIALTY PHARMACY (OUTPATIENT)
Dept: PHARMACY | Facility: CLINIC | Age: 86
End: 2021-08-13

## 2021-08-19 ENCOUNTER — CLINICAL SUPPORT (OUTPATIENT)
Dept: CARDIOLOGY | Facility: HOSPITAL | Age: 86
End: 2021-08-19
Payer: MEDICARE

## 2021-08-19 DIAGNOSIS — Z95.818 PRESENCE OF OTHER CARDIAC IMPLANTS AND GRAFTS: ICD-10-CM

## 2021-08-19 PROCEDURE — 93298 CARDIAC DEVICE CHECK - REMOTE: ICD-10-PCS | Mod: ,,, | Performed by: INTERNAL MEDICINE

## 2021-08-19 PROCEDURE — G2066 INTER DEVC REMOTE 30D: HCPCS | Performed by: INTERNAL MEDICINE

## 2021-08-19 PROCEDURE — 93298 REM INTERROG DEV EVAL SCRMS: CPT | Mod: ,,, | Performed by: INTERNAL MEDICINE

## 2021-09-13 ENCOUNTER — PATIENT MESSAGE (OUTPATIENT)
Dept: PHARMACY | Facility: CLINIC | Age: 86
End: 2021-09-13

## 2021-09-13 ENCOUNTER — SPECIALTY PHARMACY (OUTPATIENT)
Dept: PHARMACY | Facility: CLINIC | Age: 86
End: 2021-09-13

## 2021-09-18 ENCOUNTER — CLINICAL SUPPORT (OUTPATIENT)
Dept: CARDIOLOGY | Facility: HOSPITAL | Age: 86
End: 2021-09-18
Payer: MEDICARE

## 2021-09-18 DIAGNOSIS — Z95.818 PRESENCE OF OTHER CARDIAC IMPLANTS AND GRAFTS: ICD-10-CM

## 2021-09-18 PROCEDURE — 93298 CARDIAC DEVICE CHECK - REMOTE: ICD-10-PCS | Mod: ,,, | Performed by: INTERNAL MEDICINE

## 2021-09-18 PROCEDURE — 93298 REM INTERROG DEV EVAL SCRMS: CPT | Mod: ,,, | Performed by: INTERNAL MEDICINE

## 2021-09-18 PROCEDURE — G2066 INTER DEVC REMOTE 30D: HCPCS | Performed by: INTERNAL MEDICINE

## 2021-10-06 ENCOUNTER — SPECIALTY PHARMACY (OUTPATIENT)
Dept: PHARMACY | Facility: CLINIC | Age: 86
End: 2021-10-06

## 2021-10-10 ENCOUNTER — OFFICE VISIT (OUTPATIENT)
Dept: URGENT CARE | Facility: CLINIC | Age: 86
End: 2021-10-10
Payer: MEDICARE

## 2021-10-10 VITALS
TEMPERATURE: 97 F | SYSTOLIC BLOOD PRESSURE: 148 MMHG | BODY MASS INDEX: 22.68 KG/M2 | HEIGHT: 63 IN | HEART RATE: 88 BPM | WEIGHT: 128 LBS | OXYGEN SATURATION: 100 % | DIASTOLIC BLOOD PRESSURE: 70 MMHG | RESPIRATION RATE: 19 BRPM

## 2021-10-10 DIAGNOSIS — R11.2 NAUSEA AND VOMITING, INTRACTABILITY OF VOMITING NOT SPECIFIED, UNSPECIFIED VOMITING TYPE: ICD-10-CM

## 2021-10-10 DIAGNOSIS — R30.0 DYSURIA: Primary | ICD-10-CM

## 2021-10-10 LAB
BILIRUB UR QL STRIP: NEGATIVE
CTP QC/QA: YES
GLUCOSE SERPL-MCNC: 114 MG/DL (ref 70–110)
GLUCOSE UR QL STRIP: NEGATIVE
KETONES UR QL STRIP: NEGATIVE
LEUKOCYTE ESTERASE UR QL STRIP: NEGATIVE
PH, POC UA: 5.5 (ref 5–8)
POC ANION GAP: 23 MMOL/L (ref 10–20)
POC BLOOD, URINE: NEGATIVE
POC BUN: 29 MMOL/L (ref 8–26)
POC CHLORIDE: 96 MMOL/L (ref 98–109)
POC CREATININE: 1.6 MG/DL (ref 0.6–1.3)
POC HEMATOCRIT: 37 %PCV (ref 37–47)
POC HEMOGLOBIN: 12.6 G/DL (ref 12.5–16)
POC ICA: 1.17 MMOL/L (ref 1.12–1.32)
POC NITRATES, URINE: NEGATIVE
POC POTASSIUM: 3.3 MMOL/L (ref 3.5–4.9)
POC SODIUM: 133 MMOL/L (ref 138–146)
POC TCO2: 19 MMOL/L (ref 24–29)
PROT UR QL STRIP: POSITIVE
SARS-COV-2 RDRP RESP QL NAA+PROBE: NEGATIVE
SP GR UR STRIP: 1.02 (ref 1–1.03)
UROBILINOGEN UR STRIP-ACNC: NORMAL (ref 0.1–1.1)

## 2021-10-10 PROCEDURE — 99213 PR OFFICE/OUTPT VISIT, EST, LEVL III, 20-29 MIN: ICD-10-PCS | Mod: 25,S$GLB,CS, | Performed by: NURSE PRACTITIONER

## 2021-10-10 PROCEDURE — 80047 POCT CHEMISTRY PANEL: ICD-10-PCS | Mod: QW,S$GLB,, | Performed by: NURSE PRACTITIONER

## 2021-10-10 PROCEDURE — 1160F PR REVIEW ALL MEDS BY PRESCRIBER/CLIN PHARMACIST DOCUMENTED: ICD-10-PCS | Mod: CPTII,S$GLB,, | Performed by: NURSE PRACTITIONER

## 2021-10-10 PROCEDURE — 1160F RVW MEDS BY RX/DR IN RCRD: CPT | Mod: CPTII,S$GLB,, | Performed by: NURSE PRACTITIONER

## 2021-10-10 PROCEDURE — 81003 URINALYSIS AUTO W/O SCOPE: CPT | Mod: QW,S$GLB,, | Performed by: NURSE PRACTITIONER

## 2021-10-10 PROCEDURE — 87086 URINE CULTURE/COLONY COUNT: CPT | Mod: HCNC | Performed by: NURSE PRACTITIONER

## 2021-10-10 PROCEDURE — 1159F PR MEDICATION LIST DOCUMENTED IN MEDICAL RECORD: ICD-10-PCS | Mod: CPTII,S$GLB,, | Performed by: NURSE PRACTITIONER

## 2021-10-10 PROCEDURE — 99213 OFFICE O/P EST LOW 20 MIN: CPT | Mod: 25,S$GLB,CS, | Performed by: NURSE PRACTITIONER

## 2021-10-10 PROCEDURE — U0002 COVID-19 LAB TEST NON-CDC: HCPCS | Mod: QW,S$GLB,, | Performed by: NURSE PRACTITIONER

## 2021-10-10 PROCEDURE — U0002: ICD-10-PCS | Mod: QW,S$GLB,, | Performed by: NURSE PRACTITIONER

## 2021-10-10 PROCEDURE — 1159F MED LIST DOCD IN RCRD: CPT | Mod: CPTII,S$GLB,, | Performed by: NURSE PRACTITIONER

## 2021-10-10 PROCEDURE — 80047 BASIC METABLC PNL IONIZED CA: CPT | Mod: QW,S$GLB,, | Performed by: NURSE PRACTITIONER

## 2021-10-10 PROCEDURE — 81003 POCT URINALYSIS, DIPSTICK, AUTOMATED, W/O SCOPE: ICD-10-PCS | Mod: QW,S$GLB,, | Performed by: NURSE PRACTITIONER

## 2021-10-10 RX ORDER — SULFAMETHOXAZOLE AND TRIMETHOPRIM 800; 160 MG/1; MG/1
1 TABLET ORAL 2 TIMES DAILY
COMMUNITY
End: 2021-10-10

## 2021-10-10 RX ORDER — ONDANSETRON 4 MG/1
4 TABLET, ORALLY DISINTEGRATING ORAL EVERY 8 HOURS PRN
Qty: 12 TABLET | Refills: 0 | Status: SHIPPED | OUTPATIENT
Start: 2021-10-10 | End: 2021-11-09

## 2021-10-11 LAB
BACTERIA UR CULT: NORMAL
BACTERIA UR CULT: NORMAL

## 2021-10-12 ENCOUNTER — TELEPHONE (OUTPATIENT)
Dept: URGENT CARE | Facility: CLINIC | Age: 86
End: 2021-10-12

## 2021-10-18 ENCOUNTER — CLINICAL SUPPORT (OUTPATIENT)
Dept: CARDIOLOGY | Facility: HOSPITAL | Age: 86
End: 2021-10-18
Payer: MEDICARE

## 2021-10-18 DIAGNOSIS — I77.1 STENOSIS OF LEFT SUBCLAVIAN ARTERY: ICD-10-CM

## 2021-10-18 DIAGNOSIS — I73.9 PAD (PERIPHERAL ARTERY DISEASE): Primary | ICD-10-CM

## 2021-10-18 DIAGNOSIS — R82.71 ASYMPTOMATIC BACTERIURIA: ICD-10-CM

## 2021-10-18 DIAGNOSIS — Z95.818 PRESENCE OF OTHER CARDIAC IMPLANTS AND GRAFTS: ICD-10-CM

## 2021-10-18 DIAGNOSIS — E78.5 DYSLIPIDEMIA: Chronic | ICD-10-CM

## 2021-10-18 DIAGNOSIS — N30.00 ACUTE CYSTITIS WITHOUT HEMATURIA: ICD-10-CM

## 2021-10-18 PROBLEM — N28.9 ACUTE RENAL INSUFFICIENCY: Status: RESOLVED | Noted: 2019-12-29 | Resolved: 2021-10-18

## 2021-10-18 PROBLEM — Z86.73 HISTORY OF CVA (CEREBROVASCULAR ACCIDENT): Status: RESOLVED | Noted: 2019-12-29 | Resolved: 2021-10-18

## 2021-10-18 PROBLEM — I50.32 CHRONIC HEART FAILURE WITH PRESERVED EJECTION FRACTION: Status: RESOLVED | Noted: 2019-12-29 | Resolved: 2021-10-18

## 2021-10-18 PROBLEM — I63.411 EMBOLIC STROKE INVOLVING RIGHT MIDDLE CEREBRAL ARTERY: Status: RESOLVED | Noted: 2017-06-06 | Resolved: 2021-10-18

## 2021-10-18 PROCEDURE — G2066 INTER DEVC REMOTE 30D: HCPCS | Mod: HCNC | Performed by: INTERNAL MEDICINE

## 2021-10-18 PROCEDURE — 93298 REM INTERROG DEV EVAL SCRMS: CPT | Mod: HCNC,,, | Performed by: INTERNAL MEDICINE

## 2021-10-18 PROCEDURE — 93298 CARDIAC DEVICE CHECK - REMOTE: ICD-10-PCS | Mod: HCNC,,, | Performed by: INTERNAL MEDICINE

## 2021-10-19 ENCOUNTER — OFFICE VISIT (OUTPATIENT)
Dept: CARDIOLOGY | Facility: CLINIC | Age: 86
End: 2021-10-19
Payer: MEDICARE

## 2021-10-19 ENCOUNTER — LAB VISIT (OUTPATIENT)
Dept: LAB | Facility: HOSPITAL | Age: 86
End: 2021-10-19
Attending: INTERNAL MEDICINE
Payer: MEDICARE

## 2021-10-19 ENCOUNTER — PATIENT MESSAGE (OUTPATIENT)
Dept: CARDIOLOGY | Facility: CLINIC | Age: 86
End: 2021-10-19

## 2021-10-19 VITALS
WEIGHT: 126.31 LBS | SYSTOLIC BLOOD PRESSURE: 193 MMHG | BODY MASS INDEX: 22.38 KG/M2 | HEART RATE: 78 BPM | DIASTOLIC BLOOD PRESSURE: 81 MMHG

## 2021-10-19 DIAGNOSIS — I15.0 RENOVASCULAR HYPERTENSION: ICD-10-CM

## 2021-10-19 DIAGNOSIS — Z86.73 HISTORY OF CVA (CEREBROVASCULAR ACCIDENT): ICD-10-CM

## 2021-10-19 DIAGNOSIS — E78.5 DYSLIPIDEMIA: Chronic | ICD-10-CM

## 2021-10-19 DIAGNOSIS — Z88.8 ALLERGY TO STATIN MEDICATION: ICD-10-CM

## 2021-10-19 DIAGNOSIS — N30.00 ACUTE CYSTITIS WITHOUT HEMATURIA: ICD-10-CM

## 2021-10-19 DIAGNOSIS — I70.1 RIGHT RENAL ARTERY STENOSIS: ICD-10-CM

## 2021-10-19 DIAGNOSIS — I73.9 PAD (PERIPHERAL ARTERY DISEASE): Primary | ICD-10-CM

## 2021-10-19 DIAGNOSIS — I77.1 STENOSIS OF LEFT SUBCLAVIAN ARTERY: ICD-10-CM

## 2021-10-19 DIAGNOSIS — I10 ESSENTIAL HYPERTENSION: Chronic | ICD-10-CM

## 2021-10-19 LAB
BILIRUB UR QL STRIP: NEGATIVE
CLARITY UR: CLEAR
COLOR UR: YELLOW
GLUCOSE UR QL STRIP: NEGATIVE
HGB UR QL STRIP: NEGATIVE
KETONES UR QL STRIP: NEGATIVE
LEUKOCYTE ESTERASE UR QL STRIP: NEGATIVE
NITRITE UR QL STRIP: NEGATIVE
PH UR STRIP: 6 [PH] (ref 5–8)
PROT UR QL STRIP: NEGATIVE
SP GR UR STRIP: 1.02 (ref 1–1.03)
URN SPEC COLLECT METH UR: NORMAL

## 2021-10-19 PROCEDURE — 99214 OFFICE O/P EST MOD 30 MIN: CPT | Mod: HCNC,S$GLB,, | Performed by: INTERNAL MEDICINE

## 2021-10-19 PROCEDURE — 1159F MED LIST DOCD IN RCRD: CPT | Mod: HCNC,CPTII,S$GLB, | Performed by: INTERNAL MEDICINE

## 2021-10-19 PROCEDURE — 81003 URINALYSIS AUTO W/O SCOPE: CPT | Mod: HCNC,PO | Performed by: INTERNAL MEDICINE

## 2021-10-19 PROCEDURE — 1160F PR REVIEW ALL MEDS BY PRESCRIBER/CLIN PHARMACIST DOCUMENTED: ICD-10-PCS | Mod: HCNC,CPTII,S$GLB, | Performed by: INTERNAL MEDICINE

## 2021-10-19 PROCEDURE — 1160F RVW MEDS BY RX/DR IN RCRD: CPT | Mod: HCNC,CPTII,S$GLB, | Performed by: INTERNAL MEDICINE

## 2021-10-19 PROCEDURE — 99999 PR PBB SHADOW E&M-EST. PATIENT-LVL III: ICD-10-PCS | Mod: PBBFAC,HCNC,, | Performed by: INTERNAL MEDICINE

## 2021-10-19 PROCEDURE — 99499 RISK ADDL DX/OHS AUDIT: ICD-10-PCS | Mod: S$GLB,,, | Performed by: INTERNAL MEDICINE

## 2021-10-19 PROCEDURE — 99214 PR OFFICE/OUTPT VISIT, EST, LEVL IV, 30-39 MIN: ICD-10-PCS | Mod: HCNC,S$GLB,, | Performed by: INTERNAL MEDICINE

## 2021-10-19 PROCEDURE — 1159F PR MEDICATION LIST DOCUMENTED IN MEDICAL RECORD: ICD-10-PCS | Mod: HCNC,CPTII,S$GLB, | Performed by: INTERNAL MEDICINE

## 2021-10-19 PROCEDURE — 99999 PR PBB SHADOW E&M-EST. PATIENT-LVL III: CPT | Mod: PBBFAC,HCNC,, | Performed by: INTERNAL MEDICINE

## 2021-10-19 PROCEDURE — 1126F PR PAIN SEVERITY QUANTIFIED, NO PAIN PRESENT: ICD-10-PCS | Mod: HCNC,CPTII,S$GLB, | Performed by: INTERNAL MEDICINE

## 2021-10-19 PROCEDURE — 99499 UNLISTED E&M SERVICE: CPT | Mod: S$GLB,,, | Performed by: INTERNAL MEDICINE

## 2021-10-19 PROCEDURE — 1126F AMNT PAIN NOTED NONE PRSNT: CPT | Mod: HCNC,CPTII,S$GLB, | Performed by: INTERNAL MEDICINE

## 2021-10-19 RX ORDER — CHLORTHALIDONE 25 MG/1
TABLET ORAL
Qty: 60 TABLET | Refills: 3 | Status: ON HOLD | OUTPATIENT
Start: 2021-10-19 | End: 2021-11-12 | Stop reason: HOSPADM

## 2021-10-19 RX ORDER — CLOPIDOGREL BISULFATE 75 MG/1
75 TABLET ORAL DAILY
Qty: 90 TABLET | Refills: 3 | Status: SHIPPED | OUTPATIENT
Start: 2021-10-19 | End: 2022-07-19

## 2021-10-19 RX ORDER — OLMESARTAN MEDOXOMIL 40 MG/1
40 TABLET ORAL DAILY
Qty: 90 TABLET | Refills: 3 | Status: SHIPPED | OUTPATIENT
Start: 2021-10-19 | End: 2021-12-20

## 2021-11-01 DIAGNOSIS — I15.0 RENOVASCULAR HYPERTENSION: Primary | ICD-10-CM

## 2021-11-01 DIAGNOSIS — R06.02 SOB (SHORTNESS OF BREATH): ICD-10-CM

## 2021-11-04 ENCOUNTER — CLINICAL SUPPORT (OUTPATIENT)
Dept: URGENT CARE | Facility: CLINIC | Age: 86
End: 2021-11-04
Payer: MEDICARE

## 2021-11-04 ENCOUNTER — HOSPITAL ENCOUNTER (OUTPATIENT)
Dept: CARDIOLOGY | Facility: HOSPITAL | Age: 86
Discharge: HOME | End: 2021-11-04
Attending: INTERNAL MEDICINE
Payer: MEDICARE

## 2021-11-04 VITALS
BODY MASS INDEX: 22.32 KG/M2 | DIASTOLIC BLOOD PRESSURE: 60 MMHG | WEIGHT: 126 LBS | SYSTOLIC BLOOD PRESSURE: 136 MMHG | HEIGHT: 63 IN | HEART RATE: 63 BPM

## 2021-11-04 DIAGNOSIS — I15.0 RENOVASCULAR HYPERTENSION: ICD-10-CM

## 2021-11-04 DIAGNOSIS — R06.02 SOB (SHORTNESS OF BREATH): ICD-10-CM

## 2021-11-04 DIAGNOSIS — Z11.52 ENCOUNTER FOR SCREENING FOR COVID-19: Primary | ICD-10-CM

## 2021-11-04 LAB
ASCENDING AORTA: 3.35 CM
AV INDEX (PROSTH): 0.73
AV MEAN GRADIENT: 6 MMHG
AV PEAK GRADIENT: 10 MMHG
AV VALVE AREA: 2.2 CM2
AV VELOCITY RATIO: 0.69
BSA FOR ECHO PROCEDURE: 1.59 M2
CTP QC/QA: YES
CV ECHO LV RWT: 0.38 CM
DOP CALC AO PEAK VEL: 1.62 M/S
DOP CALC AO VTI: 33.33 CM
DOP CALC LVOT AREA: 3 CM2
DOP CALC LVOT DIAMETER: 1.96 CM
DOP CALC LVOT PEAK VEL: 1.12 M/S
DOP CALC LVOT STROKE VOLUME: 73.34 CM3
DOP CALCLVOT PEAK VEL VTI: 24.32 CM
E WAVE DECELERATION TIME: 237.46 MSEC
E/A RATIO: 0.66
E/E' RATIO: 12.2 M/S
ECHO LV POSTERIOR WALL: 0.75 CM (ref 0.6–1.1)
EJECTION FRACTION: 60 %
FRACTIONAL SHORTENING: 39 % (ref 28–44)
INTERVENTRICULAR SEPTUM: 0.71 CM (ref 0.6–1.1)
IVRT: 128.45 MSEC
LA MAJOR: 4.86 CM
LA MINOR: 4.73 CM
LA WIDTH: 3.62 CM
LEFT ATRIUM SIZE: 3.4 CM
LEFT ATRIUM VOLUME INDEX MOD: 27.8 ML/M2
LEFT ATRIUM VOLUME INDEX: 31.5 ML/M2
LEFT ATRIUM VOLUME MOD: 44.15 CM3
LEFT ATRIUM VOLUME: 50.16 CM3
LEFT INTERNAL DIMENSION IN SYSTOLE: 2.4 CM (ref 2.1–4)
LEFT VENTRICLE DIASTOLIC VOLUME INDEX: 42.42 ML/M2
LEFT VENTRICLE DIASTOLIC VOLUME: 67.45 ML
LEFT VENTRICLE MASS INDEX: 51 G/M2
LEFT VENTRICLE SYSTOLIC VOLUME INDEX: 12.6 ML/M2
LEFT VENTRICLE SYSTOLIC VOLUME: 20.08 ML
LEFT VENTRICULAR INTERNAL DIMENSION IN DIASTOLE: 3.94 CM (ref 3.5–6)
LEFT VENTRICULAR MASS: 80.72 G
LV LATERAL E/E' RATIO: 10.17 M/S
LV SEPTAL E/E' RATIO: 15.25 M/S
MV PEAK A VEL: 0.93 M/S
MV PEAK E VEL: 0.61 M/S
MV STENOSIS PRESSURE HALF TIME: 68.86 MS
MV VALVE AREA P 1/2 METHOD: 3.19 CM2
PISA TR MAX VEL: 2.53 M/S
PULM VEIN S/D RATIO: 1.28
PV PEAK D VEL: 0.4 M/S
PV PEAK S VEL: 0.51 M/S
RA MAJOR: 4.68 CM
RA PRESSURE: 3 MMHG
RA WIDTH: 2.76 CM
RIGHT VENTRICULAR END-DIASTOLIC DIMENSION: 2.57 CM
SARS-COV-2 RDRP RESP QL NAA+PROBE: NEGATIVE
SINUS: 2.97 CM
STJ: 2.63 CM
TDI LATERAL: 0.06 M/S
TDI SEPTAL: 0.04 M/S
TDI: 0.05 M/S
TR MAX PG: 26 MMHG
TRICUSPID ANNULAR PLANE SYSTOLIC EXCURSION: 2.32 CM
TV REST PULMONARY ARTERY PRESSURE: 29 MMHG

## 2021-11-04 PROCEDURE — U0002 COVID-19 LAB TEST NON-CDC: HCPCS | Mod: QW,S$GLB,, | Performed by: INTERNAL MEDICINE

## 2021-11-04 PROCEDURE — 93306 ECHO (CUPID ONLY): ICD-10-PCS | Mod: 26,HCNC,, | Performed by: INTERNAL MEDICINE

## 2021-11-04 PROCEDURE — 93306 TTE W/DOPPLER COMPLETE: CPT | Mod: 26,HCNC,, | Performed by: INTERNAL MEDICINE

## 2021-11-04 PROCEDURE — 93306 TTE W/DOPPLER COMPLETE: CPT | Mod: HCNC

## 2021-11-04 PROCEDURE — 99211 OFF/OP EST MAY X REQ PHY/QHP: CPT | Mod: S$GLB,,, | Performed by: INTERNAL MEDICINE

## 2021-11-04 PROCEDURE — U0002: ICD-10-PCS | Mod: QW,S$GLB,, | Performed by: INTERNAL MEDICINE

## 2021-11-04 PROCEDURE — 99211 PR OFFICE/OUTPT VISIT, EST, LEVL I: ICD-10-PCS | Mod: S$GLB,,, | Performed by: INTERNAL MEDICINE

## 2021-11-05 DIAGNOSIS — Z88.8 ALLERGY TO STATIN MEDICATION: ICD-10-CM

## 2021-11-05 DIAGNOSIS — E78.5 DYSLIPIDEMIA: Chronic | ICD-10-CM

## 2021-11-09 ENCOUNTER — SPECIALTY PHARMACY (OUTPATIENT)
Dept: PHARMACY | Facility: CLINIC | Age: 86
End: 2021-11-09
Payer: MEDICARE

## 2021-11-09 ENCOUNTER — LAB VISIT (OUTPATIENT)
Dept: LAB | Facility: HOSPITAL | Age: 86
End: 2021-11-09
Payer: MEDICARE

## 2021-11-09 ENCOUNTER — OFFICE VISIT (OUTPATIENT)
Dept: INTERNAL MEDICINE | Facility: CLINIC | Age: 86
End: 2021-11-09
Payer: MEDICARE

## 2021-11-09 VITALS
SYSTOLIC BLOOD PRESSURE: 138 MMHG | OXYGEN SATURATION: 98 % | DIASTOLIC BLOOD PRESSURE: 64 MMHG | HEIGHT: 61 IN | BODY MASS INDEX: 22.15 KG/M2 | HEART RATE: 65 BPM | WEIGHT: 117.31 LBS | TEMPERATURE: 98 F

## 2021-11-09 DIAGNOSIS — R19.7 DIARRHEA, UNSPECIFIED TYPE: Primary | ICD-10-CM

## 2021-11-09 DIAGNOSIS — R19.7 DIARRHEA, UNSPECIFIED TYPE: ICD-10-CM

## 2021-11-09 LAB
BASOPHILS # BLD AUTO: 0.03 K/UL (ref 0–0.2)
BASOPHILS NFR BLD: 0.4 % (ref 0–1.9)
DIFFERENTIAL METHOD: ABNORMAL
EOSINOPHIL # BLD AUTO: 0 K/UL (ref 0–0.5)
EOSINOPHIL NFR BLD: 0.2 % (ref 0–8)
ERYTHROCYTE [DISTWIDTH] IN BLOOD BY AUTOMATED COUNT: 14.7 % (ref 11.5–14.5)
HCT VFR BLD AUTO: 33 % (ref 37–48.5)
HGB BLD-MCNC: 10.5 G/DL (ref 12–16)
IMM GRANULOCYTES # BLD AUTO: 0.03 K/UL (ref 0–0.04)
IMM GRANULOCYTES NFR BLD AUTO: 0.4 % (ref 0–0.5)
LYMPHOCYTES # BLD AUTO: 1.3 K/UL (ref 1–4.8)
LYMPHOCYTES NFR BLD: 15.7 % (ref 18–48)
MCH RBC QN AUTO: 29.5 PG (ref 27–31)
MCHC RBC AUTO-ENTMCNC: 31.8 G/DL (ref 32–36)
MCV RBC AUTO: 93 FL (ref 82–98)
MONOCYTES # BLD AUTO: 0.8 K/UL (ref 0.3–1)
MONOCYTES NFR BLD: 9.1 % (ref 4–15)
NEUTROPHILS # BLD AUTO: 6.3 K/UL (ref 1.8–7.7)
NEUTROPHILS NFR BLD: 74.2 % (ref 38–73)
NRBC BLD-RTO: 0 /100 WBC
PLATELET # BLD AUTO: 302 K/UL (ref 150–450)
PMV BLD AUTO: 10.4 FL (ref 9.2–12.9)
RBC # BLD AUTO: 3.56 M/UL (ref 4–5.4)
WBC # BLD AUTO: 8.53 K/UL (ref 3.9–12.7)

## 2021-11-09 PROCEDURE — 99999 PR PBB SHADOW E&M-EST. PATIENT-LVL III: ICD-10-PCS | Mod: PBBFAC,HCNC,GC,

## 2021-11-09 PROCEDURE — 85025 COMPLETE CBC W/AUTO DIFF WBC: CPT | Mod: HCNC

## 2021-11-09 PROCEDURE — 99215 OFFICE O/P EST HI 40 MIN: CPT | Mod: HCNC,GC,S$GLB,

## 2021-11-09 PROCEDURE — 83735 ASSAY OF MAGNESIUM: CPT | Mod: HCNC

## 2021-11-09 PROCEDURE — 36415 COLL VENOUS BLD VENIPUNCTURE: CPT | Mod: HCNC,PO

## 2021-11-09 PROCEDURE — 80053 COMPREHEN METABOLIC PANEL: CPT | Mod: HCNC

## 2021-11-09 PROCEDURE — 99215 PR OFFICE/OUTPT VISIT, EST, LEVL V, 40-54 MIN: ICD-10-PCS | Mod: HCNC,GC,S$GLB,

## 2021-11-09 PROCEDURE — 84100 ASSAY OF PHOSPHORUS: CPT | Mod: HCNC

## 2021-11-09 PROCEDURE — 99999 PR PBB SHADOW E&M-EST. PATIENT-LVL III: CPT | Mod: PBBFAC,HCNC,GC,

## 2021-11-10 ENCOUNTER — HOSPITAL ENCOUNTER (INPATIENT)
Facility: HOSPITAL | Age: 86
LOS: 2 days | Discharge: HOME | DRG: 641 | End: 2021-11-12
Attending: EMERGENCY MEDICINE | Admitting: EMERGENCY MEDICINE
Payer: MEDICARE

## 2021-11-10 ENCOUNTER — TELEPHONE (OUTPATIENT)
Dept: PRIMARY CARE CLINIC | Facility: CLINIC | Age: 86
End: 2021-11-10
Payer: MEDICARE

## 2021-11-10 ENCOUNTER — TELEPHONE (OUTPATIENT)
Dept: INTERNAL MEDICINE | Facility: CLINIC | Age: 86
End: 2021-11-10
Payer: MEDICARE

## 2021-11-10 DIAGNOSIS — E87.6 HYPOKALEMIA: Primary | ICD-10-CM

## 2021-11-10 DIAGNOSIS — E87.6 HYPOKALEMIA: ICD-10-CM

## 2021-11-10 DIAGNOSIS — E83.42 HYPOMAGNESEMIA: ICD-10-CM

## 2021-11-10 DIAGNOSIS — R19.7 DIARRHEA, UNSPECIFIED TYPE: Primary | ICD-10-CM

## 2021-11-10 DIAGNOSIS — E87.6 POTASSIUM (K) DEFICIENCY: ICD-10-CM

## 2021-11-10 LAB
ALBUMIN SERPL BCP-MCNC: 2.9 G/DL (ref 3.5–5.2)
ALBUMIN SERPL BCP-MCNC: 3.1 G/DL (ref 3.5–5.2)
ALP SERPL-CCNC: 87 U/L (ref 55–135)
ALP SERPL-CCNC: 89 U/L (ref 55–135)
ALT SERPL W/O P-5'-P-CCNC: 19 U/L (ref 10–44)
ALT SERPL W/O P-5'-P-CCNC: 23 U/L (ref 10–44)
ANION GAP SERPL CALC-SCNC: 10 MMOL/L (ref 8–16)
ANION GAP SERPL CALC-SCNC: 13 MMOL/L (ref 8–16)
AST SERPL-CCNC: 23 U/L (ref 10–40)
AST SERPL-CCNC: 33 U/L (ref 10–40)
BASOPHILS # BLD AUTO: 0.03 K/UL (ref 0–0.2)
BASOPHILS NFR BLD: 0.4 % (ref 0–1.9)
BILIRUB SERPL-MCNC: 0.3 MG/DL (ref 0.1–1)
BILIRUB SERPL-MCNC: 0.4 MG/DL (ref 0.1–1)
BUN SERPL-MCNC: 15 MG/DL (ref 8–23)
BUN SERPL-MCNC: 18 MG/DL (ref 6–30)
BUN SERPL-MCNC: 18 MG/DL (ref 6–30)
BUN SERPL-MCNC: 19 MG/DL (ref 8–23)
CALCIUM SERPL-MCNC: 7.7 MG/DL (ref 8.7–10.5)
CALCIUM SERPL-MCNC: 7.8 MG/DL (ref 8.7–10.5)
CHLORIDE SERPL-SCNC: 101 MMOL/L (ref 95–110)
CHLORIDE SERPL-SCNC: 104 MMOL/L (ref 95–110)
CHLORIDE SERPL-SCNC: 106 MMOL/L (ref 95–110)
CHLORIDE SERPL-SCNC: 106 MMOL/L (ref 95–110)
CO2 SERPL-SCNC: 26 MMOL/L (ref 23–29)
CO2 SERPL-SCNC: 29 MMOL/L (ref 23–29)
CREAT SERPL-MCNC: 1.2 MG/DL (ref 0.5–1.4)
CREAT SERPL-MCNC: 1.3 MG/DL (ref 0.5–1.4)
CREAT SERPL-MCNC: 1.4 MG/DL (ref 0.5–1.4)
CREAT SERPL-MCNC: 1.4 MG/DL (ref 0.5–1.4)
CTP QC/QA: YES
DIFFERENTIAL METHOD: ABNORMAL
EOSINOPHIL # BLD AUTO: 0 K/UL (ref 0–0.5)
EOSINOPHIL NFR BLD: 0.2 % (ref 0–8)
ERYTHROCYTE [DISTWIDTH] IN BLOOD BY AUTOMATED COUNT: 14.8 % (ref 11.5–14.5)
EST. GFR  (AFRICAN AMERICAN): 38.7 ML/MIN/1.73 M^2
EST. GFR  (AFRICAN AMERICAN): 46.6 ML/MIN/1.73 M^2
EST. GFR  (NON AFRICAN AMERICAN): 33.6 ML/MIN/1.73 M^2
EST. GFR  (NON AFRICAN AMERICAN): 40.4 ML/MIN/1.73 M^2
GLUCOSE SERPL-MCNC: 115 MG/DL (ref 70–110)
GLUCOSE SERPL-MCNC: 126 MG/DL (ref 70–110)
GLUCOSE SERPL-MCNC: 129 MG/DL (ref 70–110)
GLUCOSE SERPL-MCNC: 131 MG/DL (ref 70–110)
HCT VFR BLD AUTO: 33 % (ref 37–48.5)
HCT VFR BLD CALC: 32 %PCV (ref 36–54)
HCT VFR BLD CALC: 32 %PCV (ref 36–54)
HGB BLD-MCNC: 10.5 G/DL (ref 12–16)
IMM GRANULOCYTES # BLD AUTO: 0.04 K/UL (ref 0–0.04)
IMM GRANULOCYTES NFR BLD AUTO: 0.5 % (ref 0–0.5)
LYMPHOCYTES # BLD AUTO: 1.7 K/UL (ref 1–4.8)
LYMPHOCYTES NFR BLD: 20.1 % (ref 18–48)
MAGNESIUM SERPL-MCNC: 1.1 MG/DL (ref 1.6–2.6)
MAGNESIUM SERPL-MCNC: 1.1 MG/DL (ref 1.6–2.6)
MCH RBC QN AUTO: 29 PG (ref 27–31)
MCHC RBC AUTO-ENTMCNC: 31.8 G/DL (ref 32–36)
MCV RBC AUTO: 91 FL (ref 82–98)
MONOCYTES # BLD AUTO: 0.9 K/UL (ref 0.3–1)
MONOCYTES NFR BLD: 10.4 % (ref 4–15)
NEUTROPHILS # BLD AUTO: 5.7 K/UL (ref 1.8–7.7)
NEUTROPHILS NFR BLD: 68.4 % (ref 38–73)
NRBC BLD-RTO: 0 /100 WBC
PHOSPHATE SERPL-MCNC: 2.7 MG/DL (ref 2.7–4.5)
PLATELET # BLD AUTO: 319 K/UL (ref 150–450)
PMV BLD AUTO: 10.2 FL (ref 9.2–12.9)
POC IONIZED CALCIUM: 0.92 MMOL/L (ref 1.06–1.42)
POC IONIZED CALCIUM: 0.95 MMOL/L (ref 1.06–1.42)
POC TCO2 (MEASURED): 24 MMOL/L (ref 23–29)
POC TCO2 (MEASURED): 24 MMOL/L (ref 23–29)
POTASSIUM BLD-SCNC: 2.3 MMOL/L (ref 3.5–5.1)
POTASSIUM BLD-SCNC: 2.5 MMOL/L (ref 3.5–5.1)
POTASSIUM SERPL-SCNC: 2.3 MMOL/L (ref 3.5–5.1)
POTASSIUM SERPL-SCNC: 2.4 MMOL/L (ref 3.5–5.1)
PROT SERPL-MCNC: 6.2 G/DL (ref 6–8.4)
PROT SERPL-MCNC: 6.5 G/DL (ref 6–8.4)
RBC # BLD AUTO: 3.62 M/UL (ref 4–5.4)
SAMPLE: ABNORMAL
SAMPLE: ABNORMAL
SARS-COV-2 RDRP RESP QL NAA+PROBE: NEGATIVE
SODIUM BLD-SCNC: 144 MMOL/L (ref 136–145)
SODIUM BLD-SCNC: 146 MMOL/L (ref 136–145)
SODIUM SERPL-SCNC: 140 MMOL/L (ref 136–145)
SODIUM SERPL-SCNC: 145 MMOL/L (ref 136–145)
WBC # BLD AUTO: 8.27 K/UL (ref 3.9–12.7)

## 2021-11-10 PROCEDURE — 93005 ELECTROCARDIOGRAM TRACING: CPT | Mod: HCNC

## 2021-11-10 PROCEDURE — 99285 PR EMERGENCY DEPT VISIT,LEVEL V: ICD-10-PCS | Mod: HCNC,CS,, | Performed by: EMERGENCY MEDICINE

## 2021-11-10 PROCEDURE — 12000002 HC ACUTE/MED SURGE SEMI-PRIVATE ROOM: Mod: HCNC

## 2021-11-10 PROCEDURE — 99285 EMERGENCY DEPT VISIT HI MDM: CPT | Mod: HCNC,CS,, | Performed by: EMERGENCY MEDICINE

## 2021-11-10 PROCEDURE — 93010 ELECTROCARDIOGRAM REPORT: CPT | Mod: HCNC,,, | Performed by: INTERNAL MEDICINE

## 2021-11-10 PROCEDURE — 80047 BASIC METABLC PNL IONIZED CA: CPT | Mod: HCNC

## 2021-11-10 PROCEDURE — 96365 THER/PROPH/DIAG IV INF INIT: CPT | Mod: HCNC

## 2021-11-10 PROCEDURE — U0002 COVID-19 LAB TEST NON-CDC: HCPCS | Mod: HCNC | Performed by: EMERGENCY MEDICINE

## 2021-11-10 PROCEDURE — 80053 COMPREHEN METABOLIC PANEL: CPT | Mod: HCNC | Performed by: NURSE PRACTITIONER

## 2021-11-10 PROCEDURE — 63600175 PHARM REV CODE 636 W HCPCS: Mod: HCNC | Performed by: EMERGENCY MEDICINE

## 2021-11-10 PROCEDURE — 99223 PR INITIAL HOSPITAL CARE,LEVL III: ICD-10-PCS | Mod: HCNC,AI,, | Performed by: HOSPITALIST

## 2021-11-10 PROCEDURE — 99223 1ST HOSP IP/OBS HIGH 75: CPT | Mod: HCNC,AI,, | Performed by: HOSPITALIST

## 2021-11-10 PROCEDURE — 87324 CLOSTRIDIUM AG IA: CPT | Mod: 59,HCNC | Performed by: NURSE PRACTITIONER

## 2021-11-10 PROCEDURE — 96366 THER/PROPH/DIAG IV INF ADDON: CPT | Mod: HCNC

## 2021-11-10 PROCEDURE — 99285 EMERGENCY DEPT VISIT HI MDM: CPT | Mod: 25,HCNC

## 2021-11-10 PROCEDURE — 85025 COMPLETE CBC W/AUTO DIFF WBC: CPT | Mod: HCNC | Performed by: NURSE PRACTITIONER

## 2021-11-10 PROCEDURE — 93010 EKG 12-LEAD: ICD-10-PCS | Mod: HCNC,,, | Performed by: INTERNAL MEDICINE

## 2021-11-10 PROCEDURE — 20600001 HC STEP DOWN PRIVATE ROOM: Mod: HCNC

## 2021-11-10 PROCEDURE — 82272 OCCULT BLD FECES 1-3 TESTS: CPT | Mod: HCNC

## 2021-11-10 PROCEDURE — 83735 ASSAY OF MAGNESIUM: CPT | Mod: HCNC | Performed by: NURSE PRACTITIONER

## 2021-11-10 PROCEDURE — 87449 NOS EACH ORGANISM AG IA: CPT | Mod: 59,HCNC | Performed by: NURSE PRACTITIONER

## 2021-11-10 PROCEDURE — 27000207 HC ISOLATION: Mod: HCNC

## 2021-11-10 PROCEDURE — 25000003 PHARM REV CODE 250: Mod: HCNC | Performed by: EMERGENCY MEDICINE

## 2021-11-10 RX ORDER — TALC
6 POWDER (GRAM) TOPICAL NIGHTLY PRN
Status: DISCONTINUED | OUTPATIENT
Start: 2021-11-11 | End: 2021-11-12 | Stop reason: HOSPADM

## 2021-11-10 RX ORDER — POTASSIUM CHLORIDE 20 MEQ/1
40 TABLET, EXTENDED RELEASE ORAL ONCE
Status: DISCONTINUED | OUTPATIENT
Start: 2021-11-10 | End: 2021-11-10

## 2021-11-10 RX ORDER — POTASSIUM CHLORIDE 20 MEQ/1
40 TABLET, EXTENDED RELEASE ORAL EVERY 4 HOURS
Status: COMPLETED | OUTPATIENT
Start: 2021-11-11 | End: 2021-11-11

## 2021-11-10 RX ORDER — CLOPIDOGREL BISULFATE 75 MG/1
75 TABLET ORAL DAILY
Status: DISCONTINUED | OUTPATIENT
Start: 2021-11-11 | End: 2021-11-12 | Stop reason: HOSPADM

## 2021-11-10 RX ORDER — IBUPROFEN 200 MG
16 TABLET ORAL
Status: DISCONTINUED | OUTPATIENT
Start: 2021-11-11 | End: 2021-11-12 | Stop reason: HOSPADM

## 2021-11-10 RX ORDER — ONDANSETRON 2 MG/ML
4 INJECTION INTRAMUSCULAR; INTRAVENOUS EVERY 8 HOURS PRN
Status: DISCONTINUED | OUTPATIENT
Start: 2021-11-11 | End: 2021-11-12 | Stop reason: HOSPADM

## 2021-11-10 RX ORDER — CITALOPRAM 10 MG/1
20 TABLET ORAL DAILY
Status: DISCONTINUED | OUTPATIENT
Start: 2021-11-11 | End: 2021-11-12 | Stop reason: HOSPADM

## 2021-11-10 RX ORDER — NALOXONE HCL 0.4 MG/ML
0.02 VIAL (ML) INJECTION
Status: DISCONTINUED | OUTPATIENT
Start: 2021-11-11 | End: 2021-11-12 | Stop reason: HOSPADM

## 2021-11-10 RX ORDER — IPRATROPIUM BROMIDE AND ALBUTEROL SULFATE 2.5; .5 MG/3ML; MG/3ML
3 SOLUTION RESPIRATORY (INHALATION) EVERY 6 HOURS PRN
Status: DISCONTINUED | OUTPATIENT
Start: 2021-11-11 | End: 2021-11-12 | Stop reason: HOSPADM

## 2021-11-10 RX ORDER — MAGNESIUM SULFATE HEPTAHYDRATE 40 MG/ML
2 INJECTION, SOLUTION INTRAVENOUS ONCE
Status: COMPLETED | OUTPATIENT
Start: 2021-11-11 | End: 2021-11-11

## 2021-11-10 RX ORDER — CHOLECALCIFEROL (VITAMIN D3) 25 MCG
1000 TABLET ORAL DAILY
Status: DISCONTINUED | OUTPATIENT
Start: 2021-11-11 | End: 2021-11-12 | Stop reason: HOSPADM

## 2021-11-10 RX ORDER — LOSARTAN POTASSIUM 50 MG/1
50 TABLET ORAL DAILY
Refills: 3 | Status: DISCONTINUED | OUTPATIENT
Start: 2021-11-11 | End: 2021-11-12 | Stop reason: HOSPADM

## 2021-11-10 RX ORDER — PROCHLORPERAZINE EDISYLATE 5 MG/ML
5 INJECTION INTRAMUSCULAR; INTRAVENOUS EVERY 6 HOURS PRN
Status: DISCONTINUED | OUTPATIENT
Start: 2021-11-11 | End: 2021-11-12 | Stop reason: HOSPADM

## 2021-11-10 RX ORDER — ACETAMINOPHEN 325 MG/1
650 TABLET ORAL EVERY 4 HOURS PRN
Status: DISCONTINUED | OUTPATIENT
Start: 2021-11-11 | End: 2021-11-12 | Stop reason: HOSPADM

## 2021-11-10 RX ORDER — CARVEDILOL 25 MG/1
25 TABLET ORAL 2 TIMES DAILY WITH MEALS
Status: DISCONTINUED | OUTPATIENT
Start: 2021-11-11 | End: 2021-11-11

## 2021-11-10 RX ORDER — ENOXAPARIN SODIUM 100 MG/ML
30 INJECTION SUBCUTANEOUS EVERY 24 HOURS
Status: DISCONTINUED | OUTPATIENT
Start: 2021-11-11 | End: 2021-11-12 | Stop reason: HOSPADM

## 2021-11-10 RX ORDER — POTASSIUM CHLORIDE 20 MEQ/1
40 TABLET, EXTENDED RELEASE ORAL 3 TIMES DAILY
Qty: 20 TABLET | Refills: 1 | Status: ON HOLD | OUTPATIENT
Start: 2021-11-10 | End: 2021-11-12 | Stop reason: HOSPADM

## 2021-11-10 RX ORDER — POTASSIUM CHLORIDE 7.45 MG/ML
10 INJECTION INTRAVENOUS
Status: COMPLETED | OUTPATIENT
Start: 2021-11-10 | End: 2021-11-10

## 2021-11-10 RX ORDER — ASPIRIN 81 MG/1
81 TABLET ORAL DAILY
Status: DISCONTINUED | OUTPATIENT
Start: 2021-11-11 | End: 2021-11-12 | Stop reason: HOSPADM

## 2021-11-10 RX ORDER — SODIUM CHLORIDE 0.9 % (FLUSH) 0.9 %
10 SYRINGE (ML) INJECTION
Status: DISCONTINUED | OUTPATIENT
Start: 2021-11-11 | End: 2021-11-12 | Stop reason: HOSPADM

## 2021-11-10 RX ORDER — IBUPROFEN 200 MG
24 TABLET ORAL
Status: DISCONTINUED | OUTPATIENT
Start: 2021-11-11 | End: 2021-11-12 | Stop reason: HOSPADM

## 2021-11-10 RX ADMIN — POTASSIUM BICARBONATE 40 MEQ: 391 TABLET, EFFERVESCENT ORAL at 08:11

## 2021-11-10 RX ADMIN — POTASSIUM CHLORIDE 10 MEQ: 7.46 INJECTION, SOLUTION INTRAVENOUS at 08:11

## 2021-11-11 PROBLEM — R19.7 DIARRHEA: Status: ACTIVE | Noted: 2021-11-11

## 2021-11-11 PROBLEM — E83.42 HYPOMAGNESEMIA: Status: ACTIVE | Noted: 2021-11-11

## 2021-11-11 LAB
ANION GAP SERPL CALC-SCNC: 10 MMOL/L (ref 8–16)
ANION GAP SERPL CALC-SCNC: 14 MMOL/L (ref 8–16)
BASOPHILS # BLD AUTO: 0.03 K/UL (ref 0–0.2)
BASOPHILS NFR BLD: 0.4 % (ref 0–1.9)
BUN SERPL-MCNC: 13 MG/DL (ref 8–23)
BUN SERPL-MCNC: 16 MG/DL (ref 8–23)
C DIFF GDH STL QL: NEGATIVE
C DIFF TOX A+B STL QL IA: NEGATIVE
CALCIUM SERPL-MCNC: 7.6 MG/DL (ref 8.7–10.5)
CALCIUM SERPL-MCNC: 8 MG/DL (ref 8.7–10.5)
CHLORIDE SERPL-SCNC: 106 MMOL/L (ref 95–110)
CHLORIDE SERPL-SCNC: 109 MMOL/L (ref 95–110)
CO2 SERPL-SCNC: 24 MMOL/L (ref 23–29)
CO2 SERPL-SCNC: 24 MMOL/L (ref 23–29)
CREAT SERPL-MCNC: 0.9 MG/DL (ref 0.5–1.4)
CREAT SERPL-MCNC: 0.9 MG/DL (ref 0.5–1.4)
DIFFERENTIAL METHOD: ABNORMAL
EOSINOPHIL # BLD AUTO: 0 K/UL (ref 0–0.5)
EOSINOPHIL NFR BLD: 0.4 % (ref 0–8)
ERYTHROCYTE [DISTWIDTH] IN BLOOD BY AUTOMATED COUNT: 14.9 % (ref 11.5–14.5)
EST. GFR  (AFRICAN AMERICAN): >60 ML/MIN/1.73 M^2
EST. GFR  (AFRICAN AMERICAN): >60 ML/MIN/1.73 M^2
EST. GFR  (NON AFRICAN AMERICAN): 57.3 ML/MIN/1.73 M^2
EST. GFR  (NON AFRICAN AMERICAN): 57.3 ML/MIN/1.73 M^2
GLUCOSE SERPL-MCNC: 128 MG/DL (ref 70–110)
GLUCOSE SERPL-MCNC: 96 MG/DL (ref 70–110)
HCT VFR BLD AUTO: 31.4 % (ref 37–48.5)
HGB BLD-MCNC: 9.8 G/DL (ref 12–16)
IMM GRANULOCYTES # BLD AUTO: 0.02 K/UL (ref 0–0.04)
IMM GRANULOCYTES NFR BLD AUTO: 0.3 % (ref 0–0.5)
LYMPHOCYTES # BLD AUTO: 1.4 K/UL (ref 1–4.8)
LYMPHOCYTES NFR BLD: 21.6 % (ref 18–48)
MAGNESIUM SERPL-MCNC: 1.9 MG/DL (ref 1.6–2.6)
MCH RBC QN AUTO: 28.8 PG (ref 27–31)
MCHC RBC AUTO-ENTMCNC: 31.2 G/DL (ref 32–36)
MCV RBC AUTO: 92 FL (ref 82–98)
MONOCYTES # BLD AUTO: 0.6 K/UL (ref 0.3–1)
MONOCYTES NFR BLD: 9.4 % (ref 4–15)
NEUTROPHILS # BLD AUTO: 4.5 K/UL (ref 1.8–7.7)
NEUTROPHILS NFR BLD: 67.9 % (ref 38–73)
NRBC BLD-RTO: 0 /100 WBC
PLATELET # BLD AUTO: 268 K/UL (ref 150–450)
PMV BLD AUTO: 10.4 FL (ref 9.2–12.9)
POTASSIUM SERPL-SCNC: 2.9 MMOL/L (ref 3.5–5.1)
POTASSIUM SERPL-SCNC: 3 MMOL/L (ref 3.5–5.1)
RBC # BLD AUTO: 3.4 M/UL (ref 4–5.4)
SODIUM SERPL-SCNC: 143 MMOL/L (ref 136–145)
SODIUM SERPL-SCNC: 144 MMOL/L (ref 136–145)
WBC # BLD AUTO: 6.67 K/UL (ref 3.9–12.7)
WBC #/AREA STL HPF: NORMAL /[HPF]

## 2021-11-11 PROCEDURE — 20600001 HC STEP DOWN PRIVATE ROOM: Mod: HCNC

## 2021-11-11 PROCEDURE — 99232 PR SUBSEQUENT HOSPITAL CARE,LEVL II: ICD-10-PCS | Mod: HCNC,,, | Performed by: STUDENT IN AN ORGANIZED HEALTH CARE EDUCATION/TRAINING PROGRAM

## 2021-11-11 PROCEDURE — 87015 SPECIMEN INFECT AGNT CONCNTJ: CPT | Mod: HCNC | Performed by: STUDENT IN AN ORGANIZED HEALTH CARE EDUCATION/TRAINING PROGRAM

## 2021-11-11 PROCEDURE — 36415 COLL VENOUS BLD VENIPUNCTURE: CPT | Mod: HCNC | Performed by: STUDENT IN AN ORGANIZED HEALTH CARE EDUCATION/TRAINING PROGRAM

## 2021-11-11 PROCEDURE — 89055 LEUKOCYTE ASSESSMENT FECAL: CPT | Mod: HCNC | Performed by: STUDENT IN AN ORGANIZED HEALTH CARE EDUCATION/TRAINING PROGRAM

## 2021-11-11 PROCEDURE — 25000003 PHARM REV CODE 250: Mod: HCNC | Performed by: STUDENT IN AN ORGANIZED HEALTH CARE EDUCATION/TRAINING PROGRAM

## 2021-11-11 PROCEDURE — 99232 SBSQ HOSP IP/OBS MODERATE 35: CPT | Mod: HCNC,,, | Performed by: STUDENT IN AN ORGANIZED HEALTH CARE EDUCATION/TRAINING PROGRAM

## 2021-11-11 PROCEDURE — 63600175 PHARM REV CODE 636 W HCPCS: Mod: HCNC | Performed by: HOSPITALIST

## 2021-11-11 PROCEDURE — 87329 GIARDIA AG IA: CPT | Mod: HCNC | Performed by: STUDENT IN AN ORGANIZED HEALTH CARE EDUCATION/TRAINING PROGRAM

## 2021-11-11 PROCEDURE — 87207 SMEAR SPECIAL STAIN: CPT | Mod: HCNC | Performed by: STUDENT IN AN ORGANIZED HEALTH CARE EDUCATION/TRAINING PROGRAM

## 2021-11-11 PROCEDURE — 25000242 PHARM REV CODE 250 ALT 637 W/ HCPCS: Mod: HCNC | Performed by: STUDENT IN AN ORGANIZED HEALTH CARE EDUCATION/TRAINING PROGRAM

## 2021-11-11 PROCEDURE — 80048 BASIC METABOLIC PNL TOTAL CA: CPT | Mod: HCNC | Performed by: HOSPITALIST

## 2021-11-11 PROCEDURE — 80048 BASIC METABOLIC PNL TOTAL CA: CPT | Mod: 91,HCNC | Performed by: STUDENT IN AN ORGANIZED HEALTH CARE EDUCATION/TRAINING PROGRAM

## 2021-11-11 PROCEDURE — 87177 OVA AND PARASITES SMEARS: CPT | Mod: HCNC | Performed by: STUDENT IN AN ORGANIZED HEALTH CARE EDUCATION/TRAINING PROGRAM

## 2021-11-11 PROCEDURE — 85025 COMPLETE CBC W/AUTO DIFF WBC: CPT | Mod: HCNC | Performed by: HOSPITALIST

## 2021-11-11 PROCEDURE — 25000003 PHARM REV CODE 250: Mod: HCNC | Performed by: HOSPITALIST

## 2021-11-11 PROCEDURE — 36415 COLL VENOUS BLD VENIPUNCTURE: CPT | Mod: HCNC | Performed by: HOSPITALIST

## 2021-11-11 PROCEDURE — 83735 ASSAY OF MAGNESIUM: CPT | Mod: HCNC | Performed by: HOSPITALIST

## 2021-11-11 RX ORDER — HYDRALAZINE HYDROCHLORIDE 25 MG/1
25 TABLET, FILM COATED ORAL EVERY 8 HOURS PRN
Status: DISCONTINUED | OUTPATIENT
Start: 2021-11-11 | End: 2021-11-12 | Stop reason: HOSPADM

## 2021-11-11 RX ORDER — LOPERAMIDE HYDROCHLORIDE 2 MG/1
2 CAPSULE ORAL 4 TIMES DAILY PRN
Status: DISCONTINUED | OUTPATIENT
Start: 2021-11-11 | End: 2021-11-12 | Stop reason: HOSPADM

## 2021-11-11 RX ORDER — CARVEDILOL 25 MG/1
25 TABLET ORAL 2 TIMES DAILY
Status: DISCONTINUED | OUTPATIENT
Start: 2021-11-11 | End: 2021-11-12 | Stop reason: HOSPADM

## 2021-11-11 RX ORDER — POTASSIUM CHLORIDE 750 MG/1
20 CAPSULE, EXTENDED RELEASE ORAL
Status: COMPLETED | OUTPATIENT
Start: 2021-11-11 | End: 2021-11-11

## 2021-11-11 RX ORDER — LOPERAMIDE HYDROCHLORIDE 2 MG/1
2 CAPSULE ORAL 3 TIMES DAILY
Status: DISCONTINUED | OUTPATIENT
Start: 2021-11-11 | End: 2021-11-11

## 2021-11-11 RX ORDER — MUPIROCIN 20 MG/G
OINTMENT TOPICAL 2 TIMES DAILY
Status: DISCONTINUED | OUTPATIENT
Start: 2021-11-11 | End: 2021-11-12 | Stop reason: HOSPADM

## 2021-11-11 RX ADMIN — CITALOPRAM HYDROBROMIDE 20 MG: 10 TABLET ORAL at 08:11

## 2021-11-11 RX ADMIN — MAGNESIUM SULFATE IN WATER 2 G: 40 INJECTION, SOLUTION INTRAVENOUS at 12:11

## 2021-11-11 RX ADMIN — ASPIRIN 81 MG: 81 TABLET, COATED ORAL at 08:11

## 2021-11-11 RX ADMIN — POTASSIUM CHLORIDE 20 MEQ: 10 CAPSULE, COATED, EXTENDED RELEASE ORAL at 07:11

## 2021-11-11 RX ADMIN — CLOPIDOGREL 75 MG: 75 TABLET, FILM COATED ORAL at 08:11

## 2021-11-11 RX ADMIN — POTASSIUM CHLORIDE 40 MEQ: 1500 TABLET, EXTENDED RELEASE ORAL at 06:11

## 2021-11-11 RX ADMIN — SODIUM CHLORIDE, SODIUM LACTATE, POTASSIUM CHLORIDE, AND CALCIUM CHLORIDE 500 ML: .6; .31; .03; .02 INJECTION, SOLUTION INTRAVENOUS at 04:11

## 2021-11-11 RX ADMIN — PSYLLIUM HUSK 1 PACKET: 3.4 POWDER ORAL at 04:11

## 2021-11-11 RX ADMIN — Medication 1000 UNITS: at 08:11

## 2021-11-11 RX ADMIN — HYDRALAZINE HYDROCHLORIDE 25 MG: 25 TABLET, FILM COATED ORAL at 09:11

## 2021-11-11 RX ADMIN — POTASSIUM CHLORIDE 20 MEQ: 10 CAPSULE, COATED, EXTENDED RELEASE ORAL at 05:11

## 2021-11-11 RX ADMIN — ENOXAPARIN SODIUM 30 MG: 30 INJECTION, SOLUTION INTRAVENOUS; SUBCUTANEOUS at 05:11

## 2021-11-11 RX ADMIN — CARVEDILOL 25 MG: 25 TABLET, FILM COATED ORAL at 12:11

## 2021-11-11 RX ADMIN — Medication 1 CAPSULE: at 04:11

## 2021-11-11 RX ADMIN — POTASSIUM CHLORIDE 20 MEQ: 10 CAPSULE, COATED, EXTENDED RELEASE ORAL at 09:11

## 2021-11-11 RX ADMIN — POTASSIUM CHLORIDE 40 MEQ: 1500 TABLET, EXTENDED RELEASE ORAL at 01:11

## 2021-11-11 RX ADMIN — CARVEDILOL 25 MG: 25 TABLET, FILM COATED ORAL at 08:11

## 2021-11-11 RX ADMIN — CARVEDILOL 25 MG: 25 TABLET, FILM COATED ORAL at 09:11

## 2021-11-11 RX ADMIN — POTASSIUM CHLORIDE 40 MEQ: 1500 TABLET, EXTENDED RELEASE ORAL at 10:11

## 2021-11-11 RX ADMIN — MUPIROCIN: 20 OINTMENT TOPICAL at 09:11

## 2021-11-11 RX ADMIN — LOSARTAN POTASSIUM 50 MG: 50 TABLET, FILM COATED ORAL at 08:11

## 2021-11-11 RX ADMIN — HYDRALAZINE HYDROCHLORIDE 25 MG: 25 TABLET, FILM COATED ORAL at 07:11

## 2021-11-11 RX ADMIN — MUPIROCIN: 20 OINTMENT TOPICAL at 08:11

## 2021-11-12 VITALS
RESPIRATION RATE: 18 BRPM | SYSTOLIC BLOOD PRESSURE: 130 MMHG | HEIGHT: 61 IN | TEMPERATURE: 98 F | DIASTOLIC BLOOD PRESSURE: 58 MMHG | WEIGHT: 116 LBS | HEART RATE: 63 BPM | OXYGEN SATURATION: 97 % | BODY MASS INDEX: 21.9 KG/M2

## 2021-11-12 LAB
ANION GAP SERPL CALC-SCNC: 10 MMOL/L (ref 8–16)
ANION GAP SERPL CALC-SCNC: 8 MMOL/L (ref 8–16)
BASOPHILS # BLD AUTO: 0.02 K/UL (ref 0–0.2)
BASOPHILS NFR BLD: 0.3 % (ref 0–1.9)
BUN SERPL-MCNC: 10 MG/DL (ref 8–23)
BUN SERPL-MCNC: 11 MG/DL (ref 8–23)
CALCIUM SERPL-MCNC: 7.9 MG/DL (ref 8.7–10.5)
CALCIUM SERPL-MCNC: 8.5 MG/DL (ref 8.7–10.5)
CHLORIDE SERPL-SCNC: 106 MMOL/L (ref 95–110)
CHLORIDE SERPL-SCNC: 110 MMOL/L (ref 95–110)
CO2 SERPL-SCNC: 24 MMOL/L (ref 23–29)
CO2 SERPL-SCNC: 26 MMOL/L (ref 23–29)
CREAT SERPL-MCNC: 0.7 MG/DL (ref 0.5–1.4)
CREAT SERPL-MCNC: 0.8 MG/DL (ref 0.5–1.4)
CRYPTOSP AG STL QL IA: NEGATIVE
DIFFERENTIAL METHOD: ABNORMAL
EOSINOPHIL # BLD AUTO: 0 K/UL (ref 0–0.5)
EOSINOPHIL NFR BLD: 0.5 % (ref 0–8)
ERYTHROCYTE [DISTWIDTH] IN BLOOD BY AUTOMATED COUNT: 14.6 % (ref 11.5–14.5)
EST. GFR  (AFRICAN AMERICAN): >60 ML/MIN/1.73 M^2
EST. GFR  (AFRICAN AMERICAN): >60 ML/MIN/1.73 M^2
EST. GFR  (NON AFRICAN AMERICAN): >60 ML/MIN/1.73 M^2
EST. GFR  (NON AFRICAN AMERICAN): >60 ML/MIN/1.73 M^2
G LAMBLIA AG STL QL IA: NEGATIVE
GLUCOSE SERPL-MCNC: 100 MG/DL (ref 70–110)
GLUCOSE SERPL-MCNC: 124 MG/DL (ref 70–110)
HCT VFR BLD AUTO: 31.4 % (ref 37–48.5)
HGB BLD-MCNC: 9.8 G/DL (ref 12–16)
IMM GRANULOCYTES # BLD AUTO: 0.02 K/UL (ref 0–0.04)
IMM GRANULOCYTES NFR BLD AUTO: 0.3 % (ref 0–0.5)
LYMPHOCYTES # BLD AUTO: 1.6 K/UL (ref 1–4.8)
LYMPHOCYTES NFR BLD: 24.5 % (ref 18–48)
MAGNESIUM SERPL-MCNC: 1.5 MG/DL (ref 1.6–2.6)
MCH RBC QN AUTO: 28.7 PG (ref 27–31)
MCHC RBC AUTO-ENTMCNC: 31.2 G/DL (ref 32–36)
MCV RBC AUTO: 92 FL (ref 82–98)
MONOCYTES # BLD AUTO: 0.5 K/UL (ref 0.3–1)
MONOCYTES NFR BLD: 7.1 % (ref 4–15)
NEUTROPHILS # BLD AUTO: 4.5 K/UL (ref 1.8–7.7)
NEUTROPHILS NFR BLD: 67.3 % (ref 38–73)
NRBC BLD-RTO: 0 /100 WBC
PLATELET # BLD AUTO: 253 K/UL (ref 150–450)
PMV BLD AUTO: 10.3 FL (ref 9.2–12.9)
POTASSIUM SERPL-SCNC: 3.1 MMOL/L (ref 3.5–5.1)
POTASSIUM SERPL-SCNC: 3.2 MMOL/L (ref 3.5–5.1)
RBC # BLD AUTO: 3.41 M/UL (ref 4–5.4)
SODIUM SERPL-SCNC: 140 MMOL/L (ref 136–145)
SODIUM SERPL-SCNC: 144 MMOL/L (ref 136–145)
WBC # BLD AUTO: 6.66 K/UL (ref 3.9–12.7)

## 2021-11-12 PROCEDURE — 63600175 PHARM REV CODE 636 W HCPCS: Mod: HCNC | Performed by: STUDENT IN AN ORGANIZED HEALTH CARE EDUCATION/TRAINING PROGRAM

## 2021-11-12 PROCEDURE — 36415 COLL VENOUS BLD VENIPUNCTURE: CPT | Mod: HCNC | Performed by: STUDENT IN AN ORGANIZED HEALTH CARE EDUCATION/TRAINING PROGRAM

## 2021-11-12 PROCEDURE — 1111F DSCHRG MED/CURRENT MED MERGE: CPT | Mod: HCNC,CPTII,, | Performed by: STUDENT IN AN ORGANIZED HEALTH CARE EDUCATION/TRAINING PROGRAM

## 2021-11-12 PROCEDURE — 80048 BASIC METABOLIC PNL TOTAL CA: CPT | Mod: HCNC | Performed by: HOSPITALIST

## 2021-11-12 PROCEDURE — 99239 HOSP IP/OBS DSCHRG MGMT >30: CPT | Mod: HCNC,,, | Performed by: STUDENT IN AN ORGANIZED HEALTH CARE EDUCATION/TRAINING PROGRAM

## 2021-11-12 PROCEDURE — 25000003 PHARM REV CODE 250: Mod: HCNC | Performed by: PHYSICIAN ASSISTANT

## 2021-11-12 PROCEDURE — 25000003 PHARM REV CODE 250: Mod: HCNC | Performed by: STUDENT IN AN ORGANIZED HEALTH CARE EDUCATION/TRAINING PROGRAM

## 2021-11-12 PROCEDURE — 83735 ASSAY OF MAGNESIUM: CPT | Mod: HCNC | Performed by: HOSPITALIST

## 2021-11-12 PROCEDURE — 99239 PR HOSPITAL DISCHARGE DAY,>30 MIN: ICD-10-PCS | Mod: HCNC,,, | Performed by: STUDENT IN AN ORGANIZED HEALTH CARE EDUCATION/TRAINING PROGRAM

## 2021-11-12 PROCEDURE — 87046 STOOL CULTR AEROBIC BACT EA: CPT | Mod: 59,HCNC | Performed by: STUDENT IN AN ORGANIZED HEALTH CARE EDUCATION/TRAINING PROGRAM

## 2021-11-12 PROCEDURE — 80048 BASIC METABOLIC PNL TOTAL CA: CPT | Mod: 91,HCNC | Performed by: STUDENT IN AN ORGANIZED HEALTH CARE EDUCATION/TRAINING PROGRAM

## 2021-11-12 PROCEDURE — 85025 COMPLETE CBC W/AUTO DIFF WBC: CPT | Mod: HCNC | Performed by: HOSPITALIST

## 2021-11-12 PROCEDURE — 36415 COLL VENOUS BLD VENIPUNCTURE: CPT | Mod: HCNC | Performed by: HOSPITALIST

## 2021-11-12 PROCEDURE — 87427 SHIGA-LIKE TOXIN AG IA: CPT | Mod: HCNC | Performed by: STUDENT IN AN ORGANIZED HEALTH CARE EDUCATION/TRAINING PROGRAM

## 2021-11-12 PROCEDURE — 87045 FECES CULTURE AEROBIC BACT: CPT | Mod: HCNC | Performed by: STUDENT IN AN ORGANIZED HEALTH CARE EDUCATION/TRAINING PROGRAM

## 2021-11-12 PROCEDURE — 25000003 PHARM REV CODE 250: Mod: HCNC | Performed by: HOSPITALIST

## 2021-11-12 PROCEDURE — 1111F PR DISCHARGE MEDS RECONCILED W/ CURRENT OUTPATIENT MED LIST: ICD-10-PCS | Mod: HCNC,CPTII,, | Performed by: STUDENT IN AN ORGANIZED HEALTH CARE EDUCATION/TRAINING PROGRAM

## 2021-11-12 RX ORDER — POTASSIUM CHLORIDE 20 MEQ/1
20 TABLET, EXTENDED RELEASE ORAL 2 TIMES DAILY
Qty: 5 TABLET | Refills: 0 | Status: SHIPPED | OUTPATIENT
Start: 2021-11-12 | End: 2021-11-15

## 2021-11-12 RX ORDER — MAGNESIUM SULFATE HEPTAHYDRATE 40 MG/ML
2 INJECTION, SOLUTION INTRAVENOUS ONCE
Status: DISCONTINUED | OUTPATIENT
Start: 2021-11-12 | End: 2021-11-12

## 2021-11-12 RX ORDER — MAGNESIUM SULFATE HEPTAHYDRATE 40 MG/ML
2 INJECTION, SOLUTION INTRAVENOUS ONCE
Status: COMPLETED | OUTPATIENT
Start: 2021-11-12 | End: 2021-11-12

## 2021-11-12 RX ORDER — LOPERAMIDE HYDROCHLORIDE 2 MG/1
2 CAPSULE ORAL 4 TIMES DAILY PRN
Qty: 40 CAPSULE | Refills: 0 | Status: SHIPPED | OUTPATIENT
Start: 2021-11-12 | End: 2021-11-22

## 2021-11-12 RX ORDER — NIFEDIPINE 30 MG/1
30 TABLET, EXTENDED RELEASE ORAL DAILY
Qty: 30 TABLET | Refills: 1 | Status: SHIPPED | OUTPATIENT
Start: 2021-11-12 | End: 2021-12-20 | Stop reason: SDUPTHER

## 2021-11-12 RX ORDER — NIFEDIPINE 30 MG/1
30 TABLET, EXTENDED RELEASE ORAL DAILY
Status: DISCONTINUED | OUTPATIENT
Start: 2021-11-12 | End: 2021-11-12 | Stop reason: HOSPADM

## 2021-11-12 RX ORDER — POTASSIUM CHLORIDE 20 MEQ/1
40 TABLET, EXTENDED RELEASE ORAL
Status: DISPENSED | OUTPATIENT
Start: 2021-11-12 | End: 2021-11-12

## 2021-11-12 RX ADMIN — MAGNESIUM SULFATE IN WATER 2 G: 40 INJECTION, SOLUTION INTRAVENOUS at 08:11

## 2021-11-12 RX ADMIN — MUPIROCIN: 20 OINTMENT TOPICAL at 10:11

## 2021-11-12 RX ADMIN — ASPIRIN 81 MG: 81 TABLET, COATED ORAL at 10:11

## 2021-11-12 RX ADMIN — CITALOPRAM HYDROBROMIDE 20 MG: 10 TABLET ORAL at 10:11

## 2021-11-12 RX ADMIN — LOSARTAN POTASSIUM 50 MG: 50 TABLET, FILM COATED ORAL at 10:11

## 2021-11-12 RX ADMIN — NIFEDIPINE 30 MG: 30 TABLET, FILM COATED, EXTENDED RELEASE ORAL at 10:11

## 2021-11-12 RX ADMIN — CLOPIDOGREL 75 MG: 75 TABLET, FILM COATED ORAL at 09:11

## 2021-11-12 RX ADMIN — POTASSIUM CHLORIDE 40 MEQ: 1500 TABLET, EXTENDED RELEASE ORAL at 10:11

## 2021-11-12 RX ADMIN — Medication 1000 UNITS: at 10:11

## 2021-11-12 RX ADMIN — CARVEDILOL 25 MG: 25 TABLET, FILM COATED ORAL at 10:11

## 2021-11-12 RX ADMIN — HYDRALAZINE HYDROCHLORIDE 25 MG: 25 TABLET, FILM COATED ORAL at 04:11

## 2021-11-12 RX ADMIN — Medication 1 CAPSULE: at 10:11

## 2021-11-12 RX ADMIN — POTASSIUM BICARBONATE 25 MEQ: 978 TABLET, EFFERVESCENT ORAL at 05:11

## 2021-11-13 LAB — O+P STL MICRO: NORMAL

## 2021-11-15 ENCOUNTER — LAB VISIT (OUTPATIENT)
Dept: LAB | Facility: HOSPITAL | Age: 86
End: 2021-11-15
Payer: MEDICARE

## 2021-11-15 DIAGNOSIS — E87.6 HYPOKALEMIA: ICD-10-CM

## 2021-11-15 DIAGNOSIS — E83.42 HYPOMAGNESEMIA: ICD-10-CM

## 2021-11-15 LAB
ANION GAP SERPL CALC-SCNC: 12 MMOL/L (ref 8–16)
ANION GAP SERPL CALC-SCNC: 12 MMOL/L (ref 8–16)
BACTERIA STL CULT: NORMAL
BUN SERPL-MCNC: 13 MG/DL (ref 8–23)
BUN SERPL-MCNC: 13 MG/DL (ref 8–23)
CALCIUM SERPL-MCNC: 8.6 MG/DL (ref 8.7–10.5)
CALCIUM SERPL-MCNC: 8.6 MG/DL (ref 8.7–10.5)
CHLORIDE SERPL-SCNC: 107 MMOL/L (ref 95–110)
CHLORIDE SERPL-SCNC: 107 MMOL/L (ref 95–110)
CO2 SERPL-SCNC: 23 MMOL/L (ref 23–29)
CO2 SERPL-SCNC: 23 MMOL/L (ref 23–29)
CREAT SERPL-MCNC: 0.8 MG/DL (ref 0.5–1.4)
CREAT SERPL-MCNC: 0.8 MG/DL (ref 0.5–1.4)
CYCLOSPORA STL SAFRANIN STN: NORMAL
E COLI SXT1 STL QL IA: NEGATIVE
E COLI SXT2 STL QL IA: NEGATIVE
EST. GFR  (AFRICAN AMERICAN): >60 ML/MIN/1.73 M^2
EST. GFR  (AFRICAN AMERICAN): >60 ML/MIN/1.73 M^2
EST. GFR  (NON AFRICAN AMERICAN): >60 ML/MIN/1.73 M^2
EST. GFR  (NON AFRICAN AMERICAN): >60 ML/MIN/1.73 M^2
GLUCOSE SERPL-MCNC: 92 MG/DL (ref 70–110)
GLUCOSE SERPL-MCNC: 92 MG/DL (ref 70–110)
MAGNESIUM SERPL-MCNC: 1.4 MG/DL (ref 1.6–2.6)
POTASSIUM SERPL-SCNC: 3.3 MMOL/L (ref 3.5–5.1)
POTASSIUM SERPL-SCNC: 3.3 MMOL/L (ref 3.5–5.1)
SODIUM SERPL-SCNC: 142 MMOL/L (ref 136–145)
SODIUM SERPL-SCNC: 142 MMOL/L (ref 136–145)

## 2021-11-15 PROCEDURE — 36415 COLL VENOUS BLD VENIPUNCTURE: CPT | Mod: HCNC,PO

## 2021-11-15 PROCEDURE — 80048 BASIC METABOLIC PNL TOTAL CA: CPT | Mod: HCNC

## 2021-11-15 PROCEDURE — 83735 ASSAY OF MAGNESIUM: CPT | Mod: HCNC | Performed by: STUDENT IN AN ORGANIZED HEALTH CARE EDUCATION/TRAINING PROGRAM

## 2021-11-16 ENCOUNTER — TELEPHONE (OUTPATIENT)
Dept: CARDIOLOGY | Facility: CLINIC | Age: 86
End: 2021-11-16
Payer: MEDICARE

## 2021-11-16 ENCOUNTER — PATIENT MESSAGE (OUTPATIENT)
Dept: INTERNAL MEDICINE | Facility: CLINIC | Age: 86
End: 2021-11-16
Payer: MEDICARE

## 2021-11-16 DIAGNOSIS — E87.6 HYPOKALEMIA: Primary | ICD-10-CM

## 2021-11-16 RX ORDER — POTASSIUM CHLORIDE 20 MEQ/1
20 TABLET, EXTENDED RELEASE ORAL DAILY
Qty: 20 TABLET | Refills: 1 | Status: SHIPPED | OUTPATIENT
Start: 2021-11-16 | End: 2022-06-25

## 2021-11-17 ENCOUNTER — CLINICAL SUPPORT (OUTPATIENT)
Dept: CARDIOLOGY | Facility: HOSPITAL | Age: 86
End: 2021-11-17
Payer: MEDICARE

## 2021-11-17 DIAGNOSIS — Z95.818 PRESENCE OF OTHER CARDIAC IMPLANTS AND GRAFTS: ICD-10-CM

## 2021-11-17 PROCEDURE — 93298 REM INTERROG DEV EVAL SCRMS: CPT | Mod: HCNC,,, | Performed by: INTERNAL MEDICINE

## 2021-11-17 PROCEDURE — 93298 CARDIAC DEVICE CHECK - REMOTE: ICD-10-PCS | Mod: HCNC,,, | Performed by: INTERNAL MEDICINE

## 2021-11-17 PROCEDURE — G2066 INTER DEVC REMOTE 30D: HCPCS | Mod: HCNC | Performed by: INTERNAL MEDICINE

## 2021-11-23 ENCOUNTER — LAB VISIT (OUTPATIENT)
Dept: LAB | Facility: HOSPITAL | Age: 86
End: 2021-11-23
Attending: INTERNAL MEDICINE
Payer: MEDICARE

## 2021-11-23 DIAGNOSIS — E87.6 HYPOKALEMIA: ICD-10-CM

## 2021-11-23 LAB
ANION GAP SERPL CALC-SCNC: 9 MMOL/L (ref 8–16)
BUN SERPL-MCNC: 14 MG/DL (ref 8–23)
CALCIUM SERPL-MCNC: 8.8 MG/DL (ref 8.7–10.5)
CHLORIDE SERPL-SCNC: 104 MMOL/L (ref 95–110)
CO2 SERPL-SCNC: 24 MMOL/L (ref 23–29)
CREAT SERPL-MCNC: 0.8 MG/DL (ref 0.5–1.4)
EST. GFR  (AFRICAN AMERICAN): >60 ML/MIN/1.73 M^2
EST. GFR  (NON AFRICAN AMERICAN): >60 ML/MIN/1.73 M^2
GLUCOSE SERPL-MCNC: 106 MG/DL (ref 70–110)
POTASSIUM SERPL-SCNC: 4.6 MMOL/L (ref 3.5–5.1)
SODIUM SERPL-SCNC: 137 MMOL/L (ref 136–145)

## 2021-11-23 PROCEDURE — 36415 COLL VENOUS BLD VENIPUNCTURE: CPT | Mod: HCNC,PO | Performed by: INTERNAL MEDICINE

## 2021-11-23 PROCEDURE — 80048 BASIC METABOLIC PNL TOTAL CA: CPT | Mod: HCNC | Performed by: INTERNAL MEDICINE

## 2021-11-30 ENCOUNTER — OFFICE VISIT (OUTPATIENT)
Dept: GASTROENTEROLOGY | Facility: CLINIC | Age: 86
End: 2021-11-30
Payer: MEDICARE

## 2021-11-30 VITALS
SYSTOLIC BLOOD PRESSURE: 139 MMHG | HEIGHT: 61 IN | WEIGHT: 114.19 LBS | BODY MASS INDEX: 21.56 KG/M2 | DIASTOLIC BLOOD PRESSURE: 80 MMHG

## 2021-11-30 DIAGNOSIS — R19.7 DIARRHEA, UNSPECIFIED TYPE: ICD-10-CM

## 2021-11-30 PROCEDURE — 99999 PR PBB SHADOW E&M-EST. PATIENT-LVL IV: ICD-10-PCS | Mod: PBBFAC,HCNC,, | Performed by: INTERNAL MEDICINE

## 2021-11-30 PROCEDURE — 99203 OFFICE O/P NEW LOW 30 MIN: CPT | Mod: HCNC,S$GLB,, | Performed by: INTERNAL MEDICINE

## 2021-11-30 PROCEDURE — 99203 PR OFFICE/OUTPT VISIT, NEW, LEVL III, 30-44 MIN: ICD-10-PCS | Mod: HCNC,S$GLB,, | Performed by: INTERNAL MEDICINE

## 2021-11-30 PROCEDURE — 99999 PR PBB SHADOW E&M-EST. PATIENT-LVL IV: CPT | Mod: PBBFAC,HCNC,, | Performed by: INTERNAL MEDICINE

## 2021-12-06 DIAGNOSIS — R11.2 NON-INTRACTABLE VOMITING WITH NAUSEA, UNSPECIFIED VOMITING TYPE: ICD-10-CM

## 2021-12-07 ENCOUNTER — TELEPHONE (OUTPATIENT)
Dept: CARDIOLOGY | Facility: CLINIC | Age: 86
End: 2021-12-07
Payer: MEDICARE

## 2021-12-07 ENCOUNTER — HOSPITAL ENCOUNTER (OUTPATIENT)
Dept: RADIOLOGY | Facility: HOSPITAL | Age: 86
Discharge: HOME | End: 2021-12-07
Attending: INTERNAL MEDICINE
Payer: MEDICARE

## 2021-12-07 DIAGNOSIS — I10 PRIMARY HYPERTENSION: Primary | ICD-10-CM

## 2021-12-07 DIAGNOSIS — R11.2 NON-INTRACTABLE VOMITING WITH NAUSEA, UNSPECIFIED VOMITING TYPE: ICD-10-CM

## 2021-12-07 PROCEDURE — 74177 CT ABD & PELVIS W/CONTRAST: CPT | Mod: TC,HCNC

## 2021-12-07 PROCEDURE — 25500020 PHARM REV CODE 255: Mod: HCNC | Performed by: INTERNAL MEDICINE

## 2021-12-07 PROCEDURE — 74177 CT ABD & PELVIS W/CONTRAST: CPT | Mod: 26,HCNC,, | Performed by: RADIOLOGY

## 2021-12-07 PROCEDURE — 74177 CT ABDOMEN PELVIS WITH CONTRAST: ICD-10-PCS | Mod: 26,HCNC,, | Performed by: RADIOLOGY

## 2021-12-07 RX ADMIN — IOHEXOL 75 ML: 350 INJECTION, SOLUTION INTRAVENOUS at 10:12

## 2021-12-14 ENCOUNTER — PATIENT MESSAGE (OUTPATIENT)
Dept: GASTROENTEROLOGY | Facility: CLINIC | Age: 86
End: 2021-12-14
Payer: MEDICARE

## 2021-12-14 DIAGNOSIS — R19.7 DIARRHEA, UNSPECIFIED TYPE: Primary | ICD-10-CM

## 2021-12-14 DIAGNOSIS — R63.4 WEIGHT LOSS: ICD-10-CM

## 2021-12-17 ENCOUNTER — CLINICAL SUPPORT (OUTPATIENT)
Dept: CARDIOLOGY | Facility: HOSPITAL | Age: 86
End: 2021-12-17
Payer: MEDICARE

## 2021-12-17 DIAGNOSIS — Z95.818 PRESENCE OF OTHER CARDIAC IMPLANTS AND GRAFTS: ICD-10-CM

## 2021-12-17 PROCEDURE — 93298 REM INTERROG DEV EVAL SCRMS: CPT | Mod: HCNC,,, | Performed by: INTERNAL MEDICINE

## 2021-12-17 PROCEDURE — G2066 INTER DEVC REMOTE 30D: HCPCS | Mod: HCNC | Performed by: INTERNAL MEDICINE

## 2021-12-17 PROCEDURE — 93298 CARDIAC DEVICE CHECK - REMOTE: ICD-10-PCS | Mod: HCNC,,, | Performed by: INTERNAL MEDICINE

## 2021-12-20 RX ORDER — NIFEDIPINE 30 MG/1
30 TABLET, EXTENDED RELEASE ORAL DAILY
Qty: 90 TABLET | Refills: 3 | Status: SHIPPED | OUTPATIENT
Start: 2021-12-20 | End: 2022-12-15 | Stop reason: SDUPTHER

## 2021-12-20 RX ORDER — CARVEDILOL 25 MG/1
25 TABLET ORAL 2 TIMES DAILY WITH MEALS
Qty: 180 TABLET | Refills: 3 | Status: SHIPPED | OUTPATIENT
Start: 2021-12-20 | End: 2022-12-15 | Stop reason: SDUPTHER

## 2022-01-16 ENCOUNTER — CLINICAL SUPPORT (OUTPATIENT)
Dept: CARDIOLOGY | Facility: HOSPITAL | Age: 87
End: 2022-01-16
Attending: INTERNAL MEDICINE
Payer: MEDICARE

## 2022-01-16 DIAGNOSIS — Z95.818 PRESENCE OF OTHER CARDIAC IMPLANTS AND GRAFTS: ICD-10-CM

## 2022-01-16 PROCEDURE — G2066 INTER DEVC REMOTE 30D: HCPCS | Mod: HCNC | Performed by: INTERNAL MEDICINE

## 2022-01-16 PROCEDURE — 93298 REM INTERROG DEV EVAL SCRMS: CPT | Mod: HCNC,,, | Performed by: INTERNAL MEDICINE

## 2022-01-16 PROCEDURE — 93298 CARDIAC DEVICE CHECK - REMOTE: ICD-10-PCS | Mod: HCNC,,, | Performed by: INTERNAL MEDICINE

## 2022-01-24 ENCOUNTER — TELEPHONE (OUTPATIENT)
Dept: CARDIOLOGY | Facility: CLINIC | Age: 87
End: 2022-01-24
Payer: MEDICARE

## 2022-01-24 DIAGNOSIS — Z95.818 PRESENCE OF OTHER CARDIAC IMPLANTS AND GRAFTS: Primary | ICD-10-CM

## 2022-01-24 NOTE — TELEPHONE ENCOUNTER
Spoke with patient's son Pat regarding monitor findings. 15 minutes of AF with RVR noted 1/22. This was asymptomatic. There was undersensing observed, and thus true duration is unknown.  We discussed her prior stroke history, which occurred after hypotension in the setting of sepsis, with an MRI revealing an infarct.  He also voiced concern with her being a significant fall risk, with 3 falls this past year.  Will obtain ECG tomorrow to confirm she is not in persistent AF. If not >> will defer anticoagulation at this time, given the brief duration and her fall risk.

## 2022-01-25 ENCOUNTER — HOSPITAL ENCOUNTER (OUTPATIENT)
Dept: CARDIOLOGY | Facility: CLINIC | Age: 87
Discharge: HOME | End: 2022-01-25
Payer: MEDICARE

## 2022-01-25 ENCOUNTER — CLINICAL SUPPORT (OUTPATIENT)
Dept: CARDIOLOGY | Facility: HOSPITAL | Age: 87
End: 2022-01-25
Attending: INTERNAL MEDICINE
Payer: MEDICARE

## 2022-01-25 ENCOUNTER — TELEPHONE (OUTPATIENT)
Dept: CARDIOLOGY | Facility: HOSPITAL | Age: 87
End: 2022-01-25
Payer: MEDICARE

## 2022-01-25 DIAGNOSIS — Z95.818 PRESENCE OF OTHER CARDIAC IMPLANTS AND GRAFTS: ICD-10-CM

## 2022-01-25 DIAGNOSIS — Z86.73 HISTORY OF CVA (CEREBROVASCULAR ACCIDENT): ICD-10-CM

## 2022-01-25 DIAGNOSIS — Z86.73 HISTORY OF CVA (CEREBROVASCULAR ACCIDENT): Primary | ICD-10-CM

## 2022-01-25 PROCEDURE — 93005 RHYTHM STRIP: ICD-10-PCS | Mod: HCNC,S$GLB,, | Performed by: INTERNAL MEDICINE

## 2022-01-25 PROCEDURE — 93010 ELECTROCARDIOGRAM REPORT: CPT | Mod: HCNC,S$GLB,, | Performed by: INTERNAL MEDICINE

## 2022-01-25 PROCEDURE — 93005 ELECTROCARDIOGRAM TRACING: CPT | Mod: HCNC,S$GLB,, | Performed by: INTERNAL MEDICINE

## 2022-01-25 PROCEDURE — 93010 RHYTHM STRIP: ICD-10-PCS | Mod: HCNC,S$GLB,, | Performed by: INTERNAL MEDICINE

## 2022-01-25 NOTE — TELEPHONE ENCOUNTER
Patient presents to clinic with EKG- NSR with PVC. Brought patient to exam room to discuss changing High Ventricular Rate alert threshold to 150 bpm on loop recorder. Explained setting would allow for closer monitoring if she should experience HVRs in future. Patient verbalized understanding.     Patient reports she was moving plants from inside to outside on 1/22/22 and some were heavy which may have caused her heart rates to go higher than usual. Advised we will continue to monitor and will call her if any high rates or irregular rhythms noted on the monitor. Patient verbalized understanding and denies any other questions/concerns at this time.

## 2022-02-15 ENCOUNTER — CLINICAL SUPPORT (OUTPATIENT)
Dept: CARDIOLOGY | Facility: HOSPITAL | Age: 87
End: 2022-02-15
Payer: MEDICARE

## 2022-02-15 DIAGNOSIS — Z95.818 PRESENCE OF OTHER CARDIAC IMPLANTS AND GRAFTS: ICD-10-CM

## 2022-02-15 PROCEDURE — G2066 INTER DEVC REMOTE 30D: HCPCS | Mod: HCNC | Performed by: INTERNAL MEDICINE

## 2022-03-17 ENCOUNTER — CLINICAL SUPPORT (OUTPATIENT)
Dept: CARDIOLOGY | Facility: HOSPITAL | Age: 87
End: 2022-03-17
Payer: MEDICARE

## 2022-03-17 DIAGNOSIS — Z95.818 PRESENCE OF OTHER CARDIAC IMPLANTS AND GRAFTS: ICD-10-CM

## 2022-03-17 PROCEDURE — 93298 REM INTERROG DEV EVAL SCRMS: CPT | Mod: ,,, | Performed by: INTERNAL MEDICINE

## 2022-03-17 PROCEDURE — G2066 INTER DEVC REMOTE 30D: HCPCS | Performed by: INTERNAL MEDICINE

## 2022-03-17 PROCEDURE — 93298 CARDIAC DEVICE CHECK - REMOTE: ICD-10-PCS | Mod: ,,, | Performed by: INTERNAL MEDICINE

## 2022-04-05 ENCOUNTER — PATIENT MESSAGE (OUTPATIENT)
Dept: CARDIOLOGY | Facility: HOSPITAL | Age: 87
End: 2022-04-05
Payer: MEDICARE

## 2022-06-25 RX ORDER — CITALOPRAM 20 MG/1
20 TABLET, FILM COATED ORAL DAILY
Qty: 90 TABLET | Refills: 2 | Status: SHIPPED | OUTPATIENT
Start: 2022-06-25 | End: 2022-07-19 | Stop reason: SDUPTHER

## 2022-07-19 RX ORDER — CITALOPRAM 20 MG/1
20 TABLET, FILM COATED ORAL DAILY
Qty: 90 TABLET | Refills: 2 | Status: SHIPPED | OUTPATIENT
Start: 2022-07-19 | End: 2022-12-15 | Stop reason: SDUPTHER

## 2022-08-28 ENCOUNTER — PATIENT MESSAGE (OUTPATIENT)
Dept: ADMINISTRATIVE | Facility: HOSPITAL | Age: 87
End: 2022-08-28
Payer: MEDICARE

## 2022-10-18 ENCOUNTER — PATIENT OUTREACH (OUTPATIENT)
Dept: ADMINISTRATIVE | Facility: HOSPITAL | Age: 87
End: 2022-10-18
Payer: MEDICARE

## 2022-10-18 NOTE — PROGRESS NOTES
[email or web address]    Left voicemail to schedule DEXA.

## 2022-10-27 ENCOUNTER — PATIENT OUTREACH (OUTPATIENT)
Dept: ADMINISTRATIVE | Facility: HOSPITAL | Age: 87
End: 2022-10-27
Payer: MEDICARE

## 2022-10-27 NOTE — LETTER
"     October 27, 2022    Naheed Cottrell  [address] [place] [address]             [facility]  [address]  [place] [address]  Phone: [phone] Dear Naheed:      [facility] wants to help patients achieve their health goals. Our records show that you are overdue for your Bone  Density Scan, known as a DEXA scan. This scan is recommended for female patients aged 65 and male patients 70 to 89 yrs of age.   As people age, their bones often become thinner and more fragile. Over time, this could lead to osteoporosis, which makes bones weak and at risk for a bone fracture.   Essentially this scan shows how strong your bones are and if you are a risk for a bone fracture.      What is a bone mineral density test (DEXA scan)?  An x-ray that can show how strong your bones are.  As people age, their bones often become less dense.   This means they are thinner, more fragile and easier to break.    What is bone density testing used for?   A DEXA scan is the best test to diagnose osteoporosis, predict the chances that you will break a bone and check how well treatment is working.      Who should get bone density testing?  All women aged 65 and men 70 and older should have bone density testing every two years.   If had a recent fracture, should have done within six months of the fracture.     What happens during a DEXA scan?     An X-ray machine scans your bones. The test does not hurt or make you uncomfortable. No pill are taken and no shots.  The test takes less than 15 minutes. Even though DXA is a type of X-ray, it gives off very little radiation. During a DEXA scan, you get about the same amount of radiation that an average person gets from the environment in one day. After the test, your bones get a "score.  The provider will use the score to determine if you have any bone issues.  We have ordered the test for you and would like you to come in to one of the radiology departments in your " area to have your DEXA scan done.       If you had your DEXA scan completed at a [facility] location, please bring a copy of your reports at your next Primary Care visit.  You can also upload a copy to your [facility] portal. If you have not completed, please schedule your DEXA scan within six month of your fracture date.    If you have questions, please call the clinic or the [facility] Scheduling schedule an appointment: [phone] Monday through Friday 9 am - 5 pm.     Thank you for choosing [facility].    Mack Cottrell MD and your [facility] Primary [facility] Team

## 2022-12-12 DIAGNOSIS — I10 PRIMARY HYPERTENSION: Primary | ICD-10-CM

## 2022-12-15 ENCOUNTER — LAB VISIT (OUTPATIENT)
Dept: LAB | Facility: HOSPITAL | Age: 87
End: 2022-12-15
Attending: INTERNAL MEDICINE
Payer: MEDICARE

## 2022-12-15 DIAGNOSIS — I10 PRIMARY HYPERTENSION: ICD-10-CM

## 2022-12-15 LAB
ALBUMIN SERPL BCP-MCNC: 3.2 G/DL (ref 3.5–5.2)
ALP SERPL-CCNC: 64 U/L (ref 55–135)
ALT SERPL W/O P-5'-P-CCNC: 9 U/L (ref 10–44)
ANION GAP SERPL CALC-SCNC: 9 MMOL/L (ref 8–16)
AST SERPL-CCNC: 15 U/L (ref 10–40)
BASOPHILS # BLD AUTO: 0.04 K/UL (ref 0–0.2)
BASOPHILS NFR BLD: 0.7 % (ref 0–1.9)
BILIRUB SERPL-MCNC: 0.4 MG/DL (ref 0.1–1)
BUN SERPL-MCNC: 18 MG/DL (ref 8–23)
CALCIUM SERPL-MCNC: 9.3 MG/DL (ref 8.7–10.5)
CHLORIDE SERPL-SCNC: 104 MMOL/L (ref 95–110)
CO2 SERPL-SCNC: 23 MMOL/L (ref 23–29)
CREAT SERPL-MCNC: 0.8 MG/DL (ref 0.5–1.4)
DIFFERENTIAL METHOD: ABNORMAL
EOSINOPHIL # BLD AUTO: 0.1 K/UL (ref 0–0.5)
EOSINOPHIL NFR BLD: 1 % (ref 0–8)
ERYTHROCYTE [DISTWIDTH] IN BLOOD BY AUTOMATED COUNT: 14.2 % (ref 11.5–14.5)
EST. GFR  (NO RACE VARIABLE): >60 ML/MIN/1.73 M^2
GLUCOSE SERPL-MCNC: 100 MG/DL (ref 70–110)
HCT VFR BLD AUTO: 38.6 % (ref 37–48.5)
HGB BLD-MCNC: 11.8 G/DL (ref 12–16)
IMM GRANULOCYTES # BLD AUTO: 0.01 K/UL (ref 0–0.04)
IMM GRANULOCYTES NFR BLD AUTO: 0.2 % (ref 0–0.5)
LYMPHOCYTES # BLD AUTO: 1.4 K/UL (ref 1–4.8)
LYMPHOCYTES NFR BLD: 24.4 % (ref 18–48)
MCH RBC QN AUTO: 29.3 PG (ref 27–31)
MCHC RBC AUTO-ENTMCNC: 30.6 G/DL (ref 32–36)
MCV RBC AUTO: 96 FL (ref 82–98)
MONOCYTES # BLD AUTO: 0.6 K/UL (ref 0.3–1)
MONOCYTES NFR BLD: 9.3 % (ref 4–15)
NEUTROPHILS # BLD AUTO: 3.8 K/UL (ref 1.8–7.7)
NEUTROPHILS NFR BLD: 64.4 % (ref 38–73)
NRBC BLD-RTO: 0 /100 WBC
PLATELET # BLD AUTO: 241 K/UL (ref 150–450)
PMV BLD AUTO: 9.9 FL (ref 9.2–12.9)
POTASSIUM SERPL-SCNC: 4.1 MMOL/L (ref 3.5–5.1)
PROT SERPL-MCNC: 6.8 G/DL (ref 6–8.4)
RBC # BLD AUTO: 4.03 M/UL (ref 4–5.4)
SODIUM SERPL-SCNC: 136 MMOL/L (ref 136–145)
WBC # BLD AUTO: 5.9 K/UL (ref 3.9–12.7)

## 2022-12-15 PROCEDURE — 36415 COLL VENOUS BLD VENIPUNCTURE: CPT | Mod: PO | Performed by: INTERNAL MEDICINE

## 2022-12-15 PROCEDURE — 85025 COMPLETE CBC W/AUTO DIFF WBC: CPT | Performed by: INTERNAL MEDICINE

## 2022-12-15 PROCEDURE — 80053 COMPREHEN METABOLIC PANEL: CPT | Performed by: INTERNAL MEDICINE

## 2022-12-15 RX ORDER — NIFEDIPINE 30 MG/1
30 TABLET, EXTENDED RELEASE ORAL DAILY
Qty: 90 TABLET | Refills: 3 | Status: SHIPPED | OUTPATIENT
Start: 2022-12-15 | End: 2023-12-18

## 2022-12-15 RX ORDER — CITALOPRAM 20 MG/1
20 TABLET, FILM COATED ORAL DAILY
Qty: 90 TABLET | Refills: 2 | Status: SHIPPED | OUTPATIENT
Start: 2022-12-15 | End: 2023-11-16 | Stop reason: SDUPTHER

## 2022-12-15 RX ORDER — CARVEDILOL 25 MG/1
25 TABLET ORAL 2 TIMES DAILY WITH MEALS
Qty: 180 TABLET | Refills: 3 | Status: SHIPPED | OUTPATIENT
Start: 2022-12-15 | End: 2024-01-15 | Stop reason: SDUPTHER

## 2023-01-23 NOTE — ASSESSMENT & PLAN NOTE
- Hgb stable   Message was left for the patient to call the office back.    Writer would like to know how the ,pt is taking the medication.     Live well message sent to the patient.

## 2023-02-07 DIAGNOSIS — Z00.00 ENCOUNTER FOR MEDICARE ANNUAL WELLNESS EXAM: ICD-10-CM

## 2023-02-09 DIAGNOSIS — Z00.00 ENCOUNTER FOR MEDICARE ANNUAL WELLNESS EXAM: ICD-10-CM

## 2023-04-20 DIAGNOSIS — G45.9 TIA (TRANSIENT ISCHEMIC ATTACK): Primary | ICD-10-CM

## 2023-04-20 DIAGNOSIS — G45.9 TIA (TRANSIENT ISCHEMIC ATTACK): ICD-10-CM

## 2023-04-20 RX ORDER — CLOPIDOGREL BISULFATE 75 MG/1
75 TABLET ORAL DAILY
Qty: 90 TABLET | Refills: 3 | Status: SHIPPED | OUTPATIENT
Start: 2023-04-20 | End: 2024-04-19

## 2023-04-24 ENCOUNTER — HOSPITAL ENCOUNTER (OUTPATIENT)
Dept: CARDIOLOGY | Facility: HOSPITAL | Age: 88
Discharge: HOME | End: 2023-04-24
Attending: INTERNAL MEDICINE
Payer: MEDICARE

## 2023-04-24 ENCOUNTER — TELEPHONE (OUTPATIENT)
Dept: ELECTROPHYSIOLOGY | Facility: CLINIC | Age: 88
End: 2023-04-24
Payer: MEDICARE

## 2023-04-24 ENCOUNTER — TELEPHONE (OUTPATIENT)
Dept: CARDIOLOGY | Facility: HOSPITAL | Age: 88
End: 2023-04-24
Payer: MEDICARE

## 2023-04-24 DIAGNOSIS — G45.9 TIA (TRANSIENT ISCHEMIC ATTACK): ICD-10-CM

## 2023-04-24 LAB
LEFT ARM DIASTOLIC BLOOD PRESSURE: 63 MMHG
LEFT ARM SYSTOLIC BLOOD PRESSURE: 98 MMHG
LEFT CBA DIAS: 12 CM/S
LEFT CBA SYS: 88 CM/S
LEFT CCA DIST DIAS: 12 CM/S
LEFT CCA DIST SYS: 95 CM/S
LEFT CCA MID DIAS: 14 CM/S
LEFT CCA MID SYS: 94 CM/S
LEFT CCA PROX DIAS: 13 CM/S
LEFT CCA PROX SYS: 93 CM/S
LEFT ECA DIAS: 0 CM/S
LEFT ECA SYS: 122 CM/S
LEFT ICA DIST DIAS: 16 CM/S
LEFT ICA DIST SYS: 90 CM/S
LEFT ICA MID DIAS: 20 CM/S
LEFT ICA MID SYS: 106 CM/S
LEFT ICA PROX DIAS: 16 CM/S
LEFT ICA PROX SYS: 49 CM/S
LEFT VERTEBRAL SYS: 74 CM/S
OHS CV CAROTID RIGHT ICA EDV HIGHEST: 19
OHS CV CAROTID ULTRASOUND LEFT ICA/CCA RATIO: 1.12
OHS CV CAROTID ULTRASOUND RIGHT ICA/CCA RATIO: 1.36
OHS CV PV CAROTID LEFT HIGHEST CCA: 95
OHS CV PV CAROTID LEFT HIGHEST ICA: 106
OHS CV PV CAROTID RIGHT HIGHEST CCA: 74
OHS CV PV CAROTID RIGHT HIGHEST ICA: 68
OHS CV US CAROTID LEFT HIGHEST EDV: 20
RIGHT ARM DIASTOLIC BLOOD PRESSURE: 65 MMHG
RIGHT ARM SYSTOLIC BLOOD PRESSURE: 136 MMHG
RIGHT CBA DIAS: 10 CM/S
RIGHT CBA SYS: 57 CM/S
RIGHT CCA DIST DIAS: 8 CM/S
RIGHT CCA DIST SYS: 50 CM/S
RIGHT CCA MID DIAS: 7 CM/S
RIGHT CCA MID SYS: 59 CM/S
RIGHT CCA PROX DIAS: 8 CM/S
RIGHT CCA PROX SYS: 74 CM/S
RIGHT ECA DIAS: 0 CM/S
RIGHT ECA SYS: 177 CM/S
RIGHT ICA DIST DIAS: 19 CM/S
RIGHT ICA DIST SYS: 63 CM/S
RIGHT ICA MID DIAS: 10 CM/S
RIGHT ICA MID SYS: 42 CM/S
RIGHT ICA PROX DIAS: 11 CM/S
RIGHT ICA PROX SYS: 68 CM/S
RIGHT VERTEBRAL DIAS: 16 CM/S
RIGHT VERTEBRAL SYS: 101 CM/S

## 2023-04-24 PROCEDURE — 93880 EXTRACRANIAL BILAT STUDY: CPT | Mod: HCNC

## 2023-04-24 PROCEDURE — 93880 EXTRACRANIAL BILAT STUDY: CPT | Mod: 26,HCNC,, | Performed by: INTERNAL MEDICINE

## 2023-04-24 PROCEDURE — 93880 CV US DOPPLER CAROTID (CUPID ONLY): ICD-10-PCS | Mod: 26,HCNC,, | Performed by: INTERNAL MEDICINE

## 2023-04-24 NOTE — TELEPHONE ENCOUNTER
"Received a call from Ai Ritter @ the [facility] in relation to this pt and her Bi[employer] ILR.  Last remote transmission was received on 2/25/2022.  Of note, with a [employer] ILR after 90 days of no communication pt's status is automatically changed to Deactivated and pt needs to come into the clinic to have remote monitoring turned back to "ON" in order for the clinic to receive any transmissions. Ai Ritter was informed of the above information and stated she will s/w Dr. EMANUEL Cottrell whom is the pt's son to see how he would like to proceed. Pt was reactivated in the Remote site however, transmissions will not be received without pt having home monitoring turned back on in the clinic.   "

## 2023-04-24 NOTE — TELEPHONE ENCOUNTER
Contacted both [facility] and [facility].Spoke with July at [facility] and it seems the patient was last checked in Feb 2022 and for her next check she was attempted to be notified on several occassions and there was no return call.The device with [place] remote checks seems to have closed account when patient was not able to be reached or return call.Dr Cottrell notified.July to contact [facility] to call our office for futher information.

## 2023-07-24 PROBLEM — G45.9 TIA (TRANSIENT ISCHEMIC ATTACK): Status: RESOLVED | Noted: 2023-04-20 | Resolved: 2023-07-24

## 2023-08-18 ENCOUNTER — PES CALL (OUTPATIENT)
Dept: ADMINISTRATIVE | Facility: CLINIC | Age: 88
End: 2023-08-18
Payer: MEDICARE

## 2023-10-19 DIAGNOSIS — I10 PRIMARY HYPERTENSION: Primary | ICD-10-CM

## 2023-10-25 ENCOUNTER — LAB VISIT (OUTPATIENT)
Dept: LAB | Facility: HOSPITAL | Age: 88
End: 2023-10-25
Attending: INTERNAL MEDICINE
Payer: MEDICARE

## 2023-10-25 DIAGNOSIS — I10 PRIMARY HYPERTENSION: ICD-10-CM

## 2023-10-25 LAB
ALBUMIN SERPL BCP-MCNC: 3.4 G/DL (ref 3.5–5.2)
ALP SERPL-CCNC: 74 U/L (ref 55–135)
ALT SERPL W/O P-5'-P-CCNC: 7 U/L (ref 10–44)
ANION GAP SERPL CALC-SCNC: 12 MMOL/L (ref 8–16)
AST SERPL-CCNC: 13 U/L (ref 10–40)
BASOPHILS # BLD AUTO: 0.07 K/UL (ref 0–0.2)
BASOPHILS NFR BLD: 0.7 % (ref 0–1.9)
BILIRUB SERPL-MCNC: 0.3 MG/DL (ref 0.1–1)
BUN SERPL-MCNC: 21 MG/DL (ref 8–23)
CALCIUM SERPL-MCNC: 9.8 MG/DL (ref 8.7–10.5)
CHLORIDE SERPL-SCNC: 102 MMOL/L (ref 95–110)
CO2 SERPL-SCNC: 22 MMOL/L (ref 23–29)
CREAT SERPL-MCNC: 1.1 MG/DL (ref 0.5–1.4)
DIFFERENTIAL METHOD: ABNORMAL
EOSINOPHIL # BLD AUTO: 0.1 K/UL (ref 0–0.5)
EOSINOPHIL NFR BLD: 0.8 % (ref 0–8)
ERYTHROCYTE [DISTWIDTH] IN BLOOD BY AUTOMATED COUNT: 15 % (ref 11.5–14.5)
EST. GFR  (NO RACE VARIABLE): 47.7 ML/MIN/1.73 M^2
GLUCOSE SERPL-MCNC: 86 MG/DL (ref 70–110)
HCT VFR BLD AUTO: 38.4 % (ref 37–48.5)
HGB BLD-MCNC: 11.8 G/DL (ref 12–16)
IMM GRANULOCYTES # BLD AUTO: 0.03 K/UL (ref 0–0.04)
IMM GRANULOCYTES NFR BLD AUTO: 0.3 % (ref 0–0.5)
LYMPHOCYTES # BLD AUTO: 1.6 K/UL (ref 1–4.8)
LYMPHOCYTES NFR BLD: 15.9 % (ref 18–48)
MCH RBC QN AUTO: 28 PG (ref 27–31)
MCHC RBC AUTO-ENTMCNC: 30.7 G/DL (ref 32–36)
MCV RBC AUTO: 91 FL (ref 82–98)
MONOCYTES # BLD AUTO: 0.8 K/UL (ref 0.3–1)
MONOCYTES NFR BLD: 7.8 % (ref 4–15)
NEUTROPHILS # BLD AUTO: 7.3 K/UL (ref 1.8–7.7)
NEUTROPHILS NFR BLD: 74.5 % (ref 38–73)
NRBC BLD-RTO: 0 /100 WBC
PLATELET # BLD AUTO: 284 K/UL (ref 150–450)
PMV BLD AUTO: 9.4 FL (ref 9.2–12.9)
POTASSIUM SERPL-SCNC: 4.4 MMOL/L (ref 3.5–5.1)
PROT SERPL-MCNC: 7.3 G/DL (ref 6–8.4)
RBC # BLD AUTO: 4.21 M/UL (ref 4–5.4)
SODIUM SERPL-SCNC: 136 MMOL/L (ref 136–145)
WBC # BLD AUTO: 9.84 K/UL (ref 3.9–12.7)

## 2023-10-25 PROCEDURE — 85025 COMPLETE CBC W/AUTO DIFF WBC: CPT | Mod: HCNC | Performed by: INTERNAL MEDICINE

## 2023-10-25 PROCEDURE — 36415 COLL VENOUS BLD VENIPUNCTURE: CPT | Mod: HCNC,PO | Performed by: INTERNAL MEDICINE

## 2023-10-25 PROCEDURE — 80053 COMPREHEN METABOLIC PANEL: CPT | Mod: HCNC | Performed by: INTERNAL MEDICINE

## 2023-11-16 RX ORDER — CITALOPRAM 20 MG/1
20 TABLET, FILM COATED ORAL DAILY
Qty: 90 TABLET | Refills: 2 | Status: SHIPPED | OUTPATIENT
Start: 2023-11-16

## 2023-12-18 RX ORDER — NIFEDIPINE 30 MG/1
30 TABLET, EXTENDED RELEASE ORAL
Qty: 90 TABLET | Refills: 3 | Status: SHIPPED | OUTPATIENT
Start: 2023-12-18

## 2024-01-07 RX ORDER — AMPICILLIN 250 MG/1
250 CAPSULE ORAL
Qty: 21 CAPSULE | Refills: 1 | Status: SHIPPED | OUTPATIENT
Start: 2024-01-07 | End: 2024-01-07 | Stop reason: CLARIF

## 2024-01-07 RX ORDER — AMPICILLIN 250 MG/1
250 CAPSULE ORAL 4 TIMES DAILY
Qty: 28 CAPSULE | Refills: 1 | Status: SHIPPED | OUTPATIENT
Start: 2024-01-07

## 2024-01-07 RX ORDER — AMPICILLIN 250 MG/1
250 CAPSULE ORAL
Qty: 21 CAPSULE | Refills: 1 | Status: SHIPPED | OUTPATIENT
Start: 2024-01-07 | End: 2024-01-07

## 2024-01-15 RX ORDER — CARVEDILOL 25 MG/1
25 TABLET ORAL 2 TIMES DAILY WITH MEALS
Qty: 180 TABLET | Refills: 3 | Status: SHIPPED | OUTPATIENT
Start: 2024-01-15

## 2024-05-03 NOTE — TELEPHONE ENCOUNTER
Department  notified of request for danial BERMUDEZ referrals started. Assigned CM/SW to follow up with pt/family on further discharge planning.     Glendy Bautista, DSC     RX call attempt 1 regarding Repatha refill from [facility]. Patient reached-- shipping out  for  arrival with patients consent. Copay of $47.00 charging [place] ([phone]) @ 587. Address and  confirmed. Patient has 0 doses of medication on hand at this time. Patient has not started any new medications, has had no missed doses and no side effects present. Patient is currently taking the medication as directed by doctors instruction. Patient does have a safe place in their residence to keep medication at desired temperature away from small children and pets. Patient also does have the capability of contacting 911 in the event of an emergency. Patient states they do not have any questions or concerns at this time. Patients next injection is scheduled for , .

## 2024-05-16 RX ORDER — CLOPIDOGREL BISULFATE 75 MG/1
75 TABLET ORAL DAILY
Qty: 90 TABLET | Refills: 3 | Status: SHIPPED | OUTPATIENT
Start: 2024-05-16 | End: 2025-05-16

## 2024-08-14 RX ORDER — CITALOPRAM 20 MG/1
20 TABLET, FILM COATED ORAL
Qty: 90 TABLET | Refills: 2 | Status: SHIPPED | OUTPATIENT
Start: 2024-08-14

## 2024-08-20 RX ORDER — OMEPRAZOLE 20 MG/1
20 CAPSULE, DELAYED RELEASE ORAL DAILY
Qty: 90 CAPSULE | Refills: 3 | Status: SHIPPED | OUTPATIENT
Start: 2024-08-20 | End: 2025-08-20

## 2024-09-17 RX ORDER — NIFEDIPINE 30 MG/1
30 TABLET, EXTENDED RELEASE ORAL DAILY
Qty: 90 TABLET | Refills: 3 | Status: SHIPPED | OUTPATIENT
Start: 2024-09-17

## 2024-11-18 RX ORDER — OMEPRAZOLE 20 MG/1
20 CAPSULE, DELAYED RELEASE ORAL DAILY
Qty: 90 CAPSULE | Refills: 3 | Status: SHIPPED | OUTPATIENT
Start: 2024-11-18 | End: 2025-11-18

## 2025-01-30 DIAGNOSIS — I10 PRIMARY HYPERTENSION: Primary | ICD-10-CM

## 2025-01-30 RX ORDER — CARVEDILOL 25 MG/1
25 TABLET ORAL 2 TIMES DAILY WITH MEALS
Qty: 180 TABLET | Refills: 3 | Status: SHIPPED | OUTPATIENT
Start: 2025-01-30

## 2025-01-31 ENCOUNTER — LAB VISIT (OUTPATIENT)
Dept: LAB | Facility: HOSPITAL | Age: OVER 89
End: 2025-01-31
Attending: INTERNAL MEDICINE
Payer: MEDICARE

## 2025-01-31 DIAGNOSIS — I10 PRIMARY HYPERTENSION: ICD-10-CM

## 2025-01-31 LAB
ALBUMIN SERPL BCP-MCNC: 3.1 G/DL (ref 3.5–5.2)
ALP SERPL-CCNC: 77 U/L (ref 40–150)
ALT SERPL W/O P-5'-P-CCNC: 9 U/L (ref 10–44)
ANION GAP SERPL CALC-SCNC: 12 MMOL/L (ref 8–16)
AST SERPL-CCNC: 15 U/L (ref 10–40)
BASOPHILS # BLD AUTO: 0.05 K/UL (ref 0–0.2)
BASOPHILS NFR BLD: 0.7 % (ref 0–1.9)
BILIRUB SERPL-MCNC: 0.3 MG/DL (ref 0.1–1)
BUN SERPL-MCNC: 19 MG/DL (ref 10–30)
CALCIUM SERPL-MCNC: 9.4 MG/DL (ref 8.7–10.5)
CHLORIDE SERPL-SCNC: 107 MMOL/L (ref 95–110)
CO2 SERPL-SCNC: 20 MMOL/L (ref 23–29)
CREAT SERPL-MCNC: 0.8 MG/DL (ref 0.5–1.4)
DIFFERENTIAL METHOD BLD: ABNORMAL
EOSINOPHIL # BLD AUTO: 0.1 K/UL (ref 0–0.5)
EOSINOPHIL NFR BLD: 1.1 % (ref 0–8)
ERYTHROCYTE [DISTWIDTH] IN BLOOD BY AUTOMATED COUNT: 16.6 % (ref 11.5–14.5)
EST. GFR  (NO RACE VARIABLE): >60 ML/MIN/1.73 M^2
GLUCOSE SERPL-MCNC: 91 MG/DL (ref 70–110)
HCT VFR BLD AUTO: 32.3 % (ref 37–48.5)
HGB BLD-MCNC: 9.8 G/DL (ref 12–16)
IMM GRANULOCYTES # BLD AUTO: 0.03 K/UL (ref 0–0.04)
IMM GRANULOCYTES NFR BLD AUTO: 0.4 % (ref 0–0.5)
LYMPHOCYTES # BLD AUTO: 1.5 K/UL (ref 1–4.8)
LYMPHOCYTES NFR BLD: 20.5 % (ref 18–48)
MCH RBC QN AUTO: 26.4 PG (ref 27–31)
MCHC RBC AUTO-ENTMCNC: 30.3 G/DL (ref 32–36)
MCV RBC AUTO: 87 FL (ref 82–98)
MONOCYTES # BLD AUTO: 0.6 K/UL (ref 0.3–1)
MONOCYTES NFR BLD: 8.4 % (ref 4–15)
NEUTROPHILS # BLD AUTO: 5.2 K/UL (ref 1.8–7.7)
NEUTROPHILS NFR BLD: 68.9 % (ref 38–73)
NRBC BLD-RTO: 0 /100 WBC
PLATELET # BLD AUTO: 300 K/UL (ref 150–450)
PMV BLD AUTO: 9.9 FL (ref 9.2–12.9)
POTASSIUM SERPL-SCNC: 4.1 MMOL/L (ref 3.5–5.1)
PROT SERPL-MCNC: 7.4 G/DL (ref 6–8.4)
RBC # BLD AUTO: 3.71 M/UL (ref 4–5.4)
SODIUM SERPL-SCNC: 139 MMOL/L (ref 136–145)
WBC # BLD AUTO: 7.53 K/UL (ref 3.9–12.7)

## 2025-01-31 PROCEDURE — 85025 COMPLETE CBC W/AUTO DIFF WBC: CPT | Mod: HCNC | Performed by: INTERNAL MEDICINE

## 2025-01-31 PROCEDURE — 36415 COLL VENOUS BLD VENIPUNCTURE: CPT | Mod: HCNC,PO | Performed by: INTERNAL MEDICINE

## 2025-01-31 PROCEDURE — 80053 COMPREHEN METABOLIC PANEL: CPT | Mod: HCNC | Performed by: INTERNAL MEDICINE

## 2025-02-03 NOTE — PT/OT/SLP EVAL
"Physical Therapy Evaluation    Patient Name:  Naheed Cottrell   MRN:  [id number]    Recommendations:     Discharge Recommendations:  home with home health   Discharge Equipment Recommendations: walker, rolling, wheelchair, bedside commode, hospital bed   Barriers to discharge: Decreased caregiver support    Assessment:       Naheed Cottrell is a 87 y.o. female admitted with a medical diagnosis of Pubic ramus fracture, right, closed, initial encounter.  She presents with the following impairments/functional limitations:  weakness, impaired endurance, impaired self care skills, impaired functional mobilty, gait instability, impaired balance, pain, decreased lower extremity function, decreased coordination, decreased ROM, orthopedic precautions.      Patient demonstrates good motivation and decreased activity tolerance secondary to pain and lightheadedness.  Patient demonstrated orthostatic hypotension during out of bed activity secondary to greatly elevated pain during ambulation.  Patient required min A for bed mobility, transfers and ambulation.  Patient's pain and lightheadedness limited overall ambulation tolerance but patient ambulated 3 ft with rolling walker and min A.  Patient will benefit from skilled PT for strengthening and mobility training to increase independence and home safety.      Rehab Prognosis: Good; patient would benefit from acute skilled PT services daily to address these deficits and reach maximum level of function.  Patient is most appropriate to go to home with home health .  Recent Surgery: * No surgery found *      Plan:     During this hospitalization, patient to be seen daily to address the identified rehab impairments via gait training, therapeutic activities, therapeutic exercises, neuromuscular re-education and progress toward the following goals:    · Plan of Care Expires:  07/26/20    Subjective     Subjective:"I want to be able to do more."  Pain/Comfort:  · Pain Rating 1: " 0/10  · Location - Side 1: Right  · Location - Orientation 1: generalized  · Location 1: groin  · Pain Addressed 1: Pre-medicate for activity, Reposition, Cessation of Activity  · Pain Rating Post-Intervention 1: (did not rate but elevated with activity)    Patients cultural, spiritual, Druze conflicts given the current situation: no    Living Environment:  Prior level of function: independent; previously independent with all ADLs and mobility without an assistive device.  Residence: lives alone elevator, number of outside stairs: 0, [facility] [place]   Support available upon discharge: family and can stay 24/7  Equipment owned: none  Objective:     Communicated with RN prior to session.  Patient found supine with PureWick  upon PT entry to room.    General Precautions: Standard, fall   Orthopedic Precautions:LLE weight bearing as tolerated, RLE weight bearing as tolerated   Braces: N/A     Exams:  · RLE ROM: Deficits: ~ 75% of functional range secondary to pain  · RLE Strength: Deficits: grossly decreased secondary to pain  · LLE ROM: Deficits: mildly decreased secondary to pain  · LLE Strength: Deficits: grossly decreased secondary to pain.  · Cognitive Exam:  Patient is oriented to Person, Place, Time and Situation    Functional Mobility:  · Bed Mobility:     · Supine to Sit: minimum assistance for sequencing and LE management.  · Transfers:     · Sit to Stand:  minimum assistance with rolling walker  · Gait: Patient ambulated 3 ft with rolling walker and minimum assistance.  · Balance:   · Sitting: GOOD-: Incosistently Maintains balance through MODERATE excursions of active trunk movement,     · Standing: FAIR: Needs CONTACT GUARD during gait      Therapeutic Activities and Exercises:   Gait training:  Patient required cues for position in walker, sequencing and upright posture to increase independence and safety.  Patient required cues ~ 25% of the time.    Patient Education:    Patient educated on assistive device  use, bed mobility training, Fall risk, gait training, home safety, Home exercise program and transfer training by explanation.  Patient was receptive to education and needs reinforcement.     AM-PAC 6 CLICK MOBILITY  Total Score:17     Patient left up in chair with all lines intact and call button in reach.    GOALS:   Multidisciplinary Problems     Physical Therapy Goals        Problem: Physical Therapy Goal    Goal Priority Disciplines Outcome Goal Variances Interventions   Physical Therapy Goal     PT, PT/OT Ongoing, Progressing     Description: Goals to be met by: 7/10/20    Patient will increase functional independence with mobility by performin. Supine to sit with Stand-by Assistance  2. Sit to supine with Stand-by Assistance  3. Sit to stand transfer with Stand-by Assistance  4. Gait  x 100 feet with Stand-by Assistance using Rolling Walker.   5.  Patient will demonstrate independence with a home exercise program  6. Patient's balance will be GOOD: Needs SUPERVISION only during gait and able to self right with moderate LOB.                   History:     Past Medical History:   Diagnosis Date    Anticoagulant long-term use     Arthritis     Cataract     Embolic stroke involving right middle cerebral artery 2017    Encounter for blood transfusion     Fall, accidental     Hypertension     Right renal artery stenosis 3/28/2017    Stenosis of left subclavian artery        Past Surgical History:   Procedure Laterality Date    EYE SURGERY      FRACTURE SURGERY      INSERTION OF IMPLANTABLE LOOP RECORDER N/A 2020    Procedure: Insertion, Implantable Loop Recorder;  Surgeon: Anton Muniz MD;  Location: [facility];  Service: Cardiology;  Laterality: N/A;  Syncope, ILR, Bio, Local, SK, 3 Prep       Time Tracking:     PT Received On: 20  PT Start Time: 08     PT Stop Time: 09  PT Total Time (min): 28 min     Billable Minutes: Evaluation 10 min Gait Training 18 min      Kareem  Chaz, PT  06/26/2020     Contusion Patient requests all Lab, Cardiology, and Radiology Results on their Discharge Instructions

## 2025-03-27 DIAGNOSIS — D64.9 ANEMIA, UNSPECIFIED TYPE: Primary | ICD-10-CM

## 2025-04-01 ENCOUNTER — TELEPHONE (OUTPATIENT)
Dept: CARDIOLOGY | Facility: CLINIC | Age: OVER 89
End: 2025-04-01
Payer: MEDICARE

## 2025-04-01 ENCOUNTER — HOSPITAL ENCOUNTER (OUTPATIENT)
Dept: RADIOLOGY | Facility: HOSPITAL | Age: OVER 89
Discharge: HOME | End: 2025-04-01
Attending: INTERNAL MEDICINE
Payer: MEDICARE

## 2025-04-01 DIAGNOSIS — R06.02 SOB (SHORTNESS OF BREATH) ON EXERTION: ICD-10-CM

## 2025-04-01 DIAGNOSIS — G45.9 TIA (TRANSIENT ISCHEMIC ATTACK): ICD-10-CM

## 2025-04-01 DIAGNOSIS — D64.9 ANEMIA, UNSPECIFIED TYPE: Primary | ICD-10-CM

## 2025-04-01 DIAGNOSIS — R06.09 DOE (DYSPNEA ON EXERTION): ICD-10-CM

## 2025-04-01 DIAGNOSIS — I73.9 INTERMITTENT CLAUDICATION: Primary | ICD-10-CM

## 2025-04-01 PROCEDURE — 71046 X-RAY EXAM CHEST 2 VIEWS: CPT | Mod: 26,,, | Performed by: RADIOLOGY

## 2025-04-01 PROCEDURE — 71046 X-RAY EXAM CHEST 2 VIEWS: CPT | Mod: TC

## 2025-04-01 RX ORDER — FUROSEMIDE 40 MG/1
TABLET ORAL
Qty: 30 TABLET | Refills: 11 | Status: SHIPPED | OUTPATIENT
Start: 2025-04-01 | End: 2025-04-02

## 2025-04-01 NOTE — TELEPHONE ENCOUNTER
Patient having increase SOB, claudication, and occasional loss of balance and falls. Will order US of carotid, lower extremities and echo.

## 2025-04-02 ENCOUNTER — HOSPITAL ENCOUNTER (OUTPATIENT)
Dept: CARDIOLOGY | Facility: HOSPITAL | Age: OVER 89
Discharge: HOME | End: 2025-04-02
Attending: INTERNAL MEDICINE
Payer: MEDICARE

## 2025-04-02 ENCOUNTER — TELEPHONE (OUTPATIENT)
Dept: CARDIOLOGY | Facility: CLINIC | Age: OVER 89
End: 2025-04-02

## 2025-04-02 VITALS
HEART RATE: 78 BPM | SYSTOLIC BLOOD PRESSURE: 124 MMHG | BODY MASS INDEX: 21.52 KG/M2 | DIASTOLIC BLOOD PRESSURE: 74 MMHG | HEIGHT: 61 IN | WEIGHT: 114 LBS

## 2025-04-02 DIAGNOSIS — J84.10 PULMONARY FIBROSIS: Primary | ICD-10-CM

## 2025-04-02 DIAGNOSIS — R06.09 DOE (DYSPNEA ON EXERTION): ICD-10-CM

## 2025-04-02 LAB
AORTIC ROOT ANNULUS: 2.01 CM
AORTIC VALVE CUSP SEPERATION: 1.36 CM
ASCENDING AORTA: 2.56 CM
AV INDEX (PROSTH): 0.68
AV MEAN GRADIENT: 5 MMHG
AV PEAK GRADIENT: 9 MMHG
AV REGURGITATION PRESSURE HALF TIME: 665 MS
AV VALVE AREA BY VELOCITY RATIO: 2.1 CM²
AV VALVE AREA: 2.1 CM²
AV VELOCITY RATIO: 0.67
BSA FOR ECHO PROCEDURE: 1.49 M2
CV ECHO LV RWT: 0.42 CM
DOP CALC AO PEAK VEL: 1.5 M/S
DOP CALC AO VTI: 32.7 CM
DOP CALC LVOT AREA: 3.1 CM2
DOP CALC LVOT DIAMETER: 2 CM
DOP CALC LVOT PEAK VEL: 1 M/S
DOP CALC LVOT STROKE VOLUME: 69.7 CM3
DOP CALC MV VTI: 38 CM
DOP CALC RVOT PEAK VEL: 1.11 M/S
DOP CALC RVOT VTI: 19.7 CM
DOP CALCLVOT PEAK VEL VTI: 22.2 CM
E WAVE DECELERATION TIME: 278 MSEC
E/A RATIO: 0.69
E/E' RATIO: 9 M/S
ECHO LV POSTERIOR WALL: 0.8 CM (ref 0.6–1.1)
EJECTION FRACTION: 65 %
FRACTIONAL SHORTENING: 36.8 % (ref 28–44)
INTERVENTRICULAR SEPTUM: 1 CM (ref 0.6–1.1)
IVC DIAMETER: 1.63 CM
LA MAJOR: 4.5 CM
LA MINOR: 4.5 CM
LA WIDTH: 4.1 CM
LEFT ATRIUM AREA SYSTOLIC (APICAL 2 CHAMBER): 16.1 CM2
LEFT ATRIUM AREA SYSTOLIC (APICAL 4 CHAMBER): 15.23 CM2
LEFT ATRIUM SIZE: 3.6 CM
LEFT ATRIUM VOLUME INDEX MOD: 30 ML/M2
LEFT ATRIUM VOLUME INDEX: 38 ML/M2
LEFT ATRIUM VOLUME MOD: 45 ML
LEFT ATRIUM VOLUME: 56 CM3
LEFT INTERNAL DIMENSION IN SYSTOLE: 2.4 CM (ref 2.1–4)
LEFT VENTRICLE DIASTOLIC VOLUME INDEX: 41.61 ML/M2
LEFT VENTRICLE DIASTOLIC VOLUME: 62 ML
LEFT VENTRICLE END SYSTOLIC VOLUME APICAL 2 CHAMBER: 46.23 ML
LEFT VENTRICLE END SYSTOLIC VOLUME APICAL 4 CHAMBER: 40.72 ML
LEFT VENTRICLE MASS INDEX: 67.8 G/M2
LEFT VENTRICLE SYSTOLIC VOLUME INDEX: 13.4 ML/M2
LEFT VENTRICLE SYSTOLIC VOLUME: 20 ML
LEFT VENTRICULAR INTERNAL DIMENSION IN DIASTOLE: 3.8 CM (ref 3.5–6)
LEFT VENTRICULAR MASS: 101.1 G
LV LATERAL E/E' RATIO: 6.5 M/S
LV SEPTAL E/E' RATIO: 14.4 M/S
LVED V (TEICH): 61.61 ML
LVES V (TEICH): 19.62 ML
LVOT MG: 2.44 MMHG
LVOT MV: 0.74 CM/S
MV A" WAVE DURATION": 134.16 MSEC
MV MEAN GRADIENT: 2 MMHG
MV PEAK A VEL: 1.04 M/S
MV PEAK E VEL: 0.72 M/S
MV PEAK GRADIENT: 7 MMHG
MV STENOSIS PRESSURE HALF TIME: 80.51 MS
MV VALVE AREA BY CONTINUITY EQUATION: 1.83 CM2
MV VALVE AREA P 1/2 METHOD: 2.73 CM2
OHS CV RV/LV RATIO: 0.76 CM
PISA AR MAX VEL: 2.34 M/S
PISA MRMAX VEL: 5.96 M/S
PISA TR MAX VEL: 2.8 M/S
PULM VEIN S/D RATIO: 0.79
PV MEAN GRADIENT: 2 MMHG
PV MV: 0.79 M/S
PV PEAK D VEL: 0.7 M/S
PV PEAK GRADIENT: 5 MMHG
PV PEAK S VEL: 0.55 M/S
PV PEAK VELOCITY: 1.05 M/S
RA MAJOR: 4.04 CM
RA PRESSURE ESTIMATED: 3 MMHG
RA VOL SYS: 28.03 ML
RA WIDTH: 3.65 CM
RIGHT ATRIAL AREA: 11.4 CM2
RIGHT ATRIUM VOLUME AREA LENGTH APICAL 4 CHAMBER: 26.51 ML
RIGHT VENTRICLE DIASTOLIC BASEL DIMENSION: 2.9 CM
RIGHT VENTRICULAR END-DIASTOLIC DIMENSION: 2.88 CM
RV TB RVSP: 6 MMHG
STJ: 2.53 CM
TDI LATERAL: 0.11 M/S
TDI SEPTAL: 0.05 M/S
TDI: 0.08 M/S
TR MAX PG: 31 MMHG
TV REST PULMONARY ARTERY PRESSURE: 34 MMHG
Z-SCORE OF LEFT VENTRICULAR DIMENSION IN END DIASTOLE: -1.52
Z-SCORE OF LEFT VENTRICULAR DIMENSION IN END SYSTOLE: -1.06

## 2025-04-02 PROCEDURE — 93306 TTE W/DOPPLER COMPLETE: CPT | Mod: HCNC,PO

## 2025-04-02 PROCEDURE — 93306 TTE W/DOPPLER COMPLETE: CPT | Mod: 26,HCNC,, | Performed by: INTERNAL MEDICINE

## 2025-04-02 RX ORDER — FERROUS GLUCONATE 324(38)MG
324 TABLET ORAL
Qty: 60 TABLET | Refills: 1 | Status: SHIPPED | OUTPATIENT
Start: 2025-04-02

## 2025-04-02 NOTE — TELEPHONE ENCOUNTER
89 yo WF with severe SOB. CXR shows interstitial fibrosis which is new finding. Echo normal LV function. 02 sat at rest 90%. With minimal exertion 02 sat dropped to 79%.   /72  Heart RRR  Lungs - no wheezing  Extremities no edema   Hgb 9.5 Hct 31.3  Iron level 22  Iron Sat 8  BNP pending\  Echo normal LV function  Plan:Fergon one po BID          Folic acid Img daily  Referral to Pulmonary

## 2025-04-03 ENCOUNTER — TELEPHONE (OUTPATIENT)
Dept: PULMONOLOGY | Facility: CLINIC | Age: OVER 89
End: 2025-04-03
Payer: MEDICARE

## 2025-04-03 ENCOUNTER — TELEPHONE (OUTPATIENT)
Dept: CARDIOLOGY | Facility: CLINIC | Age: OVER 89
End: 2025-04-03
Payer: MEDICARE

## 2025-04-03 DIAGNOSIS — J84.9 INTERSTITIAL PULMONARY DISEASE, UNSPECIFIED: Primary | ICD-10-CM

## 2025-04-03 RX ORDER — POTASSIUM CHLORIDE 750 MG/1
20 CAPSULE, EXTENDED RELEASE ORAL 2 TIMES DAILY
Qty: 60 CAPSULE | Refills: 3 | Status: SHIPPED | OUTPATIENT
Start: 2025-04-03

## 2025-04-03 NOTE — TELEPHONE ENCOUNTER
Pro-BNP elevated suggesting degree of heart failure. Echo did not show LV dysfunction but will try to diurese her to see if improvement. Will need K supplement and will have her take lasix 40 mg daily and see if improvement.

## 2025-04-04 ENCOUNTER — HOSPITAL ENCOUNTER (OUTPATIENT)
Dept: RADIOLOGY | Facility: HOSPITAL | Age: OVER 89
Discharge: HOME | End: 2025-04-04
Attending: STUDENT IN AN ORGANIZED HEALTH CARE EDUCATION/TRAINING PROGRAM
Payer: MEDICARE

## 2025-04-04 DIAGNOSIS — J84.9 INTERSTITIAL PULMONARY DISEASE, UNSPECIFIED: ICD-10-CM

## 2025-04-04 PROCEDURE — 71250 CT THORAX DX C-: CPT | Mod: TC

## 2025-04-04 PROCEDURE — 71250 CT THORAX DX C-: CPT | Mod: 26,,, | Performed by: RADIOLOGY

## 2025-04-07 ENCOUNTER — TELEPHONE (OUTPATIENT)
Dept: PULMONOLOGY | Facility: CLINIC | Age: OVER 89
End: 2025-04-07

## 2025-04-07 ENCOUNTER — OFFICE VISIT (OUTPATIENT)
Dept: PULMONOLOGY | Facility: CLINIC | Age: OVER 89
End: 2025-04-07
Payer: MEDICARE

## 2025-04-07 ENCOUNTER — HOSPITAL ENCOUNTER (OUTPATIENT)
Dept: PULMONOLOGY | Facility: CLINIC | Age: OVER 89
Discharge: HOME | End: 2025-04-07
Payer: MEDICARE

## 2025-04-07 ENCOUNTER — LAB VISIT (OUTPATIENT)
Dept: LAB | Facility: HOSPITAL | Age: OVER 89
End: 2025-04-07
Attending: STUDENT IN AN ORGANIZED HEALTH CARE EDUCATION/TRAINING PROGRAM
Payer: MEDICARE

## 2025-04-07 VITALS
WEIGHT: 100.31 LBS | DIASTOLIC BLOOD PRESSURE: 62 MMHG | SYSTOLIC BLOOD PRESSURE: 124 MMHG | HEIGHT: 61 IN | HEART RATE: 63 BPM | BODY MASS INDEX: 18.94 KG/M2 | OXYGEN SATURATION: 87 %

## 2025-04-07 VITALS — HEIGHT: 61 IN | BODY MASS INDEX: 18.88 KG/M2 | WEIGHT: 100 LBS

## 2025-04-07 DIAGNOSIS — J47.9 BRONCHIECTASIS WITHOUT COMPLICATION: ICD-10-CM

## 2025-04-07 DIAGNOSIS — J84.10 PULMONARY FIBROSIS: ICD-10-CM

## 2025-04-07 DIAGNOSIS — R93.89 ABNORMAL CT OF THE CHEST: ICD-10-CM

## 2025-04-07 DIAGNOSIS — J84.9 INTERSTITIAL PULMONARY DISEASE, UNSPECIFIED: ICD-10-CM

## 2025-04-07 DIAGNOSIS — R91.8 MULTIPLE PULMONARY NODULES: ICD-10-CM

## 2025-04-07 DIAGNOSIS — J47.9 BRONCHIECTASIS WITHOUT COMPLICATION: Primary | ICD-10-CM

## 2025-04-07 LAB
CYCLIC CITRULLINATED PEPTIDE (CCP) (OHS): 0.6 U/ML
RHEUMATOID FACT SERPL-ACNC: 29 IU/ML

## 2025-04-07 PROCEDURE — 86431 RHEUMATOID FACTOR QUANT: CPT

## 2025-04-07 PROCEDURE — 94618 PULMONARY STRESS TESTING: CPT | Mod: S$GLB,,, | Performed by: INTERNAL MEDICINE

## 2025-04-07 PROCEDURE — 86200 CCP ANTIBODY: CPT

## 2025-04-07 PROCEDURE — 36415 COLL VENOUS BLD VENIPUNCTURE: CPT

## 2025-04-07 PROCEDURE — 1100F PTFALLS ASSESS-DOCD GE2>/YR: CPT | Mod: CPTII,S$GLB,, | Performed by: STUDENT IN AN ORGANIZED HEALTH CARE EDUCATION/TRAINING PROGRAM

## 2025-04-07 PROCEDURE — 99999 PR PBB SHADOW E&M-EST. PATIENT-LVL III: CPT | Mod: PBBFAC,,, | Performed by: STUDENT IN AN ORGANIZED HEALTH CARE EDUCATION/TRAINING PROGRAM

## 2025-04-07 PROCEDURE — 1126F AMNT PAIN NOTED NONE PRSNT: CPT | Mod: CPTII,S$GLB,, | Performed by: STUDENT IN AN ORGANIZED HEALTH CARE EDUCATION/TRAINING PROGRAM

## 2025-04-07 PROCEDURE — 86038 ANTINUCLEAR ANTIBODIES: CPT

## 2025-04-07 PROCEDURE — 3288F FALL RISK ASSESSMENT DOCD: CPT | Mod: CPTII,S$GLB,, | Performed by: STUDENT IN AN ORGANIZED HEALTH CARE EDUCATION/TRAINING PROGRAM

## 2025-04-07 PROCEDURE — 99204 OFFICE O/P NEW MOD 45 MIN: CPT | Mod: 25,S$GLB,, | Performed by: STUDENT IN AN ORGANIZED HEALTH CARE EDUCATION/TRAINING PROGRAM

## 2025-04-07 PROCEDURE — 1159F MED LIST DOCD IN RCRD: CPT | Mod: CPTII,S$GLB,, | Performed by: STUDENT IN AN ORGANIZED HEALTH CARE EDUCATION/TRAINING PROGRAM

## 2025-04-07 NOTE — ASSESSMENT & PLAN NOTE
- screening ILD labs  - nebulizer kit, supplies and hypertonic saline nebs ordered.  Aerobika to follow nebs  - continue Prilosec 20 mg daily

## 2025-04-07 NOTE — TELEPHONE ENCOUNTER
Spoke with pt son, informed him that I have received his message. I advised pt son that pt completed walk test on today and that we have to give some time so that the test can get resulted. Once Test gets resulted, we can move forward with a order if its needed. Pt son verbalized that he understand and states that pt is having difficulty breathing. I verbalized to pt son that I understand and advised pt son that I will forward message to Dr Urena to review/advise. Pt son verbalized that he understand.

## 2025-04-07 NOTE — TELEPHONE ENCOUNTER
----- Message from Nemo sent at 4/7/2025  4:33 PM CDT -----  Regarding: DME Orders  Contact: Mack/son [phone]  Type:  Needs Medical AdviceWho Called: Mack/sonWould the patient rather a call back or a response via [facility]? callBe Call Back Number: [phone] Information: Calling to speak with provider/nurse regarding plan of care, oxygen delivery and nebulizer sent to DME company. Please call and discuss with patient's son as soon as possible.

## 2025-04-07 NOTE — ASSESSMENT & PLAN NOTE
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  We discussed bronchoscopy in the risks therein given her age.  We talked about the treatment regimen, its affects on her organs, its length of time, in the likely need for follow-up bronchoscopy in the future.  Decision was ultimately made to pursue more conservative therapies which I think our best given her frailty and age  - for now we will focus on symptomatic treatment.  Starting hypertonic saline nebs and Acapella  - 6 MWT, she is hypoxic on room air

## 2025-04-07 NOTE — PROGRESS NOTES
"Subjective:     Reason for visit:  Shortness of breath    Patient ID:  Naheed Cottrell is a 89 y.o. female with HTN, hyperlipidemia    Interval History:  Significant shortness of breath.  Lots of instability.  Poor appetite.  Nonproductive, mild intermittent cough.      Additional Pulmonary History:  Childhood Illnesses:    Occupational:    Environmental:    Tobacco/Smoking:  never    Objective:     Vitals:    04/07/25 0918   BP: 124/62   BP Location: Right arm   Patient Position: Sitting   Pulse: 63   SpO2: (!) 87%   Weight: 45.5 kg (100 lb 5 oz)   Height: 5' 1" (1.549 m)         Physical Exam  Vitals and nursing note reviewed.   Constitutional:       General: She is not in acute distress.     Appearance: She is not ill-appearing, toxic-appearing or diaphoretic.      Comments: Frail and elderly sitting in wheelchair   HENT:      Head: Normocephalic and atraumatic.      Nose: No rhinorrhea.      Mouth/Throat:      Mouth: Mucous membranes are moist.   Eyes:      General: No scleral icterus.     Extraocular Movements: Extraocular movements intact.   Cardiovascular:      Rate and Rhythm: Normal rate and regular rhythm.   Pulmonary:      Effort: No tachypnea, accessory muscle usage, respiratory distress or retractions.   Abdominal:      General: There is no distension.   Skin:     General: Skin is warm and dry.      Coloration: Skin is not jaundiced.      Findings: No rash.   Neurological:      General: No focal deficit present.      Mental Status: Mental status is at baseline.          Personal Diagnostic Review and Interpretation  04/04/2025 CT chest:  A few mediastinal lymph nodes with diffuse bronchiectasis, tree-in-bud nodules and scattered nodules, some cavitary      Pertinent Studies Reviewed & Interpreted:     Pulmonary Function Tests:   None      6 Minute Walk Tests:   None      Echocardiograms:   04/02/2025:  EF 65%, AZRA mod 30; PASP 34      Other Pertinent Laboratories:  [facility] 0-[facility]      Assessment & Plan:     "   Problem List Items Addressed This Visit          Pulmonary    Bronchiectasis without complication - Primary    Overview   Appears 2/2 MAC infection.  Ordering initial screening ILD labs for completeness due to bronchoscopy being defer.         Current Assessment & Plan   - screening ILD labs  - nebulizer kit, supplies and hypertonic saline nebs ordered.  Aerobika to follow nebs  - continue Prilosec 20 mg daily         Relevant Medications    mucus clearing device (AEROBIKA OSCILLATING PEP SYSTM)    Other Relevant Orders    Cyclic Citrullinated Peptide Antibody, IgG    SARAH Screen w/Reflex    Rheumatoid Factor    Six Minute Walk Test to qualify for Home Oxygen    NEBULIZER FOR HOME USE    NEBULIZER KIT (SUPPLIES) FOR HOME USE    Multiple pulmonary nodules    Overview   Apparent MAC.  Lots of tree-in-bud nodules, others cavitary.  See the section on abnormal CT            Other    Abnormal CT of the chest    Overview   Scattered bronchiectasis, tree-in-bud nodules and some cavitary lesions.  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  We discussed bronchoscopy in the risks therein given her age.  We talked about the treatment regimen, its affects on her organs, its length of time, in the likely need for follow-up bronchoscopy in the future.  Decision was ultimately made to pursue more conservative therapies which I think our best given her frailty and age  - for now we will focus on symptomatic treatment.  Starting hypertonic saline nebs and Acapella  - 6 MWT, she is hypoxic on room air          Other Visit Diagnoses         Pulmonary fibrosis                 RETURN TO CLINIC IN 1 MONTHS       Portions of the record may have been created with voice-recognition software. Occasional wrong-word or sound-a-like substitutions may have occurred due to the inherent limitations of voice-recognition software. Read the chart  carefully and recognize, using context, where substitutions have occurred.

## 2025-04-07 NOTE — PROCEDURES
Naheed Cottrell is a 89 y.o.   female patient, who presents for a 6 minute walk test ordered by MD Romel.  The diagnosis is Bronchiectasis; Qualify for Oxygen.  The patient's BMI is 18.9 kg/m2. Predicted distance (lower limit of normal) is 223.7 meters.    Test Results:    The test was not completed due to shortness of breath.  The patient stopped 1 time for a total of 265 seconds.  The total time walked was 95 seconds.  During walking, the patient reported:  Dyspnea.  The patient used no assistive devices during testing.     04/07/2025---------Distance: 60.96 meters (200 feet)     O2 Sat % Supplemental Oxygen Heart Rate Blood Pressure Dony Scale   Pre-exercise  (Resting) 92 % Room Air 68 bpm 132/62 mmHg 0   During Exercise 83 % Room Air 66 bpm 132/66 mmHg 4   Post-exercise   92 % Room Air  60 bpm       Recovery Time: 330 seconds    Oxygen Qualification:     O2 Sat % Supplemental Oxygen Heart Rate Blood Pressure Dony Scale   Pre-exercise  (Resting) 97 % 2 L/M  65 bpm  132/66 mmHg 2    During Exercise 96 %  2 L/M  74 bpm  131/62 mmHg 3    Post-exercise   97 %  2 L/M  64 bpm        Performing nurse/tech: Estop[employer] RRT      PREVIOUS STUDY:   The patient has not had a previous study.      CLINICAL INTERPRETATION:  Six minute walk distance is 60.96 meters (200 feet) with somewhat heavy dyspnea.  During exercise, there was significant desaturation while breathing room air.  Both blood pressure and heart rate remained stable with walking.  The patient did not report non-pulmonary symptoms during exercise.  Severe exercise impairment is likely due to desaturation and subjective symptoms.  The patient did not complete the study, walking 95 seconds of the 360 second test.  The patient may benefit from using supplemental oxygen during exertion.  No previous study performed.  Based upon age and body mass index, exercise capacity is less than predicted.   Oxygen saturation did improve while breathing supplemental oxygen.

## 2025-04-08 LAB — ANA (OHS): NORMAL

## 2025-04-08 RX ORDER — SODIUM CHLORIDE FOR INHALATION 3 %
4 VIAL, NEBULIZER (ML) INHALATION 2 TIMES DAILY
Qty: 300 ML | Status: SHIPPED | OUTPATIENT
Start: 2025-04-08 | End: 2025-04-08 | Stop reason: SDUPTHER

## 2025-04-08 RX ORDER — SODIUM CHLORIDE FOR INHALATION 3 %
4 VIAL, NEBULIZER (ML) INHALATION 2 TIMES DAILY
Qty: 300 ML | Status: SHIPPED | OUTPATIENT
Start: 2025-04-08 | End: 2025-04-08

## 2025-04-08 RX ORDER — SODIUM CHLORIDE FOR INHALATION 3 %
4 VIAL, NEBULIZER (ML) INHALATION 2 TIMES DAILY
Qty: 300 ML | Status: SHIPPED | OUTPATIENT
Start: 2025-04-08

## 2025-04-21 RX ORDER — PREDNISONE 10 MG/1
10 TABLET ORAL DAILY
Qty: 30 TABLET | Refills: 3 | Status: SHIPPED | OUTPATIENT
Start: 2025-04-21

## 2025-04-21 RX ORDER — AZITHROMYCIN 250 MG/1
TABLET, FILM COATED ORAL
Qty: 8 TABLET | Refills: 0 | Status: SHIPPED | OUTPATIENT
Start: 2025-04-21 | End: 2025-04-22

## 2025-04-22 RX ORDER — AZITHROMYCIN 250 MG/1
TABLET, FILM COATED ORAL
Qty: 8 TABLET | Refills: 0 | Status: SHIPPED | OUTPATIENT
Start: 2025-04-22

## 2025-04-22 RX ORDER — AZITHROMYCIN 250 MG/1
TABLET, FILM COATED ORAL
Qty: 6 TABLET | Refills: 0 | Status: SHIPPED | OUTPATIENT
Start: 2025-04-22

## 2025-04-22 NOTE — PROGRESS NOTES
Spoke with Ms. Farfan's son, Dr. Sydney Cottrell today.  Despite oxygen and mucus clearance techniques, Ms. Farfan remains very symptomatic.  She is also somewhat down tried and by her lack of progress.  We discussed that comfort and quality of what is most important to her.  We talked about steroids and ABx in the consequences of using such while having a suspected MAC infection.  Overall her comfort as what is important most and that is what we will focus on.  Trial of steroids and ABx.  Sydney given instructions on how to titrate

## 2025-04-30 ENCOUNTER — TELEPHONE (OUTPATIENT)
Dept: PALLIATIVE MEDICINE | Facility: CLINIC | Age: OVER 89
End: 2025-04-30
Payer: MEDICARE

## 2025-04-30 DIAGNOSIS — J84.9 INTERSTITIAL PULMONARY DISEASE: Primary | ICD-10-CM

## 2025-04-30 NOTE — TELEPHONE ENCOUNTER
Received call from son Mack requesting assistance facilitating hospice referral for his mother. Referral information provided to Krysta with [facility] and she will contact family.

## 2025-05-02 ENCOUNTER — HOSPITAL ENCOUNTER (OUTPATIENT)
Facility: HOSPITAL | Age: OVER 89
Discharge: HOME | End: 2025-05-04
Attending: EMERGENCY MEDICINE | Admitting: STUDENT IN AN ORGANIZED HEALTH CARE EDUCATION/TRAINING PROGRAM
Payer: MEDICARE

## 2025-05-02 DIAGNOSIS — F32.A DEPRESSION, UNSPECIFIED DEPRESSION TYPE: ICD-10-CM

## 2025-05-02 DIAGNOSIS — Z13.6 SCREENING FOR CARDIOVASCULAR CONDITION: ICD-10-CM

## 2025-05-02 DIAGNOSIS — I95.9 HYPOTENSION: ICD-10-CM

## 2025-05-02 DIAGNOSIS — J18.9 PNEUMONIA DUE TO INFECTIOUS ORGANISM, UNSPECIFIED LATERALITY, UNSPECIFIED PART OF LUNG: Primary | ICD-10-CM

## 2025-05-02 DIAGNOSIS — R41.82 ALTERED MENTAL STATUS, UNSPECIFIED ALTERED MENTAL STATUS TYPE: ICD-10-CM

## 2025-05-02 DIAGNOSIS — R07.9 CHEST PAIN: ICD-10-CM

## 2025-05-02 PROBLEM — J84.10 PULMONARY FIBROSIS: Status: ACTIVE | Noted: 2025-05-02

## 2025-05-02 PROBLEM — Z66 DNR (DO NOT RESUSCITATE): Status: ACTIVE | Noted: 2025-05-02

## 2025-05-02 LAB
ABSOLUTE EOSINOPHIL (OHS): 0 K/UL
ABSOLUTE MONOCYTE (OHS): 0.38 K/UL (ref 0.3–1)
ABSOLUTE NEUTROPHIL COUNT (OHS): 9.59 K/UL (ref 1.8–7.7)
ALBUMIN SERPL BCP-MCNC: 3.5 G/DL (ref 3.5–5.2)
ALP SERPL-CCNC: 94 UNIT/L (ref 40–150)
ALT SERPL W/O P-5'-P-CCNC: 13 UNIT/L (ref 10–44)
ANION GAP (OHS): 14 MMOL/L (ref 8–16)
AST SERPL-CCNC: 19 UNIT/L (ref 11–45)
BASOPHILS # BLD AUTO: 0.05 K/UL
BASOPHILS NFR BLD AUTO: 0.5 %
BILIRUB SERPL-MCNC: 0.4 MG/DL (ref 0.1–1)
BIPAP: 0
BNP SERPL-MCNC: 121 PG/ML (ref 0–99)
BUN SERPL-MCNC: 22 MG/DL (ref 10–30)
CALCIUM SERPL-MCNC: 9.8 MG/DL (ref 8.7–10.5)
CHLORIDE SERPL-SCNC: 100 MMOL/L (ref 95–110)
CO2 SERPL-SCNC: 25 MMOL/L (ref 23–29)
CORRECTED TEMPERATURE (PCO2): 36.6 MMHG
CORRECTED TEMPERATURE (PH): 7.48
CORRECTED TEMPERATURE (PO2): 58.2 MMHG
CREAT SERPL-MCNC: 0.8 MG/DL (ref 0.5–1.4)
ERYTHROCYTE [DISTWIDTH] IN BLOOD BY AUTOMATED COUNT: 17.3 % (ref 11.5–14.5)
FIO2: 21 %
GFR SERPLBLD CREATININE-BSD FMLA CKD-EPI: >60 ML/MIN/1.73/M2
GLUCOSE SERPL-MCNC: 130 MG/DL (ref 70–110)
HCT VFR BLD AUTO: 42.9 % (ref 37–48.5)
HCT VFR BLD CALC: 43 %
HCV AB SERPL QL IA: NORMAL
HGB BLD-MCNC: 13.1 GM/DL (ref 12–16)
HIV 1+2 AB+HIV1 P24 AG SERPL QL IA: NORMAL
IMM GRANULOCYTES # BLD AUTO: 0.03 K/UL (ref 0–0.04)
IMM GRANULOCYTES NFR BLD AUTO: 0.3 % (ref 0–0.5)
LACTATE SERPL-SCNC: 0.9 MMOL/L (ref 0.5–2.2)
LDH SERPL L TO P-CCNC: 2.3 MMOL/L (ref 0.5–2.2)
LYMPHOCYTES # BLD AUTO: 0.72 K/UL (ref 1–4.8)
MAGNESIUM SERPL-MCNC: 2 MG/DL (ref 1.6–2.6)
MCH RBC QN AUTO: 26.1 PG (ref 27–31)
MCHC RBC AUTO-ENTMCNC: 30.5 G/DL (ref 32–36)
MCV RBC AUTO: 86 FL (ref 82–98)
NUCLEATED RBC (/100WBC) (OHS): 0 /100 WBC
OHS QRS DURATION: 68 MS
OHS QRS DURATION: 72 MS
OHS QTC CALCULATION: 417 MS
OHS QTC CALCULATION: 426 MS
PCO2 BLDA: 36.6 MMHG
PH SMN: 7.48 [PH]
PHOSPHATE SERPL-MCNC: 2.6 MG/DL (ref 2.7–4.5)
PLATELET # BLD AUTO: 349 K/UL (ref 150–450)
PMV BLD AUTO: 9.8 FL (ref 9.2–12.9)
PO2 BLDA: 58.2 MMHG
POC BASE DEFICIT: 4.1 MMOL/L
POC HCO3: 27.9 MMOL/L
POC PERFORMED BY: ABNORMAL
POC TEMPERATURE: 37 C
POTASSIUM SERPL-SCNC: 4.5 MMOL/L (ref 3.5–5.1)
PROCALCITONIN SERPL-MCNC: 0.02 NG/ML
PROT SERPL-MCNC: 8.3 GM/DL (ref 6–8.4)
RBC # BLD AUTO: 5.01 M/UL (ref 4–5.4)
RELATIVE EOSINOPHIL (OHS): 0 %
RELATIVE LYMPHOCYTE (OHS): 6.7 % (ref 18–48)
RELATIVE MONOCYTE (OHS): 3.5 % (ref 4–15)
RELATIVE NEUTROPHIL (OHS): 89 % (ref 38–73)
SODIUM SERPL-SCNC: 139 MMOL/L (ref 136–145)
SPECIMEN SOURCE: ABNORMAL
TROPONIN I SERPL HS-MCNC: 57 NG/L
TROPONIN I SERPL HS-MCNC: 71 NG/L
WBC # BLD AUTO: 10.77 K/UL (ref 3.9–12.7)

## 2025-05-02 PROCEDURE — 87389 HIV-1 AG W/HIV-1&-2 AB AG IA: CPT | Mod: HCNC | Performed by: PHYSICIAN ASSISTANT

## 2025-05-02 PROCEDURE — 85025 COMPLETE CBC W/AUTO DIFF WBC: CPT | Mod: HCNC | Performed by: EMERGENCY MEDICINE

## 2025-05-02 PROCEDURE — 83735 ASSAY OF MAGNESIUM: CPT | Mod: HCNC | Performed by: EMERGENCY MEDICINE

## 2025-05-02 PROCEDURE — 63600175 PHARM REV CODE 636 W HCPCS: Mod: HCNC

## 2025-05-02 PROCEDURE — 83605 ASSAY OF LACTIC ACID: CPT | Mod: HCNC

## 2025-05-02 PROCEDURE — 96374 THER/PROPH/DIAG INJ IV PUSH: CPT | Mod: HCNC

## 2025-05-02 PROCEDURE — 96375 TX/PRO/DX INJ NEW DRUG ADDON: CPT | Mod: HCNC

## 2025-05-02 PROCEDURE — 84100 ASSAY OF PHOSPHORUS: CPT | Mod: HCNC | Performed by: EMERGENCY MEDICINE

## 2025-05-02 PROCEDURE — 86803 HEPATITIS C AB TEST: CPT | Mod: HCNC | Performed by: PHYSICIAN ASSISTANT

## 2025-05-02 PROCEDURE — 84450 TRANSFERASE (AST) (SGOT): CPT | Mod: HCNC | Performed by: EMERGENCY MEDICINE

## 2025-05-02 PROCEDURE — 83880 ASSAY OF NATRIURETIC PEPTIDE: CPT | Mod: HCNC | Performed by: EMERGENCY MEDICINE

## 2025-05-02 PROCEDURE — 63600175 PHARM REV CODE 636 W HCPCS: Mod: HCNC | Performed by: STUDENT IN AN ORGANIZED HEALTH CARE EDUCATION/TRAINING PROGRAM

## 2025-05-02 PROCEDURE — 93005 ELECTROCARDIOGRAM TRACING: CPT | Mod: HCNC

## 2025-05-02 PROCEDURE — 63600175 PHARM REV CODE 636 W HCPCS: Mod: HCNC | Performed by: EMERGENCY MEDICINE

## 2025-05-02 PROCEDURE — 25000003 PHARM REV CODE 250: Mod: HCNC

## 2025-05-02 PROCEDURE — 93010 ELECTROCARDIOGRAM REPORT: CPT | Mod: HCNC,76,, | Performed by: INTERNAL MEDICINE

## 2025-05-02 PROCEDURE — 93010 ELECTROCARDIOGRAM REPORT: CPT | Mod: HCNC,,, | Performed by: INTERNAL MEDICINE

## 2025-05-02 PROCEDURE — A4216 STERILE WATER/SALINE, 10 ML: HCPCS | Mod: HCNC

## 2025-05-02 PROCEDURE — G0378 HOSPITAL OBSERVATION PER HR: HCPCS | Mod: HCNC

## 2025-05-02 PROCEDURE — 99900035 HC TECH TIME PER 15 MIN (STAT): Mod: HCNC

## 2025-05-02 PROCEDURE — 82803 BLOOD GASES ANY COMBINATION: CPT | Mod: HCNC

## 2025-05-02 PROCEDURE — 84145 PROCALCITONIN (PCT): CPT | Mod: HCNC | Performed by: EMERGENCY MEDICINE

## 2025-05-02 PROCEDURE — 94761 N-INVAS EAR/PLS OXIMETRY MLT: CPT | Mod: HCNC,XB

## 2025-05-02 PROCEDURE — 83605 ASSAY OF LACTIC ACID: CPT | Mod: 59,HCNC

## 2025-05-02 PROCEDURE — 99285 EMERGENCY DEPT VISIT HI MDM: CPT | Mod: 25,HCNC

## 2025-05-02 PROCEDURE — 96361 HYDRATE IV INFUSION ADD-ON: CPT | Mod: HCNC

## 2025-05-02 PROCEDURE — 27000221 HC OXYGEN, UP TO 24 HOURS: Mod: HCNC

## 2025-05-02 PROCEDURE — 84484 ASSAY OF TROPONIN QUANT: CPT | Mod: 91,HCNC

## 2025-05-02 PROCEDURE — 84484 ASSAY OF TROPONIN QUANT: CPT | Mod: HCNC | Performed by: EMERGENCY MEDICINE

## 2025-05-02 PROCEDURE — 96372 THER/PROPH/DIAG INJ SC/IM: CPT | Performed by: STUDENT IN AN ORGANIZED HEALTH CARE EDUCATION/TRAINING PROGRAM

## 2025-05-02 PROCEDURE — 87040 BLOOD CULTURE FOR BACTERIA: CPT | Mod: HCNC | Performed by: EMERGENCY MEDICINE

## 2025-05-02 RX ORDER — IBUPROFEN 200 MG
16 TABLET ORAL
Status: DISCONTINUED | OUTPATIENT
Start: 2025-05-02 | End: 2025-05-04 | Stop reason: HOSPADM

## 2025-05-02 RX ORDER — SIMETHICONE 80 MG
1 TABLET,CHEWABLE ORAL 4 TIMES DAILY PRN
Status: DISCONTINUED | OUTPATIENT
Start: 2025-05-02 | End: 2025-05-04 | Stop reason: HOSPADM

## 2025-05-02 RX ORDER — HALOPERIDOL LACTATE 5 MG/ML
1 INJECTION, SOLUTION INTRAMUSCULAR
Status: COMPLETED | OUTPATIENT
Start: 2025-05-02 | End: 2025-05-02

## 2025-05-02 RX ORDER — POTASSIUM CHLORIDE 750 MG/1
20 CAPSULE, EXTENDED RELEASE ORAL 2 TIMES DAILY
Status: DISCONTINUED | OUTPATIENT
Start: 2025-05-02 | End: 2025-05-04 | Stop reason: HOSPADM

## 2025-05-02 RX ORDER — PANTOPRAZOLE SODIUM 40 MG/1
40 TABLET, DELAYED RELEASE ORAL DAILY
Status: DISCONTINUED | OUTPATIENT
Start: 2025-05-03 | End: 2025-05-04 | Stop reason: HOSPADM

## 2025-05-02 RX ORDER — CEFTRIAXONE 1 G/1
1 INJECTION, POWDER, FOR SOLUTION INTRAMUSCULAR; INTRAVENOUS
Status: DISCONTINUED | OUTPATIENT
Start: 2025-05-02 | End: 2025-05-04 | Stop reason: HOSPADM

## 2025-05-02 RX ORDER — IBUPROFEN 200 MG
24 TABLET ORAL
Status: DISCONTINUED | OUTPATIENT
Start: 2025-05-02 | End: 2025-05-04 | Stop reason: HOSPADM

## 2025-05-02 RX ORDER — SODIUM CHLORIDE 0.9 % (FLUSH) 0.9 %
10 SYRINGE (ML) INJECTION EVERY 8 HOURS
Status: DISCONTINUED | OUTPATIENT
Start: 2025-05-02 | End: 2025-05-04 | Stop reason: HOSPADM

## 2025-05-02 RX ORDER — NIFEDIPINE 30 MG/1
30 TABLET, EXTENDED RELEASE ORAL DAILY
Status: DISCONTINUED | OUTPATIENT
Start: 2025-05-03 | End: 2025-05-04 | Stop reason: HOSPADM

## 2025-05-02 RX ORDER — CARVEDILOL 25 MG/1
25 TABLET ORAL 2 TIMES DAILY WITH MEALS
Status: DISCONTINUED | OUTPATIENT
Start: 2025-05-02 | End: 2025-05-04 | Stop reason: HOSPADM

## 2025-05-02 RX ORDER — PREDNISONE 10 MG/1
10 TABLET ORAL DAILY
Status: DISCONTINUED | OUTPATIENT
Start: 2025-05-03 | End: 2025-05-02

## 2025-05-02 RX ORDER — ENOXAPARIN SODIUM 100 MG/ML
40 INJECTION SUBCUTANEOUS EVERY 24 HOURS
Status: DISCONTINUED | OUTPATIENT
Start: 2025-05-02 | End: 2025-05-02

## 2025-05-02 RX ORDER — CITALOPRAM 20 MG/1
20 TABLET, FILM COATED ORAL DAILY
Status: DISCONTINUED | OUTPATIENT
Start: 2025-05-03 | End: 2025-05-04 | Stop reason: HOSPADM

## 2025-05-02 RX ORDER — HEPARIN SODIUM 5000 [USP'U]/ML
5000 INJECTION, SOLUTION INTRAVENOUS; SUBCUTANEOUS EVERY 12 HOURS
Status: DISCONTINUED | OUTPATIENT
Start: 2025-05-02 | End: 2025-05-04 | Stop reason: HOSPADM

## 2025-05-02 RX ORDER — TALC
6 POWDER (GRAM) TOPICAL NIGHTLY PRN
Status: DISCONTINUED | OUTPATIENT
Start: 2025-05-02 | End: 2025-05-04 | Stop reason: HOSPADM

## 2025-05-02 RX ORDER — POLYETHYLENE GLYCOL 3350 17 G/17G
17 POWDER, FOR SOLUTION ORAL DAILY PRN
Status: DISCONTINUED | OUTPATIENT
Start: 2025-05-02 | End: 2025-05-04 | Stop reason: HOSPADM

## 2025-05-02 RX ORDER — FERROUS GLUCONATE 324(37.5)
324 TABLET ORAL
Status: DISCONTINUED | OUTPATIENT
Start: 2025-05-03 | End: 2025-05-04 | Stop reason: HOSPADM

## 2025-05-02 RX ORDER — NALOXONE HCL 0.4 MG/ML
0.02 VIAL (ML) INJECTION
Status: DISCONTINUED | OUTPATIENT
Start: 2025-05-02 | End: 2025-05-04 | Stop reason: HOSPADM

## 2025-05-02 RX ORDER — IPRATROPIUM BROMIDE AND ALBUTEROL SULFATE 2.5; .5 MG/3ML; MG/3ML
3 SOLUTION RESPIRATORY (INHALATION) EVERY 6 HOURS PRN
Status: DISCONTINUED | OUTPATIENT
Start: 2025-05-02 | End: 2025-05-04 | Stop reason: HOSPADM

## 2025-05-02 RX ORDER — GLUCAGON 1 MG
1 KIT INJECTION
Status: DISCONTINUED | OUTPATIENT
Start: 2025-05-02 | End: 2025-05-04 | Stop reason: HOSPADM

## 2025-05-02 RX ORDER — PROCHLORPERAZINE EDISYLATE 5 MG/ML
5 INJECTION INTRAMUSCULAR; INTRAVENOUS EVERY 6 HOURS PRN
Status: DISCONTINUED | OUTPATIENT
Start: 2025-05-02 | End: 2025-05-04 | Stop reason: HOSPADM

## 2025-05-02 RX ORDER — ALUMINUM HYDROXIDE, MAGNESIUM HYDROXIDE, AND SIMETHICONE 1200; 120; 1200 MG/30ML; MG/30ML; MG/30ML
30 SUSPENSION ORAL 4 TIMES DAILY PRN
Status: DISCONTINUED | OUTPATIENT
Start: 2025-05-02 | End: 2025-05-04 | Stop reason: HOSPADM

## 2025-05-02 RX ORDER — ONDANSETRON 8 MG/1
8 TABLET, ORALLY DISINTEGRATING ORAL EVERY 8 HOURS PRN
Status: DISCONTINUED | OUTPATIENT
Start: 2025-05-02 | End: 2025-05-04 | Stop reason: HOSPADM

## 2025-05-02 RX ADMIN — Medication 10 ML: at 10:05

## 2025-05-02 RX ADMIN — POTASSIUM CHLORIDE 20 MEQ: 750 CAPSULE, EXTENDED RELEASE ORAL at 10:05

## 2025-05-02 RX ADMIN — HALOPERIDOL LACTATE 1 MG: 5 INJECTION, SOLUTION INTRAMUSCULAR at 06:05

## 2025-05-02 RX ADMIN — Medication 6 MG: at 10:05

## 2025-05-02 RX ADMIN — CEFTRIAXONE 1 G: 1 INJECTION, POWDER, FOR SOLUTION INTRAMUSCULAR; INTRAVENOUS at 06:05

## 2025-05-02 RX ADMIN — SODIUM CHLORIDE 250 ML: 0.9 INJECTION, SOLUTION INTRAVENOUS at 07:05

## 2025-05-02 RX ADMIN — HEPARIN SODIUM 5000 UNITS: 5000 INJECTION INTRAVENOUS; SUBCUTANEOUS at 10:05

## 2025-05-02 NOTE — ED PROVIDER NOTES
Encounter Date: 5/2/2025       History     Chief Complaint   Patient presents with    Altered Mental Status     Decline in mental status/increased confusion over last 2 weeks. Recently diagnosed with pulmonary fibrosis and placed on home oxygen.    Hypotension     90s/50s      89-year-old female presenting with significant altered mental status, suicidal ideation.  Family son is a physician.  He reports over the last 2 weeks, and specifically in the last 2 days she has become significantly altered, talking nonsensically, and reporting that she wants to kill herself.  She is oriented x3 but keeps referring to things as though they happened yesterday.  She denies any significant complaints at this time.  She has sitters with 24 hour care but family states she has become combative at times and safety has now become a concern.  She was recently diagnosed with pulmonary fibrosis and recently started on oxygen a few weeks ago.  They note good O2 sats at home but the patient has been extremely upset by this change.  Additionally, she was recently started on a Z-Andres and steroids, currently taking 10 mg of prednisone daily.        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    Encounter for blood transfusion     Fall, accidental     GERD (gastroesophageal reflux disease)     Hypertension     Right renal artery stenosis 3/28/2017    Stenosis of left subclavian artery      Past Surgical History:   Procedure Laterality Date    EYE SURGERY      FRACTURE SURGERY      INSERTION OF IMPLANTABLE LOOP RECORDER N/A 1/2/2020    Procedure: Insertion, Implantable Loop Recorder;  Surgeon: Anton  MD Flavio;  Location: [facility] EP LAB;  Service: Cardiology;  Laterality: N/A;  Syncope, ILR, Bio, Local, SK, 3 Prep     Family History   Problem Relation Name Age of Onset    Hyperlipidemia Mother      Hypertension Mother      Esophageal cancer Neg Hx      Cirrhosis Neg Hx      Colon cancer Neg Hx       Social History[1]  Review of Systems   Unable to perform ROS: Dementia       Physical Exam     Initial Vitals [05/02/25 1338]   BP Pulse Resp Temp SpO2   (!) 93/52 92 (!) 24 97.9 °F (36.6 °C) 95 %      MAP       --         Physical Exam    Nursing note and vitals reviewed.  Constitutional: She appears well-developed and well-nourished. She is not diaphoretic. No distress.   HENT:   Head: Normocephalic and atraumatic.   Nose: Nose normal.   Eyes: EOM are normal. Pupils are equal, round, and reactive to light. No scleral icterus.   Neck: Neck supple.   Normal range of motion.  Cardiovascular:  Normal rate, regular rhythm, normal heart sounds and intact distal pulses.     Exam reveals no gallop and no friction rub.       No murmur heard.  Pulmonary/Chest: Breath sounds normal. No stridor. No respiratory distress. She has no wheezes. She has no rhonchi. She has no rales.   Abdominal: Abdomen is soft. Bowel sounds are normal. She exhibits no distension. There is no abdominal tenderness. There is no rebound and no guarding.   Musculoskeletal:         General: No tenderness or edema. Normal range of motion.      Cervical back: Normal range of motion and neck supple.     Neurological: She is oriented to person, place, and time. No cranial nerve deficit. GCS score is 15. GCS eye subscore is 4. GCS verbal subscore is 5. GCS motor subscore is 6.   Skin: Skin is warm and dry. No rash noted.   Psychiatric: She has a normal mood and affect. Her behavior is normal.         ED Course   Procedures  Labs Reviewed   COMPREHENSIVE METABOLIC PANEL - Abnormal       Result Value    Sodium 139      Potassium 4.5      Chloride 100      CO2 25       Glucose 130 (*)     BUN 22      Creatinine 0.8      Calcium 9.8      Protein Total 8.3      Albumin 3.5      Bilirubin Total 0.4      ALP 94      AST 19      ALT 13      Anion Gap 14      eGFR >60     PHOSPHORUS - Abnormal    Phosphorus Level 2.6 (*)    TROPONIN I HIGH SENSITIVITY - Abnormal    Troponin High Sensitive 71 (*)    B-TYPE NATRIURETIC PEPTIDE - Abnormal     (*)    CBC WITH DIFFERENTIAL - Abnormal    WBC 10.77      RBC 5.01      HGB 13.1      HCT 42.9      MCV 86      MCH 26.1 (*)     MCHC 30.5 (*)     RDW 17.3 (*)     Platelet Count 349      MPV 9.8      Nucleated RBC 0      Neut % 89.0 (*)     Lymph % 6.7 (*)     Mono % 3.5 (*)     Eos % 0.0      Basophil % 0.5      Imm Grans % 0.3      Neut # 9.59 (*)     Lymph # 0.72 (*)     Mono # 0.38      Eos # 0.00      Baso # 0.05      Imm Grans # 0.03     HEPATITIS C ANTIBODY - Normal    Hep C Ab Interp Non-Reactive     HIV 1 / 2 ANTIBODY - Normal    HIV 1/2 Ag/Ab Non-Reactive     MAGNESIUM - Normal    Magnesium  2.0     PROCALCITONIN - Normal    Procalcitonin 0.02     CULTURE, BLOOD   CULTURE, BLOOD   CBC W/ AUTO DIFFERENTIAL    Narrative:     The following orders were created for panel order CBC auto differential.  Procedure                               Abnormality         Status                     ---------                               -----------         ------                     CBC with Differential[[id number]]       Abnormal            Final result                 Please view results for these tests on the individual orders.   HEP C VIRUS HOLD SPECIMEN   URINALYSIS, REFLEX TO URINE CULTURE     EKG Readings: (Independently Interpreted)   Initial Reading: No STEMI. Rhythm: Sinus Tachycardia. Heart Rate: 101. Ectopy: No Ectopy.   No ST segment elevation or depression concerning for acute ischemia.        ECG Results              EKG 12-lead (Final result)        Collection Time Result Time QRS Duration OHS QTC Calculation    05/02/25 14:27:34  05/02/25 14:29:20 68 417                     Final result by Interface, Lab In [facility] (05/02/25 14:29:26)                   Narrative:    Test Reason : Z13.6,    Vent. Rate : 101 BPM     Atrial Rate : 101 BPM     P-R Int : 156 ms          QRS Dur :  68 ms      QT Int : 322 ms       P-R-T Axes :  76  72  67 degrees    QTcB Int : 417 ms    Sinus tachycardia  Septal infarct (cited on or before 02-May-2025)  Abnormal ECG  When compared with ECG of 02-May-2025 13:52,  Premature ventricular complexes are no longer Present  Confirmed by Bro Rangel ([id number]) on 5/2/2025 2:29:17 PM    Referred By: AAAREFERRAL SELF           Confirmed By: Bro Bennett                                     EKG 12-lead (Final result)        Collection Time Result Time QRS Duration OHS QTC Calculation    05/02/25 13:52:54 05/02/25 14:27:28 72 426                     Final result by Interface, Lab In [facility] (05/02/25 14:27:37)                   Narrative:    Test Reason : I95.9,    Vent. Rate : 108 BPM     Atrial Rate : 108 BPM     P-R Int : 146 ms          QRS Dur :  72 ms      QT Int : 318 ms       P-R-T Axes :  63  54  61 degrees    QTcB Int : 426 ms    Sinus tachycardia with frequent Premature ventricular complexes  Septal infarct ,age undetermined  Abnormal ECG  When compared with ECG of 25-Jan-2022 11:51,  Septal infarct is now Present  Confirmed by Bro Rangel ([id number]) on 5/2/2025 2:27:24 PM    Referred By:            Confirmed By: Bro Bennett                                  Imaging Results              CT Head Without Contrast (Final result)  Result time 05/02/25 15:24:16      Final result by Jonatan Lopez MD (05/02/25 15:24:16)                   Impression:      No evidence of recent hemorrhage or other acute intracranial pathology.    Chronic ischemic change, as above.      Electronically signed by: Jonatan Lopez MD  Date:    05/02/2025  Time:    15:24               Narrative:    EXAMINATION:  CT HEAD WITHOUT  CONTRAST    CLINICAL HISTORY:  Mental status change, unknown cause;Fall 4 weeks ago;    TECHNIQUE:  Low dose axial CT images obtained throughout the head without the use of intravenous contrast.  Axial, sagittal and coronal reconstructions were performed.    COMPARISON:  06/05/2017    FINDINGS:  Intracranial compartment:    Ventricles are stable in size, without evidence of hydrocephalus.    Mild chronic small vessel ischemic change throughout the supratentorial white matter.  Moderate-sized remote infarct corresponding to the inferior division of the right MCA.  Slight interval extension to the of the associated encephalomalacia/gliosis into the parietal region since the prior study of 2017.  No new major vascular distribution infarct elsewhere.  Few small foci of hypoattenuation in the basal ganglia fairly symmetric bilaterally, thought to reflect prominent perivascular spaces and/or some chronic ischemic change.  No acute parenchymal hemorrhage.  No new intracranial mass effect or midline shift.    No extra-axial blood or fluid collections.    Scattered vascular calcification about the skull base.    Skull/extracranial contents (limited evaluation):    No displaced calvarial fracture.    Mastoid air cells are clear. Mild patchy mucosal thickening in the visualized paranasal sinuses.    Bilateral pseudophakia.                                       X-Ray Chest AP Portable (Final result)  Result time 05/02/25 15:18:14      Final result by Tyler Lozano Jr., MD (05/02/25 15:18:14)                   Impression:      Some improvement in lung aeration in this patient with probable multi lobe pneumonia superimposed on a background of chronic bronchiectasis and bronchitis.      Electronically signed by: Tyler Hernandez Jr  Date:    05/02/2025  Time:    15:18               Narrative:    EXAMINATION:  XR CHEST AP PORTABLE    CLINICAL HISTORY:  Sepsis;    TECHNIQUE:  Single frontal view of the chest was  performed.    COMPARISON:  CT 04/04/2025 and radiograph 04/01/2025.    FINDINGS:  Cardiomediastinal silhouetteis normal in size.  Prominent aortic atherosclerosis.    Widespread bronchiectasis, bronchial wall thickening and nodular lung opacities which may reflect mucous plugging and or endobronchial spread of infection/pneumonia.  Findings are similar although slightly improved compared with the prior radiograph.    Pleura, diaphragm, and thoracic cage grossly unremarkable.                                       Medications   carvediloL tablet 25 mg (has no administration in time range)   citalopram tablet 20 mg (has no administration in time range)   ferrous gluconate tablet 324 mg (has no administration in time range)   NIFEdipine 24 hr tablet 30 mg (has no administration in time range)   pantoprazole EC tablet 40 mg (has no administration in time range)   potassium chloride CR capsule 20 mEq (has no administration in time range)   predniSONE tablet 10 mg (has no administration in time range)   sodium chloride 0.9% flush 10 mL (has no administration in time range)   albuterol-ipratropium 2.5 mg-0.5 mg/3 mL nebulizer solution 3 mL (has no administration in time range)   melatonin tablet 6 mg (has no administration in time range)   ondansetron disintegrating tablet 8 mg (has no administration in time range)   prochlorperazine injection Soln 5 mg (has no administration in time range)   polyethylene glycol packet 17 g (has no administration in time range)   simethicone chewable tablet 80 mg (has no administration in time range)   aluminum-magnesium hydroxide-simethicone 200-200-20 mg/5 mL suspension 30 mL (has no administration in time range)   naloxone 0.4 mg/mL injection 0.02 mg (has no administration in time range)   glucose chewable tablet 16 g (has no administration in time range)   glucose chewable tablet 24 g (has no administration in time range)   dextrose 50% injection 12.5 g (has no administration in time  range)   dextrose 50% injection 25 g (has no administration in time range)   glucagon (human recombinant) injection 1 mg (has no administration in time range)   heparin (porcine) injection 5,000 Units (has no administration in time range)   cefTRIAXone injection 1 g (has no administration in time range)   haloperidol lactate injection 1 mg (has no administration in time range)     Medical Decision Making  Amount and/or Complexity of Data Reviewed  Labs: ordered.  Radiology: ordered.    Risk  Prescription drug management.                          Medical Decision Making:   Initial Assessment:   89-year-old female presenting with significant altered mental status, suicidal ideation.  Family son is a physician.  He reports over the last 2 weeks, and specifically in the last 2 days she has become significantly altered, talking nonsensically, and reporting that she wants to kill herself.  She is oriented x3 but keeps referring to things as though they happened yesterday.  She denies any significant complaints at this time.  She has sitters with 24 hour care but family states she has become combative at times and safety has now become a concern.  She was recently diagnosed with pulmonary fibrosis and recently started on oxygen a few weeks ago.  They note good O2 sats at home but the patient has been extremely upset by this change.  Additionally, she was recently started on a Z-Andres and steroids, currently taking 10 mg of prednisone daily.  CT head without acute changes.  Lab workup relatively unremarkable.  I am concerned given the altered mental status, reported suicidal ideation.  Family has confirmed that they wish with the patient to be DNR in accordance with her wishes.  I would like to place her in observation for further evaluation of altered mental status, delirium, possibly steroid induced.  I have placed a consult to Psychiatry to have them help weigh in.             Clinical Impression:  Final  diagnoses:  [I95.9] Hypotension  [Z13.6] Screening for cardiovascular condition          ED Disposition Condition    Observation                   [1]   Social History  Tobacco Use    Smoking status: Never    Smokeless tobacco: Never   Substance Use Topics    Alcohol use: No    Drug use: No        Abram Gibson MD  05/02/25 [id number]

## 2025-05-02 NOTE — ASSESSMENT & PLAN NOTE
Patient presenting with AMS. Unclear etiology at this time. Possible 2/2 steroid use vs UTI. Unclear if AMS is attributing to her SI vs if true SI.     - Afebrile, no leukocytosis.  - CMP unremarkable  - CTH without any acute intracranial findings  - BNP of 121  - Hs trop of 71, repeat pending  - POC lactate 2.3, repeat pending  - UA pending    - Unable to provide sample yet, however is endorsing dysuria and suprapubic pain.   - IVFs ordered  - Psychiatry consulted, appreciate any recs  - Delirium and fall precautions.   - Started on CTX

## 2025-05-02 NOTE — PROVIDER PROGRESS NOTES - EMERGENCY DEPT.
Encounter Date: 5/2/2025    ED Physician Progress Notes         EKG - STEMI Decision  Initial Reading: No STEMI present (Sinus tach at 1:08 a.m. with occasional PVCs.  Nonspecific ST-T wave abnormalities.  No STEMI.).  Response: No Action Needed.

## 2025-05-02 NOTE — ASSESSMENT & PLAN NOTE
Patient has a diagnosis of pneumonia. The cause of the pneumonia is unknown at this time. The pneumonia is stable. The patient has the following signs/symptoms of pneumonia: persistent hypoxia . The patient does have a current oxygen requirement and the patient does have a home oxygen requirement. I have reviewed the pertinent imaging. The following cultures have been collected: Blood cultures The culture results are listed below.     - Afebrile, no leukocytosis  - PCT of 0.02  - CXR showed widespread bronchiectasis, bronchial wall thickening and nodular lung opacities which may reflect mucous plugging and or endobronchial spread of infection/pneumonia.   - Will monitor patient closely  - Abx broadened to CTX  - Will hold further steroids at this time, as it may be contributing to her AMS

## 2025-05-02 NOTE — SUBJECTIVE & OBJECTIVE
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    Encounter for blood transfusion     Fall, accidental     GERD (gastroesophageal reflux disease)     Hypertension     Right renal artery stenosis 3/28/2017    Stenosis of left subclavian artery        Past Surgical History:   Procedure Laterality Date    EYE SURGERY      FRACTURE SURGERY      INSERTION OF IMPLANTABLE LOOP RECORDER N/A 1/2/2020    Procedure: Insertion, Implantable Loop Recorder;  Surgeon: Anton Muniz MD;  Location: [facility];  Service: Cardiology;  Laterality: N/A;  Syncope, ILR, Bio, Local, SK, 3 Prep       Review of patient's allergies indicates:   Allergen Reactions    Percocet [oxycodone-acetaminophen]      Did not tolerate in past, not allergy    Bactrim [sulfamethoxazole-trimethoprim] Nausea And Vomiting       No current facility-administered medications on file prior to encounter.     Current Outpatient Medications on File Prior to Encounter   Medication Sig    azithromycin (Z-TERRIE) 250 MG tablet TAKE 2 TABLETS ON DAY 1 AND THEN 1 TABLET EACH DAY FOR THE NEXT 6 DAYS. 7 DAYS TOTAL    azithromycin (Z-TERRIE) 250 MG tablet Take 2 tablets by mouth on day 1; Take 1 tablet by mouth on days 2-5    carvediloL (COREG) 25 MG tablet Take 1 tablet (25 mg total) by mouth 2 (two) times daily with meals.    citalopram (CELEXA) 20 MG tablet TAKE 1 TABLET BY MOUTH EVERY DAY    diclofenac sodium (VOLTAREN) 1 % Gel Apply 2 g topically 2 (two) times daily.    ferrous gluconate (FERGON) 324 MG tablet Take 1 tablet (324 mg total) by mouth daily with breakfast.    mucus clearing device (AEROBIKA OSCILLATING PEP SYSTM) by Misc.(Non-Drug; Combo Route) route 2 (two) times a day. Use after nebulizer treatment    multivitamin capsule Take 2 capsules by mouth once daily.    NIFEdipine (PROCARDIA-XL) 30 MG (OSM) 24 hr tablet Take 1 tablet  (30 mg total) by mouth once daily.    omeprazole (PRILOSEC) 20 MG capsule Take 1 capsule (20 mg total) by mouth once daily.    potassium chloride (MICRO-K) 10 MEQ CpSR Take 2 capsules (20 mEq total) by mouth 2 (two) times daily.    predniSONE (DELTASONE) 10 MG tablet Take 1 tablet (10 mg total) by mouth once daily.    sodium chloride 3% 3 % nebulizer solution Take 4 mLs by nebulization 2 (two) times a day.     Family History       Problem Relation (Age of Onset)    Hyperlipidemia Mother    Hypertension Mother          Tobacco Use    Smoking status: Never    Smokeless tobacco: Never   Substance and Sexual Activity    Alcohol use: No    Drug use: No    Sexual activity: Not Currently     Partners: Male     Review of Systems   Constitutional:  Positive for activity change and appetite change. Negative for chills and fever.   HENT:  Negative for trouble swallowing.    Eyes:  Negative for photophobia and visual disturbance.   Respiratory:  Negative for cough, chest tightness and shortness of breath.    Cardiovascular:  Negative for chest pain, palpitations and leg swelling.   Gastrointestinal:  Negative for abdominal pain, constipation, diarrhea, nausea and vomiting.   Genitourinary:  Positive for difficulty urinating and dysuria. Negative for frequency and hematuria.   Musculoskeletal:  Negative for back pain, gait problem and neck pain.   Skin:  Negative for rash and wound.   Neurological:  Negative for dizziness, syncope, speech difficulty and light-headedness.   Psychiatric/Behavioral:  Positive for agitation and suicidal ideas. Negative for confusion. The patient is not nervous/anxious.         Tangential      Objective:     Vital Signs (Most Recent):  Temp: 97.9 °F (36.6 °C) (05/02/25 1338)  Pulse: 100 (05/02/25 1800)  Resp: 20 (05/02/25 1530)  BP: (!) 167/76 (05/02/25 1800)  SpO2: (!) 92 % (05/02/25 1600) Vital Signs (24h Range):  Temp:  [97.9 °F (36.6 °C)] 97.9 °F (36.6 °C)  Pulse:  [] 100  Resp:  [20-24]  20  SpO2:  [92 %-100 %] 92 %  BP: ()/(52-82) 167/76     Weight: 45.4 kg (100 lb)  Body mass index is 18.89 kg/m².     Physical Exam  Vitals and nursing note reviewed.   Constitutional:       General: She is not in acute distress.     Appearance: She is well-developed.   HENT:      Head: Normocephalic and atraumatic.      Mouth/Throat:      Pharynx: No oropharyngeal exudate.   Eyes:      Conjunctiva/sclera: Conjunctivae normal.      Pupils: Pupils are equal, round, and reactive to light.   Cardiovascular:      Rate and Rhythm: Normal rate and regular rhythm.      Heart sounds: Normal heart sounds.   Pulmonary:      Effort: Pulmonary effort is normal. No respiratory distress.      Breath sounds: Normal breath sounds. No wheezing.   Abdominal:      General: Bowel sounds are normal. There is no distension.      Palpations: Abdomen is soft.      Tenderness: There is abdominal tenderness (TTP to suprapubic area).   Musculoskeletal:         General: No tenderness. Normal range of motion.      Cervical back: Normal range of motion and neck supple.   Lymphadenopathy:      Cervical: No cervical adenopathy.   Skin:     General: Skin is warm and dry.      Capillary Refill: Capillary refill takes less than 2 seconds.      Findings: No rash.   Neurological:      Mental Status: She is alert. She is disoriented.      Cranial Nerves: No cranial nerve deficit.      Sensory: No sensory deficit.      Coordination: Coordination normal.   Psychiatric:         Mood and Affect: Mood is depressed.         Behavior: Behavior is cooperative.         Thought Content: Thought content is delusional. Thought content includes suicidal ideation.              CRANIAL NERVES     CN III, IV, VI   Pupils are equal, round, and reactive to light.       Significant Labs: All pertinent labs within the past 24 hours have been reviewed.  CBC:   Recent Labs   Lab 05/02/25  1428 05/02/25  1447   WBC 10.77  --    HGB 13.1  --    HCT 42.9 43.0      --      CMP:   Recent Labs   Lab 05/02/25  1428      K 4.5      CO2 25   *   BUN 22   CREATININE 0.8   CALCIUM 9.8   PROT 8.3   ALBUMIN 3.5   BILITOT 0.4   ALKPHOS 94   AST 19   ALT 13   ANIONGAP 14       Significant Imaging: I have reviewed all pertinent imaging results/findings within the past 24 hours.    CT Head Without Contrast  Narrative: EXAMINATION:  CT HEAD WITHOUT CONTRAST    CLINICAL HISTORY:  Mental status change, unknown cause;Fall 4 weeks ago;    TECHNIQUE:  Low dose axial CT images obtained throughout the head without the use of intravenous contrast.  Axial, sagittal and coronal reconstructions were performed.    COMPARISON:  06/05/2017    FINDINGS:  Intracranial compartment:    Ventricles are stable in size, without evidence of hydrocephalus.    Mild chronic small vessel ischemic change throughout the supratentorial white matter.  Moderate-sized remote infarct corresponding to the inferior division of the right MCA.  Slight interval extension to the of the associated encephalomalacia/gliosis into the parietal region since the prior study of 2017.  No new major vascular distribution infarct elsewhere.  Few small foci of hypoattenuation in the basal ganglia fairly symmetric bilaterally, thought to reflect prominent perivascular spaces and/or some chronic ischemic change.  No acute parenchymal hemorrhage.  No new intracranial mass effect or midline shift.    No extra-axial blood or fluid collections.    Scattered vascular calcification about the skull base.    Skull/extracranial contents (limited evaluation):    No displaced calvarial fracture.    Mastoid air cells are clear. Mild patchy mucosal thickening in the visualized paranasal sinuses.    Bilateral pseudophakia.  Impression: No evidence of recent hemorrhage or other acute intracranial pathology.    Chronic ischemic change, as above.    Electronically signed by: Jonatan Lopez MD  Date:    05/02/2025  Time:    15:24  X-Ray Chest  AP Portable  Narrative: EXAMINATION:  XR CHEST AP PORTABLE    CLINICAL HISTORY:  Sepsis;    TECHNIQUE:  Single frontal view of the chest was performed.    COMPARISON:  CT 04/04/2025 and radiograph 04/01/2025.    FINDINGS:  Cardiomediastinal silhouetteis normal in size.  Prominent aortic atherosclerosis.    Widespread bronchiectasis, bronchial wall thickening and nodular lung opacities which may reflect mucous plugging and or endobronchial spread of infection/pneumonia.  Findings are similar although slightly improved compared with the prior radiograph.    Pleura, diaphragm, and thoracic cage grossly unremarkable.  Impression: Some improvement in lung aeration in this patient with probable multi lobe pneumonia superimposed on a background of chronic bronchiectasis and bronchitis.    Electronically signed by: Tyler Hernandez Jr  Date:    05/02/2025  Time:    15:18

## 2025-05-02 NOTE — HPI
Naheed Cottrell is a 89-year-old female with a PMHx of HTN, GERD, embolic stroke of R MCA in 2017, and arthritis that presents for altered mental status and suicidal ideation. Patient's son, Dr. Cottrell, is at bedside to assist in history. She is alert and oriented x 3 (self, place, time), however has recently had episodes of nonsensical speech and has spoken that she wants to kill herself. Denies any visual or auditory hallucinations. She has been slowly getting more altered over the last two days now. Per son, has 24 hour care at home with sitters and has became slightly combative and difficult with them and her daughter. During my evaluation, history difficult to obtain due to pt's tangential speech. She is able to be redirected for a few seconds before continuing her speech. Patient denies dizziness, lightheadedness, headache, CP, SOB, NVD, or weakness. She does complain of pain with urination and suprapubic pain. She has been refusing any medications at home as well. Her PO intake has also been less then usual. She has been on steroids and zpak for pneumonia.He also reports that she was started on 2L NC for pulmonary fibrosis about a month ago. She has been upset about this change and more depressed due to limitations and having others care for her more.  Per son, she has prior psych history from anxiety and obsessive behavior  related to hair thinning. She was started on SSRI at the time. Of note, patient is DNR and expressed her wishes for this. Her son, also confirms this. He is pursuing hospice outpatient. He would like to continue antibiotics for any reversible causes that may be contributing to her AMS.     ED course: Initially hypotensive, now hypertensive. On 2L NC. CBC and CMP unremarkable. . Trop 71. PCT 0.02. CTH without any acute findings. CXR shows widespread bronchiectasis, bronchial wall thickening and nodular lung opacities which may reflect mucous plugging and or endobronchial spread of  infection/pneumonia. UA pending. Patient admitted to  for further management.

## 2025-05-02 NOTE — ASSESSMENT & PLAN NOTE
- Follows Dr. Dominguez outpatient. 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  We discussed bronchoscopy in the risks therein given her age.  We talked about the treatment regimen, its affects on her organs, its length of time, in the likely need for follow-up bronchoscopy in the future.  Decision was ultimately made to pursue more conservative therapies which I think our best given her frailty and age. For now we will focus on symptomatic treatment.   - On 2L NC

## 2025-05-02 NOTE — ASSESSMENT & PLAN NOTE
Patient's blood pressure range in the last 24 hours was: BP  Min: 93/52  Max: 182/82.The patient's inpatient anti-hypertensive regimen is listed below:  Current Antihypertensives  carvediloL tablet 25 mg, 2 times daily with meals, Oral  NIFEdipine 24 hr tablet 30 mg, Daily, Oral    Plan  - BP is controlled, no changes needed to their regimen

## 2025-05-03 LAB
ABSOLUTE EOSINOPHIL (OHS): 0.05 K/UL
ABSOLUTE MONOCYTE (OHS): 0.7 K/UL (ref 0.3–1)
ABSOLUTE NEUTROPHIL COUNT (OHS): 5.72 K/UL (ref 1.8–7.7)
ALBUMIN SERPL BCP-MCNC: 2.9 G/DL (ref 3.5–5.2)
ALP SERPL-CCNC: 79 UNIT/L (ref 40–150)
ALT SERPL W/O P-5'-P-CCNC: 9 UNIT/L (ref 10–44)
ANION GAP (OHS): 10 MMOL/L (ref 8–16)
AST SERPL-CCNC: 12 UNIT/L (ref 11–45)
BASOPHILS # BLD AUTO: 0.07 K/UL
BASOPHILS NFR BLD AUTO: 0.8 %
BILIRUB SERPL-MCNC: 0.3 MG/DL (ref 0.1–1)
BILIRUB UR QL STRIP.AUTO: NEGATIVE
BUN SERPL-MCNC: 20 MG/DL (ref 10–30)
CALCIUM SERPL-MCNC: 9.1 MG/DL (ref 8.7–10.5)
CHLORIDE SERPL-SCNC: 103 MMOL/L (ref 95–110)
CLARITY UR: CLEAR
CO2 SERPL-SCNC: 26 MMOL/L (ref 23–29)
COLOR UR AUTO: YELLOW
CREAT SERPL-MCNC: 0.7 MG/DL (ref 0.5–1.4)
ERYTHROCYTE [DISTWIDTH] IN BLOOD BY AUTOMATED COUNT: 17.1 % (ref 11.5–14.5)
GFR SERPLBLD CREATININE-BSD FMLA CKD-EPI: >60 ML/MIN/1.73/M2
GLUCOSE SERPL-MCNC: 72 MG/DL (ref 70–110)
GLUCOSE UR QL STRIP: NEGATIVE
HCT VFR BLD AUTO: 37.7 % (ref 37–48.5)
HGB BLD-MCNC: 11 GM/DL (ref 12–16)
HGB UR QL STRIP: NEGATIVE
HOLD SPECIMEN: NORMAL
IMM GRANULOCYTES # BLD AUTO: 0.02 K/UL (ref 0–0.04)
IMM GRANULOCYTES NFR BLD AUTO: 0.2 % (ref 0–0.5)
KETONES UR QL STRIP: NEGATIVE
LEUKOCYTE ESTERASE UR QL STRIP: NEGATIVE
LYMPHOCYTES # BLD AUTO: 1.87 K/UL (ref 1–4.8)
MAGNESIUM SERPL-MCNC: 2 MG/DL (ref 1.6–2.6)
MCH RBC QN AUTO: 25.4 PG (ref 27–31)
MCHC RBC AUTO-ENTMCNC: 29.2 G/DL (ref 32–36)
MCV RBC AUTO: 87 FL (ref 82–98)
NITRITE UR QL STRIP: NEGATIVE
NUCLEATED RBC (/100WBC) (OHS): 0 /100 WBC
PH UR STRIP: 7 [PH]
PHOSPHATE SERPL-MCNC: 3.5 MG/DL (ref 2.7–4.5)
PLATELET # BLD AUTO: 286 K/UL (ref 150–450)
PMV BLD AUTO: 9.7 FL (ref 9.2–12.9)
POTASSIUM SERPL-SCNC: 4 MMOL/L (ref 3.5–5.1)
PROT SERPL-MCNC: 6.6 GM/DL (ref 6–8.4)
PROT UR QL STRIP: NEGATIVE
RBC # BLD AUTO: 4.33 M/UL (ref 4–5.4)
RELATIVE EOSINOPHIL (OHS): 0.6 %
RELATIVE LYMPHOCYTE (OHS): 22.2 % (ref 18–48)
RELATIVE MONOCYTE (OHS): 8.3 % (ref 4–15)
RELATIVE NEUTROPHIL (OHS): 67.9 % (ref 38–73)
SODIUM SERPL-SCNC: 139 MMOL/L (ref 136–145)
SP GR UR STRIP: 1.01
UROBILINOGEN UR STRIP-ACNC: NEGATIVE EU/DL
WBC # BLD AUTO: 8.43 K/UL (ref 3.9–12.7)

## 2025-05-03 PROCEDURE — 99204 OFFICE O/P NEW MOD 45 MIN: CPT | Mod: HCNC,GC,, | Performed by: PSYCHIATRY & NEUROLOGY

## 2025-05-03 PROCEDURE — 63600175 PHARM REV CODE 636 W HCPCS: Mod: HCNC

## 2025-05-03 PROCEDURE — 94761 N-INVAS EAR/PLS OXIMETRY MLT: CPT | Mod: HCNC

## 2025-05-03 PROCEDURE — 25000003 PHARM REV CODE 250: Mod: HCNC | Performed by: PHYSICIAN ASSISTANT

## 2025-05-03 PROCEDURE — 36415 COLL VENOUS BLD VENIPUNCTURE: CPT | Mod: HCNC

## 2025-05-03 PROCEDURE — 99900035 HC TECH TIME PER 15 MIN (STAT): Mod: HCNC

## 2025-05-03 PROCEDURE — G0378 HOSPITAL OBSERVATION PER HR: HCPCS | Mod: HCNC

## 2025-05-03 PROCEDURE — 27000221 HC OXYGEN, UP TO 24 HOURS: Mod: HCNC

## 2025-05-03 PROCEDURE — 84100 ASSAY OF PHOSPHORUS: CPT | Mod: HCNC

## 2025-05-03 PROCEDURE — A4216 STERILE WATER/SALINE, 10 ML: HCPCS | Mod: HCNC

## 2025-05-03 PROCEDURE — 84155 ASSAY OF PROTEIN SERUM: CPT | Mod: HCNC

## 2025-05-03 PROCEDURE — 83735 ASSAY OF MAGNESIUM: CPT | Mod: HCNC

## 2025-05-03 PROCEDURE — 85025 COMPLETE CBC W/AUTO DIFF WBC: CPT | Mod: HCNC

## 2025-05-03 PROCEDURE — 96376 TX/PRO/DX INJ SAME DRUG ADON: CPT

## 2025-05-03 PROCEDURE — 51798 US URINE CAPACITY MEASURE: CPT | Mod: HCNC

## 2025-05-03 PROCEDURE — 25000003 PHARM REV CODE 250: Mod: HCNC

## 2025-05-03 PROCEDURE — 81003 URINALYSIS AUTO W/O SCOPE: CPT | Mod: HCNC | Performed by: EMERGENCY MEDICINE

## 2025-05-03 PROCEDURE — 63600175 PHARM REV CODE 636 W HCPCS: Mod: HCNC | Performed by: STUDENT IN AN ORGANIZED HEALTH CARE EDUCATION/TRAINING PROGRAM

## 2025-05-03 PROCEDURE — 96372 THER/PROPH/DIAG INJ SC/IM: CPT | Performed by: STUDENT IN AN ORGANIZED HEALTH CARE EDUCATION/TRAINING PROGRAM

## 2025-05-03 RX ORDER — MIRTAZAPINE 7.5 MG/1
7.5 TABLET, FILM COATED ORAL NIGHTLY
COMMUNITY
Start: 2025-05-01

## 2025-05-03 RX ORDER — MIRTAZAPINE 7.5 MG/1
7.5 TABLET, FILM COATED ORAL NIGHTLY
Status: DISCONTINUED | OUTPATIENT
Start: 2025-05-03 | End: 2025-05-04 | Stop reason: HOSPADM

## 2025-05-03 RX ADMIN — CITALOPRAM HYDROBROMIDE 20 MG: 20 TABLET ORAL at 08:05

## 2025-05-03 RX ADMIN — POTASSIUM CHLORIDE 20 MEQ: 750 CAPSULE, EXTENDED RELEASE ORAL at 08:05

## 2025-05-03 RX ADMIN — CARVEDILOL 25 MG: 25 TABLET, FILM COATED ORAL at 08:05

## 2025-05-03 RX ADMIN — Medication 10 ML: at 02:05

## 2025-05-03 RX ADMIN — NIFEDIPINE 30 MG: 30 TABLET, FILM COATED, EXTENDED RELEASE ORAL at 08:05

## 2025-05-03 RX ADMIN — CARVEDILOL 25 MG: 25 TABLET, FILM COATED ORAL at 05:05

## 2025-05-03 RX ADMIN — HEPARIN SODIUM 5000 UNITS: 5000 INJECTION INTRAVENOUS; SUBCUTANEOUS at 08:05

## 2025-05-03 RX ADMIN — Medication 10 ML: at 06:05

## 2025-05-03 RX ADMIN — MIRTAZAPINE 7.5 MG: 7.5 TABLET, FILM COATED ORAL at 08:05

## 2025-05-03 RX ADMIN — PANTOPRAZOLE SODIUM 40 MG: 40 TABLET, DELAYED RELEASE ORAL at 08:05

## 2025-05-03 RX ADMIN — POLYETHYLENE GLYCOL 3350 17 G: 17 POWDER, FOR SOLUTION ORAL at 08:05

## 2025-05-03 RX ADMIN — CEFTRIAXONE 1 G: 1 INJECTION, POWDER, FOR SOLUTION INTRAMUSCULAR; INTRAVENOUS at 05:05

## 2025-05-03 RX ADMIN — Medication 10 ML: at 09:05

## 2025-05-03 RX ADMIN — Medication 6 MG: at 08:05

## 2025-05-03 RX ADMIN — Medication 324 MG: at 08:05

## 2025-05-03 NOTE — ASSESSMENT & PLAN NOTE
Patient presenting with AMS. Unclear etiology at this time. Possible 2/2 steroid use vs UTI. Unclear if AMS is attributing to her SI vs if true SI.     - Afebrile, no leukocytosis.  - CMP unremarkable  - CTH without any acute intracranial findings  - BNP of 121  - Hs trop of 71, repeat pending  - POC lactate 2.3, repeat pending  - UA non infectious.    - IVFs ordered  - Psychiatry consulted, appreciate any recs.  - Delirium and fall precautions.   - Mental status improving, although slightly confused and anxious.   - Continue CTX

## 2025-05-03 NOTE — H&P
[facility] - Emergency Dept  [facility]  History & Physical    Patient Name: Naheed Cottrell  MRN: [id number]  Patient Class: OP- Observation  Admission Date: 5/2/2025  Attending Physician: Mack Tobar MD   Primary Care Provider: [facility]         Patient information was obtained from patient, relative(s), past medical records, and ER records.     Subjective:     Principal Problem:Altered mental status    Chief Complaint:   Chief Complaint   Patient presents with    Altered Mental Status     Decline in mental status/increased confusion over last 2 weeks. Recently diagnosed with pulmonary fibrosis and placed on home oxygen.    Hypotension     90s/50s         HPI: Naheed Cottrell is a 89-year-old female with a PMHx of HTN, GERD, embolic stroke of R MCA in 2017, and arthritis that presents for altered mental status and suicidal ideation. Patient's son, Dr. Cottrell, is at bedside to assist in history. She is alert and oriented x 3 (self, place, time), however has recently had episodes of nonsensical speech and has spoken that she wants to kill herself. Denies any visual or auditory hallucinations. She has been slowly getting more altered over the last two days now. Per son, has 24 hour care at home with sitters and has became slightly combative and difficult with them and her daughter. During my evaluation, history difficult to obtain due to pt's tangential speech. She is able to be redirected for a few seconds before continuing her speech. Patient denies dizziness, lightheadedness, headache, CP, SOB, NVD, or weakness. She does complain of pain with urination and suprapubic pain. She has been refusing any medications at home as well. Her PO intake has also been less then usual. She has been on steroids and zpak for pneumonia.He also reports that she was started on 2L NC for pulmonary fibrosis about a month ago, which is causing her more anxiety and distress. She has been upset about this change and more  depressed due to limitations and having others care for her more.  Per son, she has prior psych history from anxiety and obsessive behavior  related to hair thinning. She was started on SSRI at the time. Of note, patient is DNR and expressed her wishes for this. Her son, also confirms this. He is pursuing hospice outpatient. He would like to continue antibiotics for any reversible causes that may be contributing to her AMS.     ED course: Initially hypotensive, now hypertensive. On 2L NC. CBC and CMP unremarkable. . Trop 71. PCT 0.02. CTH without any acute findings. CXR shows widespread bronchiectasis, bronchial wall thickening and nodular lung opacities which may reflect mucous plugging and or endobronchial spread of infection/pneumonia. UA pending. Patient admitted to  for further management.     No new subjective & objective note has been filed under this hospital service since the last note was generated.    Assessment/Plan:     Assessment & Plan  Altered mental status  Patient presenting with AMS. Unclear etiology at this time. Possible 2/2 steroid use vs UTI. Unclear if AMS is attributing to her SI vs if true SI.     - Afebrile, no leukocytosis.  - CMP unremarkable  - CTH without any acute intracranial findings  - BNP of 121  - Hs trop of 71, repeat pending  - POC lactate 2.3, repeat pending  - UA pending    - Unable to provide sample yet, however is endorsing dysuria and suprapubic pain.   - IVFs ordered  - Psychiatry consulted, appreciate any recs  - Delirium and fall precautions.   - Started on CTX  Pneumonia  Patient has a diagnosis of pneumonia. The cause of the pneumonia is unknown at this time. The pneumonia is stable. The patient has the following signs/symptoms of pneumonia: persistent hypoxia . The patient does have a current oxygen requirement and the patient does have a home oxygen requirement. I have reviewed the pertinent imaging. The following cultures have been collected: Blood  cultures The culture results are listed below.     - Afebrile, no leukocytosis  - PCT of 0.02  - CXR showed widespread bronchiectasis, bronchial wall thickening and nodular lung opacities which may reflect mucous plugging and or endobronchial spread of infection/pneumonia.   - Will monitor patient closely  - Abx broadened to CTX  - Will hold further steroids at this time, as it may be contributing to her AMS  Essential hypertension  Patient's blood pressure range in the last 24 hours was: BP  Min: 93/52  Max: 182/82.The patient's inpatient anti-hypertensive regimen is listed below:  Current Antihypertensives  carvediloL tablet 25 mg, 2 times daily with meals, Oral  NIFEdipine 24 hr tablet 30 mg, Daily, Oral    Plan  - BP is controlled, no changes needed to their regimen  History of CVA (cerebrovascular accident)  - Noted, no residual effects      Pulmonary fibrosis  - Follows Dr. Dominguez outpatient. 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  We discussed bronchoscopy in the risks therein given her age.  We talked about the treatment regimen, its affects on her organs, its length of time, in the likely need for follow-up bronchoscopy in the future.  Decision was ultimately made to pursue more conservative therapies which I think our best given her frailty and age. For now we will focus on symptomatic treatment.   - On 2L NC       DNR (do not resuscitate)  - Discussed at regarding code status.   - Patient wishes to be DNR and son confirms this.       VTE Risk Mitigation (From admission, onward)           Ordered     heparin (porcine) injection 5,000 Units  Every 12 hours         05/02/25 1741     IP VTE HIGH RISK PATIENT  Once         05/02/25 1636     Place sequential compression device  Until discontinued         05/02/25 1636                         On 05/02/2025, patient should be placed in hospital observation services under my care in collaboration  with Mack Brandon MD.           Yessica Walton PA-C  Department of Hospital Medicine  [facility] - Emergency Dept

## 2025-05-03 NOTE — PLAN OF CARE
Problem: Adult Inpatient Plan of Care  Goal: Plan of Care Review  Outcome: Progressing  Goal: Patient-Specific Goal (Individualized)  Outcome: Progressing  Goal: Absence of Hospital-Acquired Illness or Injury  Outcome: Progressing  Goal: Optimal Comfort and Wellbeing  Outcome: Progressing  Goal: Readiness for Transition of Care  Outcome: Progressing     Problem: Infection  Goal: Absence of Infection Signs and Symptoms  Outcome: Progressing     Problem: Pneumonia  Goal: Fluid Balance  Outcome: Progressing  Goal: Resolution of Infection Signs and Symptoms  Outcome: Progressing  Goal: Effective Oxygenation and Ventilation  Outcome: Progressing     Problem: Fall Injury Risk  Goal: Absence of Fall and Fall-Related Injury  Outcome: Progressing     Problem: Skin Injury Risk Increased  Goal: Skin Health and Integrity  Outcome: Progressing     Problem: Hypertension Acute  Goal: Blood Pressure Within Desired Range  Outcome: Progressing     Problem: Cognitive Impairment  Goal: Optimal Cognitive Function  Outcome: Progressing     Problem: Pulmonary Impairment  Goal: Improved Activity Tolerance  Outcome: Progressing  Goal: Effective Airway Clearance  Outcome: Progressing  Goal: Optimal Gas Exchange  Outcome: Progressing     Problem: Confusion Acute  Goal: Optimal Cognitive Function  Outcome: Progressing

## 2025-05-03 NOTE — ACP (ADVANCE CARE PLANNING)
Advance Care Planning      Date:  05/02/2025      Code Status  I engaged the patient in a conversation about the patient's preferences for care at the very end of life. The patient does not wish to have CPR and other invasive treatments performed when their heart and/or breathing stops. I communicated to the patient that a DNR status would be placed in their chart.     I spent a total of 15minutes engaging the patient/family in this advance care planning discussion.    Yessica Walton PA-C  Department of [facility] Medicine

## 2025-05-03 NOTE — HOSPITAL COURSE
Mrs. Cottrell was admitted for AMS. CBC and CMP unremarkable. . Trop 71. PCT 0.02. CTH without any acute findings. CXR shows widespread bronchiectasis, bronchial wall thickening and nodular lung opacities which may reflect mucous plugging and or endobronchial spread of infection/pneumonia. UA non infectious. Psych consulted, appreciate recs. Recommended remeron nightly, continue celexa at 20mg and Zyprexa prn. Mental status improved. Work up discussed with family at bedside. Patient at baseline this morning. She is medically ready and eager to go home. Follow up requested for psychiatry.     Plan and medication changes discussed with patient; agreeable to plan. ER precautions were given. All questions were answered to the patient's satisfaction and she was subsequently discharged.      Physical Exam  Gen: in NAD, appears stated age  Neuro: AAOx3, motor, sensory, and strength grossly intact BL  HEENT: EOMI, PERRLA; no JVD appreciated  CVS: RRR, no m/r/g  Resp: lungs CTAB, no w/r/r; no belabored breathing or accessory muscle use appreciated   Abd: NTND, soft to palpation  Extrem: no UE or LE edema BL

## 2025-05-03 NOTE — NURSING
Patient arrived to the floor / transferred to bed in room. Patient followed commands when asked like rolling.patient is not oriented at this time, she can state her name not . She is verbal but non sensical speech, no thought pattern to her conversation. Patients bed alarm set / sitter @ bedside.

## 2025-05-03 NOTE — SUBJECTIVE & OBJECTIVE
Interval History: NAEON. HDS. Patient resting well in bed. She reports sleeping ok overnight. Notes that she was able to eat breakfast. She has no complains but is slightly confused if family is able to visit her. Her mental status much improved from overnight. No longer showing as tangential. Work up and plan discussed with daughter Erica. They are arranging 24 sitters and plan to take her tomorrow in AM.     Review of Systems   Constitutional:  Negative for chills and fever.   Respiratory:  Negative for chest tightness and shortness of breath.    Cardiovascular:  Negative for chest pain and leg swelling.   Gastrointestinal:  Negative for abdominal pain and nausea.   Neurological:  Negative for dizziness and weakness.     Objective:     Vital Signs (Most Recent):  Temp: 96.9 °F (36.1 °C) (05/03/25 1406)  Pulse: 63 (05/03/25 1503)  Resp: 17 (05/03/25 1406)  BP: 135/63 (05/03/25 1406)  SpO2: 99 % (05/03/25 1406) Vital Signs (24h Range):  Temp:  [96.9 °F (36.1 °C)-98.2 °F (36.8 °C)] 96.9 °F (36.1 °C)  Pulse:  [] 63  Resp:  [17-22] 17  SpO2:  [98 %-99 %] 99 %  BP: (135-184)/() 135/63     Weight: 44.8 kg (98 lb 12.3 oz)  Body mass index is 18.66 kg/m².    Intake/Output Summary (Last 24 hours) at 5/3/2025 1658  Last data filed at 5/3/2025 1419  Gross per 24 hour   Intake 1010 ml   Output 950 ml   Net 60 ml         Physical Exam  Vitals and nursing note reviewed.   Constitutional:       Appearance: She is well-developed.   Eyes:      Pupils: Pupils are equal, round, and reactive to light.   Cardiovascular:      Rate and Rhythm: Normal rate and regular rhythm.   Pulmonary:      Effort: Pulmonary effort is normal.      Breath sounds: Normal breath sounds.   Abdominal:      Palpations: Abdomen is soft.      Tenderness: There is no abdominal tenderness.   Musculoskeletal:         General: No tenderness.   Skin:     General: Skin is warm and dry.   Neurological:      Mental Status: She is alert and oriented to  person, place, and time.   Psychiatric:         Behavior: Behavior normal.               Significant Labs: All pertinent labs within the past 24 hours have been reviewed.  CBC:   Recent Labs   Lab 05/02/25  1428 05/02/25  1447 05/03/25  0312   WBC 10.77  --  8.43   HGB 13.1  --  11.0*   HCT 42.9 43.0 37.7     --  286     CMP:   Recent Labs   Lab 05/02/25  1428 05/03/25  0312    139   K 4.5 4.0    103   CO2 25 26   * 72   BUN 22 20   CREATININE 0.8 0.7   CALCIUM 9.8 9.1   PROT 8.3 6.6   ALBUMIN 3.5 2.9*   BILITOT 0.4 0.3   ALKPHOS 94 79   AST 19 12   ALT 13 9*   ANIONGAP 14 10       Significant Imaging: I have reviewed all pertinent imaging results/findings within the past 24 hours.  CT Head Without Contrast  Narrative: EXAMINATION:  CT HEAD WITHOUT CONTRAST    CLINICAL HISTORY:  Mental status change, unknown cause;Fall 4 weeks ago;    TECHNIQUE:  Low dose axial CT images obtained throughout the head without the use of intravenous contrast.  Axial, sagittal and coronal reconstructions were performed.    COMPARISON:  06/05/2017    FINDINGS:  Intracranial compartment:    Ventricles are stable in size, without evidence of hydrocephalus.    Mild chronic small vessel ischemic change throughout the supratentorial white matter.  Moderate-sized remote infarct corresponding to the inferior division of the right MCA.  Slight interval extension to the of the associated encephalomalacia/gliosis into the parietal region since the prior study of 2017.  No new major vascular distribution infarct elsewhere.  Few small foci of hypoattenuation in the basal ganglia fairly symmetric bilaterally, thought to reflect prominent perivascular spaces and/or some chronic ischemic change.  No acute parenchymal hemorrhage.  No new intracranial mass effect or midline shift.    No extra-axial blood or fluid collections.    Scattered vascular calcification about the skull base.    Skull/extracranial contents (limited  evaluation):    No displaced calvarial fracture.    Mastoid air cells are clear. Mild patchy mucosal thickening in the visualized paranasal sinuses.    Bilateral pseudophakia.  Impression: No evidence of recent hemorrhage or other acute intracranial pathology.    Chronic ischemic change, as above.    Electronically signed by: Jonatan Lopez MD  Date:    05/02/2025  Time:    15:24  X-Ray Chest AP Portable  Narrative: EXAMINATION:  XR CHEST AP PORTABLE    CLINICAL HISTORY:  Sepsis;    TECHNIQUE:  Single frontal view of the chest was performed.    COMPARISON:  CT 04/04/2025 and radiograph 04/01/2025.    FINDINGS:  Cardiomediastinal silhouetteis normal in size.  Prominent aortic atherosclerosis.    Widespread bronchiectasis, bronchial wall thickening and nodular lung opacities which may reflect mucous plugging and or endobronchial spread of infection/pneumonia.  Findings are similar although slightly improved compared with the prior radiograph.    Pleura, diaphragm, and thoracic cage grossly unremarkable.  Impression: Some improvement in lung aeration in this patient with probable multi lobe pneumonia superimposed on a background of chronic bronchiectasis and bronchitis.    Electronically signed by: Tyler Hernandez Jr  Date:    05/02/2025  Time:    15:18

## 2025-05-03 NOTE — PLAN OF CARE
Problem: Adult Inpatient Plan of Care  Goal: Plan of Care Review  Outcome: Progressing     Problem: Infection  Goal: Absence of Infection Signs and Symptoms  Outcome: Progressing     Problem: Pneumonia  Goal: Fluid Balance  Outcome: Progressing     Problem: Fall Injury Risk  Goal: Absence of Fall and Fall-Related Injury  Outcome: Progressing

## 2025-05-03 NOTE — PROGRESS NOTES
[facility] [address] [place] [address] Medicine  Progress Note    Patient Name: Naheed Cottrell  MRN: [id number]  Patient Class: OP- Observation   Admission Date: 5/2/2025  Length of Stay: 0 days  Attending Physician: Mack Tobar MD  Primary Care Provider: Aislinn, Primary Doctor        Subjective     Principal Problem:Altered mental status        HPI:  Naheed Cottrell is a 89-year-old female with a PMHx of HTN, GERD, embolic stroke of R MCA in 2017, and arthritis that presents for altered mental status and suicidal ideation. Patient's son, Dr. Cottrell, is at bedside to assist in history. She is alert and oriented x 3 (self, place, time), however has recently had episodes of nonsensical speech and has spoken that she wants to kill herself. Denies any visual or auditory hallucinations. She has been slowly getting more altered over the last two days now. Per son, has 24 hour care at home with sitters and has became slightly combative and difficult with them and her daughter. During my evaluation, history difficult to obtain due to pt's tangential speech. She is able to be redirected for a few seconds before continuing her speech. Patient denies dizziness, lightheadedness, headache, CP, SOB, NVD, or weakness. She does complain of pain with urination and suprapubic pain. She has been refusing any medications at home as well. Her PO intake has also been less then usual. She has been on steroids and zpak for pneumonia.He also reports that she was started on 2L NC for pulmonary fibrosis about a month ago. She has been upset about this change and more depressed due to limitations and having others care for her more.  Per son, she has prior psych history from anxiety and obsessive behavior  related to hair thinning. She was started on SSRI at the time. Of note, patient is DNR and expressed her wishes for this. Her son, also confirms this. He is pursuing hospice outpatient. He would like to continue antibiotics for any reversible  causes that may be contributing to her AMS.     ED course: Initially hypotensive, now hypertensive. On 2L NC. CBC and CMP unremarkable. . Trop 71. PCT 0.02. CTH without any acute findings. CXR shows widespread bronchiectasis, bronchial wall thickening and nodular lung opacities which may reflect mucous plugging and or endobronchial spread of infection/pneumonia. UA pending. Patient admitted to  for further management.     Overview/Hospital Course:  Mrs. Cottrell was admitted for AMS. CBC and CMP unremarkable. . Trop 71. PCT 0.02. CTH without any acute findings. CXR shows widespread bronchiectasis, bronchial wall thickening and nodular lung opacities which may reflect mucous plugging and or endobronchial spread of infection/pneumonia. UA non infectious. Psych consulted, appreciate recs. Mental status improved. Family arranging for sitters and plan to take her home tomorrow morning.     Interval History: NAEON. HDS. Patient resting well in bed. She reports sleeping ok overnight. Notes that she was able to eat breakfast. She has no complains but is slightly confused and anxious without family at bedside. Her mental status much improved from overnight. No longer showing as tangential speech or endorsing SI. Work up and plan discussed with daughter Erica. They are arranging 24 hr sitters and plan to take her tomorrow in AM.     Review of Systems   Constitutional:  Negative for chills and fever.   Respiratory:  Negative for chest tightness and shortness of breath.    Cardiovascular:  Negative for chest pain and leg swelling.   Gastrointestinal:  Negative for abdominal pain and nausea.   Neurological:  Negative for dizziness and weakness.     Objective:     Vital Signs (Most Recent):  Temp: 96.9 °F (36.1 °C) (05/03/25 1406)  Pulse: 63 (05/03/25 1503)  Resp: 17 (05/03/25 1406)  BP: 135/63 (05/03/25 1406)  SpO2: 99 % (05/03/25 1406) Vital Signs (24h Range):  Temp:  [96.9 °F (36.1 °C)-98.2 °F (36.8 °C)] 96.9 °F  (36.1 °C)  Pulse:  [] 63  Resp:  [17-22] 17  SpO2:  [98 %-99 %] 99 %  BP: (135-184)/() 135/63     Weight: 44.8 kg (98 lb 12.3 oz)  Body mass index is 18.66 kg/m².    Intake/Output Summary (Last 24 hours) at 5/3/2025 1658  Last data filed at 5/3/2025 1419  Gross per 24 hour   Intake 1010 ml   Output 950 ml   Net 60 ml         Physical Exam  Vitals and nursing note reviewed.   Constitutional:       Appearance: She is well-developed.   Eyes:      Pupils: Pupils are equal, round, and reactive to light.   Cardiovascular:      Rate and Rhythm: Normal rate and regular rhythm.   Pulmonary:      Effort: Pulmonary effort is normal.      Breath sounds: Normal breath sounds.   Abdominal:      Palpations: Abdomen is soft.      Tenderness: There is no abdominal tenderness.   Musculoskeletal:         General: No tenderness.   Skin:     General: Skin is warm and dry.   Neurological:      Mental Status: She is alert and oriented to person, place, and time.   Psychiatric:         Behavior: Behavior normal.               Significant Labs: All pertinent labs within the past 24 hours have been reviewed.  CBC:   Recent Labs   Lab 05/02/25  1428 05/02/25  1447 05/03/25  0312   WBC 10.77  --  8.43   HGB 13.1  --  11.0*   HCT 42.9 43.0 37.7     --  286     CMP:   Recent Labs   Lab 05/02/25  1428 05/03/25  0312    139   K 4.5 4.0    103   CO2 25 26   * 72   BUN 22 20   CREATININE 0.8 0.7   CALCIUM 9.8 9.1   PROT 8.3 6.6   ALBUMIN 3.5 2.9*   BILITOT 0.4 0.3   ALKPHOS 94 79   AST 19 12   ALT 13 9*   ANIONGAP 14 10       Significant Imaging: I have reviewed all pertinent imaging results/findings within the past 24 hours.  CT Head Without Contrast  Narrative: EXAMINATION:  CT HEAD WITHOUT CONTRAST    CLINICAL HISTORY:  Mental status change, unknown cause;Fall 4 weeks ago;    TECHNIQUE:  Low dose axial CT images obtained throughout the head without the use of intravenous contrast.  Axial, sagittal and coronal  reconstructions were performed.    COMPARISON:  06/05/2017    FINDINGS:  Intracranial compartment:    Ventricles are stable in size, without evidence of hydrocephalus.    Mild chronic small vessel ischemic change throughout the supratentorial white matter.  Moderate-sized remote infarct corresponding to the inferior division of the right MCA.  Slight interval extension to the of the associated encephalomalacia/gliosis into the parietal region since the prior study of 2017.  No new major vascular distribution infarct elsewhere.  Few small foci of hypoattenuation in the basal ganglia fairly symmetric bilaterally, thought to reflect prominent perivascular spaces and/or some chronic ischemic change.  No acute parenchymal hemorrhage.  No new intracranial mass effect or midline shift.    No extra-axial blood or fluid collections.    Scattered vascular calcification about the skull base.    Skull/extracranial contents (limited evaluation):    No displaced calvarial fracture.    Mastoid air cells are clear. Mild patchy mucosal thickening in the visualized paranasal sinuses.    Bilateral pseudophakia.  Impression: No evidence of recent hemorrhage or other acute intracranial pathology.    Chronic ischemic change, as above.    Electronically signed by: Jonatan Lopez MD  Date:    05/02/2025  Time:    15:24  X-Ray Chest AP Portable  Narrative: EXAMINATION:  XR CHEST AP PORTABLE    CLINICAL HISTORY:  Sepsis;    TECHNIQUE:  Single frontal view of the chest was performed.    COMPARISON:  CT 04/04/2025 and radiograph 04/01/2025.    FINDINGS:  Cardiomediastinal silhouetteis normal in size.  Prominent aortic atherosclerosis.    Widespread bronchiectasis, bronchial wall thickening and nodular lung opacities which may reflect mucous plugging and or endobronchial spread of infection/pneumonia.  Findings are similar although slightly improved compared with the prior radiograph.    Pleura, diaphragm, and thoracic cage grossly  unremarkable.  Impression: Some improvement in lung aeration in this patient with probable multi lobe pneumonia superimposed on a background of chronic bronchiectasis and bronchitis.    Electronically signed by: Tyler Hernandez Jr  Date:    05/02/2025  Time:    15:18          Assessment & Plan  Altered mental status  Patient presenting with AMS. Unclear etiology at this time. Possible 2/2 steroid use vs UTI. Unclear if AMS is attributing to her SI vs if true SI.     - Afebrile, no leukocytosis.  - CMP unremarkable  - CTH without any acute intracranial findings  - BNP of 121  - Hs trop of 71, repeat pending  - POC lactate 2.3, repeat pending  - UA non infectious.    - IVFs ordered  - Psychiatry consulted, appreciate any recs.  - Delirium and fall precautions.   - Mental status improving, although slightly confused and anxious.   - Continue CTX  Pneumonia  Patient has a diagnosis of pneumonia. The cause of the pneumonia is unknown at this time. The pneumonia is stable. The patient has the following signs/symptoms of pneumonia: persistent hypoxia . The patient does have a current oxygen requirement and the patient does have a home oxygen requirement. I have reviewed the pertinent imaging. The following cultures have been collected: Blood cultures The culture results are listed below.     - Afebrile, no leukocytosis  - PCT of 0.02  - CXR showed widespread bronchiectasis, bronchial wall thickening and nodular lung opacities which may reflect mucous plugging and or endobronchial spread of infection/pneumonia.   - Will monitor patient closely  - Abx broadened to CTX  - Will hold further steroids at this time, as it may be contributing to her AMS  Essential hypertension  Patient's blood pressure range in the last 24 hours was: BP  Min: 135/63  Max: 184/79.The patient's inpatient anti-hypertensive regimen is listed below:  Current Antihypertensives  carvediloL tablet 25 mg, 2 times daily with meals, Oral  NIFEdipine 24 hr  tablet 30 mg, Daily, Oral    Plan  - BP is controlled, no changes needed to their regimen  History of CVA (cerebrovascular accident)  - Noted, no residual effects      Pulmonary fibrosis  - Follows Dr. Dominguez outpatient. 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  We discussed bronchoscopy in the risks therein given her age.  We talked about the treatment regimen, its affects on her organs, its length of time, in the likely need for follow-up bronchoscopy in the future.  Decision was ultimately made to pursue more conservative therapies which I think our best given her frailty and age. For now we will focus on symptomatic treatment.   - On 2L NC       DNR (do not resuscitate)  - Discussed at regarding code status.   - Patient wishes to be DNR and son confirms this.     VTE Risk Mitigation (From admission, onward)           Ordered     heparin (porcine) injection 5,000 Units  Every 12 hours         05/02/25 1741     IP VTE HIGH RISK PATIENT  Once         05/02/25 1636     Place sequential compression device  Until discontinued         05/02/25 1636                    Discharge Planning   GEORGI:      Code Status: Full Code   Medical Readiness for Discharge Date:                Yessica Walton PA-C  Department of Hospital Medicine   [facility] 11H

## 2025-05-03 NOTE — ASSESSMENT & PLAN NOTE
Patient's blood pressure range in the last 24 hours was: BP  Min: 135/63  Max: 184/79.The patient's inpatient anti-hypertensive regimen is listed below:  Current Antihypertensives  carvediloL tablet 25 mg, 2 times daily with meals, Oral  NIFEdipine 24 hr tablet 30 mg, Daily, Oral    Plan  - BP is controlled, no changes needed to their regimen

## 2025-05-03 NOTE — ED NOTES
Patient identifiers for Naheed Cottrell 89 y.o. female checked and correct.  Chief Complaint   Patient presents with    Altered Mental Status     Decline in mental status/increased confusion over last 2 weeks. Recently diagnosed with pulmonary fibrosis and placed on home oxygen.    Hypotension     90s/50s      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    Encounter for blood transfusion     Fall, accidental     GERD (gastroesophageal reflux disease)     Hypertension     Right renal artery stenosis 3/28/2017    Stenosis of left subclavian artery      Allergies reported:   Review of patient's allergies indicates:   Allergen Reactions    Percocet [oxycodone-acetaminophen]      Did not tolerate in past, not allergy    Bactrim [sulfamethoxazole-trimethoprim] Nausea And Vomiting         LOC: Patient is awake, alert. Patient is oriented to self only and speaking nervously since 6am today per family.  APPEARANCE: Patient resting comfortably and in no acute distress. Patient is clean and well groomed, patient's clothing is properly fastened.  HEENT: WDL  SKIN: The skin is warm and dry. Patient has normal skin turgor and moist mucus membranes.   MUSCULOSKELETAL: Patient is moving all extremities well, no obvious deformities noted. Pulses intact.   RESPIRATORY: Airway is open and patent. Respirations are spontaneous and non-labored with normal effort and rate.  CARDIAC: Patient tachycardic, 101 on cardiac monitor. No peripheral edema noted. Denies chest pain and SOB at at time of assessment. Pt wearing 2L O2 nasal canula   ABDOMEN: No distention noted. Soft and non-tender upon palpation.  NEUROLOGICAL: pupils 2mm, PERRL. Facial expression is symmetrical. Pt refusing to follow neuro exam questions r/t dementia

## 2025-05-03 NOTE — CONSULTS
"CONSULTATION LIAISON PSYCHIATRY INITIAL EVALUATION    Patient Name: Naheed Cottrell  MRN: [id number]  Patient Class: OP- Observation  Admission Date: 5/2/2025  Attending Physician: Mack Tobar MD      SUBJECTIVE:   Naheed Cottrell is a 89 y.o. female with past psychiatric history of depression, anxiety, and body dysmorphia & past pertinent medical history ofHTN, GERD, embolic stroke of R MCA in 2017, and arthritis that presents for altered mental status and suicidal ideation.     Psychiatry consulted for "AMS and SI"    Per Hospital Medicine H&P:  89-year-old female with a PMHx of HTN, GERD, embolic stroke of R MCA in 2017, and arthritis that presents for altered mental status and suicidal ideation. Patient's son, Dr. Cottrell, is at bedside to assist in history. She is alert and oriented x 3 (self, place, time), however has recently had episodes of nonsensical speech and has spoken that she wants to kill herself. Denies any visual or auditory hallucinations. She has been slowly getting more altered over the last two days now. Per son, has 24 hour care at home with sitters and has became slightly combative and difficult with them and her daughter. During my evaluation, history difficult to obtain due to pt's tangential speech. She is able to be redirected for a few seconds before continuing her speech. Patient denies dizziness, lightheadedness, headache, CP, SOB, NVD, or weakness. She does complain of pain with urination and suprapubic pain. She has been refusing any medications at home as well. Her PO intake has also been less then usual. She has been on steroids and zpak for pneumonia.He also reports that she was started on 2L NC for pulmonary fibrosis about a month ago, which is causing her more anxiety and distress. She has been upset about this change and more depressed due to limitations and having others care for her more.  Per son, she has prior psych history from anxiety and obsessive behavior  related to " hair thinning. She was started on SSRI at the time. Of note, patient is DNR and expressed her wishes for this. Her son, also confirms this. He is pursuing hospice outpatient. He would like to continue antibiotics for any reversible causes that may be contributing to her AMS.     Upon initiation of interview, pt was sitting up in bed, alert, awake and linear. She is oriented to person, place, month, year, situation, and current and past presidents. She reports having a difficult time recently with her mood, memory, and not feeling like herself. She does attribute recent stressor of being diagnosed with pulmonary fibrosis and having to wear oxygen as a stressor. She also feels she is not edil to move around as much as she used to and now has to be watched all the time. She understands this is for her safety, but expresses frustration with this change. She says she is feeling much better today. She has psych history of depression that started about 10 years ago when she lost her hair. She has been on Celexa ever since, which has been helpful. She does endorse depression currently in setting of her health conditions, but denies SI. She expresses gratitude about family and support system.       Collateral:   Yes, spoke with son and daughter at bedside. They report patient has been having some depression at home for the last few weeks that is similar to when she had a breakdown 10 years ago. She will mention not wanting to be alive because she feels like a burden to her family, but she does not display any self-harming behaviors and no hx of SA. She has always been very concerned with her appearance, and when she was told she had to start carrying around an oxygen tank in setting of recent pulmonary fibrosis diagnosis, that's when her depression worsened. She does not want others to see her like this. She was a 5-7 day course of steroids that ended Wednesday, right around the time she started having christian symptoms. They  describe her talking for 8-10 hours straight with loose associations and not sleeping. Recent med changes include increase in Celexa from 20 to 40 mg a few days ago and addition of Remeron 7.5 mg nightly for sleep and appetite. She just got established with [facility], who recently prescribed ativan 0.5 mg prn for agitation, but she has not received that at home. Family reports she look a lot better today and has improved cognition and mental status. She has a 24 hour sitter at home, so they are not concerned about safety or self-harm.     Psychiatric Review of Systems:  sleep: yes, poor sleep   appetite: yes, decreased   weight: no  energy/anergy: no  interest/pleasure/anhedonia: yes, stopped going to [employer] due to embarrassed by oxygen requirement   somatic symptoms: no  libido: not asked   anxiety/panic: no  guilty/hopelessness: no  concentration: no  S.I.B.s/risky behavior: no    Medical Review Of Systems:  Per HPI    Obtained via chart review and updated as necessary     Psychiatric History:  Previous Medication Trials: Yes - Celexa   Previous Psychiatric Hospitalizations: No  Previous suicide attempts: No  Current/active homicidal ideation/plan/intent: No  History of threats/arrests associated with violent conduct - No  Access to firearms/lethal weapons - No  Family Psychiatric History: Yes - daughter w/ anxiety   Outpatient Psychiatrist: No  Outpatient Therapist: No    Social History:  Marital Status:    Children: 3 kids   Employment Status: retired  Education: some college  Special Ed: no  Housing Status: Yes - lives alone   Developmental History: No Cognitive delay/impairment  History of Abuse: No    Substance Abuse History:  Recreational Drugs: denies   Use of Alcohol: denied  Rehab History: No  Tobacco Use: No  Use of Caffeine: Yes - drinks coffee, tea daily   Use of OTC: Yes       Legal History:  Past Charges/Incarcerations: No  Pending Charges: No    Psychosocial Factors:  Stressors:  "health.   Functioning Relationships: good support system      OBJECTIVE     Vital Signs:  Temp:  [97.8 °F (36.6 °C)-98.2 °F (36.8 °C)]   Pulse:  []   Resp:  [17-24]   BP: ()/()   SpO2:  [92 %-100 %]     Mental Status Exam:  General Appearance: dressed in hospital garb, in no acute distress  Behavior: normal; cooperative; reasonably friendly, pleasant, and polite; appropriate eye-contact; under good behavioral control  Involuntary Movements and Motor Activity: no abnormal involuntary movements noted  Gait and Station: unable to assess - patient lying down or seated  Speech and Language: normal rate, rhythm, volume, tone, and pitch  Mood: "better"  Affect: normal, euthymic, reactive, full-range, mood-congruent, appropriate to situation and context  Thought Process and Associations: intact; linear, goal-directed, organized, and logical; no loosening of associations noted  Perceptual Disturbances: denies hallucinations  Thought Content and Perceptions:: no suicidal or homicidal ideation, no auditory or visual hallucinations, no paranoid ideation, no ideas of reference, no evidence of delusions or psychosis  Sensorium and Orientation: intact; alert with clear sensorium; oriented fully to person, place, time and situation  Recent and Remote Memory: mild impairments noted  Attention and Concentration: attentive to conversation  Fund of Knowledge: aware of current events, vocabulary appropriate  Insight: intact, demonstrates awareness of illness and situation  Judgment: intact, behavior is adequate/appropriate to the circumstances, compliant with health provider's recommendations and instructions    CAM ICU positive? Not assessed       ASSESSMENT & RECOMMENDATIONS   Substance/Medication-Induced Delirium with christian 2/2 steroids   Adjustment Disorder with Depressed Mood (F43.21)        Scheduled Medication(s):  Continue Celexa at 20 mg daily for depression(despite recently increased to 40 mg at home, as " Remeron has synergistic effects to help with mood)  If patient continues to experience depression after 3 weeks, can increase Celexa to 30 mg daily  Restart home Remeron 7.5 mg nightly for appetite, sleep, and mood       PRN Medication(s):  None. Discussed risks associated with use of recently prescribed prn benzodiazepine with family        Other Recommendations (labs, imaging, further consults, etc.):  N/A      Delirium Behavior Management  PLEASE utilize PRN meds first for agitation. Minimize use of PHYSICAL restraints OR have periods of being out of physical restraints if possible.  Keep window shades open and room lit during day and room dim at night in order to promote normal sleep-wake cycles  Encourage family at bedside. Sisters patient often to situation, location, date.  Continue to Limit or Discontinue use of Narcotics, Benzos and Anti-cholinergic medications as they may worsen delirium.  Continue medical workup for causative etiology of Delirium.     Risk Assessment / Legal Status  Patient does not meet criteria for PEC or involuntary inpatient psychiatric admission at this time. Recommend to rescind PEC if one was placed. Patient is not currently an imminent danger to self or others and is not gravely disabled due to a psychiatric illness.    Follow-up:  Will follow-up while in house.  Patient should follow up with palliative care providers for further management of medications upon discharge     Disposition:   Defer to primary team.    Please contact ON CALL psychiatry service (24/7) for any acute issues that may arise.    Dr. Kamala LEE Psychiatry  [facility]  5/3/2025 8:46 AM        --------------------------------------------------------------------------------------------------------------------------------------------------------------------------------------------------------------------------------------    CONTINUED OBJECTIVE clinical data & findings reviewed and noted  for above decision making    Current Medications:   Scheduled Meds:    carvediloL  25 mg Oral BID WM    cefTRIAXone (Rocephin) IV (PEDS and ADULTS)  1 g Intravenous Q24H    citalopram  20 mg Oral Daily    ferrous gluconate  324 mg Oral Daily with breakfast    heparin (porcine)  5,000 Units Subcutaneous Q12H    NIFEdipine  30 mg Oral Daily    pantoprazole  40 mg Oral Daily    potassium chloride  20 mEq Oral BID    sodium chloride 0.9%  10 mL Intravenous Q8H     PRN Meds:   Current Facility-Administered Medications:     albuterol-ipratropium, 3 mL, Nebulization, Q6H PRN    aluminum-magnesium hydroxide-simethicone, 30 mL, Oral, QID PRN    dextrose 50%, 12.5 g, Intravenous, PRN    dextrose 50%, 25 g, Intravenous, PRN    glucagon (human recombinant), 1 mg, Intramuscular, PRN    glucose, 16 g, Oral, PRN    glucose, 24 g, Oral, PRN    melatonin, 6 mg, Oral, Nightly PRN    naloxone, 0.02 mg, Intravenous, PRN    ondansetron, 8 mg, Oral, Q8H PRN    polyethylene glycol, 17 g, Oral, Daily PRN    prochlorperazine, 5 mg, Intravenous, Q6H PRN    simethicone, 1 tablet, Oral, QID PRN    Allergies:   Review of patient's allergies indicates:   Allergen Reactions    Percocet [oxycodone-acetaminophen]      Did not tolerate in past, not allergy    Bactrim [sulfamethoxazole-trimethoprim] Nausea And Vomiting       Vitals  Vitals:    05/03/25 0724   BP: (!) 149/86   Pulse: 94   Resp: 18   Temp: 98.2 °F (36.8 °C)       Labs/Imaging/Studies:  Recent Results (from the past 24 hours)   EKG 12-lead    Collection Time: 05/02/25  1:52 PM   Result Value Ref Range    QRS Duration 72 ms    [facility] QTC Calculation 426 ms   EKG 12-lead    Collection Time: 05/02/25  2:27 PM   Result Value Ref Range    QRS Duration 68 ms    [facility] QTC Calculation 417 ms   Hepatitis C Antibody    Collection Time: 05/02/25  2:28 PM   Result Value Ref Range    Hep C Ab Interp Non-Reactive Non-Reactive   HIV 1/2 Ag/Ab (4th Gen)    Collection Time: 05/02/25  2:28 PM   Result Value  Ref Range    HIV 1/2 Ag/Ab Non-Reactive Non-Reactive   Blood culture x two cultures. Draw prior to antibiotics.    Collection Time: 05/02/25  2:28 PM    Specimen: Peripheral, Forearm, Left; Blood   Result Value Ref Range    Blood Culture No Growth After 6 Hours    Comprehensive metabolic panel    Collection Time: 05/02/25  2:28 PM   Result Value Ref Range    Sodium 139 136 - 145 mmol/L    Potassium 4.5 3.5 - 5.1 mmol/L    Chloride 100 95 - 110 mmol/L    CO2 25 23 - 29 mmol/L    Glucose 130 (H) 70 - 110 mg/dL    BUN 22 10 - 30 mg/dL    Creatinine 0.8 0.5 - 1.4 mg/dL    Calcium 9.8 8.7 - 10.5 mg/dL    Protein Total 8.3 6.0 - 8.4 gm/dL    Albumin 3.5 3.5 - 5.2 g/dL    Bilirubin Total 0.4 0.1 - 1.0 mg/dL    ALP 94 40 - 150 unit/L    AST 19 11 - 45 unit/L    ALT 13 10 - 44 unit/L    Anion Gap 14 8 - 16 mmol/L    eGFR >60 >60 mL/min/1.73/m2   Magnesium    Collection Time: 05/02/25  2:28 PM   Result Value Ref Range    Magnesium  2.0 1.6 - 2.6 mg/dL   Phosphorus    Collection Time: 05/02/25  2:28 PM   Result Value Ref Range    Phosphorus Level 2.6 (L) 2.7 - 4.5 mg/dL   Troponin I High Sensitivity    Collection Time: 05/02/25  2:28 PM   Result Value Ref Range    Troponin High Sensitive 71 (H) <=14 ng/L   Procalcitonin    Collection Time: 05/02/25  2:28 PM   Result Value Ref Range    Procalcitonin 0.02 <0.25 ng/mL   Brain natriuretic peptide    Collection Time: 05/02/25  2:28 PM   Result Value Ref Range     (H) 0 - 99 pg/mL   CBC with Differential    Collection Time: 05/02/25  2:28 PM   Result Value Ref Range    WBC 10.77 3.90 - 12.70 K/uL    RBC 5.01 4.00 - 5.40 M/uL    HGB 13.1 12.0 - 16.0 gm/dL    HCT 42.9 37.0 - 48.5 %    MCV 86 82 - 98 fL    MCH 26.1 (L) 27.0 - 31.0 pg    MCHC 30.5 (L) 32.0 - 36.0 g/dL    RDW 17.3 (H) 11.5 - 14.5 %    Platelet Count 349 150 - 450 K/uL    MPV 9.8 9.2 - 12.9 fL    Nucleated RBC 0 <=0 /100 WBC    Neut % 89.0 (H) 38 - 73 %    Lymph % 6.7 (L) 18 - 48 %    Mono % 3.5 (L) 4 - 15 %    Eos  % 0.0 <=8 %    Basophil % 0.5 <=1.9 %    Imm Grans % 0.3 0.0 - 0.5 %    Neut # 9.59 (H) 1.8 - 7.7 K/uL    Lymph # 0.72 (L) 1 - 4.8 K/uL    Mono # 0.38 0.3 - 1 K/uL    Eos # 0.00 <=0.5 K/uL    Baso # 0.05 <=0.2 K/uL    Imm Grans # 0.03 0.00 - 0.04 K/uL   POCT Venous Blood Gas    Collection Time: 05/02/25  2:47 PM   Result Value Ref Range    POC PH 7.484     POC PCO2 36.6 mmHg    POC PO2 58.2 mmHg    POC Lactate 2.3 (H) 0.5 - 2.2 mmol/L    POC Hematocrit 43.0 %    Correct Temperature (PH) 7.484     Corrected Temperature (pCO2) 36.6 mmHg    Corrected Temperature (pO2) 58.2 mmHg    POC HCO3 27.9 mmol/l    Base Deficit 4.1 mmol/l    POC Temp 37.0 C    Specimen source Venous     Performed By: EDI     FiO2 21.0 %    BIPAP 0    Blood culture x two cultures. Draw prior to antibiotics.    Collection Time: 05/02/25  4:10 PM    Specimen: Peripheral, Hand, Left; Blood   Result Value Ref Range    Blood Culture No Growth After 6 Hours    Lactic acid, plasma    Collection Time: 05/02/25  8:09 PM   Result Value Ref Range    Lactic Acid Level 0.9 0.5 - 2.2 mmol/L   Troponin I High Sensitivity    Collection Time: 05/02/25  8:09 PM   Result Value Ref Range    Troponin High Sensitive 57 (H) <=14 ng/L   Comprehensive Metabolic Panel (CMP)    Collection Time: 05/03/25  3:12 AM   Result Value Ref Range    Sodium 139 136 - 145 mmol/L    Potassium 4.0 3.5 - 5.1 mmol/L    Chloride 103 95 - 110 mmol/L    CO2 26 23 - 29 mmol/L    Glucose 72 70 - 110 mg/dL    BUN 20 10 - 30 mg/dL    Creatinine 0.7 0.5 - 1.4 mg/dL    Calcium 9.1 8.7 - 10.5 mg/dL    Protein Total 6.6 6.0 - 8.4 gm/dL    Albumin 2.9 (L) 3.5 - 5.2 g/dL    Bilirubin Total 0.3 0.1 - 1.0 mg/dL    ALP 79 40 - 150 unit/L    AST 12 11 - 45 unit/L    ALT 9 (L) 10 - 44 unit/L    Anion Gap 10 8 - 16 mmol/L    eGFR >60 >60 mL/min/1.73/m2   Magnesium    Collection Time: 05/03/25  3:12 AM   Result Value Ref Range    Magnesium  2.0 1.6 - 2.6 mg/dL   Phosphorus    Collection Time: 05/03/25   3:12 AM   Result Value Ref Range    Phosphorus Level 3.5 2.7 - 4.5 mg/dL   CBC with Differential    Collection Time: 05/03/25  3:12 AM   Result Value Ref Range    WBC 8.43 3.90 - 12.70 K/uL    RBC 4.33 4.00 - 5.40 M/uL    HGB 11.0 (L) 12.0 - 16.0 gm/dL    HCT 37.7 37.0 - 48.5 %    MCV 87 82 - 98 fL    MCH 25.4 (L) 27.0 - 31.0 pg    MCHC 29.2 (L) 32.0 - 36.0 g/dL    RDW 17.1 (H) 11.5 - 14.5 %    Platelet Count 286 150 - 450 K/uL    MPV 9.7 9.2 - 12.9 fL    Nucleated RBC 0 <=0 /100 WBC    Neut % 67.9 38 - 73 %    Lymph % 22.2 18 - 48 %    Mono % 8.3 4 - 15 %    Eos % 0.6 <=8 %    Basophil % 0.8 <=1.9 %    Imm Grans % 0.2 0.0 - 0.5 %    Neut # 5.72 1.8 - 7.7 K/uL    Lymph # 1.87 1 - 4.8 K/uL    Mono # 0.70 0.3 - 1 K/uL    Eos # 0.05 <=0.5 K/uL    Baso # 0.07 <=0.2 K/uL    Imm Grans # 0.02 0.00 - 0.04 K/uL     Imaging Results              CT Head Without Contrast (Final result)  Result time 05/02/25 15:24:16      Final result by Jonatan Lopez MD (05/02/25 15:24:16)                   Impression:      No evidence of recent hemorrhage or other acute intracranial pathology.    Chronic ischemic change, as above.      Electronically signed by: Jonatan Lopez MD  Date:    05/02/2025  Time:    15:24               Narrative:    EXAMINATION:  CT HEAD WITHOUT CONTRAST    CLINICAL HISTORY:  Mental status change, unknown cause;Fall 4 weeks ago;    TECHNIQUE:  Low dose axial CT images obtained throughout the head without the use of intravenous contrast.  Axial, sagittal and coronal reconstructions were performed.    COMPARISON:  06/05/2017    FINDINGS:  Intracranial compartment:    Ventricles are stable in size, without evidence of hydrocephalus.    Mild chronic small vessel ischemic change throughout the supratentorial white matter.  Moderate-sized remote infarct corresponding to the inferior division of the right MCA.  Slight interval extension to the of the associated encephalomalacia/gliosis into the parietal region since  the prior study of 2017.  No new major vascular distribution infarct elsewhere.  Few small foci of hypoattenuation in the basal ganglia fairly symmetric bilaterally, thought to reflect prominent perivascular spaces and/or some chronic ischemic change.  No acute parenchymal hemorrhage.  No new intracranial mass effect or midline shift.    No extra-axial blood or fluid collections.    Scattered vascular calcification about the skull base.    Skull/extracranial contents (limited evaluation):    No displaced calvarial fracture.    Mastoid air cells are clear. Mild patchy mucosal thickening in the visualized paranasal sinuses.    Bilateral pseudophakia.                                       X-Ray Chest AP Portable (Final result)  Result time 05/02/25 15:18:14      Final result by Tyler Lozano Jr., MD (05/02/25 15:18:14)                   Impression:      Some improvement in lung aeration in this patient with probable multi lobe pneumonia superimposed on a background of chronic bronchiectasis and bronchitis.      Electronically signed by: Tyler Hernandez Jr  Date:    05/02/2025  Time:    15:18               Narrative:    EXAMINATION:  XR CHEST AP PORTABLE    CLINICAL HISTORY:  Sepsis;    TECHNIQUE:  Single frontal view of the chest was performed.    COMPARISON:  CT 04/04/2025 and radiograph 04/01/2025.    FINDINGS:  Cardiomediastinal silhouetteis normal in size.  Prominent aortic atherosclerosis.    Widespread bronchiectasis, bronchial wall thickening and nodular lung opacities which may reflect mucous plugging and or endobronchial spread of infection/pneumonia.  Findings are similar although slightly improved compared with the prior radiograph.    Pleura, diaphragm, and thoracic cage grossly unremarkable.

## 2025-05-03 NOTE — ED NOTES
Report received from DC Henson. Assume care of pt. Pt resting comfortably and independently repositioned in stretcher with bed locked in lowest position for safety. NAD noted at this time. Respirations even and unlabored and visible chest rise noted.  Patient offered bathroom assistance and denies need at this time. Pt family instructed to call if assistance is needed. Pt on continuous cardiac, BP, and O2 monitoring. Call light within reach. Family at bedside. No needs at this time. Will continue to monitor.

## 2025-05-04 VITALS
HEIGHT: 61 IN | OXYGEN SATURATION: 100 % | HEART RATE: 63 BPM | DIASTOLIC BLOOD PRESSURE: 60 MMHG | SYSTOLIC BLOOD PRESSURE: 138 MMHG | BODY MASS INDEX: 18.64 KG/M2 | TEMPERATURE: 98 F | WEIGHT: 98.75 LBS | RESPIRATION RATE: 17 BRPM

## 2025-05-04 PROCEDURE — G0378 HOSPITAL OBSERVATION PER HR: HCPCS | Mod: HCNC

## 2025-05-04 PROCEDURE — A4216 STERILE WATER/SALINE, 10 ML: HCPCS | Mod: HCNC

## 2025-05-04 PROCEDURE — 25000003 PHARM REV CODE 250: Mod: HCNC

## 2025-05-04 RX ORDER — OLANZAPINE 2.5 MG/1
2.5 TABLET, FILM COATED ORAL DAILY PRN
Qty: 30 TABLET | Refills: 0 | Status: SHIPPED | OUTPATIENT
Start: 2025-05-04 | End: 2025-06-03

## 2025-05-04 RX ORDER — CEFPODOXIME PROXETIL 100 MG/1
100 TABLET, FILM COATED ORAL EVERY 12 HOURS
Qty: 6 TABLET | Refills: 0 | Status: SHIPPED | OUTPATIENT
Start: 2025-05-04 | End: 2025-05-07

## 2025-05-04 RX ORDER — OLANZAPINE 2.5 MG/1
2.5 TABLET, FILM COATED ORAL DAILY PRN
Status: DISCONTINUED | OUTPATIENT
Start: 2025-05-04 | End: 2025-05-04 | Stop reason: HOSPADM

## 2025-05-04 RX ADMIN — CARVEDILOL 25 MG: 25 TABLET, FILM COATED ORAL at 09:05

## 2025-05-04 RX ADMIN — PANTOPRAZOLE SODIUM 40 MG: 40 TABLET, DELAYED RELEASE ORAL at 09:05

## 2025-05-04 RX ADMIN — POTASSIUM CHLORIDE 20 MEQ: 750 CAPSULE, EXTENDED RELEASE ORAL at 09:05

## 2025-05-04 RX ADMIN — Medication 10 ML: at 05:05

## 2025-05-04 RX ADMIN — POLYETHYLENE GLYCOL 3350 17 G: 17 POWDER, FOR SOLUTION ORAL at 09:05

## 2025-05-04 RX ADMIN — Medication 324 MG: at 09:05

## 2025-05-04 RX ADMIN — CITALOPRAM HYDROBROMIDE 20 MG: 20 TABLET ORAL at 09:05

## 2025-05-04 RX ADMIN — NIFEDIPINE 30 MG: 30 TABLET, FILM COATED, EXTENDED RELEASE ORAL at 09:05

## 2025-05-04 NOTE — DISCHARGE INSTRUCTIONS
Ms. Cottrell, you were admitted for AMS.   Please complete your course of antibiotics for pneumonia.   Stop taking prednisone.     It has been a pleasure taking care of you!    Yessica Walton PA-C  Department of Hospital Medicine

## 2025-05-04 NOTE — PLAN OF CARE
[facility] 11H  Discharge Final Note    Primary Care Provider: No, Primary Doctor    Expected Discharge Date: 5/4/2025    Patient cleared for discharge from case management standpoint.    Final Discharge Note (most recent)       Final Note - 05/04/25 1104          Final Note    Assessment Type Final Discharge Note     Anticipated Discharge Disposition Home or Self Care     What phone number can be called within the next 1-3 days to see how you are doing after discharge? [phone]     Hospital Resources/Appts/Education Provided Provided patient/caregiver with written discharge plan information;Appointments scheduled and added to AVS        Post-Acute Status    Discharge Delays None known at this time                   Contact Info       No, Primary Doctor   Relationship: PCP - General        Next Steps: Follow up          Future Appointments   Date Time Provider Department Center   5/6/2025  9:00 AM Britta Madsen NP [facility]   5/7/2025 10:00 AM Gideon Urena MD [facility] RN  [facility]  - [facility] [place] [facility]  [phone]

## 2025-05-04 NOTE — DISCHARGE SUMMARY
[facility] [address] [place] [address] Medicine  Discharge Summary      Patient Name: Naheed Cottrell  MRN: [id number]  RALF: [id number]  Patient Class: OP- Observation  Admission Date: 5/2/2025  Hospital Length of Stay: 0 days  Discharge Date and Time: 5/4/2025 12:48 PM  Attending Physician: Aislinn att. providers found   Discharging Provider: Yessica Walton PA-C  Primary Care Provider: Aislinn Primary Doctor  [facility]: [facility] Yessica Walton PA-C  Primary Care Team: [facility]    HPI:   Naheed Cottrell is a 89-year-old female with a PMHx of HTN, GERD, embolic stroke of R MCA in 2017, and arthritis that presents for altered mental status and suicidal ideation. Patient's son, Dr. Cottrell, is at bedside to assist in history. She is alert and oriented x 3 (self, place, time), however has recently had episodes of nonsensical speech and has spoken that she wants to kill herself. Denies any visual or auditory hallucinations. She has been slowly getting more altered over the last two days now. Per son, has 24 hour care at home with sitters and has became slightly combative and difficult with them and her daughter. During my evaluation, history difficult to obtain due to pt's tangential speech. She is able to be redirected for a few seconds before continuing her speech. Patient denies dizziness, lightheadedness, headache, CP, SOB, NVD, or weakness. She does complain of pain with urination and suprapubic pain. She has been refusing any medications at home as well. Her PO intake has also been less then usual. She has been on steroids and zpak for pneumonia.He also reports that she was started on 2L NC for pulmonary fibrosis about a month ago. She has been upset about this change and more depressed due to limitations and having others care for her more.  Per son, she has prior psych history from anxiety and obsessive behavior  related to hair thinning. She was started on SSRI at the time. Of note, patient is  DNR and expressed her wishes for this. Her son, also confirms this. He is pursuing hospice outpatient. He would like to continue antibiotics for any reversible causes that may be contributing to her AMS.     ED course: Initially hypotensive, now hypertensive. On 2L NC. CBC and CMP unremarkable. . Trop 71. PCT 0.02. CTH without any acute findings. CXR shows widespread bronchiectasis, bronchial wall thickening and nodular lung opacities which may reflect mucous plugging and or endobronchial spread of infection/pneumonia. UA pending. Patient admitted to  for further management.     * No surgery found *      Hospital Course:   Mrs. Cottrell was admitted for AMS. CBC and CMP unremarkable. . Trop 71. PCT 0.02. CTH without any acute findings. CXR shows widespread bronchiectasis, bronchial wall thickening and nodular lung opacities which may reflect mucous plugging and or endobronchial spread of infection/pneumonia. UA non infectious. Psych consulted, appreciate recs. Recommended remeron nightly, continue celexa at 20mg and Zyprexa prn. Mental status improved. Work up discussed with family at bedside. Patient at baseline this morning. She is medically ready and eager to go home. Follow up requested for psychiatry.     Plan and medication changes discussed with patient; agreeable to plan. ER precautions were given. All questions were answered to the patient's satisfaction and she was subsequently discharged.      Physical Exam  Gen: in NAD, appears stated age  Neuro: AAOx3, motor, sensory, and strength grossly intact BL  HEENT: EOMI, PERRLA; no JVD appreciated  CVS: RRR, no m/r/g  Resp: lungs CTAB, no w/r/r; no belabored breathing or accessory muscle use appreciated   Abd: NTND, soft to palpation  Extrem: no UE or LE edema BL       Goals of Care Treatment Preferences:  Code Status: Full Code      SDOH Screening:  The patient was screened for utility difficulties, food insecurity, transport difficulties,  housing insecurity, and interpersonal safety and there were no concerns identified this admission.     Consults:   Consults (From admission, onward)          Status Ordering Provider     Inpatient consult to Psychiatry  Once        Provider:  (Not yet assigned)    Completed RIGOBERTO WEN     Inpatient consult to Psychiatry  Once        Provider:  (Not yet assigned)    Completed KIT ROWLEY            Assessment & Plan  Altered mental status  Patient presenting with AMS. Unclear etiology at this time. Possible 2/2 steroid use vs UTI. Unclear if AMS is attributing to her SI vs if true SI.     - Afebrile, no leukocytosis.  - CMP unremarkable  - CTH without any acute intracranial findings  - BNP of 121  - Hs trop of 71, repeat pending  - POC lactate 2.3, repeat pending  - UA non infectious.    - IVFs ordered  - Psychiatry consulted, appreciate any recs.  - Delirium and fall precautions.   - Mental status improving, although slightly confused and anxious.   - Continue CTX  Pneumonia  Patient has a diagnosis of pneumonia. The cause of the pneumonia is unknown at this time. The pneumonia is stable. The patient has the following signs/symptoms of pneumonia: persistent hypoxia . The patient does have a current oxygen requirement and the patient does have a home oxygen requirement. I have reviewed the pertinent imaging. The following cultures have been collected: Blood cultures The culture results are listed below.     - Afebrile, no leukocytosis  - PCT of 0.02  - CXR showed widespread bronchiectasis, bronchial wall thickening and nodular lung opacities which may reflect mucous plugging and or endobronchial spread of infection/pneumonia.   - Will monitor patient closely  - Abx broadened to CTX  - Will hold further steroids at this time, as it may be contributing to her AMS  Essential hypertension  Patient's blood pressure range in the last 24 hours was: BP  Min: 134/60  Max: 174/74.The patient's inpatient  anti-hypertensive regimen is listed below:  Current Antihypertensives  carvediloL tablet 25 mg, 2 times daily with meals, Oral  NIFEdipine 24 hr tablet 30 mg, Daily, Oral    Plan  - BP is controlled, no changes needed to their regimen  History of CVA (cerebrovascular accident)  - Noted, no residual effects      Pulmonary fibrosis  - Follows Dr. Dominguez outpatient. 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  We discussed bronchoscopy in the risks therein given her age.  We talked about the treatment regimen, its affects on her organs, its length of time, in the likely need for follow-up bronchoscopy in the future.  Decision was ultimately made to pursue more conservative therapies which I think our best given her frailty and age. For now we will focus on symptomatic treatment.   - On 2L NC       DNR (do not resuscitate)  - Discussed at regarding code status.   - Patient wishes to be DNR and son confirms this.     Final Active Diagnoses:    Diagnosis Date Noted POA    PRINCIPAL PROBLEM:  Altered mental status [R41.82] 05/02/2025 Yes    Pneumonia [J18.9] 05/02/2025 Yes    Pulmonary fibrosis [J84.10] 05/02/2025 Yes    DNR (do not resuscitate) [Z66] 05/02/2025 Yes    History of CVA (cerebrovascular accident) [Z86.73] 12/29/2019 Not Applicable    Essential hypertension [I10] 03/28/2017 Yes     Chronic      Problems Resolved During this Admission:       Discharged Condition: good    Disposition: Home or Self Care    Follow Up:   Follow-up Information       No, Primary Doctor Follow up.                           Patient Instructions:      Ambulatory referral/consult to Psychiatry   Standing Status: Future   Referral Priority: Routine Referral Type: Psychiatric   Referral Reason: Specialty Services Required   Requested Specialty: Psychiatry   Number of Visits Requested: 1     Ambulatory referral/consult to Psychiatry   Standing Status: Future   Referral Priority:  Routine Referral Type: Psychiatric   Referral Reason: Specialty Services Required   Requested Specialty: Psychiatry   Number of Visits Requested: 1     Notify your health care provider if you experience any of the following:  increased confusion or weakness     Notify your health care provider if you experience any of the following:  persistent dizziness, light-headedness, or visual disturbances     Activity as tolerated       Significant Diagnostic Studies: Labs: CMP   Recent Labs   Lab 05/02/25  1428 05/03/25  0312    139   K 4.5 4.0    103   CO2 25 26   * 72   BUN 22 20   CREATININE 0.8 0.7   CALCIUM 9.8 9.1   PROT 8.3 6.6   ALBUMIN 3.5 2.9*   BILITOT 0.4 0.3   ALKPHOS 94 79   AST 19 12   ALT 13 9*   ANIONGAP 14 10    and CBC   Recent Labs   Lab 05/02/25  1428 05/02/25  1447 05/03/25  0312   WBC 10.77  --  8.43   HGB 13.1  --  11.0*   HCT 42.9   < > 37.7     --  286    < > = values in this interval not displayed.       Pending Diagnostic Studies:       Procedure Component Value Units Date/Time    HCV Virus Hold Specimen [[id number]] Collected: 05/02/25 1428    Order Status: Sent Lab Status: In process Updated: 05/02/25 1527    Specimen: Blood            Medications:  Reconciled Home Medications:      Medication List        START taking these medications      cefpodoxime 100 MG tablet  Commonly known as: VANTIN  Take 1 tablet (100 mg total) by mouth every 12 (twelve) hours. for 3 days     OLANZapine 2.5 MG tablet  Commonly known as: ZyPREXA  Take 1 tablet (2.5 mg total) by mouth daily as needed (non directable agitation and agression).            CONTINUE taking these medications      carvediloL 25 MG tablet  Commonly known as: COREG  Take 1 tablet (25 mg total) by mouth 2 (two) times daily with meals.     citalopram 20 MG tablet  Commonly known as: CeleXA  TAKE 1 TABLET BY MOUTH EVERY DAY     diclofenac sodium 1 % Gel  Commonly known as: VOLTAREN  Apply 2 g topically 2 (two) times  daily.     ferrous gluconate 324 MG tablet  Commonly known as: FERGON  Take 1 tablet (324 mg total) by mouth daily with breakfast.     mirtazapine 7.5 MG Tab  Commonly known as: REMERON  Take 7.5 mg by mouth every evening.     mucus clearing device  Commonly known as: AEROBIKA OSCILLATING PEP SYSTM  by Misc.(Non-Drug; Combo Route) route 2 (two) times a day. Use after nebulizer treatment     multivitamin capsule  Take 2 capsules by mouth once daily.     NIFEdipine 30 MG (OSM) 24 hr tablet  Commonly known as: PROCARDIA-XL  Take 1 tablet (30 mg total) by mouth once daily.     omeprazole 20 MG capsule  Commonly known as: PRILOSEC  Take 1 capsule (20 mg total) by mouth once daily.     potassium chloride 10 MEQ Cpsr  Commonly known as: MICRO-K  Take 2 capsules (20 mEq total) by mouth 2 (two) times daily.     sodium chloride 3% 3 % nebulizer solution  Take 4 mLs by nebulization 2 (two) times a day.            STOP taking these medications      azithromycin 250 MG tablet  Commonly known as: Z-TERRIE     predniSONE 10 MG tablet  Commonly known as: DELTASONE              Indwelling Lines/Drains at time of discharge:   Lines/Drains/Airways       None                   Time spent on the discharge of patient: 37 minutes         Yessica Walton PA-C  Department of Hospital Medicine  [facility] 11[facility]

## 2025-05-04 NOTE — PLAN OF CARE
Hospital follow-up appointment was scheduled by [facility].  Patient follow-up appointment was scheduled for 5/6/25 at 9:00 am at [facility] on [facility].        Roosevelt MILIAN, RSW  Case Management

## 2025-05-04 NOTE — PLAN OF CARE
[facility]  Discharge Assessment    Primary Care Provider: Aislinn, Primary Doctor    Admission Diagnosis: Screening for cardiovascular condition [Z13.6]  Chest pain [R07.9]  Hypotension [I95.9]    Admission Date: 5/2/2025  Expected Discharge Date: 5/4/2025    Payor: [employer] MEDICARE / Plan: [employer] MEDICARE HMO / Product Type: Capitation /     Extended Emergency Contact Information  Primary Emergency Contact: Mack Cottrell [place] [facility]  Mobile Phone: [phone]  Relation: Son    Secondary Emergency Contact: Erica Grace  [place]  Mobile Phone: [phone]  Relation: Daughter    Discharge Plan A: Home with family  Discharge Plan B: Home with family    [id number] [place] - [address]  [address] [place] [address]  Phone: [phone] Fax: [phone]    [id number] - [place] - [address] AT [facility]  [address]  [place] [address]  Phone: [phone] Fax: [phone]    Discharge Assessment (most recent)       BRIEF DISCHARGE ASSESSMENT - 05/04/25 1000          Discharge Planning    Assessment Type Discharge Planning Brief Assessment     Resource/Environmental Concerns none     Support Systems Children;Family members     Equipment Currently Used at Home oxygen     Current Living Arrangements home     Patient/Family Anticipates Transition to home with help/services   sitter    Patient/Family Anticipated Services at Transition none     DME Needed Upon Discharge  none     Discharge Plan A Home     Discharge Plan B Home                 Patient has 24 hour care at home with sitters.     Discharge Plan A and Plan B have been determined by review of patient's clinical status, future medical and therapeutic needs, and coverage/benefits for post-acute care in coordination with multidisciplinary team members.       Karen Angela RN  Weekend  - [facility]  [phone]

## 2025-05-04 NOTE — ASSESSMENT & PLAN NOTE
Patient's blood pressure range in the last 24 hours was: BP  Min: 134/60  Max: 174/74.The patient's inpatient anti-hypertensive regimen is listed below:  Current Antihypertensives  carvediloL tablet 25 mg, 2 times daily with meals, Oral  NIFEdipine 24 hr tablet 30 mg, Daily, Oral    Plan  - BP is controlled, no changes needed to their regimen

## 2025-05-04 NOTE — NURSING
Discharge instructions given and reviewed with patient.  Verbalized understanding.  Made aware of medication additions and changes.  Verbalized understanding.  PIV removed. Catheter tip intact.  Pressure applied.  No bleeding noted.  Telemetry discontinued and monitor returned. Respirations even and unlabored upon discharge.  No complaints of pain and discomfort.  Family at bedside for transportation home.

## 2025-05-06 ENCOUNTER — PATIENT OUTREACH (OUTPATIENT)
Dept: ADMINISTRATIVE | Facility: CLINIC | Age: OVER 89
End: 2025-05-06
Payer: MEDICARE

## 2025-05-06 NOTE — PROGRESS NOTES
C3 nurse attempted to contact Naheed Cottrell's daughter Erica for a TCC post hospital discharge follow up call. No answer.  No VM. The patient has a scheduled HOSFU appointment with Britta Madsen on 05/06/25 @ 0900.

## 2025-05-07 LAB
BACTERIA BLD CULT: NORMAL
BACTERIA BLD CULT: NORMAL

## 2025-05-07 NOTE — PROGRESS NOTES
C3 nurse spoke with Naheed Cottrell's daughter Erica for a TCC post hospital discharge follow up call. Pts. daughter canceled HOSPFU d/t pts. condition. Declined Home NP visit.

## 2025-05-09 ENCOUNTER — TELEPHONE (OUTPATIENT)
Dept: PSYCHIATRY | Facility: HOSPITAL | Age: OVER 89
End: 2025-05-09
Payer: MEDICARE

## 2025-05-09 NOTE — TELEPHONE ENCOUNTER
STAFF NOTE:  Called pt's daughter at 9:15 am, 5/9/25.    Pt had just been released from [facility] [place] 5/4/25 after 2 days on inpatient medical observation unit.  She was admitted for acute mental status changes and voiced SI.  On admission she was noted to be delirious with sleep disruption, agitation (at home with sitters), severe confusion and disorientation.  She was seen by our C-L consult service the next day and was noted to be relatively improved at that time.  She was no longer expressing SI, but did express frustration and sadness with her recent Dx of pulmonary fibrosis, which limits her physical function.    Daughter stated pt is currently at home and has been gradually improving.  She has care in her home 24/7.  She is on O2 at home due to the pulmonary fibrosis.  Daughter stated she becomes very anxious whenever she needs to leave the house.  Patient has not had any severe confusion or agitation and has not been expressing SI.      Pt has a NEW patient appt scheduled for today.  Will cancel this appt and reschedule for a later date, likely in June.  I told daughter we can help with any psych med refills she might need in the meantime.      Sunil Prado MD  Psychiatry Staff   [facility]

## (undated) DEVICE — DRESSING AQUACEL FOAM NON 4X4

## (undated) DEVICE — DRAPE OPTIMA MAJOR PEDIATRIC

## (undated) DEVICE — ADHESIVE DERMABOND ADVANCED